# Patient Record
Sex: FEMALE | Race: WHITE | Employment: OTHER | URBAN - METROPOLITAN AREA
[De-identification: names, ages, dates, MRNs, and addresses within clinical notes are randomized per-mention and may not be internally consistent; named-entity substitution may affect disease eponyms.]

---

## 2018-03-14 ENCOUNTER — TRANSFERRED RECORDS (OUTPATIENT)
Dept: HEALTH INFORMATION MANAGEMENT | Facility: CLINIC | Age: 63
End: 2018-03-14

## 2018-04-03 ENCOUNTER — APPOINTMENT (OUTPATIENT)
Dept: GENERAL RADIOLOGY | Facility: CLINIC | Age: 63
DRG: 470 | End: 2018-04-03
Attending: PHYSICIAN ASSISTANT
Payer: COMMERCIAL

## 2018-04-03 ENCOUNTER — ANESTHESIA (OUTPATIENT)
Dept: SURGERY | Facility: CLINIC | Age: 63
DRG: 470 | End: 2018-04-03
Payer: COMMERCIAL

## 2018-04-03 ENCOUNTER — APPOINTMENT (OUTPATIENT)
Dept: PHYSICAL THERAPY | Facility: CLINIC | Age: 63
DRG: 470 | End: 2018-04-03
Attending: ORTHOPAEDIC SURGERY
Payer: COMMERCIAL

## 2018-04-03 ENCOUNTER — ANESTHESIA EVENT (OUTPATIENT)
Dept: SURGERY | Facility: CLINIC | Age: 63
DRG: 470 | End: 2018-04-03
Payer: COMMERCIAL

## 2018-04-03 ENCOUNTER — HOSPITAL ENCOUNTER (INPATIENT)
Facility: CLINIC | Age: 63
LOS: 3 days | Discharge: HOME HEALTH | DRG: 470 | End: 2018-04-06
Attending: ORTHOPAEDIC SURGERY | Admitting: ORTHOPAEDIC SURGERY
Payer: COMMERCIAL

## 2018-04-03 DIAGNOSIS — Z96.651 S/P TOTAL KNEE REPLACEMENT USING CEMENT, RIGHT: Primary | ICD-10-CM

## 2018-04-03 LAB
CREAT SERPL-MCNC: 0.57 MG/DL (ref 0.52–1.04)
GFR SERPL CREATININE-BSD FRML MDRD: >90 ML/MIN/1.7M2
PLATELET # BLD AUTO: 254 10E9/L (ref 150–450)
POTASSIUM SERPL-SCNC: 3.8 MMOL/L (ref 3.4–5.3)

## 2018-04-03 PROCEDURE — 25000128 H RX IP 250 OP 636: Performed by: PHYSICIAN ASSISTANT

## 2018-04-03 PROCEDURE — 25000128 H RX IP 250 OP 636

## 2018-04-03 PROCEDURE — 36000093 ZZH SURGERY LEVEL 4 1ST 30 MIN: Performed by: ORTHOPAEDIC SURGERY

## 2018-04-03 PROCEDURE — 25000128 H RX IP 250 OP 636: Performed by: ANESTHESIOLOGY

## 2018-04-03 PROCEDURE — 27810169 ZZH OR IMPLANT GENERAL: Performed by: ORTHOPAEDIC SURGERY

## 2018-04-03 PROCEDURE — 36000063 ZZH SURGERY LEVEL 4 EA 15 ADDTL MIN: Performed by: ORTHOPAEDIC SURGERY

## 2018-04-03 PROCEDURE — 85049 AUTOMATED PLATELET COUNT: CPT | Performed by: PHYSICIAN ASSISTANT

## 2018-04-03 PROCEDURE — 82565 ASSAY OF CREATININE: CPT | Performed by: PHYSICIAN ASSISTANT

## 2018-04-03 PROCEDURE — 25000128 H RX IP 250 OP 636: Performed by: ORTHOPAEDIC SURGERY

## 2018-04-03 PROCEDURE — 36415 COLL VENOUS BLD VENIPUNCTURE: CPT | Performed by: ANESTHESIOLOGY

## 2018-04-03 PROCEDURE — C1776 JOINT DEVICE (IMPLANTABLE): HCPCS | Performed by: ORTHOPAEDIC SURGERY

## 2018-04-03 PROCEDURE — 97530 THERAPEUTIC ACTIVITIES: CPT | Mod: GP

## 2018-04-03 PROCEDURE — 37000008 ZZH ANESTHESIA TECHNICAL FEE, 1ST 30 MIN: Performed by: ORTHOPAEDIC SURGERY

## 2018-04-03 PROCEDURE — 97116 GAIT TRAINING THERAPY: CPT | Mod: GP

## 2018-04-03 PROCEDURE — 84132 ASSAY OF SERUM POTASSIUM: CPT | Performed by: ANESTHESIOLOGY

## 2018-04-03 PROCEDURE — 27110028 ZZH OR GENERAL SUPPLY NON-STERILE: Performed by: ORTHOPAEDIC SURGERY

## 2018-04-03 PROCEDURE — 27210995 ZZH RX 272: Performed by: ORTHOPAEDIC SURGERY

## 2018-04-03 PROCEDURE — 25000132 ZZH RX MED GY IP 250 OP 250 PS 637: Performed by: PHYSICIAN ASSISTANT

## 2018-04-03 PROCEDURE — 71000013 ZZH RECOVERY PHASE 1 LEVEL 1 EA ADDTL HR: Performed by: ORTHOPAEDIC SURGERY

## 2018-04-03 PROCEDURE — 40000193 ZZH STATISTIC PT WARD VISIT

## 2018-04-03 PROCEDURE — 97161 PT EVAL LOW COMPLEX 20 MIN: CPT | Mod: GP

## 2018-04-03 PROCEDURE — 0SRC0J9 REPLACEMENT OF RIGHT KNEE JOINT WITH SYNTHETIC SUBSTITUTE, CEMENTED, OPEN APPROACH: ICD-10-PCS | Performed by: ORTHOPAEDIC SURGERY

## 2018-04-03 PROCEDURE — 71000012 ZZH RECOVERY PHASE 1 LEVEL 1 FIRST HR: Performed by: ORTHOPAEDIC SURGERY

## 2018-04-03 PROCEDURE — 40000171 ZZH STATISTIC PRE-PROCEDURE ASSESSMENT III: Performed by: ORTHOPAEDIC SURGERY

## 2018-04-03 PROCEDURE — 25000125 ZZHC RX 250

## 2018-04-03 PROCEDURE — 97110 THERAPEUTIC EXERCISES: CPT | Mod: GP

## 2018-04-03 PROCEDURE — 12000007 ZZH R&B INTERMEDIATE

## 2018-04-03 PROCEDURE — 25800025 ZZH RX 258: Performed by: ORTHOPAEDIC SURGERY

## 2018-04-03 PROCEDURE — 40000986 XR KNEE PORT RT 1/2 VW: Mod: RT

## 2018-04-03 PROCEDURE — 37000009 ZZH ANESTHESIA TECHNICAL FEE, EACH ADDTL 15 MIN: Performed by: ORTHOPAEDIC SURGERY

## 2018-04-03 PROCEDURE — 27210794 ZZH OR GENERAL SUPPLY STERILE: Performed by: ORTHOPAEDIC SURGERY

## 2018-04-03 PROCEDURE — 36416 COLLJ CAPILLARY BLOOD SPEC: CPT | Performed by: PHYSICIAN ASSISTANT

## 2018-04-03 DEVICE — IMPLANTABLE DEVICE: Type: IMPLANTABLE DEVICE | Site: KNEE | Status: FUNCTIONAL

## 2018-04-03 DEVICE — BONE CEMENT STRK SIMPLEX P SPEEDSET 6192-1-001: Type: IMPLANTABLE DEVICE | Site: KNEE | Status: FUNCTIONAL

## 2018-04-03 RX ORDER — ACETAMINOPHEN 325 MG/1
650 TABLET ORAL EVERY 4 HOURS PRN
Status: DISCONTINUED | OUTPATIENT
Start: 2018-04-06 | End: 2018-04-06 | Stop reason: HOSPADM

## 2018-04-03 RX ORDER — SODIUM CHLORIDE, SODIUM LACTATE, POTASSIUM CHLORIDE, CALCIUM CHLORIDE 600; 310; 30; 20 MG/100ML; MG/100ML; MG/100ML; MG/100ML
INJECTION, SOLUTION INTRAVENOUS CONTINUOUS
Status: DISCONTINUED | OUTPATIENT
Start: 2018-04-03 | End: 2018-04-03 | Stop reason: HOSPADM

## 2018-04-03 RX ORDER — EPHEDRINE SULFATE 50 MG/ML
INJECTION, SOLUTION INTRAMUSCULAR; INTRAVENOUS; SUBCUTANEOUS PRN
Status: DISCONTINUED | OUTPATIENT
Start: 2018-04-03 | End: 2018-04-03

## 2018-04-03 RX ORDER — GLYCOPYRROLATE 0.2 MG/ML
INJECTION, SOLUTION INTRAMUSCULAR; INTRAVENOUS PRN
Status: DISCONTINUED | OUTPATIENT
Start: 2018-04-03 | End: 2018-04-03

## 2018-04-03 RX ORDER — PROPOFOL 10 MG/ML
INJECTION, EMULSION INTRAVENOUS CONTINUOUS PRN
Status: DISCONTINUED | OUTPATIENT
Start: 2018-04-03 | End: 2018-04-03

## 2018-04-03 RX ORDER — MULTIPLE VITAMINS W/ MINERALS TAB 9MG-400MCG
1 TAB ORAL AT BEDTIME
Status: DISCONTINUED | OUTPATIENT
Start: 2018-04-03 | End: 2018-04-06 | Stop reason: HOSPADM

## 2018-04-03 RX ORDER — NALOXONE HYDROCHLORIDE 0.4 MG/ML
.1-.4 INJECTION, SOLUTION INTRAMUSCULAR; INTRAVENOUS; SUBCUTANEOUS
Status: DISCONTINUED | OUTPATIENT
Start: 2018-04-03 | End: 2018-04-06 | Stop reason: HOSPADM

## 2018-04-03 RX ORDER — FENTANYL CITRATE 50 UG/ML
INJECTION, SOLUTION INTRAMUSCULAR; INTRAVENOUS PRN
Status: DISCONTINUED | OUTPATIENT
Start: 2018-04-03 | End: 2018-04-03

## 2018-04-03 RX ORDER — DEXAMETHASONE SODIUM PHOSPHATE 4 MG/ML
INJECTION, SOLUTION INTRA-ARTICULAR; INTRALESIONAL; INTRAMUSCULAR; INTRAVENOUS; SOFT TISSUE PRN
Status: DISCONTINUED | OUTPATIENT
Start: 2018-04-03 | End: 2018-04-03

## 2018-04-03 RX ORDER — ACETAMINOPHEN 500 MG
1000 TABLET ORAL ONCE
Status: COMPLETED | OUTPATIENT
Start: 2018-04-03 | End: 2018-04-03

## 2018-04-03 RX ORDER — PROCHLORPERAZINE MALEATE 10 MG
10 TABLET ORAL EVERY 6 HOURS PRN
Status: DISCONTINUED | OUTPATIENT
Start: 2018-04-03 | End: 2018-04-06 | Stop reason: HOSPADM

## 2018-04-03 RX ORDER — PROPOFOL 10 MG/ML
INJECTION, EMULSION INTRAVENOUS PRN
Status: DISCONTINUED | OUTPATIENT
Start: 2018-04-03 | End: 2018-04-03

## 2018-04-03 RX ORDER — DIAZEPAM 5 MG
5 TABLET ORAL EVERY 6 HOURS PRN
Status: DISCONTINUED | OUTPATIENT
Start: 2018-04-03 | End: 2018-04-06 | Stop reason: HOSPADM

## 2018-04-03 RX ORDER — NALOXONE HYDROCHLORIDE 0.4 MG/ML
.1-.4 INJECTION, SOLUTION INTRAMUSCULAR; INTRAVENOUS; SUBCUTANEOUS
Status: DISCONTINUED | OUTPATIENT
Start: 2018-04-03 | End: 2018-04-04

## 2018-04-03 RX ORDER — MAGNESIUM HYDROXIDE 1200 MG/15ML
LIQUID ORAL PRN
Status: DISCONTINUED | OUTPATIENT
Start: 2018-04-03 | End: 2018-04-03

## 2018-04-03 RX ORDER — AMLODIPINE BESYLATE 5 MG/1
5 TABLET ORAL EVERY MORNING
Status: DISCONTINUED | OUTPATIENT
Start: 2018-04-04 | End: 2018-04-06 | Stop reason: HOSPADM

## 2018-04-03 RX ORDER — ONDANSETRON 4 MG/1
4 TABLET, ORALLY DISINTEGRATING ORAL EVERY 6 HOURS PRN
Status: DISCONTINUED | OUTPATIENT
Start: 2018-04-03 | End: 2018-04-06 | Stop reason: HOSPADM

## 2018-04-03 RX ORDER — AMOXICILLIN 250 MG
1 CAPSULE ORAL 2 TIMES DAILY
Status: DISCONTINUED | OUTPATIENT
Start: 2018-04-03 | End: 2018-04-06 | Stop reason: HOSPADM

## 2018-04-03 RX ORDER — MULTIVIT WITH MINERALS/LUTEIN
1 TABLET ORAL AT BEDTIME
COMMUNITY
End: 2021-01-01

## 2018-04-03 RX ORDER — ONDANSETRON 2 MG/ML
4 INJECTION INTRAMUSCULAR; INTRAVENOUS EVERY 30 MIN PRN
Status: DISCONTINUED | OUTPATIENT
Start: 2018-04-03 | End: 2018-04-03 | Stop reason: HOSPADM

## 2018-04-03 RX ORDER — ZOLPIDEM TARTRATE 5 MG/1
5 TABLET ORAL
Status: DISCONTINUED | OUTPATIENT
Start: 2018-04-04 | End: 2018-04-06 | Stop reason: HOSPADM

## 2018-04-03 RX ORDER — AMOXICILLIN 250 MG
2 CAPSULE ORAL 2 TIMES DAILY
Status: DISCONTINUED | OUTPATIENT
Start: 2018-04-03 | End: 2018-04-06 | Stop reason: HOSPADM

## 2018-04-03 RX ORDER — HYDROMORPHONE HYDROCHLORIDE 1 MG/ML
.3-.5 INJECTION, SOLUTION INTRAMUSCULAR; INTRAVENOUS; SUBCUTANEOUS EVERY 5 MIN PRN
Status: DISCONTINUED | OUTPATIENT
Start: 2018-04-03 | End: 2018-04-03 | Stop reason: HOSPADM

## 2018-04-03 RX ORDER — CEFAZOLIN SODIUM 2 G/100ML
2 INJECTION, SOLUTION INTRAVENOUS EVERY 8 HOURS
Status: COMPLETED | OUTPATIENT
Start: 2018-04-03 | End: 2018-04-04

## 2018-04-03 RX ORDER — CEFAZOLIN SODIUM 2 G/100ML
2 INJECTION, SOLUTION INTRAVENOUS
Status: DISCONTINUED | OUTPATIENT
Start: 2018-04-03 | End: 2018-04-03 | Stop reason: HOSPADM

## 2018-04-03 RX ORDER — ACETAMINOPHEN 325 MG/1
975 TABLET ORAL EVERY 8 HOURS
Status: DISCONTINUED | OUTPATIENT
Start: 2018-04-03 | End: 2018-04-06 | Stop reason: HOSPADM

## 2018-04-03 RX ORDER — FENTANYL CITRATE 50 UG/ML
25-50 INJECTION, SOLUTION INTRAMUSCULAR; INTRAVENOUS EVERY 5 MIN PRN
Status: DISCONTINUED | OUTPATIENT
Start: 2018-04-03 | End: 2018-04-03 | Stop reason: HOSPADM

## 2018-04-03 RX ORDER — HYDROMORPHONE HYDROCHLORIDE 2 MG/1
2-4 TABLET ORAL
Status: DISCONTINUED | OUTPATIENT
Start: 2018-04-03 | End: 2018-04-06 | Stop reason: HOSPADM

## 2018-04-03 RX ORDER — SODIUM CHLORIDE 9 MG/ML
INJECTION, SOLUTION INTRAVENOUS CONTINUOUS
Status: DISCONTINUED | OUTPATIENT
Start: 2018-04-03 | End: 2018-04-06 | Stop reason: HOSPADM

## 2018-04-03 RX ORDER — HYDROXYZINE HYDROCHLORIDE 25 MG/1
25 TABLET, FILM COATED ORAL EVERY 6 HOURS PRN
Status: DISCONTINUED | OUTPATIENT
Start: 2018-04-03 | End: 2018-04-06 | Stop reason: HOSPADM

## 2018-04-03 RX ORDER — OXYCODONE HCL 10 MG/1
10 TABLET, FILM COATED, EXTENDED RELEASE ORAL ONCE
Status: COMPLETED | OUTPATIENT
Start: 2018-04-03 | End: 2018-04-03

## 2018-04-03 RX ORDER — METOCLOPRAMIDE HYDROCHLORIDE 5 MG/ML
10 INJECTION INTRAMUSCULAR; INTRAVENOUS EVERY 6 HOURS PRN
Status: DISCONTINUED | OUTPATIENT
Start: 2018-04-03 | End: 2018-04-06 | Stop reason: HOSPADM

## 2018-04-03 RX ORDER — LIDOCAINE 40 MG/G
CREAM TOPICAL
Status: DISCONTINUED | OUTPATIENT
Start: 2018-04-03 | End: 2018-04-06 | Stop reason: HOSPADM

## 2018-04-03 RX ORDER — ONDANSETRON 4 MG/1
4 TABLET, ORALLY DISINTEGRATING ORAL EVERY 30 MIN PRN
Status: DISCONTINUED | OUTPATIENT
Start: 2018-04-03 | End: 2018-04-03 | Stop reason: HOSPADM

## 2018-04-03 RX ORDER — CEFAZOLIN SODIUM 1 G
1 VIAL (EA) INJECTION SEE ADMIN INSTRUCTIONS
Status: DISCONTINUED | OUTPATIENT
Start: 2018-04-03 | End: 2018-04-03 | Stop reason: HOSPADM

## 2018-04-03 RX ORDER — ONDANSETRON 2 MG/ML
4 INJECTION INTRAMUSCULAR; INTRAVENOUS EVERY 6 HOURS PRN
Status: DISCONTINUED | OUTPATIENT
Start: 2018-04-03 | End: 2018-04-06 | Stop reason: HOSPADM

## 2018-04-03 RX ORDER — ONDANSETRON 2 MG/ML
INJECTION INTRAMUSCULAR; INTRAVENOUS PRN
Status: DISCONTINUED | OUTPATIENT
Start: 2018-04-03 | End: 2018-04-03

## 2018-04-03 RX ORDER — METOCLOPRAMIDE 10 MG/1
10 TABLET ORAL EVERY 6 HOURS PRN
Status: DISCONTINUED | OUTPATIENT
Start: 2018-04-03 | End: 2018-04-06 | Stop reason: HOSPADM

## 2018-04-03 RX ORDER — CEFAZOLIN SODIUM 2 G/100ML
2 INJECTION, SOLUTION INTRAVENOUS
Status: COMPLETED | OUTPATIENT
Start: 2018-04-03 | End: 2018-04-03

## 2018-04-03 RX ORDER — HYDROMORPHONE HYDROCHLORIDE 1 MG/ML
.3-.5 INJECTION, SOLUTION INTRAMUSCULAR; INTRAVENOUS; SUBCUTANEOUS
Status: DISCONTINUED | OUTPATIENT
Start: 2018-04-03 | End: 2018-04-06 | Stop reason: HOSPADM

## 2018-04-03 RX ORDER — PANTOPRAZOLE SODIUM 40 MG/1
40 TABLET, DELAYED RELEASE ORAL ONCE
Status: COMPLETED | OUTPATIENT
Start: 2018-04-03 | End: 2018-04-03

## 2018-04-03 RX ORDER — NEOSTIGMINE METHYLSULFATE 1 MG/ML
VIAL (ML) INJECTION PRN
Status: DISCONTINUED | OUTPATIENT
Start: 2018-04-03 | End: 2018-04-03

## 2018-04-03 RX ORDER — METOPROLOL SUCCINATE 50 MG/1
50 TABLET, EXTENDED RELEASE ORAL EVERY MORNING
Status: DISCONTINUED | OUTPATIENT
Start: 2018-04-04 | End: 2018-04-06 | Stop reason: HOSPADM

## 2018-04-03 RX ORDER — OXYCODONE HCL 10 MG/1
10 TABLET, FILM COATED, EXTENDED RELEASE ORAL EVERY 12 HOURS
Status: DISPENSED | OUTPATIENT
Start: 2018-04-03 | End: 2018-04-05

## 2018-04-03 RX ORDER — LABETALOL HYDROCHLORIDE 5 MG/ML
10 INJECTION, SOLUTION INTRAVENOUS
Status: DISCONTINUED | OUTPATIENT
Start: 2018-04-03 | End: 2018-04-03 | Stop reason: HOSPADM

## 2018-04-03 RX ORDER — LIDOCAINE HYDROCHLORIDE 20 MG/ML
INJECTION, SOLUTION INFILTRATION; PERINEURAL PRN
Status: DISCONTINUED | OUTPATIENT
Start: 2018-04-03 | End: 2018-04-03

## 2018-04-03 RX ADMIN — PROPOFOL 150 MCG/KG/MIN: 10 INJECTION, EMULSION INTRAVENOUS at 10:04

## 2018-04-03 RX ADMIN — DEXAMETHASONE SODIUM PHOSPHATE 4 MG: 4 INJECTION, SOLUTION INTRA-ARTICULAR; INTRALESIONAL; INTRAMUSCULAR; INTRAVENOUS; SOFT TISSUE at 10:11

## 2018-04-03 RX ADMIN — MIDAZOLAM 2 MG: 1 INJECTION INTRAMUSCULAR; INTRAVENOUS at 09:59

## 2018-04-03 RX ADMIN — PHENYLEPHRINE HYDROCHLORIDE 100 MCG: 10 INJECTION, SOLUTION INTRAMUSCULAR; INTRAVENOUS; SUBCUTANEOUS at 10:20

## 2018-04-03 RX ADMIN — DEXMEDETOMIDINE HYDROCHLORIDE 0.3 MCG/KG/HR: 100 INJECTION, SOLUTION INTRAVENOUS at 10:03

## 2018-04-03 RX ADMIN — FENTANYL CITRATE 50 MCG: 50 INJECTION, SOLUTION INTRAMUSCULAR; INTRAVENOUS at 10:03

## 2018-04-03 RX ADMIN — HYDROMORPHONE HYDROCHLORIDE 0.5 MG: 1 INJECTION, SOLUTION INTRAMUSCULAR; INTRAVENOUS; SUBCUTANEOUS at 11:58

## 2018-04-03 RX ADMIN — Medication 10 MG: at 10:35

## 2018-04-03 RX ADMIN — ROCURONIUM BROMIDE 50 MG: 10 INJECTION INTRAVENOUS at 10:03

## 2018-04-03 RX ADMIN — NEOSTIGMINE METHYLSULFATE 5 MG: 1 INJECTION, SOLUTION INTRAVENOUS at 11:07

## 2018-04-03 RX ADMIN — SODIUM CHLORIDE: 9 INJECTION, SOLUTION INTRAVENOUS at 12:56

## 2018-04-03 RX ADMIN — Medication 10 MG: at 10:25

## 2018-04-03 RX ADMIN — SODIUM CHLORIDE, POTASSIUM CHLORIDE, SODIUM LACTATE AND CALCIUM CHLORIDE: 600; 310; 30; 20 INJECTION, SOLUTION INTRAVENOUS at 09:24

## 2018-04-03 RX ADMIN — CEFAZOLIN SODIUM 2 G: 2 INJECTION, SOLUTION INTRAVENOUS at 10:19

## 2018-04-03 RX ADMIN — ONDANSETRON 4 MG: 2 INJECTION INTRAMUSCULAR; INTRAVENOUS at 10:46

## 2018-04-03 RX ADMIN — HYDROXYZINE HYDROCHLORIDE 25 MG: 25 TABLET ORAL at 18:09

## 2018-04-03 RX ADMIN — ROPIVACAINE HYDROCHLORIDE 25 ML GIVEN: 5 INJECTION, SOLUTION EPIDURAL; INFILTRATION; PERINEURAL at 09:16

## 2018-04-03 RX ADMIN — PANTOPRAZOLE SODIUM 40 MG: 40 TABLET, DELAYED RELEASE ORAL at 08:36

## 2018-04-03 RX ADMIN — MIDAZOLAM HYDROCHLORIDE 2 MG: 1 INJECTION, SOLUTION INTRAMUSCULAR; INTRAVENOUS at 09:26

## 2018-04-03 RX ADMIN — GLYCOPYRROLATE 0.8 MG: 0.2 INJECTION, SOLUTION INTRAMUSCULAR; INTRAVENOUS at 11:07

## 2018-04-03 RX ADMIN — PROPOFOL 50 MG: 10 INJECTION, EMULSION INTRAVENOUS at 11:01

## 2018-04-03 RX ADMIN — FENTANYL CITRATE 50 MCG: 50 INJECTION, SOLUTION INTRAMUSCULAR; INTRAVENOUS at 10:22

## 2018-04-03 RX ADMIN — SENNOSIDES AND DOCUSATE SODIUM 1 TABLET: 8.6; 5 TABLET ORAL at 22:13

## 2018-04-03 RX ADMIN — ACETAMINOPHEN 1000 MG: 500 TABLET, FILM COATED ORAL at 08:35

## 2018-04-03 RX ADMIN — LIDOCAINE HYDROCHLORIDE 100 MG: 20 INJECTION, SOLUTION INFILTRATION; PERINEURAL at 10:03

## 2018-04-03 RX ADMIN — HYDROMORPHONE HYDROCHLORIDE 0.5 MG: 1 INJECTION, SOLUTION INTRAMUSCULAR; INTRAVENOUS; SUBCUTANEOUS at 13:07

## 2018-04-03 RX ADMIN — OXYCODONE HYDROCHLORIDE 10 MG: 10 TABLET, FILM COATED, EXTENDED RELEASE ORAL at 08:35

## 2018-04-03 RX ADMIN — ACETAMINOPHEN 975 MG: 325 TABLET, FILM COATED ORAL at 22:13

## 2018-04-03 RX ADMIN — MULTIPLE VITAMINS W/ MINERALS TAB 1 TABLET: TAB at 22:14

## 2018-04-03 RX ADMIN — PROPOFOL 200 MG: 10 INJECTION, EMULSION INTRAVENOUS at 10:03

## 2018-04-03 RX ADMIN — Medication 10 MG: at 10:50

## 2018-04-03 RX ADMIN — CEFAZOLIN SODIUM 2 G: 2 INJECTION, SOLUTION INTRAVENOUS at 18:09

## 2018-04-03 RX ADMIN — SODIUM CHLORIDE, POTASSIUM CHLORIDE, SODIUM LACTATE AND CALCIUM CHLORIDE: 600; 310; 30; 20 INJECTION, SOLUTION INTRAVENOUS at 10:30

## 2018-04-03 NOTE — BRIEF OP NOTE
[facility] Brief Operative Note    Pre-operative diagnosis: END STAGE OSTEOARTHRITIS RIGHT KNEE   Post-operative diagnosis: Same   Procedure: Procedure(s):  TOTAL ARTHROPLASTY RIGHT KNEE - ZAHIRA HIGHTOWER   Surgeon: Amy Landers MD   Assistant(s): Franny Hill PA-C   Estimated blood loss: Less than 20 ml   Drains:  Specimens:                             Medium Hemovac  None   Condition: Stable

## 2018-04-03 NOTE — IP AVS SNAPSHOT
[address] Special[facility]    [address] [place] [address]    Phone:  [phone]                                       After Visit Summary   4/3/2018    Sarahy Dunbar    MRN: [id number]           After Visit Summary Signature Page     I have received my discharge instructions, and my questions have been answered. I have discussed any challenges I see with this plan with the nurse or doctor.    ..........................................................................................................................................  Patient/Patient Representative Signature      ..........................................................................................................................................  Patient Representative Print Name and Relationship to Patient    ..................................................               ................................................  Date                                            Time    ..........................................................................................................................................  Reviewed by Signature/Title    ...................................................              ..............................................  Date                                                            Time

## 2018-04-03 NOTE — IP AVS SNAPSHOT
[place] [address] [facility]: [phone]                                              INTERAGENCY TRANSFER FORM - PHYSICIAN ORDERS   4/3/2018                    Hospital Admission Date: 4/3/2018  SYED VAZQUEZ   : 1955  Sex: Female        Attending Provider: Amy Landers MD     Allergies:  Lisinopril-hydrochlorothiazide    Infection:  None   Service:  SURGERY    Ht:  1.524 m (5')   Wt:  104.3 kg (230 lb)   Admission Wt:  104.3 kg (230 lb)    BMI:  44.92 kg/m 2   BSA:  2.1 m 2            Patient PCP Information     Provider PCP Type    Rama Pop General      ED Clinical Impression     Diagnosis Description Comment Added By Time Added    S/P total knee replacement using cement, right [Z96.651] S/P total knee replacement using cement, right [Z96.651]  Franny Hill PA-C 2018  6:50 AM      Hospital Problems as of 2018              Priority Class Noted POA    S/P total knee replacement using cement, right Medium  4/3/2018 Yes      Non-Hospital Problems as of 2018              Priority Class Noted    Other postprocedural status(V45.89) Medium  3/31/2006    Place of occurrence, home Medium  3/31/2006    Accidental fall on or from other stairs or steps Medium  3/31/2006    Left ankle pain Medium  2012    Papulovesicular rash B/l LE Medium  2012    S/P total knee replacement using cement, left Medium  2016      Code Status History     Date Active Date Inactive Code Status Order ID Comments User Context    2012  8:49 AM  Full Code [id number]  Lonnie Aaron MD Outpatient    2012  6:01 PM 2012  8:49 AM Full Code [id number]  Zaida Ho, RN Inpatient    2012  1:04 AM 2012  6:01 PM Full Code [id number]  Deepa Noland, RN Inpatient         Medication Review      CONTINUE these medications which have NOT CHANGED        Dose / Directions Comments    AMLODIPINE BESYLATE PO        Dose:  5 mg   Take 5 mg by  mouth every morning   Refills:  0        CENTRUM SILVER per tablet        Dose:  1 tablet   Take 1 tablet by mouth At Bedtime   Refills:  0        METOPROLOL SUCCINATE ER PO        Dose:  50 mg   Take 50 mg by mouth every morning   Refills:  0        TYLENOL PO        Dose:  650 mg   Take 650 mg by mouth every 4 hours as needed for mild pain or fever   Refills:  0                  Further instructions from your care team       Home care arranged by:  [facility]  Phone number #[phone]  Fax [phone]      Summary of Visit     Reason for your hospital stay       Total Knee Arthroplasty             After Care     Activity       Your activity upon discharge: activity as tolerated       Diet       Follow this diet upon discharge: Regular       Wound care and dressings       Instructions to care for your wound at home: daily dressing changes as needed.  May shower with incision uncovered. No soaking or bathing for 2 weeks.             Referrals     Home Care PT Referral for Hospital Discharge       PT to eval and treat    Your provider has ordered home care - physical therapy. If you have not been contacted within 2 days of your discharge please call the department phone number listed on the top of this document.              MD face to face encounter       Documentation of Face to Face and Certification for Home Health Services    I certify that patient: Sarahy Dunbar is under my care and that I, or a nurse practitioner or physician's assistant working with me, had a face-to-face encounter that meets the physician face-to-face encounter requirements with this patient on: 4/6/2018.    This encounter with the patient was in whole, or in part, for the following medical condition, which is the primary reason for home health care: total knee arthroplasty.    I certify that, based on my findings, the following services are medically necessary home health services: Physical Therapy.    My clinical findings  support the need for the above services because: Physical Therapy Services are needed to assess and treat the following functional impairments: limited mobility and ambulation following TKA.    Further, I certify that my clinical findings support that this patient is homebound (i.e. absences from home require considerable and taxing effort and are for medical reasons or [employer] services or infrequently or of short duration when for other reasons) because: Requires assistance of another person or specialized equipment to access medical services because patient: Has prohibitive pain during ambulation.    Based on the above findings. I certify that this patient is confined to the home and needs intermittent skilled nursing care, physical therapy and/or speech therapy.  The patient is under my care, and I have initiated the establishment of the plan of care.  This patient will be followed by a physician who will periodically review the plan of care.  Physician/Provider to provide follow up care: Rama Pop    Attending hospital physician (the Medicare certified PECOS provider): Franny Hill  Physician Signature: See electronic signature associated with these discharge orders.  Date: 4/6/2018                  Follow-Up Appointment Instructions     Future Labs/Procedures    Follow-up and recommended labs and tests     Comments:    Follow up with Dr. Landers at [facility] within 2 weeks to evaluate after surgery. No follow up labs or test are needed.      Follow-Up Appointment Instructions     Follow-up and recommended labs and tests       Follow up with Dr. Landers at [facility] within 2 weeks to evaluate after surgery. No follow up labs or test are needed.             Statement of Approval     Ordered          04/06/18 0650  I have reviewed and agree with all the recommendations and orders detailed in this document.  EFFECTIVE NOW     Approved and electronically  signed by:  Franny Hill PA-C

## 2018-04-03 NOTE — OP NOTE
Procedure Date: 04/03/2018      SURGEON:  Amy Landers MD      ASSISTANT:  MANUELITO Treadwell PA-C, was required for retraction of soft tissues, protection of neurovascular structures, visualization and positioning of the leg.      PREOPERATIVE DIAGNOSIS:  Right knee endstage osteoarthritis.      POSTOPERATIVE DIAGNOSIS:  Right knee endstage osteoarthritis.      PROCEDURE:  Total knee arthroplasty, right.      IMPLANTS:  DePuy Attune size 4 femur, size 4 tibia, size 10 mm polyethylene insert, size 35 mm patellar button.      INDICATIONS:  Sarahy Dunbar is a 62-year-old woman with progressive end-stage osteoarthritis of her right knee.  She has had extensive nonoperative treatments and these have failed to provide her with persistent or lasting relief.  Risks, benefits and alternatives of continued nonoperative and operative intervention, specifically knee replacement, were discussed with her and her questions were answered.  She elected to proceed with knee replacement.      DESCRIPTION OF PROCEDURE:  After informed consent was obtained and operative site was blocked, a femoral nerve block was administered in the preoperative holding suite.  She was then brought to the operating room where general anesthetic via ET tube was placed by Anesthesia.  Her right lower extremity was then prepped and draped in the usual sterile fashion.  A timeout was taken to confirm operative site, antibiotic administration and procedure.  Her leg was exsanguinated and the tourniquet insufflated to 300 mmHg.        A standard anterior incision was made on the front of the knee, sharp dissection carried out through skin and subcutaneous fat.  A medial parapatellar arthrotomy was created.  The patella was translated laterally and the knee was brought into flexion.  At this time, the anterior horns of the menisci and fat pad were resected.  Femoral intramedullary drill was used to find the  intramedullary canal.  A canal-based distal femoral articular surfacing cutting jig was secured in place and 9-mm was resected.  At this time, a sizing jig was placed on this freshly cut surface, measuring her femur to a size 4.  A 4 cutting jig was secured, subsequently checked and agreed upon as the correct size.  Therefore, anterior, posterior and chamfer cuts, followed by a PCL sacrificing box cut of the femur, were carried out.        Attention was then turned to the tibia where an extramedullary guide was fashioned, correcting for coronal and sagittal alignment.  Approximately 4 mm of proximal tibial articular surface referencing off the medial tibial plateau was planned to be resected.  The jig was secured in place and the cut was performed.  The remainder of the menisci, cruciate ligaments and articular surface proximally were removed.  On this freshly removed surface, sizing of the tibia was carried out and she was found to give a 4.  The 4 was then rotationally aligned, secured in place and a tibial pin punched.  At this time, a trial polyethylene insert was positioned until a 10 was found to give full extension and flexion and stability throughout the range of motion.  With the knee in extension, the articular surface of the patella was then subsequently removed with a saw, sized and punched.  All the trials were then in place and the knee was taken through a range of motion and cycled.  The patella was found to track normally free of any external input and the knee was stable with full range of motion.  All the trials were removed.  The bone ends were irrigated, dried and prepared for final cementation.  Final components were cemented into place and further irrigation was then performed.  A deep Hemovac drain was then positioned.  Arthrotomy was closed, followed by layered wound closure of skin and application of sterile dressing.  She was then extubated and taken to PACU in stable condition after the  tourniquet had been let down.        ESTIMATED BLOOD LOSS:  Minimal.      COMPLICATIONS:  None apparent.      POSTOPERATIVE PLAN:  Once the block wears off, she can bear weight as tolerated.  She has unrestricted range of motion, ice, elevation, intermittent use of oral narcotics for pain and daily aspirin.  Intravenous followed by oral narcotics for pain.  She will use Lovenox for DVT prophylaxis in house and discharge home on twice daily aspirin.  Anticipate discharge on postoperative day 2 or 3 to significant nursing home for rehabilitative services.         SHAYNA SMITH MD             D: 2018   T: 2018   MT: CEZAR      Name:     SYED VAZQUEZ   MRN:      [id number]        Account:        [id number]   :      1955           Procedure Date: 2018      Document: [id number]

## 2018-04-03 NOTE — PLAN OF CARE
Problem: Patient Care Overview  Goal: Plan of Care/Patient Progress Review  PT:  Discharge Planner PT   Patient plan for discharge: Return home with spouse assist for a few days and home PT if at all possible  Current status: Orders received, eval completed, treatment initiated. Pt is POD 0 R TKA. Prior to admit pt was living with spouse in a split level house, no AD use, independent with mobility. Currently requires min A for supine to/from sit, CGA sit to/from stand with FWW, CGA for gait of 8 ftx2 with FWW with KI donned and no buckling noted. Independent pericares in bathroom standing with SBA. Participated well in LE strengthening and ROM activities. Pt demonstrates pain, dec strength, balance, activity tolerance, ROM and difficulty ambulating and transferring and would benefit from skilled PT services in order to improve this. AAROM R knee 25-85 deg.  Barriers to return to prior living situation: Has not yet done stairs, short distance walking only done thus far  Recommendations for discharge: Anticipate pt will be able to return home with spouse assist and home PT  Rationale for recommendations: Pt would benefit from continued PT to improve strength, balance, mobility to increase independence, reduce falls risk and increase safety before returning home.       Entered by: Trinh Simpson 04/03/2018 4:44 PM

## 2018-04-03 NOTE — PROGRESS NOTES
Admission medication history interview status for the 4/3/2018  admission is complete. See EPIC admission navigator for prior to admission medications     Medication history source reliability:Good    Medication history interview source(s):Patient    Medication history resources (including written lists, pill bottles, clinic record):Patient emailed in a med list prior to day of procedure.    Primary pharmacy.[facility], [place], ([place])    Additional medication history information not noted on PTA med list :None    Time spent in this activity: 30 minutes    Prior to Admission medications    Medication Sig Last Dose Taking? Auth Provider   Acetaminophen (TYLENOL PO) Take 650 mg by mouth every 4 hours as needed for mild pain or fever 4/2/2018 at pm Yes Reported, Patient   Multiple Vitamins-Minerals (CENTRUM SILVER) per tablet Take 1 tablet by mouth At Bedtime 4/1/2018 at hs Yes Reported, Patient   METOPROLOL SUCCINATE ER PO Take 50 mg by mouth every morning  4/3/2018 at 0600 Yes Reported, Patient   AMLODIPINE BESYLATE PO Take 5 mg by mouth every morning  4/3/2018 at 0600 Yes Reported, Patient

## 2018-04-03 NOTE — ANESTHESIA CARE TRANSFER NOTE
Patient: Sarahy Dunbar    Procedure(s):  RIGHT TOTAL KNEE ARTHROPLASTY  - Wound Class: I-Clean    Diagnosis: END STAGE OSTEOARTHRITIS RIGHT KNEE  Diagnosis Additional Information: No value filed.    Anesthesia Type:   Periph. Nerve Block for postop pain, General, ETT     Note:  Airway :Face Mask  Patient transferred to:PACU  Comments: Spontaneously breathing with adequate vT, sats 98 - 100%, TOF 4/4 with sustained tetany. Purposeful movement, following commands with 100% O2 at 15 L/min, suctioned x2, Anesthesiologist notified.  Extubated.  To PACU with O2 via SFM at 10 L/minute, VSS. Monitors on, report to RN.Handoff Report: Identifed the Patient, Identified the Reponsible Provider, Reviewed the pertinent medical history, Discussed the surgical course, Reviewed Intra-OP anesthesia mangement and issues during anesthesia, Set expectations for post-procedure period and Allowed opportunity for questions and acknowledgement of understanding      Vitals: (Last set prior to Anesthesia Care Transfer)    CRNA VITALS  4/3/2018 1049 - 4/3/2018 1124      4/3/2018             NIBP: 120/56    Pulse: 81    NIBP Mean: 80    SpO2: 96 %    Resp Rate (set): 10                Electronically Signed By: DARRELL Foster CRNA  April 3, 2018  11:24 AM

## 2018-04-03 NOTE — PROGRESS NOTES
04/03/18 1553   Quick Adds   Type of Visit Initial PT Evaluation   Living Environment   Lives With spouse   Living Arrangements house  (3 level split)   Home Accessibility stairs to enter home;stairs within home   Number of Stairs to Enter Home 3   Number of Stairs Within Home 7   Stair Railings at Home inside, present on right side   Self-Care   Usual Activity Tolerance good   Current Activity Tolerance moderate   Regular Exercise no   Equipment Currently Used at Home none  (has 2 walkers and cane, did not use)   Functional Level Prior   Ambulation 0-->independent   Transferring 0-->independent   Fall history within last six months no   Which of the above functional risks had a recent onset or change? none   General Information   Onset of Illness/Injury or Date of Surgery - Date 04/03/18   Referring Physician Franny Boyle PA-C   Patient/Family Goals Statement return home with home PT and then OPPT. Spouse working and unable to assist past a few days at home.   Pertinent History of Current Problem (include personal factors and/or comorbidities that impact the POC) POD 0 R TKA. PMH: HTN, morbid obesity, venous insufficiency, L TKA.   Precautions/Limitations fall precautions   Weight-Bearing Status - LLE full weight-bearing   Weight-Bearing Status - RLE weight-bearing as tolerated   General Observations spouse present   Cognitive Status Examination   Orientation orientation to person, place and time   Level of Consciousness alert   Follows Commands and Answers Questions 100% of the time;able to follow multistep instructions   Personal Safety and Judgment intact   Memory intact   Pain Assessment   Patient Currently in Pain Yes, see Vital Sign flowsheet  (2/10)   Posture    Posture Forward head position;Protracted shoulders   Range of Motion (ROM)   ROM Comment R LE limited by pain and surgery, L LE WFL   Strength   Strength Comments R LE functionally weak, L LE WFL   Bed Mobility   Bed Mobility Comments  "Supine to sit with min A   Transfer Skills   Transfer Comments Sit to stand with FWW and CGA with KI donned   Gait   Gait Comments Pt amb 5 ft with FWW and CGA with KI donned, no buckling noted   Balance   Balance Comments Balance mildly dec with FWW   Sensory Examination   Sensory Perception Comments Denies numbness or tingling   General Therapy Interventions   Planned Therapy Interventions bed mobility training;gait training;ROM;strengthening;transfer training   Clinical Impression   Criteria for Skilled Therapeutic Intervention yes, treatment indicated   PT Diagnosis Difficulty ambulating   Influenced by the following impairments Pain, dec strength, balance, activity tolernace, ROM   Functional limitations due to impairments Difficulty ambulating and transferring   Clinical Presentation Stable/Uncomplicated   Clinical Presentation Rationale medically stable   Clinical Decision Making (Complexity) Low complexity   Therapy Frequency` 2 times/day   Predicted Duration of Therapy Intervention (days/wks) 3 days   Anticipated Discharge Disposition Home with Home Therapy   Risk & Benefits of therapy have been explained Yes   Patient, Family & other staff in agreement with plan of care Yes   [facility] [employer] System TM \"6 Clicks\"   2016, Trustees of [facility], under license to [employer].  All rights reserved.   6 Clicks Short Forms Basic Mobility Inpatient Short Form   [facility] AM-PAC  \"6 Clicks\" V.2 Basic Mobility Inpatient Short Form   1. Turning from your back to your side while in a flat bed without using bedrails? 3 - A Little   2. Moving from lying on your back to sitting on the side of a flat bed without using bedrails? 3 - A Little   3. Moving to and from a bed to a chair (including a wheelchair)? 3 - A Little   4. Standing up from a chair using your arms (e.g., wheelchair, or bedside chair)? 3 - A Little   5. To walk in hospital room? 3 - A Little   6. Climbing 3-5 steps with a railing? 2 - A " Lot   Basic Mobility Raw Score (Score out of 24.Lower scores equate to lower levels of function) 17   Total Evaluation Time   Total Evaluation Time (Minutes) 10

## 2018-04-03 NOTE — IP AVS SNAPSHOT
` ` Patient Information     Patient Name Sex     Sarahy Dunbar ([id number]) Female 1955       Room Bed    5532 5532-01      Patient Demographics     Address Phone    [address]  [place] [address] [phone] (Home)  [phone] (Mobile) *Preferred*      Patient Ethnicity & Race     Ethnic Group Patient Race    American White      PCP and Center    Primary Care Provider  Phone Center     Rama Pop [phone] [address] [facility] [address]        Emergency Contact(s)     Name Relation Home Work Mobile    BRENT DUNBAR Spouse [phone]        Documents on File        Status Date Received Description       Documents for the Patient    Privacy Notice - [place]  06     Consent Form  06     Waiver - Payment  06     External Medication Information Consent       Patient ID Received 16     Consent for Services - Hospital/Clinic  ()      Privacy Notice - [place] Received 12     Insurance Card Received 12     Consent for EHR Access  13 Copied from existing Consent for services - IP/ED collected on 2012    [facility] Specified Other       Consent for Services/Privacy Notice - Hospital/Clinic Received () 16     Insurance Card Received 16     Care Everywhere Prospective Auth Received 18     Consent for Services/Privacy Notice - Hospital/Clinic Received 18     Insurance Card Received 18     Patient ID Received 18     Consent for Services/Privacy Notice - Hospital/Clinic       Advance Directives and Living Will Not Received  Validation of AD 08/10/2016    Advance Directives and Living Will Received 18 Health Care Directive 08/10/2016    Insurance Card  (Deleted) 06        Documents for the Encounter    H/P - History and Physical  18 BEVERLEY JOHN, HISTORY AND PHY 2018    CMS IM for Patient Signature       EKG Cardiac - HIM Scan  (Deleted) 18  EKG, [facility] JOHN    EKG Cardiac - HIM Scan  04/02/18 EKG - [facility] 3/14/18    CE Point of Care Auth Received        Admission Information     Attending Provider Admitting Provider Admission Type Admission Date/Time    Amy Landers MD Heikes, Christie S, MD Elective 04/03/18  0753    Discharge Date Hospital Service Auth/Cert Status Service Area     Surgery Incomplete [facility]    Unit Room/Bed Admission Status       [facility] ORTHO SPEC UNIT 5532/5532-01 Admission (Confirmed)       Admission     Complaint    END STAGE OSTEOARTHRITIS RIGHT KNEE, S/P total knee replacement using cement, right      Hospital Account     Name Acct ID Class Status Primary Coverage    Sarahy Dunbar [id number] Inpatient Open [employer] [facility] [place] [employer]            Guarantor Account (for Hospital Account #[id number])     Name Relation to Pt Service Area Active? Acct Type    Sarahy Dunbar  FCS Yes Personal/Family    Address Phone          [address] [place] [address] [phone](H)  [phone](O)              Coverage Information (for Hospital Account #[id number])     F/O Payor/Plan Precert #    [employer] [employer]     Subscriber Subscriber #    Jd Dunbar [id number]    Address Phone    [address] [address]  [place] [address] [phone]

## 2018-04-03 NOTE — ANESTHESIA PREPROCEDURE EVALUATION
Anesthesia Evaluation     .             ROS/MED HX    ENT/Pulmonary:     (+)RAMOS risk factors hypertension, obese, , . .   (-) sleep apnea   Neurologic:       Cardiovascular:     (+) hypertension----. : . . . :. .       METS/Exercise Tolerance:     Hematologic:         Musculoskeletal:   (+) arthritis, , , -       GI/Hepatic:        (-) GERD   Renal/Genitourinary:         Endo:     (+) Obesity, .      Psychiatric:         Infectious Disease:         Malignancy:         Other:                     Physical Exam  Normal systems: cardiovascular, pulmonary and dental    Airway   Mallampati: III  TM distance: >3 FB  Neck ROM: full    Dental     Cardiovascular       Pulmonary                     Anesthesia Plan      History & Physical Review  History and physical reviewed and following examination; no interval change.    ASA Status:  2 .    NPO Status:  > 8 hours    Plan for Periph. Nerve Block for postop pain, General and ETT   PONV prophylaxis:  Ondansetron (or other 5HT-3) and Dexamethasone or Solumedrol  Additional equipment: Videolaryngoscope Declines SAB; glidescope due to previous report of difficult airway;      Postoperative Care  Postoperative pain management:  IV analgesics and Peripheral nerve block (Single Shot).      Consents  Anesthetic plan, risks, benefits and alternatives discussed with:  Patient..                          .

## 2018-04-03 NOTE — ANESTHESIA PROCEDURE NOTES
Peripheral nerve/Neuraxial procedure note : femoral and Adductor canal  Pre-Procedure  Performed by INGE WEBB  Location: pre-op      Pre-Anesthestic Checklist: patient identified, IV checked, site marked, risks and benefits discussed, informed consent, monitors and equipment checked, at physician/surgeon's request and post-op pain management    Timeout  Correct Patient: Yes   Correct Procedure: Yes   Correct Site: Yes   Correct Laterality: Yes   Correct Position: Yes   Site Marked: Yes   .   Procedure Documentation    .    Procedure:  right  Femoral and Adductor canal.  Local skin infiltrated with 3 mL of 1% lidocaine.     Ultrasound used to identify targeted nerve, plexus, or vascular marker and placed a needle adjacent to it., Ultrasound was used to visualize the spread of the anesthetic in close proximity to the above stated nerve. A permanent image is entered into the patient's record.  Patient Prep;mask, sterile gloves, chlorhexidine gluconate and isopropyl alcohol, patient draped.  .  Needle: insulated, short bevel Needle Gauge: 20.    Needle Length (Inches) 2  Insertion Method: Single Shot.       Assessment/Narrative  Paresthesias: No.  .  The placement was negative for: blood aspirated, painful injection and site bleeding.  Bolus given via needle..   Secured via.   Complications: none. Comments:  Single shot femoral nerve block via adductor canal;  25 ml 0.5% Ropivacaine with 1:400,000 Epinephrine

## 2018-04-03 NOTE — ANESTHESIA POSTPROCEDURE EVALUATION
Patient: Sarahy Dunbar    Procedure(s):  RIGHT TOTAL KNEE ARTHROPLASTY  - Wound Class: I-Clean    Diagnosis:END STAGE OSTEOARTHRITIS RIGHT KNEE  Diagnosis Additional Information: No value filed.    Anesthesia Type:  Periph. Nerve Block for postop pain, General, ETT    Note:  Anesthesia Post Evaluation    Patient location during evaluation: PACU  Patient participation: Able to fully participate in evaluation  Level of consciousness: awake  Pain management: adequate  Airway patency: patent  Cardiovascular status: acceptable  Respiratory status: acceptable  Hydration status: acceptable  PONV: none     Anesthetic complications: None          Last vitals:  Vitals:    04/03/18 1140 04/03/18 1150 04/03/18 1200   BP: 119/63 116/58 119/58   Resp: 20 13 16   Temp:      SpO2: 94% 94% 97%         Electronically Signed By: INGE WEBB MD  April 3, 2018  12:15 PM

## 2018-04-03 NOTE — IP AVS SNAPSHOT
MRN:[id number]                      After Visit Summary   4/3/2018    Sarahy Dunbar    MRN: [id number]           Thank you!     Thank you for choosing [facility] for your care. Our goal is always to provide you with excellent care. Hearing back from our patients is one way we can continue to improve our services. Please take a few minutes to complete the written survey that you may receive in the mail after you visit with us. Thank you!        Patient Information     Date Of Birth          1955        Designated Caregiver       Most Recent Value    Caregiver    Will someone help with your care after discharge? yes    Name of designated caregiver lulu    Phone number of caregiver     Caregiver address [place]      About your hospital stay     You were admitted on:  April 3, 2018 You last received care in the:  [facility] [facility] [address] [facility]    You were discharged on:  April 6, 2018        Reason for your hospital stay       Total Knee Arthroplasty                  Who to Call     For medical emergencies, please call 911.  For non-urgent questions about your medical care, please call your primary care provider or clinic, [phone]  For questions related to your surgery, please call your surgery clinic        Attending Provider     Provider Amy Jeff MD Orthopedics       Primary Care Provider Office Phone # Fax #    Rama GERARD Nellyflorinda [phone]      After Care Instructions     Activity       Your activity upon discharge: activity as tolerated            Diet       Follow this diet upon discharge: Regular            Wound care and dressings       Instructions to care for your wound at home: daily dressing changes as needed.  May shower with incision uncovered. No soaking or bathing for 2 weeks.                  Follow-up Appointments     Follow-up and recommended labs and tests       Follow up with Dr. Landers at [facility]  "[facility] [place] within 2 weeks to evaluate after surgery. No follow up labs or test are needed.                  Additional Services     Home Care PT Referral for Hospital Discharge       PT to eval and treat    Your provider has ordered home care - physical therapy. If you have not been contacted within 2 days of your discharge please call the department phone number listed on the top of this document.                  Further instructions from your care team       Home care arranged by:  [facility]  Phone number #[phone]  Fax [phone]    TOTAL KNEE REPLACEMENT TAKE HOME INSTRUCTIONS  Your surgeon will answer any questions about your progress. General guidelines for your care are listed below. Your surgeon may give additional instructions for your care at home. Please follow them carefully    Activity Level  1. Physical activity may be resumed gradually according to your comfort level and your surgeon s instructions. Follow your exercise program as instructed by your therapist. Do exercises at least twice a day. you may ice your knee after exercising.  2. Complete exercises two hours before bedtime to minimize the effect pain may have on sleep.  Refer to pages 19-22 of you \"Total Knee Replacement\" booklet for details  3. Do not wear your knee immobilizer unless your doctor has specifically told you to continue it.    Good Health Practices  1. Maintain an adequate fluid intake and eat a well balanced diet.  2. Be sure to include the basic food groups, such as dairy products, meat/fish, vegetables, and fruit. Each of these foods contribute to your wound healing and increasing your strength.  3. Surgery, decreased activity and pain medication all contribute to a decrease in bowel activity that can result in constipation. It is recommended that you increase your liquid intake, add fiber to your diet, increase activity, and decrease pain medication use. If you have any problems, notify your " "physician.  4. Wear your anti-embolism stockings day and night until seen by your surgeon if you were issued these at discharge.  (Not all patients will have anti-embolism stockings) Remove twice a day for one hour at a time. You may hand wash and air dry your stockings.  5. Notify your dentist of your total knee surgery and call your dentist one week before a dental appointment for antibiotics, if your dentist will not prescribe antibiotics then call your surgeon to ask for next steps.      Things to Watch For  1. Check incision daily for increased redness, tenderness, swelling, or drainage along the incision line. If these occur, please notify your doctor. Also, call if you develop a fever above 101 .  2. Please notify your doctor if you experience any calf pain and/or if you have surgical pain not relieved by the pain medication prescribed by your doctor.  3. [facility] main line: [phone]    Pending Results     No orders found from 4/1/2018 to 4/4/2018.            Statement of Approval     Ordered          04/06/18 0650  I have reviewed and agree with all the recommendations and orders detailed in this document.  EFFECTIVE NOW     Approved and electronically signed by:  Franny Hill PA-C             Admission Information     Date & Time Provider Department Dept. Phone    4/3/2018 Amy Landers MD [place] [address] [facility] [phone]      Your Vitals Were     Blood Pressure Pulse Temperature Respirations Height Weight    146/70 (BP Location: Left arm) 91 98.1  F (36.7  C) (Oral) 16 1.524 m (5') 104.3 kg (230 lb)    Pulse Oximetry BMI (Body Mass Index)                97% 44.92 kg/m2          MyChart Information     [employer] lets you send messages to your doctor, view your test results, renew your prescriptions, schedule appointments and more. To sign up, go to [email or web address] . Click on \"Log in\" on the left side of the screen, which will take you to " "the Welcome page. Then click on \"Sign up Now\" on the right side of the page.     You will be asked to enter the access code listed below, as well as some personal information. Please follow the directions to create your username and password.     Your access code is: [email or web address]  Expires: 2018  2:12 PM     Your access code will  in 90 days. If you need help or a new code, please call your [facility] clinic or [phone].        Care EveryWhere ID     This is your Care EveryWhere ID. This could be used by other organizations to access your [facility] medical records  [id number]        Equal Access to Services     SANJUANITA SARAVIA : Merissa Wynne, elaine molina, sierra bales, christel guadarrama. So [facility] [phone].    ATENCIÓN: Si habla español, tiene a cooper disposición servicios gratuitos de asistencia lingüística. Llame al [phone].    We comply with applicable federal civil rights laws and [place] laws. We do not discriminate on the basis of race, color, national origin, age, disability, sex, sexual orientation, or gender identity.               Review of your medicines      START taking        Dose / Directions    aspirin 325 MG tablet   Notes to Patient:  Take for 14 days        Dose:  325 mg   Take 1 tablet (325 mg) by mouth 2 times daily for 14 days   Quantity:  28 tablet   Refills:  0         CONTINUE these medicines which have NOT CHANGED        Dose / Directions    AMLODIPINE BESYLATE PO        Dose:  5 mg   Take 5 mg by mouth every morning   Refills:  0       CENTRUM SILVER per tablet        Dose:  1 tablet   Take 1 tablet by mouth At Bedtime   Refills:  0       METOPROLOL SUCCINATE ER PO        Dose:  50 mg   Take 50 mg by mouth every morning   Refills:  0       TYLENOL PO        Dose:  650 mg   Take 650 mg by mouth every 4 hours as needed for mild pain or fever   Refills:  0            Where to get your medicines      Some of these will " need a paper prescription and others can be bought over the counter. Ask your nurse if you have questions.     You don't need a prescription for these medications     aspirin 325 MG tablet                Protect others around you: Learn how to safely use, store and throw away your medicines at www.disposemymeds.org.             Medication List: This is a list of all your medications and when to take them. Check marks below indicate your daily home schedule. Keep this list as a reference.      Medications           Morning Afternoon Evening Bedtime As Needed    AMLODIPINE BESYLATE PO   Take 5 mg by mouth every morning   Last time this was given:  5 mg on 4/6/2018  9:21 AM   Next Dose Due:  4/7 AM                                   aspirin 325 MG tablet   Take 1 tablet (325 mg) by mouth 2 times daily for 14 days   Next Dose Due:  Start 4/7 AM   Notes to Patient:  Take for 14 days                                      CENTRUM SILVER per tablet   Take 1 tablet by mouth At Bedtime   Last time this was given:  1 tablet on 4/5/2018  9:04 PM   Next Dose Due:  4/6 PM                                   METOPROLOL SUCCINATE ER PO   Take 50 mg by mouth every morning   Last time this was given:  50 mg on 4/6/2018  9:21 AM   Next Dose Due:  4/7 AM                                   TYLENOL PO   Take 650 mg by mouth every 4 hours as needed for mild pain or fever   Last time this was given:  975 mg on 4/6/2018  9:20 AM

## 2018-04-03 NOTE — PLAN OF CARE
Problem: Patient Care Overview  Goal: Plan of Care/Patient Progress Review  Outcome: No Change  A&Ox4, VSS on 2L NC. CMS intact, dressing CDI. Hemovac patent. IV dilaudid for pain management. Pt DTV. Tolerating clear liquids. Will continue to monitor.

## 2018-04-04 ENCOUNTER — APPOINTMENT (OUTPATIENT)
Dept: PHYSICAL THERAPY | Facility: CLINIC | Age: 63
DRG: 470 | End: 2018-04-04
Attending: ORTHOPAEDIC SURGERY
Payer: COMMERCIAL

## 2018-04-04 LAB
GLUCOSE SERPL-MCNC: 106 MG/DL (ref 70–99)
HGB BLD-MCNC: 9.9 G/DL (ref 11.7–15.7)

## 2018-04-04 PROCEDURE — 36415 COLL VENOUS BLD VENIPUNCTURE: CPT | Performed by: ORTHOPAEDIC SURGERY

## 2018-04-04 PROCEDURE — 12000007 ZZH R&B INTERMEDIATE

## 2018-04-04 PROCEDURE — 97116 GAIT TRAINING THERAPY: CPT | Mod: GP | Performed by: PHYSICAL THERAPY ASSISTANT

## 2018-04-04 PROCEDURE — 97110 THERAPEUTIC EXERCISES: CPT | Mod: GP | Performed by: PHYSICAL THERAPY ASSISTANT

## 2018-04-04 PROCEDURE — 97110 THERAPEUTIC EXERCISES: CPT | Mod: GP

## 2018-04-04 PROCEDURE — 97530 THERAPEUTIC ACTIVITIES: CPT | Mod: GP

## 2018-04-04 PROCEDURE — 25000128 H RX IP 250 OP 636: Performed by: PHYSICIAN ASSISTANT

## 2018-04-04 PROCEDURE — 85018 HEMOGLOBIN: CPT | Performed by: PHYSICIAN ASSISTANT

## 2018-04-04 PROCEDURE — 40000193 ZZH STATISTIC PT WARD VISIT

## 2018-04-04 PROCEDURE — 25000132 ZZH RX MED GY IP 250 OP 250 PS 637: Performed by: PHYSICIAN ASSISTANT

## 2018-04-04 PROCEDURE — 40000193 ZZH STATISTIC PT WARD VISIT: Performed by: PHYSICAL THERAPY ASSISTANT

## 2018-04-04 PROCEDURE — 97116 GAIT TRAINING THERAPY: CPT | Mod: GP

## 2018-04-04 PROCEDURE — 40000894 ZZH STATISTIC OT IP EVAL DEFER

## 2018-04-04 PROCEDURE — 97530 THERAPEUTIC ACTIVITIES: CPT | Mod: GP | Performed by: PHYSICAL THERAPY ASSISTANT

## 2018-04-04 PROCEDURE — 82947 ASSAY GLUCOSE BLOOD QUANT: CPT | Performed by: ORTHOPAEDIC SURGERY

## 2018-04-04 RX ADMIN — HYDROMORPHONE HYDROCHLORIDE 2 MG: 2 TABLET ORAL at 08:11

## 2018-04-04 RX ADMIN — SENNOSIDES AND DOCUSATE SODIUM 2 TABLET: 8.6; 5 TABLET ORAL at 20:23

## 2018-04-04 RX ADMIN — METOPROLOL SUCCINATE 50 MG: 50 TABLET, EXTENDED RELEASE ORAL at 08:11

## 2018-04-04 RX ADMIN — AMLODIPINE BESYLATE 5 MG: 5 TABLET ORAL at 08:11

## 2018-04-04 RX ADMIN — SODIUM CHLORIDE: 9 INJECTION, SOLUTION INTRAVENOUS at 02:07

## 2018-04-04 RX ADMIN — HYDROMORPHONE HYDROCHLORIDE 2 MG: 2 TABLET ORAL at 16:42

## 2018-04-04 RX ADMIN — ACETAMINOPHEN 975 MG: 325 TABLET, FILM COATED ORAL at 07:06

## 2018-04-04 RX ADMIN — ENOXAPARIN SODIUM 40 MG: 40 INJECTION SUBCUTANEOUS at 11:23

## 2018-04-04 RX ADMIN — ACETAMINOPHEN 975 MG: 325 TABLET, FILM COATED ORAL at 16:33

## 2018-04-04 RX ADMIN — SENNOSIDES AND DOCUSATE SODIUM 1 TABLET: 8.6; 5 TABLET ORAL at 08:11

## 2018-04-04 RX ADMIN — OXYCODONE HYDROCHLORIDE 10 MG: 10 TABLET, FILM COATED, EXTENDED RELEASE ORAL at 08:11

## 2018-04-04 RX ADMIN — MULTIPLE VITAMINS W/ MINERALS TAB 1 TABLET: TAB at 20:23

## 2018-04-04 RX ADMIN — CEFAZOLIN SODIUM 2 G: 2 INJECTION, SOLUTION INTRAVENOUS at 02:09

## 2018-04-04 RX ADMIN — ENOXAPARIN SODIUM 40 MG: 40 INJECTION SUBCUTANEOUS at 23:03

## 2018-04-04 NOTE — PLAN OF CARE
Problem: Patient Care Overview  Goal: Plan of Care/Patient Progress Review  Discharge Planner OT   Patient plan for discharge: Home.  Current status: Order rec'd. Patient reports she has no concern for ADL. Has AE at home and assist if needed.   Barriers to return to prior living situation: Defer to PT.   Recommendations for discharge: Defer to PT.   Rationale for recommendations: Patient denies a need for OT eval. OT order completed.        Entered by: Juanis Harper 04/04/2018 2:00 PM

## 2018-04-04 NOTE — PLAN OF CARE
Problem: Patient Care Overview  Goal: Plan of Care/Patient Progress Review  Outcome: Improving  Nursing note  Pt is POD # 1 of RTKA doing well. Up with assist of 1/walker/gb to the BR voiding fine. Pt denies any dizziness or lightheadedness. Pt denies any n/v. Dressing d/c/i. Cms intact. Pain well controlled w/po dilaudid. hvc d/c'd. Saline locked iv. Will continue to monitor.

## 2018-04-04 NOTE — PLAN OF CARE
Problem: Patient Care Overview  Goal: Plan of Care/Patient Progress Review  Discharge Planner PT   Patient plan for discharge: home with , home PT  Current status: Pt requires Gildardo for bed mobility, transfers and gait 15' x 2 with FWW and KI donned. Toilet transfer with Gildardo, pt able to manage hygiene independently. R knee AAROM 22-75 degrees AAROM.  Barriers to return to prior living situation: level of assist required, limited mobility, pain, stairs  Recommendations for discharge: Home with  [facility]st for household tasks, home PT  Rationale for recommendations: Pt would benefit from home PT to progress AROM/strength and mobility upon hospital discharge.       Entered by: Marychuy Larose 04/04/2018 11:44 AM

## 2018-04-04 NOTE — PROGRESS NOTES
Orthopedic Surgery  4/4/2018  POD# 1    S: Patient voices no complaints today. No immediate post-operative complications.     O: Blood pressure 122/71, temperature 98.3  F (36.8  C), temperature source Oral, resp. rate 18, height 1.524 m (5'), weight 104.3 kg (230 lb), SpO2 97 %.  Lab Results   Component Value Date    HGB 9.9 04/04/2018     Lab Results   Component Value Date    INR 1.07 01/31/2012        Neurovascularly intact.  Calves are negative bilaterally, both soft and nontender.  The wound is C/D/I.  The wound looks good with minimal erythema of the surrounding skin.    A: Ms. Dunbar is doing well status post Procedure(s):  ARTHROPLASTY RIGHT KNEE.    P: Continue physical therapy.   Anticoagulation with Lovenox  Pain management  Hemovac removal today, dressing change tomorrow  Discharge planning - plan for homecare with [facility], likely POD#3 once she can safely navigate stairs.       Nichelle Hill PA-C  [phone]

## 2018-04-04 NOTE — PLAN OF CARE
Problem: Patient Care Overview  Goal: Plan of Care/Patient Progress Review  Outcome: Improving  A/o x4. VSS. RA. IVF. Tolerating clears and crackers. CMS intact. Hemovac intact. Dressing CDI. Bladder scanned +500, straight cath for 850. Bladder scan before bed 125. Has voided small amounts. Up A/1 walker to bathroom. Pain  Managed with schedule tylenol. PRN atarax x1. D/c pending progress.

## 2018-04-04 NOTE — PLAN OF CARE
Problem: Patient Care Overview  Goal: Plan of Care/Patient Progress Review  Outcome: Improving  Patient A&Ox4, VSS on 2L NC. CMS intact. Dressing CDI. Up with 1 & walker. Rated pain 2/10 refused any pain med. IV infusing. On Regular diet. Denies any nausea. cc & pt try void BSC with 50 cc.  Pt still DTV . Will continue monitoring.

## 2018-04-05 ENCOUNTER — APPOINTMENT (OUTPATIENT)
Dept: PHYSICAL THERAPY | Facility: CLINIC | Age: 63
DRG: 470 | End: 2018-04-05
Attending: ORTHOPAEDIC SURGERY
Payer: COMMERCIAL

## 2018-04-05 LAB
GLUCOSE SERPL-MCNC: 101 MG/DL (ref 70–99)
HGB BLD-MCNC: 9 G/DL (ref 11.7–15.7)

## 2018-04-05 PROCEDURE — 36415 COLL VENOUS BLD VENIPUNCTURE: CPT | Performed by: ORTHOPAEDIC SURGERY

## 2018-04-05 PROCEDURE — 25000132 ZZH RX MED GY IP 250 OP 250 PS 637: Performed by: PHYSICIAN ASSISTANT

## 2018-04-05 PROCEDURE — 40000193 ZZH STATISTIC PT WARD VISIT: Performed by: PHYSICAL THERAPY ASSISTANT

## 2018-04-05 PROCEDURE — 82947 ASSAY GLUCOSE BLOOD QUANT: CPT | Performed by: ORTHOPAEDIC SURGERY

## 2018-04-05 PROCEDURE — 12000007 ZZH R&B INTERMEDIATE

## 2018-04-05 PROCEDURE — 85018 HEMOGLOBIN: CPT | Performed by: PHYSICIAN ASSISTANT

## 2018-04-05 PROCEDURE — 97116 GAIT TRAINING THERAPY: CPT | Mod: GP | Performed by: PHYSICAL THERAPY ASSISTANT

## 2018-04-05 PROCEDURE — 97530 THERAPEUTIC ACTIVITIES: CPT | Mod: GP | Performed by: PHYSICAL THERAPY ASSISTANT

## 2018-04-05 PROCEDURE — 97110 THERAPEUTIC EXERCISES: CPT | Mod: GP | Performed by: PHYSICAL THERAPY ASSISTANT

## 2018-04-05 PROCEDURE — 25000128 H RX IP 250 OP 636: Performed by: PHYSICIAN ASSISTANT

## 2018-04-05 RX ADMIN — MULTIPLE VITAMINS W/ MINERALS TAB 1 TABLET: TAB at 21:04

## 2018-04-05 RX ADMIN — ENOXAPARIN SODIUM 40 MG: 40 INJECTION SUBCUTANEOUS at 12:31

## 2018-04-05 RX ADMIN — ACETAMINOPHEN 975 MG: 325 TABLET, FILM COATED ORAL at 08:35

## 2018-04-05 RX ADMIN — HYDROMORPHONE HYDROCHLORIDE 2 MG: 2 TABLET ORAL at 01:46

## 2018-04-05 RX ADMIN — OXYCODONE HYDROCHLORIDE 10 MG: 10 TABLET, FILM COATED, EXTENDED RELEASE ORAL at 08:35

## 2018-04-05 RX ADMIN — ACETAMINOPHEN 975 MG: 325 TABLET, FILM COATED ORAL at 23:10

## 2018-04-05 RX ADMIN — ACETAMINOPHEN 975 MG: 325 TABLET, FILM COATED ORAL at 00:42

## 2018-04-05 RX ADMIN — ENOXAPARIN SODIUM 40 MG: 40 INJECTION SUBCUTANEOUS at 23:10

## 2018-04-05 RX ADMIN — ACETAMINOPHEN 975 MG: 325 TABLET, FILM COATED ORAL at 16:15

## 2018-04-05 RX ADMIN — AMLODIPINE BESYLATE 5 MG: 5 TABLET ORAL at 08:35

## 2018-04-05 RX ADMIN — SENNOSIDES AND DOCUSATE SODIUM 2 TABLET: 8.6; 5 TABLET ORAL at 08:34

## 2018-04-05 RX ADMIN — SENNOSIDES AND DOCUSATE SODIUM 2 TABLET: 8.6; 5 TABLET ORAL at 21:04

## 2018-04-05 RX ADMIN — METOPROLOL SUCCINATE 50 MG: 50 TABLET, EXTENDED RELEASE ORAL at 08:35

## 2018-04-05 NOTE — PLAN OF CARE
Problem: Knee Arthroplasty (Total, Partial) (Adult)  Goal: Signs and Symptoms of Listed Potential Problems Will be Absent, Minimized or Managed (Knee Arthroplasty)  Signs and symptoms of listed potential problems will be absent, minimized or managed by discharge/transition of care (reference Knee Arthroplasty (Total, Partial) (Adult) CPG).   Outcome: Improving  Patient up with assist of 1 and walker.  Pain managed with PO Dilaudid and scheduled Tylenol.  VSS on RA.  A/Ox4.  Dressing CDI.  CMS intact.  Knee immobilizer on @HS.  Voiding adequately.  Good PO intake.  Plan to D/C home with [facility].

## 2018-04-05 NOTE — PROGRESS NOTES
Orthopedic Surgery  4/5/2018  POD# 2    S: Patient voices no complaints today.     O: Blood pressure 148/80, pulse 85, temperature 98.5  F (36.9  C), temperature source Oral, resp. rate 16, height 1.524 m (5'), weight 104.3 kg (230 lb), SpO2 95 %.  Lab Results   Component Value Date    HGB 9.0 04/05/2018     Lab Results   Component Value Date    INR 1.07 01/31/2012        Neurovascularly intact.  Calves are negative bilaterally, both soft and nontender.  The wound is C/D/I.  The wound looks good with minimal erythema of the surrounding skin.    A: Ms. Dunbar is doing well status post Procedure(s):  ARTHROPLASTY RIGHT KNEE.    P: Continue physical therapy.   Anticoagulation with Lovenox  Pain management  Discharge planning - plan for home with homecare ([facility]) tomorrow if progressing towards therapy goals.      Nichelle Hill PA-C  [phone]

## 2018-04-05 NOTE — PLAN OF CARE
Problem: Patient Care Overview  Goal: Plan of Care/Patient Progress Review  Outcome: Improving  Nursing note  Pt is POD # 1 of RTKA doing well. Up with assist of 1/walker/gb to the BR voiding fine. Pt denies any dizziness or lightheadedness. Pt denies any n/v. Dressing changed. Cms intact. Pain well controlled with tylenol. No iv access.Will continue to monitor.

## 2018-04-05 NOTE — PLAN OF CARE
Problem: Patient Care Overview  Goal: Plan of Care/Patient Progress Review  Discharge Planner PT   Patient plan for discharge: Home with , Home PT  Current status: Pt performed bed mobility with min assist and sit to/from stand transfers with CGA.  Pt performed gait training x 50 ft using wheeled walker and CGA.  Slow pace.  Step length improving.  Pt performed 3 stairs x 1 trial using bilateral rails and CGA.  Knee AAROM is 14-78 degrees.  Barriers to return to prior living situation: None  Recommendations for discharge: Home with  assisst for household tasks and Home PT per plan established by the PT.   Rationale for recommendations: Pt would benefit from Home PT to progress AROM/strength and mobility upon hospital discharge.       Entered by: Juanis Almanza 04/05/2018 11:05 AM

## 2018-04-06 ENCOUNTER — APPOINTMENT (OUTPATIENT)
Dept: PHYSICAL THERAPY | Facility: CLINIC | Age: 63
DRG: 470 | End: 2018-04-06
Attending: ORTHOPAEDIC SURGERY
Payer: COMMERCIAL

## 2018-04-06 VITALS
BODY MASS INDEX: 45.16 KG/M2 | DIASTOLIC BLOOD PRESSURE: 70 MMHG | RESPIRATION RATE: 16 BRPM | WEIGHT: 230 LBS | HEIGHT: 60 IN | HEART RATE: 91 BPM | SYSTOLIC BLOOD PRESSURE: 146 MMHG | OXYGEN SATURATION: 97 % | TEMPERATURE: 98.1 F

## 2018-04-06 LAB
CREAT SERPL-MCNC: 0.63 MG/DL (ref 0.52–1.04)
GFR SERPL CREATININE-BSD FRML MDRD: >90 ML/MIN/1.7M2
PLATELET # BLD AUTO: 231 10E9/L (ref 150–450)

## 2018-04-06 PROCEDURE — 36415 COLL VENOUS BLD VENIPUNCTURE: CPT | Performed by: PHYSICIAN ASSISTANT

## 2018-04-06 PROCEDURE — 97110 THERAPEUTIC EXERCISES: CPT | Mod: GP | Performed by: PHYSICAL THERAPIST

## 2018-04-06 PROCEDURE — 97530 THERAPEUTIC ACTIVITIES: CPT | Mod: GP | Performed by: PHYSICAL THERAPIST

## 2018-04-06 PROCEDURE — 25000128 H RX IP 250 OP 636: Performed by: PHYSICIAN ASSISTANT

## 2018-04-06 PROCEDURE — 82565 ASSAY OF CREATININE: CPT | Performed by: PHYSICIAN ASSISTANT

## 2018-04-06 PROCEDURE — 97116 GAIT TRAINING THERAPY: CPT | Mod: GP | Performed by: PHYSICAL THERAPIST

## 2018-04-06 PROCEDURE — 85049 AUTOMATED PLATELET COUNT: CPT | Performed by: PHYSICIAN ASSISTANT

## 2018-04-06 PROCEDURE — 25000132 ZZH RX MED GY IP 250 OP 250 PS 637: Performed by: PHYSICIAN ASSISTANT

## 2018-04-06 PROCEDURE — 40000193 ZZH STATISTIC PT WARD VISIT: Performed by: PHYSICAL THERAPIST

## 2018-04-06 RX ORDER — ASPIRIN 325 MG
325 TABLET ORAL 2 TIMES DAILY
Qty: 28 TABLET | Refills: 0
Start: 2018-04-06 | End: 2018-04-20

## 2018-04-06 RX ORDER — ASPIRIN 325 MG
325 TABLET ORAL 2 TIMES DAILY
Refills: 0
Start: 2018-04-06 | End: 2018-04-06

## 2018-04-06 RX ADMIN — METOPROLOL SUCCINATE 50 MG: 50 TABLET, EXTENDED RELEASE ORAL at 09:21

## 2018-04-06 RX ADMIN — ENOXAPARIN SODIUM 40 MG: 40 INJECTION SUBCUTANEOUS at 10:49

## 2018-04-06 RX ADMIN — ACETAMINOPHEN 975 MG: 325 TABLET, FILM COATED ORAL at 09:20

## 2018-04-06 RX ADMIN — AMLODIPINE BESYLATE 5 MG: 5 TABLET ORAL at 09:21

## 2018-04-06 NOTE — PLAN OF CARE
Problem: Patient Care Overview  Goal: Plan of Care/Patient Progress Review  Physical Therapy Discharge Summary    Reason for therapy discharge:    Discharged to home with home therapy.    Progress towards therapy goal(s). See goals on Care Plan [facility] electronic health record for goal details.  Goals partially met.  Barriers to achieving goals:   discharge from facility.    Therapy recommendation(s):    Continued therapy is recommended.  Rationale/Recommendations:  Home PT to maximize return to PLOF.

## 2018-04-06 NOTE — PLAN OF CARE
Problem: Patient Care Overview  Goal: Plan of Care/Patient Progress Review  Outcome: Improving  Pt is A&Ox4, CMS intact. Up w/ one assist/ Gb/walker to BR. Minimal pain, declined pain medication overnight. Discoloration of BLE due to venous stasis per pt. D/c today.

## 2018-04-06 NOTE — PLAN OF CARE
Problem: Patient Care Overview  Goal: Plan of Care/Patient Progress Review  Discharge Planner PT   Patient plan for discharge: return home today with assist of spouse and Home PT  Current status: Patient needing min/mod A for R LE into and OOB; CGA/SBA for sit<>stand transfers with use of a walker; Gait 75 feet x 2 with a walker and SBA; no LOB; cues for upright posture and forward gaze; Min/CGA for stairs; cues for sequencing and safety  Barriers to return to prior living situation: stairs; patient will have assist of spouse  Recommendations for discharge: home with assist of spouse  Until independent with all functional mobility; Home PT  Rationale for recommendations: decreased R knee ROM/strength; impaired independence with functional mobility       Entered by: Kerrie Marx 04/06/2018 10:05 AM

## 2018-04-06 NOTE — DISCHARGE SUMMARY
Went over avs with pt and answered all questions. Pt was sent with AVS packet, dressing supplies, and all personal belongings. Pt DC'd to home with help from family @ 1400

## 2018-04-06 NOTE — PLAN OF CARE
Problem: Knee Arthroplasty (Total, Partial) (Adult)  Goal: Signs and Symptoms of Listed Potential Problems Will be Absent, Minimized or Managed (Knee Arthroplasty)  Signs and symptoms of listed potential problems will be absent, minimized or managed by discharge/transition of care (reference Knee Arthroplasty (Total, Partial) (Adult) CPG).   Outcome: Adequate for Discharge Date Met: 04/06/18  A&O X4. CMS intact. Drg changed. Pain is being managed with po pain meds. A-1 with walker. Eating, drinking, and voiding adequately. VSS on RA. Pt to DC to home

## 2018-04-06 NOTE — PROGRESS NOTES
Orthopedic Surgery  4/6/2018  POD# 3    S: Patient voices no complaints today.     O: Blood pressure 143/58, pulse 91, temperature 98.6  F (37  C), temperature source Oral, resp. rate 16, height 1.524 m (5'), weight 104.3 kg (230 lb), SpO2 95 %.  Lab Results   Component Value Date    HGB 9.0 04/05/2018     Lab Results   Component Value Date    INR 1.07 01/31/2012        Neurovascularly intact.  Calves are negative bilaterally, both soft and nontender.  The wound is C/D/I.  The wound looks good with minimal erythema of the surrounding skin.    A: Ms. Dunbar is doing well status post Procedure(s):  ARTHROPLASTY RIGHT KNEE.    P: Continue physical therapy.   Anticoagulation with 325 mg ASA twice daily upon D/C - patient has at home and is aware of dosage and schedule  Pain management - she does not wish to have Dilaudid upon D/C and has Tylenol at home for pain management - no written Rx needed for D/C  Discharge planning - to home with [facility] today  Follow-up in 2 weeks with Dr. Landers  Call [facility] with questions [phone]      Nichelle Hill PA-C  [phone]

## 2018-04-06 NOTE — PLAN OF CARE
Problem: Patient Care Overview  Goal: Plan of Care/Patient Progress Review  Outcome: Improving  Alert and oriented x4 . Up with assist of 1 and walker . Denies pain . Vital signs stable . CMS intact . Lower ext. Discoloration from venous stasis .

## 2018-04-06 NOTE — DISCHARGE INSTRUCTIONS
"Home care arranged by:  [facility]  Phone number #[phone]  Fax [phone]    TOTAL KNEE REPLACEMENT TAKE HOME INSTRUCTIONS  Your surgeon will answer any questions about your progress. General guidelines for your care are listed below. Your surgeon may give additional instructions for your care at home. Please follow them carefully    Activity Level  1. Physical activity may be resumed gradually according to your comfort level and your surgeon s instructions. Follow your exercise program as instructed by your therapist. Do exercises at least twice a day. you may ice your knee after exercising.  2. Complete exercises two hours before bedtime to minimize the effect pain may have on sleep.  Refer to pages 19-22 of you \"Total Knee Replacement\" booklet for details  3. Do not wear your knee immobilizer unless your doctor has specifically told you to continue it.    Good Health Practices  1. Maintain an adequate fluid intake and eat a well balanced diet.  2. Be sure to include the basic food groups, such as dairy products, meat/fish, vegetables, and fruit. Each of these foods contribute to your wound healing and increasing your strength.  3. Surgery, decreased activity and pain medication all contribute to a decrease in bowel activity that can result in constipation. It is recommended that you increase your liquid intake, add fiber to your diet, increase activity, and decrease pain medication use. If you have any problems, notify your physician.  4. Wear your anti-embolism stockings day and night until seen by your surgeon if you were issued these at discharge.  (Not all patients will have anti-embolism stockings) Remove twice a day for one hour at a time. You may hand wash and air dry your stockings.  5. Notify your dentist of your total knee surgery and call your dentist one week before a dental appointment for antibiotics, if your dentist will not prescribe antibiotics then call your surgeon to ask for next " steps.      Things to Watch For  1. Check incision daily for increased redness, tenderness, swelling, or drainage along the incision line. If these occur, please notify your doctor. Also, call if you develop a fever above 101 .  2. Please notify your doctor if you experience any calf pain and/or if you have surgical pain not relieved by the pain medication prescribed by your doctor.  3. [facility] main line: [phone]

## 2018-04-13 NOTE — DISCHARGE SUMMARY
Discharge Summary    Sarahy Dunbar MRN# [id number]   YOB: 1955 Age: 62 year old     Date of Admission: 4/3/2018    Date of Discharge: 4/6/2018    Reason for Admission: Sarahy Dunbar is a 62 year old year old female who was admitted to the hospital following surgery with Dr. Amy Landers.    Preoperative Diagnosis: END STAGE OSTEOARTHRITIS RIGHT KNEE    Postoperative Diagnosis: END STAGE OSTEOARTHRITIS RIGHT KNEE    Procedure Completed: right total knee arthroplasty     Hospital Course:  Ms. Dunbar was admitted and underwent the above procedure. The patient tolerated the procedure well. There were no complications. Following surgery she was admitted to the floor.  During her stay she did not require any blood transfusions.  Her last hemoglobin was noted to be   Lab Results   Component Value Date    HGB 9.0 04/05/2018   .  Her pain was controlled with oral pain medication.  During her stay she progressed well in physical therapy and all the therapy goals were met.     Discharge Physical Exam:  /70 (BP Location: Left arm)  Pulse 91  Temp 98.1  F (36.7  C) (Oral)  Resp 16  Ht 1.524 m (5')  Wt 104.3 kg (230 lb)  SpO2 97%  BMI 44.92 kg/m2  Neurovascularly intact, distal pulses present bilaterally.  Calves are negative bilaterally, both soft and nontender.  The would looks good with minimal erythema of the surrounding skin.    Assessment: Ms. Dunbar is stable and doing well status post right total knee arthroplasty on POD #3.    Plan: We will discharge her home on analgesics and deep venous thrombosis prophylaxis.  She will follow-up with Dr. Amy Landers approximately 2 weeks from surgery.      Meds:   Sarahy Dunbar   Home Medication Instructions MAXI:[id number]    Printed on:04/13/18 0758   Medication Information                      Acetaminophen (TYLENOL PO)  Take 650 mg by mouth every 4 hours as needed for mild pain or fever             AMLODIPINE BESYLATE PO  Take 5 mg by  mouth every morning              aspirin 325 MG tablet  Take 1 tablet (325 mg) by mouth 2 times daily for 14 days             METOPROLOL SUCCINATE ER PO  Take 50 mg by mouth every morning              Multiple Vitamins-Minerals (CENTRUM SILVER) per tablet  Take 1 tablet by mouth At Bedtime

## 2021-01-01 ENCOUNTER — APPOINTMENT (OUTPATIENT)
Dept: ULTRASOUND IMAGING | Facility: CLINIC | Age: 66
DRG: 004 | End: 2021-01-01
Attending: INTERNAL MEDICINE
Payer: MEDICARE

## 2021-01-01 ENCOUNTER — ANESTHESIA (OUTPATIENT)
Dept: SURGERY | Facility: CLINIC | Age: 66
DRG: 004 | End: 2021-01-01
Payer: MEDICARE

## 2021-01-01 ENCOUNTER — APPOINTMENT (OUTPATIENT)
Dept: CARDIOLOGY | Facility: CLINIC | Age: 66
DRG: 004 | End: 2021-01-01
Attending: INTERNAL MEDICINE
Payer: MEDICARE

## 2021-01-01 ENCOUNTER — APPOINTMENT (OUTPATIENT)
Dept: GENERAL RADIOLOGY | Facility: CLINIC | Age: 66
DRG: 004 | End: 2021-01-01
Attending: INTERNAL MEDICINE
Payer: MEDICARE

## 2021-01-01 ENCOUNTER — OFFICE VISIT (OUTPATIENT)
Dept: SURGERY | Facility: PHYSICIAN GROUP | Age: 66
End: 2021-01-01
Payer: MEDICARE

## 2021-01-01 ENCOUNTER — ANESTHESIA (OUTPATIENT)
Dept: INTENSIVE CARE | Facility: CLINIC | Age: 66
DRG: 004 | End: 2021-01-01
Payer: MEDICARE

## 2021-01-01 ENCOUNTER — ANESTHESIA EVENT (OUTPATIENT)
Dept: INTENSIVE CARE | Facility: CLINIC | Age: 66
DRG: 004 | End: 2021-01-01
Payer: MEDICARE

## 2021-01-01 ENCOUNTER — APPOINTMENT (OUTPATIENT)
Dept: ULTRASOUND IMAGING | Facility: CLINIC | Age: 66
DRG: 004 | End: 2021-01-01
Attending: ANESTHESIOLOGY
Payer: MEDICARE

## 2021-01-01 ENCOUNTER — TRANSFERRED RECORDS (OUTPATIENT)
Dept: HEALTH INFORMATION MANAGEMENT | Facility: CLINIC | Age: 66
End: 2021-01-01

## 2021-01-01 ENCOUNTER — APPOINTMENT (OUTPATIENT)
Dept: CT IMAGING | Facility: CLINIC | Age: 66
DRG: 004 | End: 2021-01-01
Attending: INTERNAL MEDICINE
Payer: MEDICARE

## 2021-01-01 ENCOUNTER — APPOINTMENT (OUTPATIENT)
Dept: GENERAL RADIOLOGY | Facility: CLINIC | Age: 66
DRG: 004 | End: 2021-01-01
Attending: ANESTHESIOLOGY
Payer: MEDICARE

## 2021-01-01 ENCOUNTER — HOSPITAL ENCOUNTER (INPATIENT)
Facility: CLINIC | Age: 66
LOS: 45 days | DRG: 004 | End: 2021-05-28
Attending: ANESTHESIOLOGY | Admitting: ANESTHESIOLOGY
Payer: MEDICARE

## 2021-01-01 ENCOUNTER — ANESTHESIA EVENT (OUTPATIENT)
Dept: SURGERY | Facility: CLINIC | Age: 66
DRG: 004 | End: 2021-01-01
Payer: MEDICARE

## 2021-01-01 ENCOUNTER — APPOINTMENT (OUTPATIENT)
Dept: ULTRASOUND IMAGING | Facility: CLINIC | Age: 66
DRG: 004 | End: 2021-01-01
Attending: SURGERY
Payer: MEDICARE

## 2021-01-01 ENCOUNTER — APPOINTMENT (OUTPATIENT)
Dept: GENERAL RADIOLOGY | Facility: CLINIC | Age: 66
DRG: 004 | End: 2021-01-01
Attending: SURGERY
Payer: MEDICARE

## 2021-01-01 VITALS
SYSTOLIC BLOOD PRESSURE: 69 MMHG | WEIGHT: 239.2 LBS | OXYGEN SATURATION: 1 % | BODY MASS INDEX: 46.96 KG/M2 | TEMPERATURE: 100.6 F | HEIGHT: 60 IN | DIASTOLIC BLOOD PRESSURE: 47 MMHG

## 2021-01-01 DIAGNOSIS — Z87.09 HISTORY OF RESPIRATORY FAILURE: ICD-10-CM

## 2021-01-01 DIAGNOSIS — J96.01 ACUTE RESPIRATORY FAILURE WITH HYPOXEMIA (H): Primary | ICD-10-CM

## 2021-01-01 LAB
ABO + RH BLD: NORMAL
ALBUMIN SERPL-MCNC: 1.8 G/DL (ref 3.4–5)
ALBUMIN SERPL-MCNC: 1.9 G/DL (ref 3.4–5)
ALBUMIN SERPL-MCNC: 2.7 G/DL (ref 3.4–5)
ALBUMIN UR-MCNC: 10 MG/DL
ALBUMIN UR-MCNC: 10 MG/DL
ALBUMIN UR-MCNC: NEGATIVE MG/DL
ALP SERPL-CCNC: 269 U/L (ref 40–150)
ALP SERPL-CCNC: 75 U/L (ref 40–150)
ALP SERPL-CCNC: 98 U/L (ref 40–150)
ALT SERPL W P-5'-P-CCNC: 21 U/L (ref 0–50)
ALT SERPL W P-5'-P-CCNC: 46 U/L (ref 0–50)
ALT SERPL W P-5'-P-CCNC: 64 U/L (ref 0–50)
AMYLASE SERPL-CCNC: 28 U/L (ref 30–110)
ANION GAP SERPL CALCULATED.3IONS-SCNC: 1 MMOL/L (ref 3–14)
ANION GAP SERPL CALCULATED.3IONS-SCNC: 2 MMOL/L (ref 3–14)
ANION GAP SERPL CALCULATED.3IONS-SCNC: 3 MMOL/L (ref 3–14)
ANION GAP SERPL CALCULATED.3IONS-SCNC: 3 MMOL/L (ref 3–14)
ANION GAP SERPL CALCULATED.3IONS-SCNC: 4 MMOL/L (ref 3–14)
ANION GAP SERPL CALCULATED.3IONS-SCNC: 5 MMOL/L (ref 3–14)
ANION GAP SERPL CALCULATED.3IONS-SCNC: 5 MMOL/L (ref 3–14)
ANION GAP SERPL CALCULATED.3IONS-SCNC: 6 MMOL/L (ref 3–14)
ANION GAP SERPL CALCULATED.3IONS-SCNC: 8 MMOL/L (ref 3–14)
ANION GAP SERPL CALCULATED.3IONS-SCNC: <1 MMOL/L (ref 3–14)
ANION GAP SERPL CALCULATED.3IONS-SCNC: ABNORMAL MMOL/L (ref 3–14)
APPEARANCE UR: ABNORMAL
APPEARANCE UR: CLEAR
APPEARANCE UR: CLEAR
APTT PPP: 30 SEC (ref 22–37)
APTT PPP: 35 SEC (ref 22–37)
AST SERPL W P-5'-P-CCNC: 42 U/L (ref 0–45)
AST SERPL W P-5'-P-CCNC: 55 U/L (ref 0–45)
AST SERPL W P-5'-P-CCNC: 70 U/L (ref 0–45)
BACTERIA #/AREA URNS HPF: ABNORMAL /HPF
BACTERIA SPEC CULT: ABNORMAL
BACTERIA SPEC CULT: NO GROWTH
BASE DEFICIT BLDA-SCNC: 0.2 MMOL/L
BASE DEFICIT BLDA-SCNC: 0.6 MMOL/L
BASE DEFICIT BLDA-SCNC: 2.4 MMOL/L
BASE DEFICIT BLDV-SCNC: NORMAL MMOL/L
BASE EXCESS BLDA CALC-SCNC: 0.4 MMOL/L
BASE EXCESS BLDA CALC-SCNC: 1.2 MMOL/L
BASE EXCESS BLDA CALC-SCNC: 10.1 MMOL/L
BASE EXCESS BLDA CALC-SCNC: 10.4 MMOL/L
BASE EXCESS BLDA CALC-SCNC: 11.5 MMOL/L
BASE EXCESS BLDA CALC-SCNC: 12.1 MMOL/L
BASE EXCESS BLDA CALC-SCNC: 12.4 MMOL/L
BASE EXCESS BLDA CALC-SCNC: 12.5 MMOL/L
BASE EXCESS BLDA CALC-SCNC: 12.6 MMOL/L
BASE EXCESS BLDA CALC-SCNC: 13.5 MMOL/L
BASE EXCESS BLDA CALC-SCNC: 14.4 MMOL/L
BASE EXCESS BLDA CALC-SCNC: 15.5 MMOL/L
BASE EXCESS BLDA CALC-SCNC: 16.8 MMOL/L
BASE EXCESS BLDA CALC-SCNC: 17.1 MMOL/L
BASE EXCESS BLDA CALC-SCNC: 19.7 MMOL/L
BASE EXCESS BLDA CALC-SCNC: 4.7 MMOL/L
BASE EXCESS BLDA CALC-SCNC: 4.7 MMOL/L
BASE EXCESS BLDA CALC-SCNC: 5 MMOL/L
BASE EXCESS BLDA CALC-SCNC: 5.1 MMOL/L
BASE EXCESS BLDA CALC-SCNC: 5.1 MMOL/L
BASE EXCESS BLDA CALC-SCNC: 5.3 MMOL/L
BASE EXCESS BLDA CALC-SCNC: 5.4 MMOL/L
BASE EXCESS BLDA CALC-SCNC: 5.7 MMOL/L
BASE EXCESS BLDA CALC-SCNC: 5.8 MMOL/L
BASE EXCESS BLDA CALC-SCNC: 6.5 MMOL/L
BASE EXCESS BLDA CALC-SCNC: 6.7 MMOL/L
BASE EXCESS BLDA CALC-SCNC: 6.9 MMOL/L
BASE EXCESS BLDA CALC-SCNC: 9.1 MMOL/L
BASE EXCESS BLDA CALC-SCNC: 9.5 MMOL/L
BASE EXCESS BLDV CALC-SCNC: 12.7 MMOL/L
BASE EXCESS BLDV CALC-SCNC: 13.5 MMOL/L
BASE EXCESS BLDV CALC-SCNC: 14.7 MMOL/L
BASE EXCESS BLDV CALC-SCNC: 16.4 MMOL/L
BASE EXCESS BLDV CALC-SCNC: 17.3 MMOL/L
BASE EXCESS BLDV CALC-SCNC: 17.6 MMOL/L
BASE EXCESS BLDV CALC-SCNC: 17.9 MMOL/L
BASE EXCESS BLDV CALC-SCNC: NORMAL MMOL/L
BASOPHILS # BLD AUTO: 0 10E9/L (ref 0–0.2)
BASOPHILS # BLD AUTO: 0 10E9/L (ref 0–0.2)
BASOPHILS NFR BLD AUTO: 0.1 %
BASOPHILS NFR BLD AUTO: 0.1 %
BILIRUB DIRECT SERPL-MCNC: 0.1 MG/DL (ref 0–0.2)
BILIRUB DIRECT SERPL-MCNC: 0.2 MG/DL (ref 0–0.2)
BILIRUB SERPL-MCNC: 0.4 MG/DL (ref 0.2–1.3)
BILIRUB SERPL-MCNC: 0.6 MG/DL (ref 0.2–1.3)
BILIRUB SERPL-MCNC: 1 MG/DL (ref 0.2–1.3)
BILIRUB UR QL STRIP: NEGATIVE
BLD GP AB SCN SERPL QL: NORMAL
BLD PROD TYP BPU: NORMAL
BLD UNIT ID BPU: 0
BLOOD BANK CMNT PATIENT-IMP: NORMAL
BLOOD PRODUCT CODE: NORMAL
BPU ID: NORMAL
BUN SERPL-MCNC: 10 MG/DL (ref 7–30)
BUN SERPL-MCNC: 12 MG/DL (ref 7–30)
BUN SERPL-MCNC: 14 MG/DL (ref 7–30)
BUN SERPL-MCNC: 17 MG/DL (ref 7–30)
BUN SERPL-MCNC: 20 MG/DL (ref 7–30)
BUN SERPL-MCNC: 21 MG/DL (ref 7–30)
BUN SERPL-MCNC: 21 MG/DL (ref 7–30)
BUN SERPL-MCNC: 22 MG/DL (ref 7–30)
BUN SERPL-MCNC: 23 MG/DL (ref 7–30)
BUN SERPL-MCNC: 27 MG/DL (ref 7–30)
BUN SERPL-MCNC: 28 MG/DL (ref 7–30)
BUN SERPL-MCNC: 28 MG/DL (ref 7–30)
BUN SERPL-MCNC: 29 MG/DL (ref 7–30)
BUN SERPL-MCNC: 30 MG/DL (ref 7–30)
BUN SERPL-MCNC: 31 MG/DL (ref 7–30)
BUN SERPL-MCNC: 32 MG/DL (ref 7–30)
BUN SERPL-MCNC: 32 MG/DL (ref 7–30)
BUN SERPL-MCNC: 33 MG/DL (ref 7–30)
BUN SERPL-MCNC: 34 MG/DL (ref 7–30)
BUN SERPL-MCNC: 35 MG/DL (ref 7–30)
BUN SERPL-MCNC: 35 MG/DL (ref 7–30)
BUN SERPL-MCNC: 37 MG/DL (ref 7–30)
BUN SERPL-MCNC: 38 MG/DL (ref 7–30)
BUN SERPL-MCNC: 38 MG/DL (ref 7–30)
BUN SERPL-MCNC: 40 MG/DL (ref 7–30)
BUN SERPL-MCNC: 43 MG/DL (ref 7–30)
BUN SERPL-MCNC: 44 MG/DL (ref 7–30)
BUN SERPL-MCNC: 45 MG/DL (ref 7–30)
BUN SERPL-MCNC: 8 MG/DL (ref 7–30)
BUN SERPL-MCNC: 8 MG/DL (ref 7–30)
BUN SERPL-MCNC: 9 MG/DL (ref 7–30)
CALCIUM SERPL-MCNC: 7.1 MG/DL (ref 8.5–10.1)
CALCIUM SERPL-MCNC: 7.3 MG/DL (ref 8.5–10.1)
CALCIUM SERPL-MCNC: 7.3 MG/DL (ref 8.5–10.1)
CALCIUM SERPL-MCNC: 7.4 MG/DL (ref 8.5–10.1)
CALCIUM SERPL-MCNC: 7.4 MG/DL (ref 8.5–10.1)
CALCIUM SERPL-MCNC: 7.5 MG/DL (ref 8.5–10.1)
CALCIUM SERPL-MCNC: 7.6 MG/DL (ref 8.5–10.1)
CALCIUM SERPL-MCNC: 7.7 MG/DL (ref 8.5–10.1)
CALCIUM SERPL-MCNC: 7.8 MG/DL (ref 8.5–10.1)
CALCIUM SERPL-MCNC: 7.9 MG/DL (ref 8.5–10.1)
CALCIUM SERPL-MCNC: 8 MG/DL (ref 8.5–10.1)
CALCIUM SERPL-MCNC: 8.2 MG/DL (ref 8.5–10.1)
CALCIUM SERPL-MCNC: 8.2 MG/DL (ref 8.5–10.1)
CALCIUM SERPL-MCNC: 8.3 MG/DL (ref 8.5–10.1)
CALCIUM SERPL-MCNC: 8.3 MG/DL (ref 8.5–10.1)
CALCIUM SERPL-MCNC: 8.4 MG/DL (ref 8.5–10.1)
CALCIUM SERPL-MCNC: 8.4 MG/DL (ref 8.5–10.1)
CALCIUM SERPL-MCNC: 8.5 MG/DL (ref 8.5–10.1)
CALCIUM SERPL-MCNC: 8.5 MG/DL (ref 8.5–10.1)
CALCIUM SERPL-MCNC: 8.6 MG/DL (ref 8.5–10.1)
CALCIUM SERPL-MCNC: 8.7 MG/DL (ref 8.5–10.1)
CALCIUM SERPL-MCNC: 8.8 MG/DL (ref 8.5–10.1)
CALCIUM SERPL-MCNC: 8.9 MG/DL (ref 8.5–10.1)
CALCIUM SERPL-MCNC: 9 MG/DL (ref 8.5–10.1)
CALCIUM SERPL-MCNC: 9.1 MG/DL (ref 8.5–10.1)
CALCIUM SERPL-MCNC: 9.1 MG/DL (ref 8.5–10.1)
CHLORIDE SERPL-SCNC: 100 MMOL/L (ref 94–109)
CHLORIDE SERPL-SCNC: 101 MMOL/L (ref 94–109)
CHLORIDE SERPL-SCNC: 102 MMOL/L (ref 94–109)
CHLORIDE SERPL-SCNC: 103 MMOL/L (ref 94–109)
CHLORIDE SERPL-SCNC: 104 MMOL/L (ref 94–109)
CHLORIDE SERPL-SCNC: 105 MMOL/L (ref 94–109)
CHLORIDE SERPL-SCNC: 106 MMOL/L (ref 94–109)
CHLORIDE SERPL-SCNC: 106 MMOL/L (ref 94–109)
CHLORIDE SERPL-SCNC: 107 MMOL/L (ref 94–109)
CHLORIDE SERPL-SCNC: 108 MMOL/L (ref 94–109)
CHLORIDE SERPL-SCNC: 108 MMOL/L (ref 94–109)
CHLORIDE SERPL-SCNC: 110 MMOL/L (ref 94–109)
CHLORIDE SERPL-SCNC: 110 MMOL/L (ref 94–109)
CHLORIDE SERPL-SCNC: 112 MMOL/L (ref 94–109)
CHLORIDE SERPL-SCNC: 113 MMOL/L (ref 94–109)
CHLORIDE SERPL-SCNC: 113 MMOL/L (ref 94–109)
CHLORIDE SERPL-SCNC: 91 MMOL/L (ref 94–109)
CHLORIDE SERPL-SCNC: 93 MMOL/L (ref 94–109)
CHLORIDE SERPL-SCNC: 94 MMOL/L (ref 94–109)
CHLORIDE SERPL-SCNC: 96 MMOL/L (ref 94–109)
CHLORIDE SERPL-SCNC: 96 MMOL/L (ref 94–109)
CHLORIDE SERPL-SCNC: 97 MMOL/L (ref 94–109)
CHLORIDE SERPL-SCNC: 97 MMOL/L (ref 94–109)
CHLORIDE SERPL-SCNC: 98 MMOL/L (ref 94–109)
CHLORIDE SERPL-SCNC: 99 MMOL/L (ref 94–109)
CK SERPL-CCNC: 100 U/L (ref 30–225)
CO2 SERPL-SCNC: 25 MMOL/L (ref 20–32)
CO2 SERPL-SCNC: 26 MMOL/L (ref 20–32)
CO2 SERPL-SCNC: 26 MMOL/L (ref 20–32)
CO2 SERPL-SCNC: 28 MMOL/L (ref 20–32)
CO2 SERPL-SCNC: 29 MMOL/L (ref 20–32)
CO2 SERPL-SCNC: 31 MMOL/L (ref 20–32)
CO2 SERPL-SCNC: 31 MMOL/L (ref 20–32)
CO2 SERPL-SCNC: 32 MMOL/L (ref 20–32)
CO2 SERPL-SCNC: 32 MMOL/L (ref 20–32)
CO2 SERPL-SCNC: 35 MMOL/L (ref 20–32)
CO2 SERPL-SCNC: 36 MMOL/L (ref 20–32)
CO2 SERPL-SCNC: 37 MMOL/L (ref 20–32)
CO2 SERPL-SCNC: 38 MMOL/L (ref 20–32)
CO2 SERPL-SCNC: 39 MMOL/L (ref 20–32)
CO2 SERPL-SCNC: 40 MMOL/L (ref 20–32)
CO2 SERPL-SCNC: 41 MMOL/L (ref 20–32)
CO2 SERPL-SCNC: 42 MMOL/L (ref 20–32)
CO2 SERPL-SCNC: 43 MMOL/L (ref 20–32)
CO2 SERPL-SCNC: 44 MMOL/L (ref 20–32)
CO2 SERPL-SCNC: 45 MMOL/L (ref 20–32)
CO2 SERPL-SCNC: 45 MMOL/L (ref 20–32)
CO2 SERPL-SCNC: >45 MMOL/L (ref 20–32)
COLOR UR AUTO: ABNORMAL
COLOR UR AUTO: YELLOW
COLOR UR AUTO: YELLOW
CREAT SERPL-MCNC: 0.3 MG/DL (ref 0.52–1.04)
CREAT SERPL-MCNC: 0.38 MG/DL (ref 0.52–1.04)
CREAT SERPL-MCNC: 0.39 MG/DL (ref 0.52–1.04)
CREAT SERPL-MCNC: 0.4 MG/DL (ref 0.52–1.04)
CREAT SERPL-MCNC: 0.4 MG/DL (ref 0.52–1.04)
CREAT SERPL-MCNC: 0.41 MG/DL (ref 0.52–1.04)
CREAT SERPL-MCNC: 0.41 MG/DL (ref 0.52–1.04)
CREAT SERPL-MCNC: 0.42 MG/DL (ref 0.52–1.04)
CREAT SERPL-MCNC: 0.42 MG/DL (ref 0.52–1.04)
CREAT SERPL-MCNC: 0.43 MG/DL (ref 0.52–1.04)
CREAT SERPL-MCNC: 0.43 MG/DL (ref 0.52–1.04)
CREAT SERPL-MCNC: 0.44 MG/DL (ref 0.52–1.04)
CREAT SERPL-MCNC: 0.45 MG/DL (ref 0.52–1.04)
CREAT SERPL-MCNC: 0.46 MG/DL (ref 0.52–1.04)
CREAT SERPL-MCNC: 0.47 MG/DL (ref 0.52–1.04)
CREAT SERPL-MCNC: 0.48 MG/DL (ref 0.52–1.04)
CREAT SERPL-MCNC: 0.49 MG/DL (ref 0.52–1.04)
CREAT SERPL-MCNC: 0.5 MG/DL (ref 0.52–1.04)
CREAT SERPL-MCNC: 0.5 MG/DL (ref 0.52–1.04)
CREAT SERPL-MCNC: 0.51 MG/DL (ref 0.52–1.04)
CREAT SERPL-MCNC: 0.51 MG/DL (ref 0.52–1.04)
CREAT SERPL-MCNC: 0.52 MG/DL (ref 0.52–1.04)
CREAT SERPL-MCNC: 0.53 MG/DL (ref 0.52–1.04)
CREAT SERPL-MCNC: 0.55 MG/DL (ref 0.52–1.04)
CREAT SERPL-MCNC: 0.57 MG/DL (ref 0.52–1.04)
CREAT SERPL-MCNC: 0.58 MG/DL (ref 0.52–1.04)
CREAT SERPL-MCNC: 0.58 MG/DL (ref 0.52–1.04)
CREAT SERPL-MCNC: 0.62 MG/DL (ref 0.52–1.04)
CREAT SERPL-MCNC: 0.64 MG/DL (ref 0.52–1.04)
CREAT SERPL-MCNC: 0.7 MG/DL (ref 0.52–1.04)
CREAT SERPL-MCNC: 0.78 MG/DL (ref 0.52–1.04)
CREAT SERPL-MCNC: 0.85 MG/DL (ref 0.52–1.04)
CREAT SERPL-MCNC: 0.99 MG/DL (ref 0.52–1.04)
CREAT SERPL-MCNC: 1.02 MG/DL (ref 0.57–1.11)
CRP SERPL-MCNC: 155 MG/L (ref 0–8)
CRP SERPL-MCNC: 165 MG/L (ref 0–8)
CRP SERPL-MCNC: 204 MG/L (ref 0–8)
CRP SERPL-MCNC: 257 MG/L (ref 0–8)
CRP SERPL-MCNC: 286 MG/L (ref 0–8)
CRP SERPL-MCNC: 290 MG/L (ref 0–8)
D DIMER PPP FEU-MCNC: 10.2 UG/ML FEU (ref 0–0.5)
D DIMER PPP FEU-MCNC: 2.8 UG/ML FEU (ref 0–0.5)
D DIMER PPP FEU-MCNC: 4.3 UG/ML FEU (ref 0–0.5)
D DIMER PPP FEU-MCNC: >20 UG/ML FEU (ref 0–0.5)
D DIMER PPP FEU-MCNC: >20 UG/ML FEU (ref 0–0.5)
DIFFERENTIAL METHOD BLD: ABNORMAL
DIFFERENTIAL METHOD BLD: ABNORMAL
EOSINOPHIL # BLD AUTO: 0 10E9/L (ref 0–0.7)
EOSINOPHIL # BLD AUTO: 0.1 10E9/L (ref 0–0.7)
EOSINOPHIL NFR BLD AUTO: 0 %
EOSINOPHIL NFR BLD AUTO: 0.8 %
ERYTHROCYTE [DISTWIDTH] IN BLOOD BY AUTOMATED COUNT: 12.8 % (ref 10–15)
ERYTHROCYTE [DISTWIDTH] IN BLOOD BY AUTOMATED COUNT: 12.9 % (ref 10–15)
ERYTHROCYTE [DISTWIDTH] IN BLOOD BY AUTOMATED COUNT: 13.2 % (ref 10–15)
ERYTHROCYTE [DISTWIDTH] IN BLOOD BY AUTOMATED COUNT: 13.2 % (ref 10–15)
ERYTHROCYTE [DISTWIDTH] IN BLOOD BY AUTOMATED COUNT: 13.3 % (ref 10–15)
ERYTHROCYTE [DISTWIDTH] IN BLOOD BY AUTOMATED COUNT: 13.4 % (ref 10–15)
ERYTHROCYTE [DISTWIDTH] IN BLOOD BY AUTOMATED COUNT: 13.4 % (ref 10–15)
ERYTHROCYTE [DISTWIDTH] IN BLOOD BY AUTOMATED COUNT: 13.5 % (ref 10–15)
ERYTHROCYTE [DISTWIDTH] IN BLOOD BY AUTOMATED COUNT: 14.1 % (ref 10–15)
ERYTHROCYTE [DISTWIDTH] IN BLOOD BY AUTOMATED COUNT: 14.2 % (ref 10–15)
ERYTHROCYTE [DISTWIDTH] IN BLOOD BY AUTOMATED COUNT: 14.3 % (ref 10–15)
ERYTHROCYTE [DISTWIDTH] IN BLOOD BY AUTOMATED COUNT: 14.5 % (ref 10–15)
ERYTHROCYTE [DISTWIDTH] IN BLOOD BY AUTOMATED COUNT: 14.5 % (ref 10–15)
ERYTHROCYTE [DISTWIDTH] IN BLOOD BY AUTOMATED COUNT: 14.6 % (ref 10–15)
ERYTHROCYTE [DISTWIDTH] IN BLOOD BY AUTOMATED COUNT: 14.6 % (ref 10–15)
ERYTHROCYTE [DISTWIDTH] IN BLOOD BY AUTOMATED COUNT: 14.8 % (ref 10–15)
ERYTHROCYTE [DISTWIDTH] IN BLOOD BY AUTOMATED COUNT: 15.1 % (ref 10–15)
ERYTHROCYTE [DISTWIDTH] IN BLOOD BY AUTOMATED COUNT: 15.8 % (ref 10–15)
ERYTHROCYTE [DISTWIDTH] IN BLOOD BY AUTOMATED COUNT: 15.9 % (ref 10–15)
ERYTHROCYTE [DISTWIDTH] IN BLOOD BY AUTOMATED COUNT: 16 % (ref 10–15)
ERYTHROCYTE [DISTWIDTH] IN BLOOD BY AUTOMATED COUNT: 16.5 % (ref 10–15)
ERYTHROCYTE [DISTWIDTH] IN BLOOD BY AUTOMATED COUNT: 16.7 % (ref 10–15)
ERYTHROCYTE [DISTWIDTH] IN BLOOD BY AUTOMATED COUNT: 17.2 % (ref 10–15)
ERYTHROCYTE [DISTWIDTH] IN BLOOD BY AUTOMATED COUNT: 17.2 % (ref 10–15)
ERYTHROCYTE [DISTWIDTH] IN BLOOD BY AUTOMATED COUNT: 17.4 % (ref 10–15)
ERYTHROCYTE [DISTWIDTH] IN BLOOD BY AUTOMATED COUNT: 17.5 % (ref 10–15)
ERYTHROCYTE [DISTWIDTH] IN BLOOD BY AUTOMATED COUNT: 17.7 % (ref 10–15)
ERYTHROCYTE [DISTWIDTH] IN BLOOD BY AUTOMATED COUNT: 17.7 % (ref 10–15)
ERYTHROCYTE [DISTWIDTH] IN BLOOD BY AUTOMATED COUNT: 17.8 % (ref 10–15)
ERYTHROCYTE [DISTWIDTH] IN BLOOD BY AUTOMATED COUNT: 17.8 % (ref 10–15)
ERYTHROCYTE [DISTWIDTH] IN BLOOD BY AUTOMATED COUNT: 18.1 % (ref 10–15)
ERYTHROCYTE [DISTWIDTH] IN BLOOD BY AUTOMATED COUNT: 18.2 % (ref 10–15)
ERYTHROCYTE [DISTWIDTH] IN BLOOD BY AUTOMATED COUNT: 18.2 % (ref 10–15)
ERYTHROCYTE [DISTWIDTH] IN BLOOD BY AUTOMATED COUNT: 18.3 % (ref 10–15)
ERYTHROCYTE [DISTWIDTH] IN BLOOD BY AUTOMATED COUNT: 18.4 % (ref 10–15)
ERYTHROCYTE [DISTWIDTH] IN BLOOD BY AUTOMATED COUNT: 18.6 % (ref 10–15)
ERYTHROCYTE [DISTWIDTH] IN BLOOD BY AUTOMATED COUNT: 18.8 % (ref 10–15)
ERYTHROCYTE [DISTWIDTH] IN BLOOD BY AUTOMATED COUNT: 19 % (ref 10–15)
ERYTHROCYTE [DISTWIDTH] IN BLOOD BY AUTOMATED COUNT: 19.1 % (ref 10–15)
FIBRINOGEN PPP-MCNC: 365 MG/DL (ref 200–420)
FIBRINOGEN PPP-MCNC: 382 MG/DL (ref 200–420)
FIBRINOGEN PPP-MCNC: 515 MG/DL (ref 200–420)
FIBRINOGEN PPP-MCNC: 521 MG/DL (ref 200–420)
FIBRINOGEN PPP-MCNC: 532 MG/DL (ref 200–420)
FLUAV RNA RESP QL NAA+PROBE: NEGATIVE
FLUBV RNA RESP QL NAA+PROBE: NEGATIVE
GFR SERPL CREATININE-BSD FRML MDRD: 54 ML/MIN/1.73M2
GFR SERPL CREATININE-BSD FRML MDRD: 60 ML/MIN/{1.73_M2}
GFR SERPL CREATININE-BSD FRML MDRD: 72 ML/MIN/{1.73_M2}
GFR SERPL CREATININE-BSD FRML MDRD: 79 ML/MIN/{1.73_M2}
GFR SERPL CREATININE-BSD FRML MDRD: >90 ML/MIN/{1.73_M2}
GLUCOSE BLDC GLUCOMTR-MCNC: 100 MG/DL (ref 70–99)
GLUCOSE BLDC GLUCOMTR-MCNC: 101 MG/DL (ref 70–99)
GLUCOSE BLDC GLUCOMTR-MCNC: 102 MG/DL (ref 70–99)
GLUCOSE BLDC GLUCOMTR-MCNC: 103 MG/DL (ref 70–99)
GLUCOSE BLDC GLUCOMTR-MCNC: 104 MG/DL (ref 70–99)
GLUCOSE BLDC GLUCOMTR-MCNC: 105 MG/DL (ref 70–99)
GLUCOSE BLDC GLUCOMTR-MCNC: 106 MG/DL (ref 70–99)
GLUCOSE BLDC GLUCOMTR-MCNC: 106 MG/DL (ref 70–99)
GLUCOSE BLDC GLUCOMTR-MCNC: 107 MG/DL (ref 70–99)
GLUCOSE BLDC GLUCOMTR-MCNC: 108 MG/DL (ref 70–99)
GLUCOSE BLDC GLUCOMTR-MCNC: 109 MG/DL (ref 70–99)
GLUCOSE BLDC GLUCOMTR-MCNC: 110 MG/DL (ref 70–99)
GLUCOSE BLDC GLUCOMTR-MCNC: 110 MG/DL (ref 70–99)
GLUCOSE BLDC GLUCOMTR-MCNC: 111 MG/DL (ref 70–99)
GLUCOSE BLDC GLUCOMTR-MCNC: 112 MG/DL (ref 70–99)
GLUCOSE BLDC GLUCOMTR-MCNC: 113 MG/DL (ref 70–99)
GLUCOSE BLDC GLUCOMTR-MCNC: 114 MG/DL (ref 70–99)
GLUCOSE BLDC GLUCOMTR-MCNC: 115 MG/DL (ref 70–99)
GLUCOSE BLDC GLUCOMTR-MCNC: 116 MG/DL (ref 70–99)
GLUCOSE BLDC GLUCOMTR-MCNC: 117 MG/DL (ref 70–99)
GLUCOSE BLDC GLUCOMTR-MCNC: 118 MG/DL (ref 70–99)
GLUCOSE BLDC GLUCOMTR-MCNC: 118 MG/DL (ref 70–99)
GLUCOSE BLDC GLUCOMTR-MCNC: 119 MG/DL (ref 70–99)
GLUCOSE BLDC GLUCOMTR-MCNC: 120 MG/DL (ref 70–99)
GLUCOSE BLDC GLUCOMTR-MCNC: 120 MG/DL (ref 70–99)
GLUCOSE BLDC GLUCOMTR-MCNC: 121 MG/DL (ref 70–99)
GLUCOSE BLDC GLUCOMTR-MCNC: 121 MG/DL (ref 70–99)
GLUCOSE BLDC GLUCOMTR-MCNC: 122 MG/DL (ref 70–99)
GLUCOSE BLDC GLUCOMTR-MCNC: 123 MG/DL (ref 70–99)
GLUCOSE BLDC GLUCOMTR-MCNC: 124 MG/DL (ref 70–99)
GLUCOSE BLDC GLUCOMTR-MCNC: 125 MG/DL (ref 70–99)
GLUCOSE BLDC GLUCOMTR-MCNC: 126 MG/DL (ref 70–99)
GLUCOSE BLDC GLUCOMTR-MCNC: 127 MG/DL (ref 70–99)
GLUCOSE BLDC GLUCOMTR-MCNC: 128 MG/DL (ref 70–99)
GLUCOSE BLDC GLUCOMTR-MCNC: 129 MG/DL (ref 70–99)
GLUCOSE BLDC GLUCOMTR-MCNC: 129 MG/DL (ref 70–99)
GLUCOSE BLDC GLUCOMTR-MCNC: 130 MG/DL (ref 70–99)
GLUCOSE BLDC GLUCOMTR-MCNC: 130 MG/DL (ref 70–99)
GLUCOSE BLDC GLUCOMTR-MCNC: 131 MG/DL (ref 70–99)
GLUCOSE BLDC GLUCOMTR-MCNC: 131 MG/DL (ref 70–99)
GLUCOSE BLDC GLUCOMTR-MCNC: 132 MG/DL (ref 70–99)
GLUCOSE BLDC GLUCOMTR-MCNC: 133 MG/DL (ref 70–99)
GLUCOSE BLDC GLUCOMTR-MCNC: 134 MG/DL (ref 70–99)
GLUCOSE BLDC GLUCOMTR-MCNC: 135 MG/DL (ref 70–99)
GLUCOSE BLDC GLUCOMTR-MCNC: 136 MG/DL (ref 70–99)
GLUCOSE BLDC GLUCOMTR-MCNC: 136 MG/DL (ref 70–99)
GLUCOSE BLDC GLUCOMTR-MCNC: 137 MG/DL (ref 70–99)
GLUCOSE BLDC GLUCOMTR-MCNC: 138 MG/DL (ref 70–99)
GLUCOSE BLDC GLUCOMTR-MCNC: 139 MG/DL (ref 70–99)
GLUCOSE BLDC GLUCOMTR-MCNC: 140 MG/DL (ref 70–99)
GLUCOSE BLDC GLUCOMTR-MCNC: 141 MG/DL (ref 70–99)
GLUCOSE BLDC GLUCOMTR-MCNC: 141 MG/DL (ref 70–99)
GLUCOSE BLDC GLUCOMTR-MCNC: 142 MG/DL (ref 70–99)
GLUCOSE BLDC GLUCOMTR-MCNC: 143 MG/DL (ref 70–99)
GLUCOSE BLDC GLUCOMTR-MCNC: 144 MG/DL (ref 70–99)
GLUCOSE BLDC GLUCOMTR-MCNC: 146 MG/DL (ref 70–99)
GLUCOSE BLDC GLUCOMTR-MCNC: 147 MG/DL (ref 70–99)
GLUCOSE BLDC GLUCOMTR-MCNC: 148 MG/DL (ref 70–99)
GLUCOSE BLDC GLUCOMTR-MCNC: 150 MG/DL (ref 70–99)
GLUCOSE BLDC GLUCOMTR-MCNC: 151 MG/DL (ref 70–99)
GLUCOSE BLDC GLUCOMTR-MCNC: 152 MG/DL (ref 70–99)
GLUCOSE BLDC GLUCOMTR-MCNC: 152 MG/DL (ref 70–99)
GLUCOSE BLDC GLUCOMTR-MCNC: 153 MG/DL (ref 70–99)
GLUCOSE BLDC GLUCOMTR-MCNC: 154 MG/DL (ref 70–99)
GLUCOSE BLDC GLUCOMTR-MCNC: 154 MG/DL (ref 70–99)
GLUCOSE BLDC GLUCOMTR-MCNC: 155 MG/DL (ref 70–99)
GLUCOSE BLDC GLUCOMTR-MCNC: 157 MG/DL (ref 70–99)
GLUCOSE BLDC GLUCOMTR-MCNC: 158 MG/DL (ref 70–99)
GLUCOSE BLDC GLUCOMTR-MCNC: 158 MG/DL (ref 70–99)
GLUCOSE BLDC GLUCOMTR-MCNC: 159 MG/DL (ref 70–99)
GLUCOSE BLDC GLUCOMTR-MCNC: 160 MG/DL (ref 70–99)
GLUCOSE BLDC GLUCOMTR-MCNC: 161 MG/DL (ref 70–99)
GLUCOSE BLDC GLUCOMTR-MCNC: 162 MG/DL (ref 70–99)
GLUCOSE BLDC GLUCOMTR-MCNC: 163 MG/DL (ref 70–99)
GLUCOSE BLDC GLUCOMTR-MCNC: 166 MG/DL (ref 70–99)
GLUCOSE BLDC GLUCOMTR-MCNC: 171 MG/DL (ref 70–99)
GLUCOSE BLDC GLUCOMTR-MCNC: 172 MG/DL (ref 70–99)
GLUCOSE BLDC GLUCOMTR-MCNC: 174 MG/DL (ref 70–99)
GLUCOSE BLDC GLUCOMTR-MCNC: 175 MG/DL (ref 70–99)
GLUCOSE BLDC GLUCOMTR-MCNC: 176 MG/DL (ref 70–99)
GLUCOSE BLDC GLUCOMTR-MCNC: 181 MG/DL (ref 70–99)
GLUCOSE BLDC GLUCOMTR-MCNC: 183 MG/DL (ref 70–99)
GLUCOSE BLDC GLUCOMTR-MCNC: 183 MG/DL (ref 70–99)
GLUCOSE BLDC GLUCOMTR-MCNC: 186 MG/DL (ref 70–99)
GLUCOSE BLDC GLUCOMTR-MCNC: 191 MG/DL (ref 70–99)
GLUCOSE BLDC GLUCOMTR-MCNC: 194 MG/DL (ref 70–99)
GLUCOSE BLDC GLUCOMTR-MCNC: 200 MG/DL (ref 70–99)
GLUCOSE BLDC GLUCOMTR-MCNC: 83 MG/DL (ref 70–99)
GLUCOSE BLDC GLUCOMTR-MCNC: 83 MG/DL (ref 70–99)
GLUCOSE BLDC GLUCOMTR-MCNC: 84 MG/DL (ref 70–99)
GLUCOSE BLDC GLUCOMTR-MCNC: 85 MG/DL (ref 70–99)
GLUCOSE BLDC GLUCOMTR-MCNC: 85 MG/DL (ref 70–99)
GLUCOSE BLDC GLUCOMTR-MCNC: 86 MG/DL (ref 70–99)
GLUCOSE BLDC GLUCOMTR-MCNC: 87 MG/DL (ref 70–99)
GLUCOSE BLDC GLUCOMTR-MCNC: 88 MG/DL (ref 70–99)
GLUCOSE BLDC GLUCOMTR-MCNC: 89 MG/DL (ref 70–99)
GLUCOSE BLDC GLUCOMTR-MCNC: 90 MG/DL (ref 70–99)
GLUCOSE BLDC GLUCOMTR-MCNC: 91 MG/DL (ref 70–99)
GLUCOSE BLDC GLUCOMTR-MCNC: 93 MG/DL (ref 70–99)
GLUCOSE BLDC GLUCOMTR-MCNC: 94 MG/DL (ref 70–99)
GLUCOSE BLDC GLUCOMTR-MCNC: 95 MG/DL (ref 70–99)
GLUCOSE BLDC GLUCOMTR-MCNC: 98 MG/DL (ref 70–99)
GLUCOSE BLDC GLUCOMTR-MCNC: 98 MG/DL (ref 70–99)
GLUCOSE BLDC GLUCOMTR-MCNC: 99 MG/DL (ref 70–99)
GLUCOSE SERPL-MCNC: 101 MG/DL (ref 70–99)
GLUCOSE SERPL-MCNC: 105 MG/DL (ref 70–99)
GLUCOSE SERPL-MCNC: 108 MG/DL (ref 70–99)
GLUCOSE SERPL-MCNC: 110 MG/DL (ref 70–99)
GLUCOSE SERPL-MCNC: 110 MG/DL (ref 70–99)
GLUCOSE SERPL-MCNC: 111 MG/DL (ref 70–99)
GLUCOSE SERPL-MCNC: 114 MG/DL (ref 70–99)
GLUCOSE SERPL-MCNC: 114 MG/DL (ref 70–99)
GLUCOSE SERPL-MCNC: 115 MG/DL (ref 70–99)
GLUCOSE SERPL-MCNC: 117 MG/DL (ref 70–99)
GLUCOSE SERPL-MCNC: 118 MG/DL (ref 70–99)
GLUCOSE SERPL-MCNC: 119 MG/DL (ref 70–99)
GLUCOSE SERPL-MCNC: 119 MG/DL (ref 70–99)
GLUCOSE SERPL-MCNC: 120 MG/DL (ref 70–99)
GLUCOSE SERPL-MCNC: 121 MG/DL (ref 65–100)
GLUCOSE SERPL-MCNC: 121 MG/DL (ref 70–99)
GLUCOSE SERPL-MCNC: 122 MG/DL (ref 70–99)
GLUCOSE SERPL-MCNC: 123 MG/DL (ref 70–99)
GLUCOSE SERPL-MCNC: 124 MG/DL (ref 70–99)
GLUCOSE SERPL-MCNC: 125 MG/DL (ref 70–99)
GLUCOSE SERPL-MCNC: 126 MG/DL (ref 70–99)
GLUCOSE SERPL-MCNC: 127 MG/DL (ref 70–99)
GLUCOSE SERPL-MCNC: 128 MG/DL (ref 70–99)
GLUCOSE SERPL-MCNC: 132 MG/DL (ref 70–99)
GLUCOSE SERPL-MCNC: 132 MG/DL (ref 70–99)
GLUCOSE SERPL-MCNC: 133 MG/DL (ref 70–99)
GLUCOSE SERPL-MCNC: 134 MG/DL (ref 70–99)
GLUCOSE SERPL-MCNC: 134 MG/DL (ref 70–99)
GLUCOSE SERPL-MCNC: 136 MG/DL (ref 70–99)
GLUCOSE SERPL-MCNC: 137 MG/DL (ref 70–99)
GLUCOSE SERPL-MCNC: 144 MG/DL (ref 70–99)
GLUCOSE SERPL-MCNC: 146 MG/DL (ref 70–99)
GLUCOSE SERPL-MCNC: 146 MG/DL (ref 70–99)
GLUCOSE SERPL-MCNC: 164 MG/DL (ref 70–99)
GLUCOSE SERPL-MCNC: 193 MG/DL (ref 70–99)
GLUCOSE SERPL-MCNC: 194 MG/DL (ref 70–99)
GLUCOSE SERPL-MCNC: 215 MG/DL (ref 70–99)
GLUCOSE SERPL-MCNC: 82 MG/DL (ref 70–99)
GLUCOSE SERPL-MCNC: 88 MG/DL (ref 70–99)
GLUCOSE SERPL-MCNC: 89 MG/DL (ref 70–99)
GLUCOSE SERPL-MCNC: 90 MG/DL (ref 70–99)
GLUCOSE SERPL-MCNC: 91 MG/DL (ref 70–99)
GLUCOSE SERPL-MCNC: 91 MG/DL (ref 70–99)
GLUCOSE SERPL-MCNC: 96 MG/DL (ref 70–99)
GLUCOSE SERPL-MCNC: 96 MG/DL (ref 70–99)
GLUCOSE SERPL-MCNC: 97 MG/DL (ref 70–99)
GLUCOSE SERPL-MCNC: 98 MG/DL (ref 70–99)
GLUCOSE SERPL-MCNC: 99 MG/DL (ref 70–99)
GLUCOSE UR STRIP-MCNC: NEGATIVE MG/DL
GRAM STN SPEC: ABNORMAL
GRAM STN SPEC: NORMAL
GRAM STN SPEC: NORMAL
HCO3 BLD-SCNC: 23 MMOL/L (ref 21–28)
HCO3 BLD-SCNC: 24 MMOL/L (ref 21–28)
HCO3 BLD-SCNC: 25 MMOL/L (ref 21–28)
HCO3 BLD-SCNC: 25 MMOL/L (ref 21–28)
HCO3 BLD-SCNC: 26 MMOL/L (ref 21–28)
HCO3 BLD-SCNC: 29 MMOL/L (ref 21–28)
HCO3 BLD-SCNC: 29 MMOL/L (ref 21–28)
HCO3 BLD-SCNC: 30 MMOL/L (ref 21–28)
HCO3 BLD-SCNC: 31 MMOL/L (ref 21–28)
HCO3 BLD-SCNC: 32 MMOL/L (ref 21–28)
HCO3 BLD-SCNC: 32 MMOL/L (ref 21–28)
HCO3 BLD-SCNC: 33 MMOL/L (ref 21–28)
HCO3 BLD-SCNC: 34 MMOL/L (ref 21–28)
HCO3 BLD-SCNC: 35 MMOL/L (ref 21–28)
HCO3 BLD-SCNC: 36 MMOL/L (ref 21–28)
HCO3 BLD-SCNC: 37 MMOL/L (ref 21–28)
HCO3 BLD-SCNC: 38 MMOL/L (ref 21–28)
HCO3 BLD-SCNC: 39 MMOL/L (ref 21–28)
HCO3 BLD-SCNC: 40 MMOL/L (ref 21–28)
HCO3 BLD-SCNC: 41 MMOL/L (ref 21–28)
HCO3 BLD-SCNC: 42 MMOL/L (ref 21–28)
HCO3 BLD-SCNC: 43 MMOL/L (ref 21–28)
HCO3 BLD-SCNC: 44 MMOL/L (ref 21–28)
HCO3 BLD-SCNC: 45 MMOL/L (ref 21–28)
HCO3 BLD-SCNC: 46 MMOL/L (ref 21–28)
HCO3 BLDV-SCNC: 41 MMOL/L (ref 21–28)
HCO3 BLDV-SCNC: 41 MMOL/L (ref 21–28)
HCO3 BLDV-SCNC: 42 MMOL/L (ref 21–28)
HCO3 BLDV-SCNC: 43 MMOL/L (ref 21–28)
HCO3 BLDV-SCNC: 45 MMOL/L (ref 21–28)
HCO3 BLDV-SCNC: 45 MMOL/L (ref 21–28)
HCO3 BLDV-SCNC: 46 MMOL/L (ref 21–28)
HCO3 BLDV-SCNC: NORMAL MMOL/L (ref 21–28)
HCT VFR BLD AUTO: 19.9 % (ref 35–47)
HCT VFR BLD AUTO: 21.2 % (ref 35–47)
HCT VFR BLD AUTO: 21.6 % (ref 35–47)
HCT VFR BLD AUTO: 21.7 % (ref 35–47)
HCT VFR BLD AUTO: 21.9 % (ref 35–47)
HCT VFR BLD AUTO: 22.8 % (ref 35–47)
HCT VFR BLD AUTO: 23.1 % (ref 35–47)
HCT VFR BLD AUTO: 23.5 % (ref 35–47)
HCT VFR BLD AUTO: 23.7 % (ref 35–47)
HCT VFR BLD AUTO: 23.8 % (ref 35–47)
HCT VFR BLD AUTO: 24.4 % (ref 35–47)
HCT VFR BLD AUTO: 24.8 % (ref 35–47)
HCT VFR BLD AUTO: 25.2 % (ref 35–47)
HCT VFR BLD AUTO: 25.2 % (ref 35–47)
HCT VFR BLD AUTO: 25.3 % (ref 35–47)
HCT VFR BLD AUTO: 25.8 % (ref 35–47)
HCT VFR BLD AUTO: 26.1 % (ref 35–47)
HCT VFR BLD AUTO: 26.1 % (ref 35–47)
HCT VFR BLD AUTO: 26.5 % (ref 35–47)
HCT VFR BLD AUTO: 26.9 % (ref 35–47)
HCT VFR BLD AUTO: 27 % (ref 35–47)
HCT VFR BLD AUTO: 27.2 % (ref 35–47)
HCT VFR BLD AUTO: 27.6 % (ref 35–47)
HCT VFR BLD AUTO: 27.7 % (ref 35–47)
HCT VFR BLD AUTO: 27.7 % (ref 35–47)
HCT VFR BLD AUTO: 28.5 % (ref 35–47)
HCT VFR BLD AUTO: 28.7 % (ref 35–47)
HCT VFR BLD AUTO: 29.1 % (ref 35–47)
HCT VFR BLD AUTO: 30 % (ref 35–47)
HCT VFR BLD AUTO: 30.2 % (ref 35–47)
HCT VFR BLD AUTO: 30.4 % (ref 35–47)
HCT VFR BLD AUTO: 30.5 % (ref 35–47)
HCT VFR BLD AUTO: 31.1 % (ref 35–47)
HCT VFR BLD AUTO: 31.3 % (ref 35–47)
HCT VFR BLD AUTO: 31.6 % (ref 35–47)
HCT VFR BLD AUTO: 32.3 % (ref 35–47)
HCT VFR BLD AUTO: 32.5 % (ref 35–47)
HCT VFR BLD AUTO: 32.7 % (ref 35–47)
HCT VFR BLD AUTO: 32.9 % (ref 35–47)
HCT VFR BLD AUTO: 33 % (ref 35–47)
HCT VFR BLD AUTO: 33.4 % (ref 35–47)
HCT VFR BLD AUTO: 33.4 % (ref 35–47)
HCT VFR BLD AUTO: 34.7 % (ref 35–47)
HCT VFR BLD AUTO: 37.4 % (ref 35–47)
HCT VFR BLD AUTO: 39.4 % (ref 35–47)
HGB BLD-MCNC: 10.3 G/DL (ref 11.7–15.7)
HGB BLD-MCNC: 10.6 G/DL (ref 11.7–15.7)
HGB BLD-MCNC: 10.8 G/DL (ref 11.7–15.7)
HGB BLD-MCNC: 11.3 G/DL (ref 11.7–15.7)
HGB BLD-MCNC: 12.5 G/DL (ref 11.7–15.7)
HGB BLD-MCNC: 13.6 G/DL (ref 11.7–15.7)
HGB BLD-MCNC: 6.5 G/DL (ref 11.7–15.7)
HGB BLD-MCNC: 6.6 G/DL (ref 11.7–15.7)
HGB BLD-MCNC: 6.7 G/DL (ref 11.7–15.7)
HGB BLD-MCNC: 6.8 G/DL (ref 11.7–15.7)
HGB BLD-MCNC: 6.8 G/DL (ref 11.7–15.7)
HGB BLD-MCNC: 6.9 G/DL (ref 11.7–15.7)
HGB BLD-MCNC: 7 G/DL (ref 11.7–15.7)
HGB BLD-MCNC: 7.1 G/DL (ref 11.7–15.7)
HGB BLD-MCNC: 7.2 G/DL (ref 11.7–15.7)
HGB BLD-MCNC: 7.2 G/DL (ref 11.7–15.7)
HGB BLD-MCNC: 7.3 G/DL (ref 11.7–15.7)
HGB BLD-MCNC: 7.4 G/DL (ref 11.7–15.7)
HGB BLD-MCNC: 7.5 G/DL (ref 11.7–15.7)
HGB BLD-MCNC: 7.6 G/DL (ref 11.7–15.7)
HGB BLD-MCNC: 7.7 G/DL (ref 11.7–15.7)
HGB BLD-MCNC: 7.8 G/DL (ref 11.7–15.7)
HGB BLD-MCNC: 8 G/DL (ref 11.7–15.7)
HGB BLD-MCNC: 8.1 G/DL (ref 11.7–15.7)
HGB BLD-MCNC: 8.2 G/DL (ref 11.7–15.7)
HGB BLD-MCNC: 8.3 G/DL (ref 11.7–15.7)
HGB BLD-MCNC: 8.6 G/DL (ref 11.7–15.7)
HGB BLD-MCNC: 8.7 G/DL (ref 11.7–15.7)
HGB BLD-MCNC: 8.7 G/DL (ref 11.7–15.7)
HGB BLD-MCNC: 8.8 G/DL (ref 11.7–15.7)
HGB BLD-MCNC: 9 G/DL (ref 11.7–15.7)
HGB BLD-MCNC: 9.1 G/DL (ref 11.7–15.7)
HGB BLD-MCNC: 9.3 G/DL (ref 11.7–15.7)
HGB BLD-MCNC: 9.3 G/DL (ref 11.7–15.7)
HGB BLD-MCNC: 9.4 G/DL (ref 11.7–15.7)
HGB BLD-MCNC: 9.6 G/DL (ref 11.7–15.7)
HGB BLD-MCNC: 9.7 G/DL (ref 11.7–15.7)
HGB BLD-MCNC: 9.8 G/DL (ref 11.7–15.7)
HGB BLD-MCNC: 9.9 G/DL (ref 11.7–15.7)
HGB UR QL STRIP: ABNORMAL
HGB UR QL STRIP: NEGATIVE
HGB UR QL STRIP: NEGATIVE
HYALINE CASTS #/AREA URNS LPF: 7 /LPF (ref 0–2)
IMM GRANULOCYTES # BLD: 0.1 10E9/L (ref 0–0.4)
IMM GRANULOCYTES # BLD: 0.7 10E9/L (ref 0–0.4)
IMM GRANULOCYTES NFR BLD: 0.6 %
IMM GRANULOCYTES NFR BLD: 4.7 %
INR PPP: 1.08 (ref 0.86–1.14)
INR PPP: 1.2
INR PPP: 1.23 (ref 0.86–1.14)
INR PPP: 1.27 (ref 0.86–1.14)
INTERPRETATION ECG - MUSE: NORMAL
KETONES UR STRIP-MCNC: NEGATIVE MG/DL
LABORATORY COMMENT REPORT: ABNORMAL
LACTATE BLD-SCNC: 0.7 MMOL/L (ref 0.7–2)
LACTATE BLD-SCNC: 1 MMOL/L (ref 0.7–2)
LDH SERPL L TO P-CCNC: 814 U/L (ref 81–234)
LEUKOCYTE ESTERASE UR QL STRIP: ABNORMAL
LIPASE SERPL-CCNC: 60 U/L (ref 73–393)
LYMPHOCYTES # BLD AUTO: 0.3 10E9/L (ref 0.8–5.3)
LYMPHOCYTES # BLD AUTO: 0.8 10E9/L (ref 0.8–5.3)
LYMPHOCYTES NFR BLD AUTO: 2.7 %
LYMPHOCYTES NFR BLD AUTO: 5.2 %
Lab: ABNORMAL
Lab: NORMAL
MAGNESIUM SERPL-MCNC: 1.9 MG/DL (ref 1.6–2.3)
MAGNESIUM SERPL-MCNC: 1.9 MG/DL (ref 1.6–2.3)
MAGNESIUM SERPL-MCNC: 2 MG/DL (ref 1.6–2.3)
MAGNESIUM SERPL-MCNC: 2.1 MG/DL (ref 1.6–2.3)
MAGNESIUM SERPL-MCNC: 2.2 MG/DL (ref 1.6–2.3)
MAGNESIUM SERPL-MCNC: 2.3 MG/DL (ref 1.6–2.3)
MAGNESIUM SERPL-MCNC: 2.4 MG/DL (ref 1.6–2.3)
MAGNESIUM SERPL-MCNC: 2.5 MG/DL (ref 1.6–2.3)
MAGNESIUM SERPL-MCNC: 2.6 MG/DL (ref 1.6–2.3)
MAGNESIUM SERPL-MCNC: 2.7 MG/DL (ref 1.6–2.3)
MCH RBC QN AUTO: 28.8 PG (ref 26.5–33)
MCH RBC QN AUTO: 28.9 PG (ref 26.5–33)
MCH RBC QN AUTO: 28.9 PG (ref 26.5–33)
MCH RBC QN AUTO: 29.1 PG (ref 26.5–33)
MCH RBC QN AUTO: 29.1 PG (ref 26.5–33)
MCH RBC QN AUTO: 29.2 PG (ref 26.5–33)
MCH RBC QN AUTO: 29.3 PG (ref 26.5–33)
MCH RBC QN AUTO: 29.6 PG (ref 26.5–33)
MCH RBC QN AUTO: 29.7 PG (ref 26.5–33)
MCH RBC QN AUTO: 29.8 PG (ref 26.5–33)
MCH RBC QN AUTO: 29.9 PG (ref 26.5–33)
MCH RBC QN AUTO: 30 PG (ref 26.5–33)
MCH RBC QN AUTO: 30.1 PG (ref 26.5–33)
MCH RBC QN AUTO: 30.2 PG (ref 26.5–33)
MCH RBC QN AUTO: 30.3 PG (ref 26.5–33)
MCH RBC QN AUTO: 30.4 PG (ref 26.5–33)
MCH RBC QN AUTO: 30.5 PG (ref 26.5–33)
MCH RBC QN AUTO: 30.6 PG (ref 26.5–33)
MCH RBC QN AUTO: 30.8 PG (ref 26.5–33)
MCH RBC QN AUTO: 30.9 PG (ref 26.5–33)
MCH RBC QN AUTO: 30.9 PG (ref 26.5–33)
MCH RBC QN AUTO: 31.1 PG (ref 26.5–33)
MCH RBC QN AUTO: 31.5 PG (ref 26.5–33)
MCHC RBC AUTO-ENTMCNC: 28.7 G/DL (ref 31.5–36.5)
MCHC RBC AUTO-ENTMCNC: 29.1 G/DL (ref 31.5–36.5)
MCHC RBC AUTO-ENTMCNC: 29.3 G/DL (ref 31.5–36.5)
MCHC RBC AUTO-ENTMCNC: 29.4 G/DL (ref 31.5–36.5)
MCHC RBC AUTO-ENTMCNC: 29.6 G/DL (ref 31.5–36.5)
MCHC RBC AUTO-ENTMCNC: 29.7 G/DL (ref 31.5–36.5)
MCHC RBC AUTO-ENTMCNC: 29.7 G/DL (ref 31.5–36.5)
MCHC RBC AUTO-ENTMCNC: 29.8 G/DL (ref 31.5–36.5)
MCHC RBC AUTO-ENTMCNC: 29.9 G/DL (ref 31.5–36.5)
MCHC RBC AUTO-ENTMCNC: 30 G/DL (ref 31.5–36.5)
MCHC RBC AUTO-ENTMCNC: 30 G/DL (ref 31.5–36.5)
MCHC RBC AUTO-ENTMCNC: 30.2 G/DL (ref 31.5–36.5)
MCHC RBC AUTO-ENTMCNC: 30.2 G/DL (ref 31.5–36.5)
MCHC RBC AUTO-ENTMCNC: 30.4 G/DL (ref 31.5–36.5)
MCHC RBC AUTO-ENTMCNC: 30.4 G/DL (ref 31.5–36.5)
MCHC RBC AUTO-ENTMCNC: 30.5 G/DL (ref 31.5–36.5)
MCHC RBC AUTO-ENTMCNC: 30.5 G/DL (ref 31.5–36.5)
MCHC RBC AUTO-ENTMCNC: 30.7 G/DL (ref 31.5–36.5)
MCHC RBC AUTO-ENTMCNC: 30.7 G/DL (ref 31.5–36.5)
MCHC RBC AUTO-ENTMCNC: 31 G/DL (ref 31.5–36.5)
MCHC RBC AUTO-ENTMCNC: 31.1 G/DL (ref 31.5–36.5)
MCHC RBC AUTO-ENTMCNC: 31.7 G/DL (ref 31.5–36.5)
MCHC RBC AUTO-ENTMCNC: 31.9 G/DL (ref 31.5–36.5)
MCHC RBC AUTO-ENTMCNC: 32 G/DL (ref 31.5–36.5)
MCHC RBC AUTO-ENTMCNC: 32.1 G/DL (ref 31.5–36.5)
MCHC RBC AUTO-ENTMCNC: 32.3 G/DL (ref 31.5–36.5)
MCHC RBC AUTO-ENTMCNC: 32.6 G/DL (ref 31.5–36.5)
MCHC RBC AUTO-ENTMCNC: 32.7 G/DL (ref 31.5–36.5)
MCHC RBC AUTO-ENTMCNC: 32.9 G/DL (ref 31.5–36.5)
MCHC RBC AUTO-ENTMCNC: 32.9 G/DL (ref 31.5–36.5)
MCHC RBC AUTO-ENTMCNC: 33.4 G/DL (ref 31.5–36.5)
MCHC RBC AUTO-ENTMCNC: 34.5 G/DL (ref 31.5–36.5)
MCV RBC AUTO: 100 FL (ref 78–100)
MCV RBC AUTO: 101 FL (ref 78–100)
MCV RBC AUTO: 102 FL (ref 78–100)
MCV RBC AUTO: 103 FL (ref 78–100)
MCV RBC AUTO: 104 FL (ref 78–100)
MCV RBC AUTO: 104 FL (ref 78–100)
MCV RBC AUTO: 105 FL (ref 78–100)
MCV RBC AUTO: 86 FL (ref 78–100)
MCV RBC AUTO: 86 FL (ref 78–100)
MCV RBC AUTO: 89 FL (ref 78–100)
MCV RBC AUTO: 90 FL (ref 78–100)
MCV RBC AUTO: 91 FL (ref 78–100)
MCV RBC AUTO: 92 FL (ref 78–100)
MCV RBC AUTO: 94 FL (ref 78–100)
MCV RBC AUTO: 95 FL (ref 78–100)
MCV RBC AUTO: 95 FL (ref 78–100)
MCV RBC AUTO: 98 FL (ref 78–100)
MCV RBC AUTO: 98 FL (ref 78–100)
MCV RBC AUTO: 99 FL (ref 78–100)
MONOCYTES # BLD AUTO: 0.2 10E9/L (ref 0–1.3)
MONOCYTES # BLD AUTO: 0.5 10E9/L (ref 0–1.3)
MONOCYTES NFR BLD AUTO: 1.9 %
MONOCYTES NFR BLD AUTO: 3.7 %
MUCOUS THREADS #/AREA URNS LPF: PRESENT /LPF
NEUTROPHILS # BLD AUTO: 11.5 10E9/L (ref 1.6–8.3)
NEUTROPHILS # BLD AUTO: 12.5 10E9/L (ref 1.6–8.3)
NEUTROPHILS NFR BLD AUTO: 85.5 %
NEUTROPHILS NFR BLD AUTO: 94.7 %
NITRATE UR QL: NEGATIVE
NRBC # BLD AUTO: 0 10*3/UL
NRBC # BLD AUTO: 0 10*3/UL
NRBC BLD AUTO-RTO: 0 /100
NRBC BLD AUTO-RTO: 0 /100
NT-PROBNP SERPL-MCNC: 9375 PG/ML (ref 0–900)
NUM BPU REQUESTED: 1
NUM BPU REQUESTED: 3
O2/TOTAL GAS SETTING VFR VENT: 50 %
O2/TOTAL GAS SETTING VFR VENT: 60 %
O2/TOTAL GAS SETTING VFR VENT: 60 %
O2/TOTAL GAS SETTING VFR VENT: 70 %
O2/TOTAL GAS SETTING VFR VENT: ABNORMAL %
O2/TOTAL GAS SETTING VFR VENT: NORMAL %
OXYHGB MFR BLD: 73 % (ref 92–100)
OXYHGB MFR BLD: 82 % (ref 92–100)
OXYHGB MFR BLD: 88 % (ref 92–100)
OXYHGB MFR BLD: 90 % (ref 92–100)
OXYHGB MFR BLD: 92 % (ref 92–100)
OXYHGB MFR BLD: 93 % (ref 92–100)
OXYHGB MFR BLD: 93 % (ref 92–100)
OXYHGB MFR BLD: 94 % (ref 92–100)
OXYHGB MFR BLD: 95 % (ref 92–100)
OXYHGB MFR BLD: 96 % (ref 92–100)
OXYHGB MFR BLD: 97 % (ref 92–100)
OXYHGB MFR BLD: 98 % (ref 92–100)
OXYHGB MFR BLD: 99 % (ref 92–100)
OXYHGB MFR BLDV: 66 %
OXYHGB MFR BLDV: 68 %
OXYHGB MFR BLDV: 71 %
OXYHGB MFR BLDV: 73 %
OXYHGB MFR BLDV: 75 %
OXYHGB MFR BLDV: 77 %
OXYHGB MFR BLDV: 77 %
OXYHGB MFR BLDV: NORMAL %
PCO2 BLD: 35 MM HG (ref 35–45)
PCO2 BLD: 39 MM HG (ref 35–45)
PCO2 BLD: 40 MM HG (ref 35–45)
PCO2 BLD: 40 MM HG (ref 35–45)
PCO2 BLD: 41 MM HG (ref 35–45)
PCO2 BLD: 42 MM HG (ref 35–45)
PCO2 BLD: 44 MM HG (ref 35–45)
PCO2 BLD: 46 MM HG (ref 35–45)
PCO2 BLD: 47 MM HG (ref 35–45)
PCO2 BLD: 48 MM HG (ref 35–45)
PCO2 BLD: 48 MM HG (ref 35–45)
PCO2 BLD: 49 MM HG (ref 35–45)
PCO2 BLD: 50 MM HG (ref 35–45)
PCO2 BLD: 53 MM HG (ref 35–45)
PCO2 BLD: 55 MM HG (ref 35–45)
PCO2 BLD: 57 MM HG (ref 35–45)
PCO2 BLD: 59 MM HG (ref 35–45)
PCO2 BLD: 60 MM HG (ref 35–45)
PCO2 BLD: 63 MM HG (ref 35–45)
PCO2 BLD: 69 MM HG (ref 35–45)
PCO2 BLD: 70 MM HG (ref 35–45)
PCO2 BLD: 72 MM HG (ref 35–45)
PCO2 BLD: 72 MM HG (ref 35–45)
PCO2 BLD: 73 MM HG (ref 35–45)
PCO2 BLD: 82 MM HG (ref 35–45)
PCO2 BLDV: 64 MM HG (ref 40–50)
PCO2 BLDV: 69 MM HG (ref 40–50)
PCO2 BLDV: 70 MM HG (ref 40–50)
PCO2 BLDV: 74 MM HG (ref 40–50)
PCO2 BLDV: 78 MM HG (ref 40–50)
PCO2 BLDV: 80 MM HG (ref 40–50)
PCO2 BLDV: 89 MM HG (ref 40–50)
PCO2 BLDV: NORMAL MM HG (ref 40–50)
PH BLD: 7.32 PH (ref 7.35–7.45)
PH BLD: 7.35 PH (ref 7.35–7.45)
PH BLD: 7.36 PH (ref 7.35–7.45)
PH BLD: 7.37 PH (ref 7.35–7.45)
PH BLD: 7.37 PH (ref 7.35–7.45)
PH BLD: 7.39 PH (ref 7.35–7.45)
PH BLD: 7.4 PH (ref 7.35–7.45)
PH BLD: 7.41 PH (ref 7.35–7.45)
PH BLD: 7.42 PH (ref 7.35–7.45)
PH BLD: 7.43 PH (ref 7.35–7.45)
PH BLD: 7.44 PH (ref 7.35–7.45)
PH BLD: 7.44 PH (ref 7.35–7.45)
PH BLD: 7.45 PH (ref 7.35–7.45)
PH BLD: 7.45 PH (ref 7.35–7.45)
PH BLD: 7.46 PH (ref 7.35–7.45)
PH BLD: 7.47 PH (ref 7.35–7.45)
PH BLD: 7.49 PH (ref 7.35–7.45)
PH BLD: 7.5 PH (ref 7.35–7.45)
PH BLDV: 7.28 PH (ref 7.32–7.43)
PH BLDV: 7.35 PH (ref 7.32–7.43)
PH BLDV: 7.36 PH (ref 7.32–7.43)
PH BLDV: 7.39 PH (ref 7.32–7.43)
PH BLDV: 7.4 PH (ref 7.32–7.43)
PH BLDV: 7.41 PH (ref 7.32–7.43)
PH BLDV: 7.42 PH (ref 7.32–7.43)
PH BLDV: NORMAL PH (ref 7.32–7.43)
PH UR STRIP: 5.5 PH (ref 5–7)
PHOSPHATE SERPL-MCNC: 2.2 MG/DL (ref 2.5–4.5)
PHOSPHATE SERPL-MCNC: 2.2 MG/DL (ref 2.5–4.5)
PHOSPHATE SERPL-MCNC: 2.6 MG/DL (ref 2.5–4.5)
PHOSPHATE SERPL-MCNC: 2.6 MG/DL (ref 2.5–4.5)
PHOSPHATE SERPL-MCNC: 2.8 MG/DL (ref 2.5–4.5)
PHOSPHATE SERPL-MCNC: 2.8 MG/DL (ref 2.5–4.5)
PHOSPHATE SERPL-MCNC: 3 MG/DL (ref 2.5–4.5)
PHOSPHATE SERPL-MCNC: 3.2 MG/DL (ref 2.5–4.5)
PHOSPHATE SERPL-MCNC: 3.3 MG/DL (ref 2.5–4.5)
PHOSPHATE SERPL-MCNC: 3.4 MG/DL (ref 2.5–4.5)
PHOSPHATE SERPL-MCNC: 3.5 MG/DL (ref 2.5–4.5)
PHOSPHATE SERPL-MCNC: 3.5 MG/DL (ref 2.5–4.5)
PHOSPHATE SERPL-MCNC: 3.6 MG/DL (ref 2.5–4.5)
PHOSPHATE SERPL-MCNC: 3.7 MG/DL (ref 2.5–4.5)
PHOSPHATE SERPL-MCNC: 3.7 MG/DL (ref 2.5–4.5)
PHOSPHATE SERPL-MCNC: 3.9 MG/DL (ref 2.5–4.5)
PHOSPHATE SERPL-MCNC: 3.9 MG/DL (ref 2.5–4.5)
PHOSPHATE SERPL-MCNC: 4 MG/DL (ref 2.5–4.5)
PHOSPHATE SERPL-MCNC: 4.1 MG/DL (ref 2.5–4.5)
PHOSPHATE SERPL-MCNC: 4.2 MG/DL (ref 2.5–4.5)
PHOSPHATE SERPL-MCNC: 4.4 MG/DL (ref 2.5–4.5)
PHOSPHATE SERPL-MCNC: 4.5 MG/DL (ref 2.5–4.5)
PHOSPHATE SERPL-MCNC: 4.5 MG/DL (ref 2.5–4.5)
PHOSPHATE SERPL-MCNC: 4.8 MG/DL (ref 2.5–4.5)
PHOSPHATE SERPL-MCNC: 4.8 MG/DL (ref 2.5–4.5)
PLATELET # BLD AUTO: 162 10E9/L (ref 150–450)
PLATELET # BLD AUTO: 171 10E9/L (ref 150–450)
PLATELET # BLD AUTO: 178 10E9/L (ref 150–450)
PLATELET # BLD AUTO: 182 10E9/L (ref 150–450)
PLATELET # BLD AUTO: 190 10E9/L (ref 150–450)
PLATELET # BLD AUTO: 192 10E9/L (ref 150–450)
PLATELET # BLD AUTO: 203 10E9/L (ref 150–450)
PLATELET # BLD AUTO: 237 10E9/L (ref 150–450)
PLATELET # BLD AUTO: 238 10E9/L (ref 150–450)
PLATELET # BLD AUTO: 239 10E9/L (ref 150–450)
PLATELET # BLD AUTO: 240 10E9/L (ref 150–450)
PLATELET # BLD AUTO: 242 10E9/L (ref 150–450)
PLATELET # BLD AUTO: 244 10E9/L (ref 150–450)
PLATELET # BLD AUTO: 247 10E9/L (ref 150–450)
PLATELET # BLD AUTO: 252 10E9/L (ref 150–450)
PLATELET # BLD AUTO: 253 10E9/L (ref 150–450)
PLATELET # BLD AUTO: 264 10E9/L (ref 150–450)
PLATELET # BLD AUTO: 269 10E9/L (ref 150–450)
PLATELET # BLD AUTO: 269 10E9/L (ref 150–450)
PLATELET # BLD AUTO: 274 10E9/L (ref 150–450)
PLATELET # BLD AUTO: 278 10E9/L (ref 150–450)
PLATELET # BLD AUTO: 278 10E9/L (ref 150–450)
PLATELET # BLD AUTO: 279 10E9/L (ref 150–450)
PLATELET # BLD AUTO: 284 10E9/L (ref 150–450)
PLATELET # BLD AUTO: 285 10E9/L (ref 150–450)
PLATELET # BLD AUTO: 287 10E9/L (ref 150–450)
PLATELET # BLD AUTO: 287 10E9/L (ref 150–450)
PLATELET # BLD AUTO: 288 10E9/L (ref 150–450)
PLATELET # BLD AUTO: 291 10E9/L (ref 150–450)
PLATELET # BLD AUTO: 295 10E9/L (ref 150–450)
PLATELET # BLD AUTO: 298 10E9/L (ref 150–450)
PLATELET # BLD AUTO: 302 10E9/L (ref 150–450)
PLATELET # BLD AUTO: 306 10E9/L (ref 150–450)
PLATELET # BLD AUTO: 321 10E9/L (ref 150–450)
PLATELET # BLD AUTO: 328 10E9/L (ref 150–450)
PLATELET # BLD AUTO: 355 10E9/L (ref 150–450)
PLATELET # BLD AUTO: 356 10E9/L (ref 150–450)
PLATELET # BLD AUTO: 372 10E9/L (ref 150–450)
PLATELET # BLD AUTO: 374 10E9/L (ref 150–450)
PLATELET # BLD AUTO: 388 10E9/L (ref 150–450)
PLATELET # BLD AUTO: 403 10E9/L (ref 150–450)
PLATELET # BLD AUTO: 480 10E9/L (ref 150–450)
PLATELET # BLD AUTO: 481 10E9/L (ref 150–450)
PLATELET # BLD AUTO: 487 10E9/L (ref 150–450)
PLATELET # BLD AUTO: 514 10E9/L (ref 150–450)
PLATELET # BLD AUTO: 527 10E9/L (ref 150–450)
PLATELET # BLD AUTO: 549 10E9/L (ref 150–450)
PO2 BLD: 101 MM HG (ref 80–105)
PO2 BLD: 107 MM HG (ref 80–105)
PO2 BLD: 108 MM HG (ref 80–105)
PO2 BLD: 111 MM HG (ref 80–105)
PO2 BLD: 121 MM HG (ref 80–105)
PO2 BLD: 127 MM HG (ref 80–105)
PO2 BLD: 149 MM HG (ref 80–105)
PO2 BLD: 158 MM HG (ref 80–105)
PO2 BLD: 159 MM HG (ref 80–105)
PO2 BLD: 170 MM HG (ref 80–105)
PO2 BLD: 198 MM HG (ref 80–105)
PO2 BLD: 44 MM HG (ref 80–105)
PO2 BLD: 49 MM HG (ref 80–105)
PO2 BLD: 59 MM HG (ref 80–105)
PO2 BLD: 61 MM HG (ref 80–105)
PO2 BLD: 64 MM HG (ref 80–105)
PO2 BLD: 65 MM HG (ref 80–105)
PO2 BLD: 69 MM HG (ref 80–105)
PO2 BLD: 69 MM HG (ref 80–105)
PO2 BLD: 70 MM HG (ref 80–105)
PO2 BLD: 71 MM HG (ref 80–105)
PO2 BLD: 73 MM HG (ref 80–105)
PO2 BLD: 74 MM HG (ref 80–105)
PO2 BLD: 75 MM HG (ref 80–105)
PO2 BLD: 78 MM HG (ref 80–105)
PO2 BLD: 81 MM HG (ref 80–105)
PO2 BLD: 88 MM HG (ref 80–105)
PO2 BLD: 88 MM HG (ref 80–105)
PO2 BLD: 91 MM HG (ref 80–105)
PO2 BLD: 92 MM HG (ref 80–105)
PO2 BLD: 92 MM HG (ref 80–105)
PO2 BLD: 98 MM HG (ref 80–105)
PO2 BLDV: 38 MM HG (ref 25–47)
PO2 BLDV: 39 MM HG (ref 25–47)
PO2 BLDV: 39 MM HG (ref 25–47)
PO2 BLDV: 42 MM HG (ref 25–47)
PO2 BLDV: 43 MM HG (ref 25–47)
PO2 BLDV: 44 MM HG (ref 25–47)
PO2 BLDV: 47 MM HG (ref 25–47)
PO2 BLDV: NORMAL MM HG (ref 25–47)
POTASSIUM SERPL-SCNC: 2.9 MMOL/L (ref 3.4–5.3)
POTASSIUM SERPL-SCNC: 3 MMOL/L (ref 3.4–5.3)
POTASSIUM SERPL-SCNC: 3.1 MMOL/L (ref 3.4–5.3)
POTASSIUM SERPL-SCNC: 3.2 MMOL/L (ref 3.4–5.3)
POTASSIUM SERPL-SCNC: 3.3 MMOL/L (ref 3.4–5.3)
POTASSIUM SERPL-SCNC: 3.4 MMOL/L (ref 3.4–5.3)
POTASSIUM SERPL-SCNC: 3.4 MMOL/L (ref 3.5–5)
POTASSIUM SERPL-SCNC: 3.5 MMOL/L (ref 3.4–5.3)
POTASSIUM SERPL-SCNC: 3.6 MMOL/L (ref 3.4–5.3)
POTASSIUM SERPL-SCNC: 3.7 MMOL/L (ref 3.4–5.3)
POTASSIUM SERPL-SCNC: 3.8 MMOL/L (ref 3.4–5.3)
POTASSIUM SERPL-SCNC: 3.9 MMOL/L (ref 3.4–5.3)
POTASSIUM SERPL-SCNC: 4 MMOL/L (ref 3.4–5.3)
POTASSIUM SERPL-SCNC: 4.1 MMOL/L (ref 3.4–5.3)
POTASSIUM SERPL-SCNC: 4.2 MMOL/L (ref 3.4–5.3)
POTASSIUM SERPL-SCNC: 4.5 MMOL/L (ref 3.4–5.3)
POTASSIUM SERPL-SCNC: 4.8 MMOL/L (ref 3.4–5.3)
POTASSIUM SERPL-SCNC: NORMAL MMOL/L (ref 3.4–5.3)
PROCALCITONIN SERPL-MCNC: 0.14 NG/ML
PROCALCITONIN SERPL-MCNC: 0.23 NG/ML
PROCALCITONIN SERPL-MCNC: 0.41 NG/ML
PROCALCITONIN SERPL-MCNC: 0.41 NG/ML
PROCALCITONIN SERPL-MCNC: 0.48 NG/ML
PROT SERPL-MCNC: 5.2 G/DL (ref 6.8–8.8)
PROT SERPL-MCNC: 6.5 G/DL (ref 6.8–8.8)
PROT SERPL-MCNC: 6.9 G/DL (ref 6.8–8.8)
RBC # BLD AUTO: 2.18 10E12/L (ref 3.8–5.2)
RBC # BLD AUTO: 2.25 10E12/L (ref 3.8–5.2)
RBC # BLD AUTO: 2.28 10E12/L (ref 3.8–5.2)
RBC # BLD AUTO: 2.29 10E12/L (ref 3.8–5.2)
RBC # BLD AUTO: 2.39 10E12/L (ref 3.8–5.2)
RBC # BLD AUTO: 2.4 10E12/L (ref 3.8–5.2)
RBC # BLD AUTO: 2.42 10E12/L (ref 3.8–5.2)
RBC # BLD AUTO: 2.43 10E12/L (ref 3.8–5.2)
RBC # BLD AUTO: 2.47 10E12/L (ref 3.8–5.2)
RBC # BLD AUTO: 2.5 10E12/L (ref 3.8–5.2)
RBC # BLD AUTO: 2.54 10E12/L (ref 3.8–5.2)
RBC # BLD AUTO: 2.56 10E12/L (ref 3.8–5.2)
RBC # BLD AUTO: 2.58 10E12/L (ref 3.8–5.2)
RBC # BLD AUTO: 2.58 10E12/L (ref 3.8–5.2)
RBC # BLD AUTO: 2.59 10E12/L (ref 3.8–5.2)
RBC # BLD AUTO: 2.6 10E12/L (ref 3.8–5.2)
RBC # BLD AUTO: 2.64 10E12/L (ref 3.8–5.2)
RBC # BLD AUTO: 2.64 10E12/L (ref 3.8–5.2)
RBC # BLD AUTO: 2.65 10E12/L (ref 3.8–5.2)
RBC # BLD AUTO: 2.67 10E12/L (ref 3.8–5.2)
RBC # BLD AUTO: 2.7 10E12/L (ref 3.8–5.2)
RBC # BLD AUTO: 2.7 10E12/L (ref 3.8–5.2)
RBC # BLD AUTO: 2.72 10E12/L (ref 3.8–5.2)
RBC # BLD AUTO: 2.74 10E12/L (ref 3.8–5.2)
RBC # BLD AUTO: 2.77 10E12/L (ref 3.8–5.2)
RBC # BLD AUTO: 2.8 10E12/L (ref 3.8–5.2)
RBC # BLD AUTO: 2.85 10E12/L (ref 3.8–5.2)
RBC # BLD AUTO: 2.88 10E12/L (ref 3.8–5.2)
RBC # BLD AUTO: 2.9 10E12/L (ref 3.8–5.2)
RBC # BLD AUTO: 3.02 10E12/L (ref 3.8–5.2)
RBC # BLD AUTO: 3.03 10E12/L (ref 3.8–5.2)
RBC # BLD AUTO: 3.04 10E12/L (ref 3.8–5.2)
RBC # BLD AUTO: 3.09 10E12/L (ref 3.8–5.2)
RBC # BLD AUTO: 3.11 10E12/L (ref 3.8–5.2)
RBC # BLD AUTO: 3.16 10E12/L (ref 3.8–5.2)
RBC # BLD AUTO: 3.2 10E12/L (ref 3.8–5.2)
RBC # BLD AUTO: 3.25 10E12/L (ref 3.8–5.2)
RBC # BLD AUTO: 3.3 10E12/L (ref 3.8–5.2)
RBC # BLD AUTO: 3.33 10E12/L (ref 3.8–5.2)
RBC # BLD AUTO: 3.57 10E12/L (ref 3.8–5.2)
RBC # BLD AUTO: 3.57 10E12/L (ref 3.8–5.2)
RBC # BLD AUTO: 3.69 10E12/L (ref 3.8–5.2)
RBC # BLD AUTO: 3.88 10E12/L (ref 3.8–5.2)
RBC # BLD AUTO: 4.34 10E12/L (ref 3.8–5.2)
RBC # BLD AUTO: 4.57 10E12/L (ref 3.8–5.2)
RBC #/AREA URNS AUTO: 2 /HPF (ref 0–2)
RBC #/AREA URNS AUTO: 21 /HPF (ref 0–2)
RBC #/AREA URNS AUTO: 24 /HPF (ref 0–2)
RSV RNA SPEC QL NAA+PROBE: ABNORMAL
SARS-COV-2 RNA RESP QL NAA+PROBE: POSITIVE
SODIUM SERPL-SCNC: 135 MMOL/L (ref 133–144)
SODIUM SERPL-SCNC: 135 MMOL/L (ref 133–144)
SODIUM SERPL-SCNC: 136 MMOL/L (ref 133–144)
SODIUM SERPL-SCNC: 137 MMOL/L (ref 133–144)
SODIUM SERPL-SCNC: 138 MMOL/L (ref 133–144)
SODIUM SERPL-SCNC: 139 MMOL/L (ref 133–144)
SODIUM SERPL-SCNC: 140 MMOL/L (ref 133–144)
SODIUM SERPL-SCNC: 141 MMOL/L (ref 133–144)
SODIUM SERPL-SCNC: 142 MMOL/L (ref 133–144)
SODIUM SERPL-SCNC: 143 MMOL/L (ref 133–144)
SODIUM SERPL-SCNC: 144 MMOL/L (ref 133–144)
SODIUM SERPL-SCNC: 145 MMOL/L (ref 133–144)
SODIUM SERPL-SCNC: 146 MMOL/L (ref 133–144)
SODIUM SERPL-SCNC: 146 MMOL/L (ref 133–144)
SODIUM SERPL-SCNC: 147 MMOL/L (ref 133–144)
SODIUM SERPL-SCNC: 148 MMOL/L (ref 133–144)
SODIUM SERPL-SCNC: 148 MMOL/L (ref 133–144)
SOURCE: ABNORMAL
SP GR UR STRIP: 1.01 (ref 1–1.03)
SP GR UR STRIP: 1.02 (ref 1–1.03)
SP GR UR STRIP: 1.02 (ref 1–1.03)
SPECIMEN EXP DATE BLD: NORMAL
SPECIMEN SOURCE: ABNORMAL
SPECIMEN SOURCE: NORMAL
SQUAMOUS #/AREA URNS AUTO: 0 /HPF (ref 0–1)
SQUAMOUS #/AREA URNS AUTO: <1 /HPF (ref 0–1)
SQUAMOUS #/AREA URNS AUTO: <1 /HPF (ref 0–1)
TRANS CELLS #/AREA URNS HPF: <1 /HPF (ref 0–1)
TRANSF REACT PLASRBC-IMP: NORMAL
TRANSFUSION RXN BLOOD BANK NOTIFICATION: NORMAL
TRANSFUSION STATUS PATIENT QL: NORMAL
TRIGL SERPL-MCNC: 134 MG/DL
TRIGL SERPL-MCNC: 226 MG/DL
TROPONIN I SERPL-MCNC: 0.02 UG/L (ref 0–0.04)
TROPONIN I SERPL-MCNC: 0.43 UG/L (ref 0–0.04)
UFH PPP CHRO-ACNC: 0.49 IU/ML
UROBILINOGEN UR STRIP-MCNC: 0 MG/DL (ref 0–2)
WBC # BLD AUTO: 10 10E9/L (ref 4–11)
WBC # BLD AUTO: 10.2 10E9/L (ref 4–11)
WBC # BLD AUTO: 10.2 10E9/L (ref 4–11)
WBC # BLD AUTO: 10.5 10E9/L (ref 4–11)
WBC # BLD AUTO: 10.7 10E9/L (ref 4–11)
WBC # BLD AUTO: 10.8 10E9/L (ref 4–11)
WBC # BLD AUTO: 11.2 10E9/L (ref 4–11)
WBC # BLD AUTO: 11.3 10E9/L (ref 4–11)
WBC # BLD AUTO: 11.3 10E9/L (ref 4–11)
WBC # BLD AUTO: 11.5 10E9/L (ref 4–11)
WBC # BLD AUTO: 11.5 10E9/L (ref 4–11)
WBC # BLD AUTO: 11.7 10E9/L (ref 4–11)
WBC # BLD AUTO: 11.7 10E9/L (ref 4–11)
WBC # BLD AUTO: 11.8 10E9/L (ref 4–11)
WBC # BLD AUTO: 12 10E9/L (ref 4–11)
WBC # BLD AUTO: 12.1 10E9/L (ref 4–11)
WBC # BLD AUTO: 12.1 10E9/L (ref 4–11)
WBC # BLD AUTO: 12.4 10E9/L (ref 4–11)
WBC # BLD AUTO: 12.6 10E9/L (ref 4–11)
WBC # BLD AUTO: 12.7 10E9/L (ref 4–11)
WBC # BLD AUTO: 12.9 10E9/L (ref 4–11)
WBC # BLD AUTO: 12.9 10E9/L (ref 4–11)
WBC # BLD AUTO: 13 10E9/L (ref 4–11)
WBC # BLD AUTO: 13.2 10E9/L (ref 4–11)
WBC # BLD AUTO: 13.6 10E9/L (ref 4–11)
WBC # BLD AUTO: 14.4 10E9/L (ref 4–11)
WBC # BLD AUTO: 14.5 10E9/L (ref 4–11)
WBC # BLD AUTO: 14.6 10E9/L (ref 4–11)
WBC # BLD AUTO: 14.8 10E9/L (ref 4–11)
WBC # BLD AUTO: 14.8 10E9/L (ref 4–11)
WBC # BLD AUTO: 15 10E9/L (ref 4–11)
WBC # BLD AUTO: 15.2 10E9/L (ref 4–11)
WBC # BLD AUTO: 15.9 10E9/L (ref 4–11)
WBC # BLD AUTO: 16.2 10E9/L (ref 4–11)
WBC # BLD AUTO: 16.4 10E9/L (ref 4–11)
WBC # BLD AUTO: 16.5 10E9/L (ref 4–11)
WBC # BLD AUTO: 16.6 10E9/L (ref 4–11)
WBC # BLD AUTO: 16.7 10E9/L (ref 4–11)
WBC # BLD AUTO: 19.3 10E9/L (ref 4–11)
WBC # BLD AUTO: 7.6 10E9/L (ref 4–11)
WBC # BLD AUTO: 8.1 10E9/L (ref 4–11)
WBC # BLD AUTO: 8.6 10E9/L (ref 4–11)
WBC # BLD AUTO: 9.3 10E9/L (ref 4–11)
WBC # BLD AUTO: 9.4 10E9/L (ref 4–11)
WBC # BLD AUTO: 9.5 10E9/L (ref 4–11)
WBC #/AREA URNS AUTO: 13 /HPF (ref 0–5)
WBC #/AREA URNS AUTO: 22 /HPF (ref 0–5)
WBC #/AREA URNS AUTO: 31 /HPF (ref 0–5)
WBC CLUMPS #/AREA URNS HPF: PRESENT /HPF
YEAST #/AREA URNS HPF: ABNORMAL /HPF
YEAST #/AREA URNS HPF: ABNORMAL /HPF

## 2021-01-01 PROCEDURE — 250N000011 HC RX IP 250 OP 636: Performed by: INTERNAL MEDICINE

## 2021-01-01 PROCEDURE — 86850 RBC ANTIBODY SCREEN: CPT | Performed by: ANESTHESIOLOGY

## 2021-01-01 PROCEDURE — 94640 AIRWAY INHALATION TREATMENT: CPT | Mod: 76

## 2021-01-01 PROCEDURE — 84100 ASSAY OF PHOSPHORUS: CPT | Performed by: ANESTHESIOLOGY

## 2021-01-01 PROCEDURE — 99291 CRITICAL CARE FIRST HOUR: CPT | Performed by: INTERNAL MEDICINE

## 2021-01-01 PROCEDURE — 83735 ASSAY OF MAGNESIUM: CPT | Performed by: ANESTHESIOLOGY

## 2021-01-01 PROCEDURE — 999N000157 HC STATISTIC RCP TIME EA 10 MIN

## 2021-01-01 PROCEDURE — 200N000001 HC R&B ICU

## 2021-01-01 PROCEDURE — 250N000013 HC RX MED GY IP 250 OP 250 PS 637: Performed by: ANESTHESIOLOGY

## 2021-01-01 PROCEDURE — 258N000003 HC RX IP 258 OP 636: Performed by: NURSE ANESTHETIST, CERTIFIED REGISTERED

## 2021-01-01 PROCEDURE — 250N000013 HC RX MED GY IP 250 OP 250 PS 637: Performed by: PHYSICIAN ASSISTANT

## 2021-01-01 PROCEDURE — 250N000009 HC RX 250: Performed by: INTERNAL MEDICINE

## 2021-01-01 PROCEDURE — 85027 COMPLETE CBC AUTOMATED: CPT | Performed by: ANESTHESIOLOGY

## 2021-01-01 PROCEDURE — 999N000198 HC STATISTIC WOC PT EDUCATION, 16-30 MIN

## 2021-01-01 PROCEDURE — 84132 ASSAY OF SERUM POTASSIUM: CPT | Performed by: ANESTHESIOLOGY

## 2021-01-01 PROCEDURE — 84132 ASSAY OF SERUM POTASSIUM: CPT | Performed by: INTERNAL MEDICINE

## 2021-01-01 PROCEDURE — 87070 CULTURE OTHR SPECIMN AEROBIC: CPT | Performed by: INTERNAL MEDICINE

## 2021-01-01 PROCEDURE — 250N000011 HC RX IP 250 OP 636

## 2021-01-01 PROCEDURE — 86078 PHYS BLOOD BANK SERV REACTJ: CPT | Performed by: PATHOLOGY

## 2021-01-01 PROCEDURE — 85027 COMPLETE CBC AUTOMATED: CPT | Performed by: PHYSICIAN ASSISTANT

## 2021-01-01 PROCEDURE — 250N000013 HC RX MED GY IP 250 OP 250 PS 637: Performed by: INTERNAL MEDICINE

## 2021-01-01 PROCEDURE — 250N000012 HC RX MED GY IP 250 OP 636 PS 637: Performed by: INTERNAL MEDICINE

## 2021-01-01 PROCEDURE — 94003 VENT MGMT INPAT SUBQ DAY: CPT

## 2021-01-01 PROCEDURE — 999N000009 HC STATISTIC AIRWAY CARE

## 2021-01-01 PROCEDURE — 86140 C-REACTIVE PROTEIN: CPT | Performed by: ANESTHESIOLOGY

## 2021-01-01 PROCEDURE — 83615 LACTATE (LD) (LDH) ENZYME: CPT | Performed by: ANESTHESIOLOGY

## 2021-01-01 PROCEDURE — 85730 THROMBOPLASTIN TIME PARTIAL: CPT | Performed by: INTERNAL MEDICINE

## 2021-01-01 PROCEDURE — 80048 BASIC METABOLIC PNL TOTAL CA: CPT | Performed by: ANESTHESIOLOGY

## 2021-01-01 PROCEDURE — 85018 HEMOGLOBIN: CPT | Performed by: INTERNAL MEDICINE

## 2021-01-01 PROCEDURE — 82805 BLOOD GASES W/O2 SATURATION: CPT | Performed by: INTERNAL MEDICINE

## 2021-01-01 PROCEDURE — 85610 PROTHROMBIN TIME: CPT | Performed by: ANESTHESIOLOGY

## 2021-01-01 PROCEDURE — 87077 CULTURE AEROBIC IDENTIFY: CPT | Performed by: INTERNAL MEDICINE

## 2021-01-01 PROCEDURE — 250N000009 HC RX 250: Performed by: ANESTHESIOLOGY

## 2021-01-01 PROCEDURE — 83735 ASSAY OF MAGNESIUM: CPT | Performed by: PHYSICIAN ASSISTANT

## 2021-01-01 PROCEDURE — 250N000011 HC RX IP 250 OP 636: Performed by: PHYSICIAN ASSISTANT

## 2021-01-01 PROCEDURE — 93005 ELECTROCARDIOGRAM TRACING: CPT

## 2021-01-01 PROCEDURE — 258N000003 HC RX IP 258 OP 636: Performed by: ANESTHESIOLOGY

## 2021-01-01 PROCEDURE — 86900 BLOOD TYPING SEROLOGIC ABO: CPT | Performed by: SURGERY

## 2021-01-01 PROCEDURE — 87040 BLOOD CULTURE FOR BACTERIA: CPT | Performed by: INTERNAL MEDICINE

## 2021-01-01 PROCEDURE — P9016 RBC LEUKOCYTES REDUCED: HCPCS | Performed by: INTERNAL MEDICINE

## 2021-01-01 PROCEDURE — 86850 RBC ANTIBODY SCREEN: CPT | Performed by: INTERNAL MEDICINE

## 2021-01-01 PROCEDURE — 87635 SARS-COV-2 COVID-19 AMP PRB: CPT | Performed by: INTERNAL MEDICINE

## 2021-01-01 PROCEDURE — 85018 HEMOGLOBIN: CPT | Performed by: SURGERY

## 2021-01-01 PROCEDURE — 250N000011 HC RX IP 250 OP 636: Performed by: SURGERY

## 2021-01-01 PROCEDURE — 94640 AIRWAY INHALATION TREATMENT: CPT

## 2021-01-01 PROCEDURE — 999N001017 HC STATISTIC GLUCOSE BY METER IP

## 2021-01-01 PROCEDURE — 80048 BASIC METABOLIC PNL TOTAL CA: CPT | Performed by: INTERNAL MEDICINE

## 2021-01-01 PROCEDURE — 84100 ASSAY OF PHOSPHORUS: CPT | Performed by: PHYSICIAN ASSISTANT

## 2021-01-01 PROCEDURE — 999N000208 ECHOCARDIOGRAM COMPLETE

## 2021-01-01 PROCEDURE — 87186 SC STD MICRODIL/AGAR DIL: CPT | Performed by: INTERNAL MEDICINE

## 2021-01-01 PROCEDURE — 87088 URINE BACTERIA CULTURE: CPT | Performed by: ANESTHESIOLOGY

## 2021-01-01 PROCEDURE — 250N000009 HC RX 250: Performed by: PHYSICIAN ASSISTANT

## 2021-01-01 PROCEDURE — 85520 HEPARIN ASSAY: CPT | Performed by: INTERNAL MEDICINE

## 2021-01-01 PROCEDURE — 84132 ASSAY OF SERUM POTASSIUM: CPT | Performed by: FAMILY MEDICINE

## 2021-01-01 PROCEDURE — 87186 SC STD MICRODIL/AGAR DIL: CPT | Performed by: ANESTHESIOLOGY

## 2021-01-01 PROCEDURE — 99291 CRITICAL CARE FIRST HOUR: CPT | Performed by: ANESTHESIOLOGY

## 2021-01-01 PROCEDURE — 250N000011 HC RX IP 250 OP 636: Performed by: ANESTHESIOLOGY

## 2021-01-01 PROCEDURE — 97602 WOUND(S) CARE NON-SELECTIVE: CPT

## 2021-01-01 PROCEDURE — 84478 ASSAY OF TRIGLYCERIDES: CPT | Performed by: ANESTHESIOLOGY

## 2021-01-01 PROCEDURE — 84484 ASSAY OF TROPONIN QUANT: CPT | Performed by: ANESTHESIOLOGY

## 2021-01-01 PROCEDURE — C9113 INJ PANTOPRAZOLE SODIUM, VIA: HCPCS | Performed by: ANESTHESIOLOGY

## 2021-01-01 PROCEDURE — 86923 COMPATIBILITY TEST ELECTRIC: CPT | Performed by: ANESTHESIOLOGY

## 2021-01-01 PROCEDURE — 85379 FIBRIN DEGRADATION QUANT: CPT | Performed by: ANESTHESIOLOGY

## 2021-01-01 PROCEDURE — 999N000185 HC STATISTIC TRANSPORT TIME EA 15 MIN

## 2021-01-01 PROCEDURE — 999N000065 XR CHEST PORT 1 VIEW

## 2021-01-01 PROCEDURE — 80048 BASIC METABOLIC PNL TOTAL CA: CPT | Performed by: PHYSICIAN ASSISTANT

## 2021-01-01 PROCEDURE — 82248 BILIRUBIN DIRECT: CPT | Performed by: ANESTHESIOLOGY

## 2021-01-01 PROCEDURE — 86900 BLOOD TYPING SEROLOGIC ABO: CPT | Performed by: ANESTHESIOLOGY

## 2021-01-01 PROCEDURE — 71045 X-RAY EXAM CHEST 1 VIEW: CPT

## 2021-01-01 PROCEDURE — 999N000078 HC STATISTIC INTRAPULMONARY PERCUSSIVE VENT

## 2021-01-01 PROCEDURE — 999N000197 HC STATISTIC WOC PT EDUCATION, 0-15 MIN

## 2021-01-01 PROCEDURE — 87205 SMEAR GRAM STAIN: CPT | Performed by: INTERNAL MEDICINE

## 2021-01-01 PROCEDURE — 84145 PROCALCITONIN (PCT): CPT | Performed by: INTERNAL MEDICINE

## 2021-01-01 PROCEDURE — 81001 URINALYSIS AUTO W/SCOPE: CPT | Performed by: ANESTHESIOLOGY

## 2021-01-01 PROCEDURE — 76942 ECHO GUIDE FOR BIOPSY: CPT

## 2021-01-01 PROCEDURE — 99291 CRITICAL CARE FIRST HOUR: CPT | Mod: 24 | Performed by: INTERNAL MEDICINE

## 2021-01-01 PROCEDURE — G0463 HOSPITAL OUTPT CLINIC VISIT: HCPCS

## 2021-01-01 PROCEDURE — 84100 ASSAY OF PHOSPHORUS: CPT | Performed by: INTERNAL MEDICINE

## 2021-01-01 PROCEDURE — 999N000065 XR ABDOMEN PORT 1 VW

## 2021-01-01 PROCEDURE — 272N000083 HC NUTRITION PRODUCT SEMIELEM INTERMED LITER

## 2021-01-01 PROCEDURE — 31500 INSERT EMERGENCY AIRWAY: CPT

## 2021-01-01 PROCEDURE — 86900 BLOOD TYPING SEROLOGIC ABO: CPT | Performed by: INTERNAL MEDICINE

## 2021-01-01 PROCEDURE — 3E043XZ INTRODUCTION OF VASOPRESSOR INTO CENTRAL VEIN, PERCUTANEOUS APPROACH: ICD-10-PCS | Performed by: ANESTHESIOLOGY

## 2021-01-01 PROCEDURE — 81001 URINALYSIS AUTO W/SCOPE: CPT | Performed by: INTERNAL MEDICINE

## 2021-01-01 PROCEDURE — 83735 ASSAY OF MAGNESIUM: CPT | Performed by: INTERNAL MEDICINE

## 2021-01-01 PROCEDURE — 36620 INSERTION CATHETER ARTERY: CPT | Performed by: ANESTHESIOLOGY

## 2021-01-01 PROCEDURE — 93306 TTE W/DOPPLER COMPLETE: CPT | Mod: 26 | Performed by: INTERNAL MEDICINE

## 2021-01-01 PROCEDURE — 82150 ASSAY OF AMYLASE: CPT | Performed by: INTERNAL MEDICINE

## 2021-01-01 PROCEDURE — 5A1955Z RESPIRATORY VENTILATION, GREATER THAN 96 CONSECUTIVE HOURS: ICD-10-PCS | Performed by: ANESTHESIOLOGY

## 2021-01-01 PROCEDURE — 82805 BLOOD GASES W/O2 SATURATION: CPT | Performed by: SURGERY

## 2021-01-01 PROCEDURE — 0B110F4 BYPASS TRACHEA TO CUTANEOUS WITH TRACHEOSTOMY DEVICE, OPEN APPROACH: ICD-10-PCS | Performed by: THORACIC SURGERY (CARDIOTHORACIC VASCULAR SURGERY)

## 2021-01-01 PROCEDURE — 85027 COMPLETE CBC AUTOMATED: CPT | Performed by: INTERNAL MEDICINE

## 2021-01-01 PROCEDURE — 258N000003 HC RX IP 258 OP 636: Performed by: INTERNAL MEDICINE

## 2021-01-01 PROCEDURE — 94645 CONT INHLJ TX EACH ADDL HOUR: CPT

## 2021-01-01 PROCEDURE — 99233 SBSQ HOSP IP/OBS HIGH 50: CPT | Performed by: NURSE PRACTITIONER

## 2021-01-01 PROCEDURE — 94644 CONT INHLJ TX 1ST HOUR: CPT

## 2021-01-01 PROCEDURE — 370N000017 HC ANESTHESIA TECHNICAL FEE, PER MIN: Performed by: THORACIC SURGERY (CARDIOTHORACIC VASCULAR SURGERY)

## 2021-01-01 PROCEDURE — 250N000011 HC RX IP 250 OP 636: Performed by: OBSTETRICS & GYNECOLOGY

## 2021-01-01 PROCEDURE — 999N000065 XR CHEST PORT 1 VW

## 2021-01-01 PROCEDURE — 99231 SBSQ HOSP IP/OBS SF/LOW 25: CPT | Performed by: SURGERY

## 2021-01-01 PROCEDURE — 93970 EXTREMITY STUDY: CPT

## 2021-01-01 PROCEDURE — P9016 RBC LEUKOCYTES REDUCED: HCPCS | Performed by: SURGERY

## 2021-01-01 PROCEDURE — 86901 BLOOD TYPING SEROLOGIC RH(D): CPT | Performed by: SURGERY

## 2021-01-01 PROCEDURE — 94660 CPAP INITIATION&MGMT: CPT

## 2021-01-01 PROCEDURE — 999N000190 HC STATISTIC VAT ROUNDS

## 2021-01-01 PROCEDURE — 258N000003 HC RX IP 258 OP 636: Performed by: PHYSICIAN ASSISTANT

## 2021-01-01 PROCEDURE — 250N000012 HC RX MED GY IP 250 OP 636 PS 637: Performed by: ANESTHESIOLOGY

## 2021-01-01 PROCEDURE — XW043E5 INTRODUCTION OF REMDESIVIR ANTI-INFECTIVE INTO CENTRAL VEIN, PERCUTANEOUS APPROACH, NEW TECHNOLOGY GROUP 5: ICD-10-PCS | Performed by: ANESTHESIOLOGY

## 2021-01-01 PROCEDURE — 84145 PROCALCITONIN (PCT): CPT | Performed by: ANESTHESIOLOGY

## 2021-01-01 PROCEDURE — 99207 PR NO BILLABLE SERVICE THIS VISIT: CPT | Mod: 24 | Performed by: INTERNAL MEDICINE

## 2021-01-01 PROCEDURE — 87086 URINE CULTURE/COLONY COUNT: CPT | Performed by: ANESTHESIOLOGY

## 2021-01-01 PROCEDURE — 86901 BLOOD TYPING SEROLOGIC RH(D): CPT | Performed by: ANESTHESIOLOGY

## 2021-01-01 PROCEDURE — 36600 WITHDRAWAL OF ARTERIAL BLOOD: CPT

## 2021-01-01 PROCEDURE — G0463 HOSPITAL OUTPT CLINIC VISIT: HCPCS | Mod: 25

## 2021-01-01 PROCEDURE — 250N000009 HC RX 250: Performed by: NURSE ANESTHETIST, CERTIFIED REGISTERED

## 2021-01-01 PROCEDURE — 82550 ASSAY OF CK (CPK): CPT | Performed by: ANESTHESIOLOGY

## 2021-01-01 PROCEDURE — 36415 COLL VENOUS BLD VENIPUNCTURE: CPT | Performed by: INTERNAL MEDICINE

## 2021-01-01 PROCEDURE — 999N000011 HC STATISTIC ANESTHESIA CASE

## 2021-01-01 PROCEDURE — 82805 BLOOD GASES W/O2 SATURATION: CPT | Performed by: ANESTHESIOLOGY

## 2021-01-01 PROCEDURE — 86923 COMPATIBILITY TEST ELECTRIC: CPT | Performed by: SURGERY

## 2021-01-01 PROCEDURE — 99291 CRITICAL CARE FIRST HOUR: CPT | Mod: 25 | Performed by: INTERNAL MEDICINE

## 2021-01-01 PROCEDURE — 80076 HEPATIC FUNCTION PANEL: CPT | Performed by: ANESTHESIOLOGY

## 2021-01-01 PROCEDURE — 5A09457 ASSISTANCE WITH RESPIRATORY VENTILATION, 24-96 CONSECUTIVE HOURS, CONTINUOUS POSITIVE AIRWAY PRESSURE: ICD-10-PCS | Performed by: ANESTHESIOLOGY

## 2021-01-01 PROCEDURE — 87106 FUNGI IDENTIFICATION YEAST: CPT | Performed by: ANESTHESIOLOGY

## 2021-01-01 PROCEDURE — 87636 SARSCOV2 & INF A&B AMP PRB: CPT | Performed by: ANESTHESIOLOGY

## 2021-01-01 PROCEDURE — 250N000013 HC RX MED GY IP 250 OP 250 PS 637: Performed by: STUDENT IN AN ORGANIZED HEALTH CARE EDUCATION/TRAINING PROGRAM

## 2021-01-01 PROCEDURE — 36415 COLL VENOUS BLD VENIPUNCTURE: CPT | Performed by: ANESTHESIOLOGY

## 2021-01-01 PROCEDURE — 87106 FUNGI IDENTIFICATION YEAST: CPT | Performed by: INTERNAL MEDICINE

## 2021-01-01 PROCEDURE — 250N000013 HC RX MED GY IP 250 OP 250 PS 637: Performed by: SURGERY

## 2021-01-01 PROCEDURE — 250N000011 HC RX IP 250 OP 636: Performed by: NURSE ANESTHETIST, CERTIFIED REGISTERED

## 2021-01-01 PROCEDURE — 85730 THROMBOPLASTIN TIME PARTIAL: CPT | Performed by: ANESTHESIOLOGY

## 2021-01-01 PROCEDURE — 360N000075 HC SURGERY LEVEL 2, PER MIN: Performed by: THORACIC SURGERY (CARDIOTHORACIC VASCULAR SURGERY)

## 2021-01-01 PROCEDURE — 99358 PROLONG SERVICE W/O CONTACT: CPT | Performed by: NURSE PRACTITIONER

## 2021-01-01 PROCEDURE — 99291 CRITICAL CARE FIRST HOUR: CPT | Mod: 25 | Performed by: ANESTHESIOLOGY

## 2021-01-01 PROCEDURE — 999N000040 HC STATISTIC CONSULT NO CHARGE VASC ACCESS

## 2021-01-01 PROCEDURE — 99207 PR CONSULT E&M CHANGED TO INITIAL LEVEL: CPT | Performed by: NURSE PRACTITIONER

## 2021-01-01 PROCEDURE — P9016 RBC LEUKOCYTES REDUCED: HCPCS | Performed by: ANESTHESIOLOGY

## 2021-01-01 PROCEDURE — 80053 COMPREHEN METABOLIC PANEL: CPT | Performed by: ANESTHESIOLOGY

## 2021-01-01 PROCEDURE — 85610 PROTHROMBIN TIME: CPT | Performed by: INTERNAL MEDICINE

## 2021-01-01 PROCEDURE — 80048 BASIC METABOLIC PNL TOTAL CA: CPT

## 2021-01-01 PROCEDURE — 36569 INSJ PICC 5 YR+ W/O IMAGING: CPT

## 2021-01-01 PROCEDURE — 86901 BLOOD TYPING SEROLOGIC RH(D): CPT | Performed by: INTERNAL MEDICINE

## 2021-01-01 PROCEDURE — 36556 INSERT NON-TUNNEL CV CATH: CPT | Performed by: ANESTHESIOLOGY

## 2021-01-01 PROCEDURE — 83605 ASSAY OF LACTIC ACID: CPT | Performed by: INTERNAL MEDICINE

## 2021-01-01 PROCEDURE — 272N000452 HC KIT SHRLOCK 5FR POWER PICC TRIPLE LUMEN

## 2021-01-01 PROCEDURE — 86923 COMPATIBILITY TEST ELECTRIC: CPT | Performed by: INTERNAL MEDICINE

## 2021-01-01 PROCEDURE — 99292 CRITICAL CARE ADDL 30 MIN: CPT | Mod: 25 | Performed by: INTERNAL MEDICINE

## 2021-01-01 PROCEDURE — 80053 COMPREHEN METABOLIC PANEL: CPT | Performed by: INTERNAL MEDICINE

## 2021-01-01 PROCEDURE — 0DH63UZ INSERTION OF FEEDING DEVICE INTO STOMACH, PERCUTANEOUS APPROACH: ICD-10-PCS | Performed by: SURGERY

## 2021-01-01 PROCEDURE — 83605 ASSAY OF LACTIC ACID: CPT | Performed by: ANESTHESIOLOGY

## 2021-01-01 PROCEDURE — 85025 COMPLETE CBC W/AUTO DIFF WBC: CPT | Performed by: ANESTHESIOLOGY

## 2021-01-01 PROCEDURE — 86140 C-REACTIVE PROTEIN: CPT | Performed by: INTERNAL MEDICINE

## 2021-01-01 PROCEDURE — 93926 LOWER EXTREMITY STUDY: CPT

## 2021-01-01 PROCEDURE — 93971 EXTREMITY STUDY: CPT | Mod: RT

## 2021-01-01 PROCEDURE — 85025 COMPLETE CBC W/AUTO DIFF WBC: CPT | Performed by: INTERNAL MEDICINE

## 2021-01-01 PROCEDURE — 83880 ASSAY OF NATRIURETIC PEPTIDE: CPT | Performed by: ANESTHESIOLOGY

## 2021-01-01 PROCEDURE — 85384 FIBRINOGEN ACTIVITY: CPT | Performed by: ANESTHESIOLOGY

## 2021-01-01 PROCEDURE — 87040 BLOOD CULTURE FOR BACTERIA: CPT | Performed by: ANESTHESIOLOGY

## 2021-01-01 PROCEDURE — 250N000025 HC SEVOFLURANE, PER MIN: Performed by: THORACIC SURGERY (CARDIOTHORACIC VASCULAR SURGERY)

## 2021-01-01 PROCEDURE — 86850 RBC ANTIBODY SCREEN: CPT | Performed by: SURGERY

## 2021-01-01 PROCEDURE — 999N001060 HC STATISTIC BB TRANSF RXN INVEST: Performed by: PATHOLOGY

## 2021-01-01 PROCEDURE — 0Y953ZZ DRAINAGE OF RIGHT INGUINAL REGION, PERCUTANEOUS APPROACH: ICD-10-PCS | Performed by: RADIOLOGY

## 2021-01-01 PROCEDURE — 255N000002 HC RX 255 OP 636: Performed by: ANESTHESIOLOGY

## 2021-01-01 PROCEDURE — 36620 INSERTION CATHETER ARTERY: CPT | Performed by: SURGERY

## 2021-01-01 PROCEDURE — 71250 CT THORAX DX C-: CPT | Mod: MG

## 2021-01-01 PROCEDURE — 94002 VENT MGMT INPAT INIT DAY: CPT

## 2021-01-01 PROCEDURE — 272N000431 US DRAIN OF SEROMA/HEMATOMA/ABSCESS/CYST

## 2021-01-01 PROCEDURE — 272N000001 HC OR GENERAL SUPPLY STERILE: Performed by: THORACIC SURGERY (CARDIOTHORACIC VASCULAR SURGERY)

## 2021-01-01 PROCEDURE — 02HV33Z INSERTION OF INFUSION DEVICE INTO SUPERIOR VENA CAVA, PERCUTANEOUS APPROACH: ICD-10-PCS | Performed by: ANESTHESIOLOGY

## 2021-01-01 PROCEDURE — 83690 ASSAY OF LIPASE: CPT | Performed by: INTERNAL MEDICINE

## 2021-01-01 PROCEDURE — 99221 1ST HOSP IP/OBS SF/LOW 40: CPT | Performed by: NURSE PRACTITIONER

## 2021-01-01 PROCEDURE — 84478 ASSAY OF TRIGLYCERIDES: CPT | Performed by: PHYSICIAN ASSISTANT

## 2021-01-01 RX ORDER — ACETYLCYSTEINE 200 MG/ML
2 SOLUTION ORAL; RESPIRATORY (INHALATION) 4 TIMES DAILY
Status: DISCONTINUED | OUTPATIENT
Start: 2021-01-01 | End: 2021-01-01

## 2021-01-01 RX ORDER — ATROPINE SULFATE 10 MG/ML
1-2 SOLUTION/ DROPS OPHTHALMIC
Status: DISCONTINUED | OUTPATIENT
Start: 2021-01-01 | End: 2021-01-01 | Stop reason: HOSPADM

## 2021-01-01 RX ORDER — POTASSIUM CHLORIDE 20MEQ/15ML
40 LIQUID (ML) ORAL ONCE
Status: COMPLETED | OUTPATIENT
Start: 2021-01-01 | End: 2021-01-01

## 2021-01-01 RX ORDER — POTASSIUM CHLORIDE 20MEQ/15ML
20 LIQUID (ML) ORAL ONCE
Status: COMPLETED | OUTPATIENT
Start: 2021-01-01 | End: 2021-01-01

## 2021-01-01 RX ORDER — MULTIVITAMIN,THERAPEUTIC
1 TABLET ORAL DAILY
COMMUNITY

## 2021-01-01 RX ORDER — NALOXONE HYDROCHLORIDE 0.4 MG/ML
0.2 INJECTION, SOLUTION INTRAMUSCULAR; INTRAVENOUS; SUBCUTANEOUS
Status: DISCONTINUED | OUTPATIENT
Start: 2021-01-01 | End: 2021-01-01

## 2021-01-01 RX ORDER — POTASSIUM CHLORIDE 1.5 G/1.58G
20 POWDER, FOR SOLUTION ORAL ONCE
Status: COMPLETED | OUTPATIENT
Start: 2021-01-01 | End: 2021-01-01

## 2021-01-01 RX ORDER — ACETAMINOPHEN 325 MG/1
650 TABLET ORAL EVERY 4 HOURS PRN
Status: DISCONTINUED | OUTPATIENT
Start: 2021-01-01 | End: 2021-01-01

## 2021-01-01 RX ORDER — POTASSIUM CHLORIDE 29.8 MG/ML
20 INJECTION INTRAVENOUS
Status: COMPLETED | OUTPATIENT
Start: 2021-01-01 | End: 2021-01-01

## 2021-01-01 RX ORDER — MEROPENEM 500 MG/1
500 INJECTION, POWDER, FOR SOLUTION INTRAVENOUS EVERY 6 HOURS
Status: DISCONTINUED | OUTPATIENT
Start: 2021-01-01 | End: 2021-01-01

## 2021-01-01 RX ORDER — CEFAZOLIN SODIUM 2 G/100ML
2 INJECTION, SOLUTION INTRAVENOUS
Status: CANCELLED | OUTPATIENT
Start: 2021-01-01

## 2021-01-01 RX ORDER — NOREPINEPHRINE BITARTRATE 0.06 MG/ML
0.03-0.4 INJECTION, SOLUTION INTRAVENOUS CONTINUOUS
Status: DISCONTINUED | OUTPATIENT
Start: 2021-01-01 | End: 2021-01-01

## 2021-01-01 RX ORDER — DEXTROSE MONOHYDRATE 25 G/50ML
25-50 INJECTION, SOLUTION INTRAVENOUS
Status: DISCONTINUED | OUTPATIENT
Start: 2021-01-01 | End: 2021-01-01

## 2021-01-01 RX ORDER — POTASSIUM CHLORIDE 1.5 G/1.58G
40 POWDER, FOR SOLUTION ORAL ONCE
Status: COMPLETED | OUTPATIENT
Start: 2021-01-01 | End: 2021-01-01

## 2021-01-01 RX ORDER — DEXMEDETOMIDINE HYDROCHLORIDE 4 UG/ML
0.2-0.7 INJECTION, SOLUTION INTRAVENOUS CONTINUOUS
Status: DISCONTINUED | OUTPATIENT
Start: 2021-01-01 | End: 2021-01-01

## 2021-01-01 RX ORDER — PROPOFOL 10 MG/ML
5-75 INJECTION, EMULSION INTRAVENOUS CONTINUOUS
Status: DISCONTINUED | OUTPATIENT
Start: 2021-01-01 | End: 2021-01-01

## 2021-01-01 RX ORDER — VECURONIUM BROMIDE 1 MG/ML
INJECTION, POWDER, LYOPHILIZED, FOR SOLUTION INTRAVENOUS PRN
Status: DISCONTINUED | OUTPATIENT
Start: 2021-01-01 | End: 2021-01-01

## 2021-01-01 RX ORDER — NALOXONE HYDROCHLORIDE 0.4 MG/ML
0.4 INJECTION, SOLUTION INTRAMUSCULAR; INTRAVENOUS; SUBCUTANEOUS
Status: DISCONTINUED | OUTPATIENT
Start: 2021-01-01 | End: 2021-01-01

## 2021-01-01 RX ORDER — DIPHENHYDRAMINE HYDROCHLORIDE 50 MG/ML
25 INJECTION INTRAMUSCULAR; INTRAVENOUS ONCE
Status: COMPLETED | OUTPATIENT
Start: 2021-01-01 | End: 2021-01-01

## 2021-01-01 RX ORDER — FUROSEMIDE 10 MG/ML
60 INJECTION INTRAMUSCULAR; INTRAVENOUS EVERY 12 HOURS
Status: DISCONTINUED | OUTPATIENT
Start: 2021-01-01 | End: 2021-01-01

## 2021-01-01 RX ORDER — ACETAMINOPHEN 160 MG
TABLET,DISINTEGRATING ORAL EVERY 8 HOURS PRN
Status: DISCONTINUED | OUTPATIENT
Start: 2021-01-01 | End: 2021-01-01

## 2021-01-01 RX ORDER — MIDAZOLAM HCL IN 0.9 % NACL/PF 1 MG/ML
1-10 PLASTIC BAG, INJECTION (ML) INTRAVENOUS CONTINUOUS
Status: DISCONTINUED | OUTPATIENT
Start: 2021-01-01 | End: 2021-01-01

## 2021-01-01 RX ORDER — MIDAZOLAM HCL IN 0.9 % NACL/PF 1 MG/ML
1-6 PLASTIC BAG, INJECTION (ML) INTRAVENOUS CONTINUOUS
Status: DISCONTINUED | OUTPATIENT
Start: 2021-01-01 | End: 2021-01-01

## 2021-01-01 RX ORDER — POTASSIUM CHLORIDE 1.5 G/1.58G
40 POWDER, FOR SOLUTION ORAL 2 TIMES DAILY
Status: DISCONTINUED | OUTPATIENT
Start: 2021-01-01 | End: 2021-01-01

## 2021-01-01 RX ORDER — DEXAMETHASONE 4 MG/1
4 TABLET ORAL DAILY
Status: DISCONTINUED | OUTPATIENT
Start: 2021-01-01 | End: 2021-01-01

## 2021-01-01 RX ORDER — PROPOFOL 10 MG/ML
INJECTION, EMULSION INTRAVENOUS
Status: DISCONTINUED
Start: 2021-01-01 | End: 2021-01-01 | Stop reason: WASHOUT

## 2021-01-01 RX ORDER — METHYLPREDNISOLONE SODIUM SUCCINATE 125 MG/2ML
62.5 INJECTION, POWDER, LYOPHILIZED, FOR SOLUTION INTRAMUSCULAR; INTRAVENOUS EVERY 24 HOURS
Status: DISCONTINUED | OUTPATIENT
Start: 2021-01-01 | End: 2021-01-01

## 2021-01-01 RX ORDER — FUROSEMIDE 10 MG/ML
60 INJECTION INTRAMUSCULAR; INTRAVENOUS ONCE
Status: COMPLETED | OUTPATIENT
Start: 2021-01-01 | End: 2021-01-01

## 2021-01-01 RX ORDER — FENTANYL CITRATE 50 UG/ML
25-50 INJECTION, SOLUTION INTRAMUSCULAR; INTRAVENOUS
Status: DISCONTINUED | OUTPATIENT
Start: 2021-01-01 | End: 2021-01-01

## 2021-01-01 RX ORDER — FUROSEMIDE 10 MG/ML
80 INJECTION INTRAMUSCULAR; INTRAVENOUS ONCE
Status: COMPLETED | OUTPATIENT
Start: 2021-01-01 | End: 2021-01-01

## 2021-01-01 RX ORDER — AMINO AC/PROTEIN HYDR/WHEY PRO 10G-100/30
2 LIQUID (ML) ORAL 3 TIMES DAILY
Status: DISCONTINUED | OUTPATIENT
Start: 2021-01-01 | End: 2021-01-01 | Stop reason: CLARIF

## 2021-01-01 RX ORDER — NOREPINEPHRINE BITARTRATE 0.06 MG/ML
.03-.4 INJECTION, SOLUTION INTRAVENOUS CONTINUOUS
Status: DISCONTINUED | OUTPATIENT
Start: 2021-01-01 | End: 2021-01-01

## 2021-01-01 RX ORDER — POTASSIUM CHLORIDE 7.45 MG/ML
10 INJECTION INTRAVENOUS
Status: COMPLETED | OUTPATIENT
Start: 2021-01-01 | End: 2021-01-01

## 2021-01-01 RX ORDER — METOCLOPRAMIDE HYDROCHLORIDE 5 MG/ML
5 INJECTION INTRAMUSCULAR; INTRAVENOUS EVERY 6 HOURS PRN
Status: DISCONTINUED | OUTPATIENT
Start: 2021-01-01 | End: 2021-01-01

## 2021-01-01 RX ORDER — FUROSEMIDE 10 MG/ML
40 INJECTION INTRAMUSCULAR; INTRAVENOUS ONCE
Status: COMPLETED | OUTPATIENT
Start: 2021-01-01 | End: 2021-01-01

## 2021-01-01 RX ORDER — PIPERACILLIN SODIUM, TAZOBACTAM SODIUM 4; .5 G/20ML; G/20ML
4.5 INJECTION, POWDER, LYOPHILIZED, FOR SOLUTION INTRAVENOUS EVERY 6 HOURS
Status: COMPLETED | OUTPATIENT
Start: 2021-01-01 | End: 2021-01-01

## 2021-01-01 RX ORDER — MIDAZOLAM HCL IN 0.9 % NACL/PF 1 MG/ML
1-15 PLASTIC BAG, INJECTION (ML) INTRAVENOUS CONTINUOUS
Status: DISCONTINUED | OUTPATIENT
Start: 2021-01-01 | End: 2021-01-01

## 2021-01-01 RX ORDER — QUETIAPINE FUMARATE 25 MG/1
50 TABLET, FILM COATED ORAL 2 TIMES DAILY
Status: DISCONTINUED | OUTPATIENT
Start: 2021-01-01 | End: 2021-01-01

## 2021-01-01 RX ORDER — BISACODYL 5 MG
10 TABLET, DELAYED RELEASE (ENTERIC COATED) ORAL DAILY PRN
Status: DISCONTINUED | OUTPATIENT
Start: 2021-01-01 | End: 2021-01-01

## 2021-01-01 RX ORDER — MAGNESIUM SULFATE HEPTAHYDRATE 40 MG/ML
2 INJECTION, SOLUTION INTRAVENOUS ONCE
Status: COMPLETED | OUTPATIENT
Start: 2021-01-01 | End: 2021-01-01

## 2021-01-01 RX ORDER — ACETAZOLAMIDE 500 MG/5ML
500 INJECTION, POWDER, LYOPHILIZED, FOR SOLUTION INTRAVENOUS ONCE
Status: COMPLETED | OUTPATIENT
Start: 2021-01-01 | End: 2021-01-01

## 2021-01-01 RX ORDER — MIDAZOLAM HCL IN 0.9 % NACL/PF 1 MG/ML
1-8 PLASTIC BAG, INJECTION (ML) INTRAVENOUS CONTINUOUS
Status: DISCONTINUED | OUTPATIENT
Start: 2021-01-01 | End: 2021-01-01

## 2021-01-01 RX ORDER — HYDROMORPHONE HYDROCHLORIDE 1 MG/ML
0.3 INJECTION, SOLUTION INTRAMUSCULAR; INTRAVENOUS; SUBCUTANEOUS ONCE
Status: DISCONTINUED | OUTPATIENT
Start: 2021-01-01 | End: 2021-01-01 | Stop reason: CLARIF

## 2021-01-01 RX ORDER — MEROPENEM 1 G/1
1 INJECTION, POWDER, FOR SOLUTION INTRAVENOUS EVERY 8 HOURS
Status: COMPLETED | OUTPATIENT
Start: 2021-01-01 | End: 2021-01-01

## 2021-01-01 RX ORDER — PROPOFOL 10 MG/ML
INJECTION, EMULSION INTRAVENOUS PRN
Status: DISCONTINUED | OUTPATIENT
Start: 2021-01-01 | End: 2021-01-01

## 2021-01-01 RX ORDER — METHYLPREDNISOLONE SODIUM SUCCINATE 125 MG/2ML
125 INJECTION, POWDER, LYOPHILIZED, FOR SOLUTION INTRAMUSCULAR; INTRAVENOUS EVERY 24 HOURS
Status: DISCONTINUED | OUTPATIENT
Start: 2021-01-01 | End: 2021-01-01

## 2021-01-01 RX ORDER — AMINO AC/PROTEIN HYDR/WHEY PRO 10G-100/30
3 LIQUID (ML) ORAL DAILY
Status: DISCONTINUED | OUTPATIENT
Start: 2021-01-01 | End: 2021-01-01 | Stop reason: CLARIF

## 2021-01-01 RX ORDER — METHYLPREDNISOLONE SODIUM SUCCINATE 40 MG/ML
40 INJECTION, POWDER, LYOPHILIZED, FOR SOLUTION INTRAMUSCULAR; INTRAVENOUS EVERY 24 HOURS
Status: COMPLETED | OUTPATIENT
Start: 2021-01-01 | End: 2021-01-01

## 2021-01-01 RX ORDER — SODIUM CHLORIDE, SODIUM LACTATE, POTASSIUM CHLORIDE, CALCIUM CHLORIDE 600; 310; 30; 20 MG/100ML; MG/100ML; MG/100ML; MG/100ML
INJECTION, SOLUTION INTRAVENOUS CONTINUOUS
Status: DISCONTINUED | OUTPATIENT
Start: 2021-01-01 | End: 2021-01-01

## 2021-01-01 RX ORDER — LIDOCAINE 40 MG/G
CREAM TOPICAL
Status: DISCONTINUED | OUTPATIENT
Start: 2021-01-01 | End: 2021-01-01

## 2021-01-01 RX ORDER — CEFAZOLIN SODIUM 2 G/100ML
2 INJECTION, SOLUTION INTRAVENOUS SEE ADMIN INSTRUCTIONS
Status: CANCELLED | OUTPATIENT
Start: 2021-01-01

## 2021-01-01 RX ORDER — SODIUM CHLORIDE, SODIUM LACTATE, POTASSIUM CHLORIDE, CALCIUM CHLORIDE 600; 310; 30; 20 MG/100ML; MG/100ML; MG/100ML; MG/100ML
INJECTION, SOLUTION INTRAVENOUS CONTINUOUS PRN
Status: DISCONTINUED | OUTPATIENT
Start: 2021-01-01 | End: 2021-01-01

## 2021-01-01 RX ORDER — ONDANSETRON 4 MG/1
4 TABLET, ORALLY DISINTEGRATING ORAL EVERY 6 HOURS PRN
Status: DISCONTINUED | OUTPATIENT
Start: 2021-01-01 | End: 2021-01-01

## 2021-01-01 RX ORDER — ACETAMINOPHEN 325 MG/1
650 TABLET ORAL EVERY 4 HOURS PRN
Status: ON HOLD | COMMUNITY
End: 2021-01-01

## 2021-01-01 RX ORDER — SODIUM CHLORIDE 9 MG/ML
INJECTION, SOLUTION INTRAVENOUS CONTINUOUS
Status: DISCONTINUED | OUTPATIENT
Start: 2021-01-01 | End: 2021-01-01

## 2021-01-01 RX ORDER — NAPROXEN SODIUM 220 MG
220 TABLET ORAL 2 TIMES DAILY PRN
COMMUNITY

## 2021-01-01 RX ORDER — HYDROMORPHONE HYDROCHLORIDE 1 MG/ML
0.5 INJECTION, SOLUTION INTRAMUSCULAR; INTRAVENOUS; SUBCUTANEOUS ONCE
Status: COMPLETED | OUTPATIENT
Start: 2021-01-01 | End: 2021-01-01

## 2021-01-01 RX ORDER — DEXAMETHASONE SODIUM PHOSPHATE 4 MG/ML
4 INJECTION, SOLUTION INTRA-ARTICULAR; INTRALESIONAL; INTRAMUSCULAR; INTRAVENOUS; SOFT TISSUE EVERY 24 HOURS
Status: DISCONTINUED | OUTPATIENT
Start: 2021-01-01 | End: 2021-01-01

## 2021-01-01 RX ORDER — FENTANYL CITRATE 50 UG/ML
INJECTION, SOLUTION INTRAMUSCULAR; INTRAVENOUS PRN
Status: DISCONTINUED | OUTPATIENT
Start: 2021-01-01 | End: 2021-01-01

## 2021-01-01 RX ORDER — MIDAZOLAM HCL IN 0.9 % NACL/PF 1 MG/ML
1-12 PLASTIC BAG, INJECTION (ML) INTRAVENOUS CONTINUOUS
Status: DISCONTINUED | OUTPATIENT
Start: 2021-01-01 | End: 2021-01-01

## 2021-01-01 RX ORDER — IPRATROPIUM BROMIDE AND ALBUTEROL SULFATE 2.5; .5 MG/3ML; MG/3ML
3 SOLUTION RESPIRATORY (INHALATION) EVERY 4 HOURS PRN
Status: DISCONTINUED | OUTPATIENT
Start: 2021-01-01 | End: 2021-01-01

## 2021-01-01 RX ORDER — FUROSEMIDE 10 MG/ML
20 INJECTION INTRAMUSCULAR; INTRAVENOUS ONCE
Status: DISCONTINUED | OUTPATIENT
Start: 2021-01-01 | End: 2021-01-01 | Stop reason: CLARIF

## 2021-01-01 RX ORDER — QUETIAPINE FUMARATE 25 MG/1
25 TABLET, FILM COATED ORAL EVERY 8 HOURS PRN
Status: DISCONTINUED | OUTPATIENT
Start: 2021-01-01 | End: 2021-01-01

## 2021-01-01 RX ORDER — POTASSIUM CHLORIDE 1.5 G/1.58G
40 POWDER, FOR SOLUTION ORAL ONCE
Status: DISCONTINUED | OUTPATIENT
Start: 2021-01-01 | End: 2021-01-01 | Stop reason: ALTCHOICE

## 2021-01-01 RX ORDER — DEXTROSE MONOHYDRATE 100 MG/ML
INJECTION, SOLUTION INTRAVENOUS CONTINUOUS PRN
Status: DISCONTINUED | OUTPATIENT
Start: 2021-01-01 | End: 2021-01-01

## 2021-01-01 RX ORDER — DEXAMETHASONE SODIUM PHOSPHATE 4 MG/ML
6 INJECTION, SOLUTION INTRA-ARTICULAR; INTRALESIONAL; INTRAMUSCULAR; INTRAVENOUS; SOFT TISSUE EVERY 24 HOURS
Status: COMPLETED | OUTPATIENT
Start: 2021-01-01 | End: 2021-01-01

## 2021-01-01 RX ORDER — CEFTRIAXONE 2 G/1
2 INJECTION, POWDER, FOR SOLUTION INTRAMUSCULAR; INTRAVENOUS EVERY 24 HOURS
Status: COMPLETED | OUTPATIENT
Start: 2021-01-01 | End: 2021-01-01

## 2021-01-01 RX ORDER — AMINO AC/PROTEIN HYDR/WHEY PRO 10G-100/30
2 LIQUID (ML) ORAL EVERY 6 HOURS
Status: DISCONTINUED | OUTPATIENT
Start: 2021-01-01 | End: 2021-01-01

## 2021-01-01 RX ORDER — METOPROLOL SUCCINATE 50 MG/1
75 TABLET, EXTENDED RELEASE ORAL DAILY
COMMUNITY

## 2021-01-01 RX ORDER — HEPARIN SODIUM,PORCINE 10 UNIT/ML
2-5 VIAL (ML) INTRAVENOUS
Status: DISCONTINUED | OUTPATIENT
Start: 2021-01-01 | End: 2021-01-01

## 2021-01-01 RX ORDER — CEFTRIAXONE 1 G/1
1 INJECTION, POWDER, FOR SOLUTION INTRAMUSCULAR; INTRAVENOUS ONCE
Status: COMPLETED | OUTPATIENT
Start: 2021-01-01 | End: 2021-01-01

## 2021-01-01 RX ORDER — FUROSEMIDE 10 MG/ML
60 INJECTION INTRAMUSCULAR; INTRAVENOUS EVERY 8 HOURS
Status: DISCONTINUED | OUTPATIENT
Start: 2021-01-01 | End: 2021-01-01

## 2021-01-01 RX ORDER — AMINO AC/PROTEIN HYDR/WHEY PRO 10G-100/30
2 LIQUID (ML) ORAL EVERY 8 HOURS
Status: DISCONTINUED | OUTPATIENT
Start: 2021-01-01 | End: 2021-01-01 | Stop reason: CLARIF

## 2021-01-01 RX ORDER — POTASSIUM CHLORIDE 29.8 MG/ML
20 INJECTION INTRAVENOUS
Status: ACTIVE | OUTPATIENT
Start: 2021-01-01 | End: 2021-01-01

## 2021-01-01 RX ORDER — MIDODRINE HYDROCHLORIDE 5 MG/1
10 TABLET ORAL EVERY 8 HOURS
Status: DISCONTINUED | OUTPATIENT
Start: 2021-01-01 | End: 2021-01-01

## 2021-01-01 RX ORDER — ONDANSETRON 2 MG/ML
4 INJECTION INTRAMUSCULAR; INTRAVENOUS EVERY 6 HOURS PRN
Status: DISCONTINUED | OUTPATIENT
Start: 2021-01-01 | End: 2021-01-01

## 2021-01-01 RX ORDER — IPRATROPIUM BROMIDE AND ALBUTEROL SULFATE 2.5; .5 MG/3ML; MG/3ML
3 SOLUTION RESPIRATORY (INHALATION)
Status: DISCONTINUED | OUTPATIENT
Start: 2021-01-01 | End: 2021-01-01

## 2021-01-01 RX ORDER — LORAZEPAM 2 MG/ML
1-2 INJECTION INTRAMUSCULAR EVERY 10 MIN PRN
Status: DISCONTINUED | OUTPATIENT
Start: 2021-01-01 | End: 2021-01-01 | Stop reason: HOSPADM

## 2021-01-01 RX ORDER — POTASSIUM CHLORIDE 29.8 MG/ML
20 INJECTION INTRAVENOUS ONCE
Status: COMPLETED | OUTPATIENT
Start: 2021-01-01 | End: 2021-01-01

## 2021-01-01 RX ORDER — BISACODYL 5 MG
15 TABLET, DELAYED RELEASE (ENTERIC COATED) ORAL DAILY PRN
Status: DISCONTINUED | OUTPATIENT
Start: 2021-01-01 | End: 2021-01-01

## 2021-01-01 RX ORDER — CHLORHEXIDINE GLUCONATE ORAL RINSE 1.2 MG/ML
15 SOLUTION DENTAL EVERY 12 HOURS
Status: DISCONTINUED | OUTPATIENT
Start: 2021-01-01 | End: 2021-01-01

## 2021-01-01 RX ORDER — BISACODYL 5 MG
5 TABLET, DELAYED RELEASE (ENTERIC COATED) ORAL DAILY PRN
Status: DISCONTINUED | OUTPATIENT
Start: 2021-01-01 | End: 2021-01-01

## 2021-01-01 RX ORDER — AZITHROMYCIN 500 MG/1
500 INJECTION, POWDER, LYOPHILIZED, FOR SOLUTION INTRAVENOUS EVERY 24 HOURS
Status: COMPLETED | OUTPATIENT
Start: 2021-01-01 | End: 2021-01-01

## 2021-01-01 RX ORDER — PROPOFOL 10 MG/ML
INJECTION, EMULSION INTRAVENOUS
Status: COMPLETED
Start: 2021-01-01 | End: 2021-01-01

## 2021-01-01 RX ORDER — MIDODRINE HYDROCHLORIDE 5 MG/1
10 TABLET ORAL ONCE
Status: COMPLETED | OUTPATIENT
Start: 2021-01-01 | End: 2021-01-01

## 2021-01-01 RX ORDER — NICOTINE POLACRILEX 4 MG
15-30 LOZENGE BUCCAL
Status: DISCONTINUED | OUTPATIENT
Start: 2021-01-01 | End: 2021-01-01

## 2021-01-01 RX ORDER — MORPHINE SULFATE 4 MG/ML
5-10 INJECTION, SOLUTION INTRAMUSCULAR; INTRAVENOUS EVERY 10 MIN PRN
Status: DISCONTINUED | OUTPATIENT
Start: 2021-01-01 | End: 2021-01-01 | Stop reason: HOSPADM

## 2021-01-01 RX ORDER — AMLODIPINE BESYLATE 5 MG/1
5 TABLET ORAL DAILY
COMMUNITY

## 2021-01-01 RX ORDER — MORPHINE SULFATE 4 MG/ML
5-10 INJECTION, SOLUTION INTRAMUSCULAR; INTRAVENOUS EVERY 30 MIN PRN
Status: DISCONTINUED | OUTPATIENT
Start: 2021-01-01 | End: 2021-01-01 | Stop reason: HOSPADM

## 2021-01-01 RX ORDER — FUROSEMIDE 10 MG/ML
20 INJECTION INTRAMUSCULAR; INTRAVENOUS EVERY 12 HOURS
Status: DISCONTINUED | OUTPATIENT
Start: 2021-01-01 | End: 2021-01-01

## 2021-01-01 RX ORDER — FUROSEMIDE 10 MG/ML
20 INJECTION INTRAMUSCULAR; INTRAVENOUS ONCE
Status: COMPLETED | OUTPATIENT
Start: 2021-01-01 | End: 2021-01-01

## 2021-01-01 RX ADMIN — INSULIN ASPART 1 UNITS: 100 INJECTION, SOLUTION INTRAVENOUS; SUBCUTANEOUS at 05:12

## 2021-01-01 RX ADMIN — PROPOFOL 75 MCG/KG/MIN: 10 INJECTION, EMULSION INTRAVENOUS at 05:49

## 2021-01-01 RX ADMIN — CHLORHEXIDINE GLUCONATE 0.12% ORAL RINSE 15 ML: 1.2 LIQUID ORAL at 19:21

## 2021-01-01 RX ADMIN — IPRATROPIUM BROMIDE AND ALBUTEROL SULFATE 3 ML: .5; 3 SOLUTION RESPIRATORY (INHALATION) at 07:22

## 2021-01-01 RX ADMIN — Medication 15 MG/HR: at 13:09

## 2021-01-01 RX ADMIN — MIDAZOLAM (PF) 1 MG/ML IN 0.9 % SODIUM CHLORIDE INTRAVENOUS SOLUTION 12 MG/HR: at 11:09

## 2021-01-01 RX ADMIN — SODIUM CHLORIDE 50 ML: 9 INJECTION, SOLUTION INTRAVENOUS at 23:45

## 2021-01-01 RX ADMIN — IPRATROPIUM BROMIDE AND ALBUTEROL SULFATE 3 ML: .5; 3 SOLUTION RESPIRATORY (INHALATION) at 10:49

## 2021-01-01 RX ADMIN — ACETAMINOPHEN 650 MG: 325 TABLET, FILM COATED ORAL at 06:16

## 2021-01-01 RX ADMIN — INSULIN ASPART 1 UNITS: 100 INJECTION, SOLUTION INTRAVENOUS; SUBCUTANEOUS at 08:02

## 2021-01-01 RX ADMIN — IPRATROPIUM BROMIDE AND ALBUTEROL SULFATE 3 ML: .5; 3 SOLUTION RESPIRATORY (INHALATION) at 08:00

## 2021-01-01 RX ADMIN — PROPOFOL 50 MCG/KG/MIN: 10 INJECTION, EMULSION INTRAVENOUS at 00:33

## 2021-01-01 RX ADMIN — Medication 10 MG/HR: at 22:47

## 2021-01-01 RX ADMIN — PROPOFOL 50 MCG/KG/MIN: 10 INJECTION, EMULSION INTRAVENOUS at 07:35

## 2021-01-01 RX ADMIN — CHLORHEXIDINE GLUCONATE 0.12% ORAL RINSE 15 ML: 1.2 LIQUID ORAL at 09:32

## 2021-01-01 RX ADMIN — FUROSEMIDE 20 MG: 10 INJECTION, SOLUTION INTRAVENOUS at 12:40

## 2021-01-01 RX ADMIN — Medication 12 MG/HR: at 09:42

## 2021-01-01 RX ADMIN — ENOXAPARIN SODIUM 60 MG: 60 INJECTION SUBCUTANEOUS at 20:58

## 2021-01-01 RX ADMIN — POTASSIUM CHLORIDE 20 MEQ: 29.8 INJECTION, SOLUTION INTRAVENOUS at 05:18

## 2021-01-01 RX ADMIN — MIDAZOLAM (PF) 1 MG/ML IN 0.9 % SODIUM CHLORIDE INTRAVENOUS SOLUTION 7 MG/HR: at 14:28

## 2021-01-01 RX ADMIN — PROPOFOL 50 MCG/KG/MIN: 10 INJECTION, EMULSION INTRAVENOUS at 12:04

## 2021-01-01 RX ADMIN — PANTOPRAZOLE SODIUM 40 MG: 40 TABLET, DELAYED RELEASE ORAL at 08:10

## 2021-01-01 RX ADMIN — ENOXAPARIN SODIUM 50 MG: 60 INJECTION SUBCUTANEOUS at 07:51

## 2021-01-01 RX ADMIN — Medication 15 ML: at 08:00

## 2021-01-01 RX ADMIN — Medication 2 PACKET: at 16:06

## 2021-01-01 RX ADMIN — AZITHROMYCIN MONOHYDRATE 500 MG: 500 INJECTION, POWDER, LYOPHILIZED, FOR SOLUTION INTRAVENOUS at 14:33

## 2021-01-01 RX ADMIN — ACETYLCYSTEINE 2 ML: 200 SOLUTION ORAL; RESPIRATORY (INHALATION) at 20:27

## 2021-01-01 RX ADMIN — POTASSIUM CHLORIDE 20 MEQ: 1.5 POWDER, FOR SOLUTION ORAL at 06:40

## 2021-01-01 RX ADMIN — VECURONIUM BROMIDE 5 MG: 1 INJECTION, POWDER, LYOPHILIZED, FOR SOLUTION INTRAVENOUS at 15:52

## 2021-01-01 RX ADMIN — Medication 2 PACKET: at 00:05

## 2021-01-01 RX ADMIN — FUROSEMIDE 60 MG: 10 INJECTION, SOLUTION INTRAVENOUS at 04:06

## 2021-01-01 RX ADMIN — POTASSIUM CHLORIDE 20 MEQ: 29.8 INJECTION, SOLUTION INTRAVENOUS at 08:03

## 2021-01-01 RX ADMIN — FUROSEMIDE 60 MG: 10 INJECTION, SOLUTION INTRAVENOUS at 20:07

## 2021-01-01 RX ADMIN — FUROSEMIDE 60 MG: 10 INJECTION, SOLUTION INTRAVENOUS at 22:31

## 2021-01-01 RX ADMIN — PIPERACILLIN SODIUM AND TAZOBACTAM SODIUM 4.5 G: 4; .5 INJECTION, POWDER, LYOPHILIZED, FOR SOLUTION INTRAVENOUS at 13:49

## 2021-01-01 RX ADMIN — PROPOFOL 50 MCG/KG/MIN: 10 INJECTION, EMULSION INTRAVENOUS at 04:11

## 2021-01-01 RX ADMIN — PROPOFOL 40 MCG/KG/MIN: 10 INJECTION, EMULSION INTRAVENOUS at 13:27

## 2021-01-01 RX ADMIN — CHLORHEXIDINE GLUCONATE 0.12% ORAL RINSE 15 ML: 1.2 LIQUID ORAL at 08:12

## 2021-01-01 RX ADMIN — INSULIN ASPART 1 UNITS: 100 INJECTION, SOLUTION INTRAVENOUS; SUBCUTANEOUS at 16:14

## 2021-01-01 RX ADMIN — Medication 15 MG/HR: at 05:48

## 2021-01-01 RX ADMIN — PIPERACILLIN SODIUM AND TAZOBACTAM SODIUM 4.5 G: 4; .5 INJECTION, POWDER, LYOPHILIZED, FOR SOLUTION INTRAVENOUS at 19:06

## 2021-01-01 RX ADMIN — QUETIAPINE FUMARATE 75 MG: 50 TABLET ORAL at 19:15

## 2021-01-01 RX ADMIN — Medication 15 ML: at 07:53

## 2021-01-01 RX ADMIN — Medication 15 ML: at 07:50

## 2021-01-01 RX ADMIN — PROPOFOL 75 MCG/KG/MIN: 10 INJECTION, EMULSION INTRAVENOUS at 21:48

## 2021-01-01 RX ADMIN — ENOXAPARIN SODIUM 105 MG: 120 INJECTION SUBCUTANEOUS at 07:48

## 2021-01-01 RX ADMIN — ENOXAPARIN SODIUM 60 MG: 60 INJECTION SUBCUTANEOUS at 07:45

## 2021-01-01 RX ADMIN — EPOPROSTENOL 20 NG/KG/MIN: 1.5 INJECTION, POWDER, LYOPHILIZED, FOR SOLUTION INTRAVENOUS at 22:15

## 2021-01-01 RX ADMIN — ENOXAPARIN SODIUM 50 MG: 60 INJECTION SUBCUTANEOUS at 08:16

## 2021-01-01 RX ADMIN — Medication 15 ML: at 08:04

## 2021-01-01 RX ADMIN — PROPOFOL 50 MCG/KG/MIN: 10 INJECTION, EMULSION INTRAVENOUS at 21:20

## 2021-01-01 RX ADMIN — SODIUM CHLORIDE, PRESERVATIVE FREE 20 ML: 5 INJECTION INTRAVENOUS at 23:11

## 2021-01-01 RX ADMIN — PROPOFOL 60 MCG/KG/MIN: 10 INJECTION, EMULSION INTRAVENOUS at 05:25

## 2021-01-01 RX ADMIN — CHLORHEXIDINE GLUCONATE 0.12% ORAL RINSE 15 ML: 1.2 LIQUID ORAL at 19:59

## 2021-01-01 RX ADMIN — CEFTRIAXONE SODIUM 2 G: 2 INJECTION, POWDER, FOR SOLUTION INTRAMUSCULAR; INTRAVENOUS at 11:34

## 2021-01-01 RX ADMIN — QUETIAPINE FUMARATE 50 MG: 25 TABLET ORAL at 07:58

## 2021-01-01 RX ADMIN — ACETYLCYSTEINE 2 ML: 200 SOLUTION ORAL; RESPIRATORY (INHALATION) at 10:49

## 2021-01-01 RX ADMIN — PROPOFOL 50 MCG/KG/MIN: 10 INJECTION, EMULSION INTRAVENOUS at 03:15

## 2021-01-01 RX ADMIN — Medication 4 MG/HR: at 09:23

## 2021-01-01 RX ADMIN — PROPOFOL 70 MCG/KG/MIN: 10 INJECTION, EMULSION INTRAVENOUS at 11:09

## 2021-01-01 RX ADMIN — CHLORHEXIDINE GLUCONATE 0.12% ORAL RINSE 15 ML: 1.2 LIQUID ORAL at 20:56

## 2021-01-01 RX ADMIN — MIDODRINE HYDROCHLORIDE 10 MG: 5 TABLET ORAL at 23:12

## 2021-01-01 RX ADMIN — Medication 15 MG/HR: at 18:10

## 2021-01-01 RX ADMIN — SODIUM CHLORIDE, PRESERVATIVE FREE: 5 INJECTION INTRAVENOUS at 20:13

## 2021-01-01 RX ADMIN — PIPERACILLIN SODIUM AND TAZOBACTAM SODIUM 4.5 G: 4; .5 INJECTION, POWDER, LYOPHILIZED, FOR SOLUTION INTRAVENOUS at 14:19

## 2021-01-01 RX ADMIN — MIDAZOLAM (PF) 1 MG/ML IN 0.9 % SODIUM CHLORIDE INTRAVENOUS SOLUTION 7 MG/HR: at 22:30

## 2021-01-01 RX ADMIN — SENNOSIDES 5 ML: 8.8 LIQUID ORAL at 19:36

## 2021-01-01 RX ADMIN — MIDAZOLAM (PF) 1 MG/ML IN 0.9 % SODIUM CHLORIDE INTRAVENOUS SOLUTION 6 MG/HR: at 16:08

## 2021-01-01 RX ADMIN — AZITHROMYCIN MONOHYDRATE 500 MG: 500 INJECTION, POWDER, LYOPHILIZED, FOR SOLUTION INTRAVENOUS at 12:41

## 2021-01-01 RX ADMIN — PROPOFOL 75 MCG/KG/MIN: 10 INJECTION, EMULSION INTRAVENOUS at 23:55

## 2021-01-01 RX ADMIN — QUETIAPINE FUMARATE 50 MG: 25 TABLET ORAL at 10:08

## 2021-01-01 RX ADMIN — MEROPENEM 1 G: 1 INJECTION, POWDER, FOR SOLUTION INTRAVENOUS at 04:19

## 2021-01-01 RX ADMIN — PROPOFOL 20 MCG/KG/MIN: 10 INJECTION, EMULSION INTRAVENOUS at 16:23

## 2021-01-01 RX ADMIN — FUROSEMIDE 60 MG: 10 INJECTION, SOLUTION INTRAVENOUS at 18:47

## 2021-01-01 RX ADMIN — MIDAZOLAM (PF) 1 MG/ML IN 0.9 % SODIUM CHLORIDE INTRAVENOUS SOLUTION 10 MG/HR: at 09:16

## 2021-01-01 RX ADMIN — Medication 0.12 MCG/KG/MIN: at 08:46

## 2021-01-01 RX ADMIN — Medication 2 PACKET: at 12:23

## 2021-01-01 RX ADMIN — CHLORHEXIDINE GLUCONATE 0.12% ORAL RINSE 15 ML: 1.2 LIQUID ORAL at 20:13

## 2021-01-01 RX ADMIN — Medication 2 PACKET: at 23:48

## 2021-01-01 RX ADMIN — Medication 1 MG/HR: at 03:26

## 2021-01-01 RX ADMIN — ENOXAPARIN SODIUM 105 MG: 120 INJECTION SUBCUTANEOUS at 22:44

## 2021-01-01 RX ADMIN — Medication 15 MG/HR: at 11:31

## 2021-01-01 RX ADMIN — FENTANYL CITRATE 25 MCG: 50 INJECTION, SOLUTION INTRAMUSCULAR; INTRAVENOUS at 08:37

## 2021-01-01 RX ADMIN — Medication 12 MG/HR: at 05:18

## 2021-01-01 RX ADMIN — PROPOFOL 65 MCG/KG/MIN: 10 INJECTION, EMULSION INTRAVENOUS at 05:37

## 2021-01-01 RX ADMIN — PIPERACILLIN SODIUM AND TAZOBACTAM SODIUM 4.5 G: 4; .5 INJECTION, POWDER, LYOPHILIZED, FOR SOLUTION INTRAVENOUS at 14:38

## 2021-01-01 RX ADMIN — QUETIAPINE FUMARATE 75 MG: 50 TABLET ORAL at 19:59

## 2021-01-01 RX ADMIN — PROPOFOL 50 MCG/KG/MIN: 10 INJECTION, EMULSION INTRAVENOUS at 11:38

## 2021-01-01 RX ADMIN — Medication 9 MG/HR: at 11:29

## 2021-01-01 RX ADMIN — PROPOFOL 50 MCG/KG/MIN: 10 INJECTION, EMULSION INTRAVENOUS at 12:03

## 2021-01-01 RX ADMIN — Medication 15 ML: at 09:31

## 2021-01-01 RX ADMIN — FUROSEMIDE 60 MG: 10 INJECTION, SOLUTION INTRAVENOUS at 21:28

## 2021-01-01 RX ADMIN — MINERAL OIL AND PETROLATUM: 150; 830 OINTMENT OPHTHALMIC at 18:57

## 2021-01-01 RX ADMIN — PANTOPRAZOLE SODIUM 40 MG: 40 TABLET, DELAYED RELEASE ORAL at 09:32

## 2021-01-01 RX ADMIN — POTASSIUM CHLORIDE 40 MEQ: 1.5 POWDER, FOR SOLUTION ORAL at 05:27

## 2021-01-01 RX ADMIN — PROPOFOL 60 MCG/KG/MIN: 10 INJECTION, EMULSION INTRAVENOUS at 10:10

## 2021-01-01 RX ADMIN — ENOXAPARIN SODIUM 50 MG: 60 INJECTION SUBCUTANEOUS at 08:23

## 2021-01-01 RX ADMIN — MEROPENEM 500 MG: 500 INJECTION, POWDER, FOR SOLUTION INTRAVENOUS at 09:55

## 2021-01-01 RX ADMIN — PROPOFOL 60 MCG/KG/MIN: 10 INJECTION, EMULSION INTRAVENOUS at 06:55

## 2021-01-01 RX ADMIN — CHLORHEXIDINE GLUCONATE 0.12% ORAL RINSE 15 ML: 1.2 LIQUID ORAL at 19:46

## 2021-01-01 RX ADMIN — PIPERACILLIN SODIUM AND TAZOBACTAM SODIUM 4.5 G: 4; .5 INJECTION, POWDER, LYOPHILIZED, FOR SOLUTION INTRAVENOUS at 13:33

## 2021-01-01 RX ADMIN — FUROSEMIDE 60 MG: 10 INJECTION, SOLUTION INTRAVENOUS at 10:05

## 2021-01-01 RX ADMIN — PROPOFOL 60 MCG/KG/MIN: 10 INJECTION, EMULSION INTRAVENOUS at 12:41

## 2021-01-01 RX ADMIN — VECURONIUM BROMIDE 0.8 MCG/KG/MIN: 1 INJECTION, POWDER, LYOPHILIZED, FOR SOLUTION INTRAVENOUS at 04:38

## 2021-01-01 RX ADMIN — ENOXAPARIN SODIUM 60 MG: 60 INJECTION SUBCUTANEOUS at 20:47

## 2021-01-01 RX ADMIN — POTASSIUM CHLORIDE 20 MEQ: 1.5 POWDER, FOR SOLUTION ORAL at 05:58

## 2021-01-01 RX ADMIN — PROPOFOL 20 MCG/KG/MIN: 10 INJECTION, EMULSION INTRAVENOUS at 04:13

## 2021-01-01 RX ADMIN — POTASSIUM CHLORIDE 20 MEQ: 29.8 INJECTION, SOLUTION INTRAVENOUS at 17:21

## 2021-01-01 RX ADMIN — PROPOFOL 25 MCG/KG/MIN: 10 INJECTION, EMULSION INTRAVENOUS at 21:03

## 2021-01-01 RX ADMIN — FUROSEMIDE 60 MG: 10 INJECTION, SOLUTION INTRAVENOUS at 19:59

## 2021-01-01 RX ADMIN — Medication 3 MG/HR: at 23:46

## 2021-01-01 RX ADMIN — PROPOFOL 50 MCG/KG/MIN: 10 INJECTION, EMULSION INTRAVENOUS at 19:15

## 2021-01-01 RX ADMIN — Medication 2 PACKET: at 00:36

## 2021-01-01 RX ADMIN — DEXAMETHASONE SODIUM PHOSPHATE 6 MG: 4 INJECTION, SOLUTION INTRAMUSCULAR; INTRAVENOUS at 15:48

## 2021-01-01 RX ADMIN — Medication 0.08 MCG/KG/MIN: at 06:21

## 2021-01-01 RX ADMIN — EPOPROSTENOL 20 NG/KG/MIN: 1.5 INJECTION, POWDER, LYOPHILIZED, FOR SOLUTION INTRAVENOUS at 11:35

## 2021-01-01 RX ADMIN — CHLORHEXIDINE GLUCONATE 0.12% ORAL RINSE 15 ML: 1.2 LIQUID ORAL at 19:54

## 2021-01-01 RX ADMIN — METHYLPREDNISOLONE SODIUM SUCCINATE 40 MG: 40 INJECTION, POWDER, FOR SOLUTION INTRAMUSCULAR; INTRAVENOUS at 12:20

## 2021-01-01 RX ADMIN — IPRATROPIUM BROMIDE AND ALBUTEROL SULFATE 3 ML: .5; 3 SOLUTION RESPIRATORY (INHALATION) at 20:36

## 2021-01-01 RX ADMIN — PANTOPRAZOLE SODIUM 40 MG: 40 TABLET, DELAYED RELEASE ORAL at 07:51

## 2021-01-01 RX ADMIN — MIDAZOLAM (PF) 1 MG/ML IN 0.9 % SODIUM CHLORIDE INTRAVENOUS SOLUTION 10 MG/HR: at 12:55

## 2021-01-01 RX ADMIN — POTASSIUM CHLORIDE 40 MEQ: 1.5 POWDER, FOR SOLUTION ORAL at 09:32

## 2021-01-01 RX ADMIN — POTASSIUM CHLORIDE 40 MEQ: 1.5 POWDER, FOR SOLUTION ORAL at 08:00

## 2021-01-01 RX ADMIN — Medication 15 ML: at 07:46

## 2021-01-01 RX ADMIN — PROPOFOL 55 MCG/KG/MIN: 10 INJECTION, EMULSION INTRAVENOUS at 04:34

## 2021-01-01 RX ADMIN — FUROSEMIDE 60 MG: 10 INJECTION, SOLUTION INTRAVENOUS at 11:25

## 2021-01-01 RX ADMIN — ENOXAPARIN SODIUM 50 MG: 60 INJECTION SUBCUTANEOUS at 20:14

## 2021-01-01 RX ADMIN — SENNOSIDES 5 ML: 8.8 LIQUID ORAL at 20:00

## 2021-01-01 RX ADMIN — INSULIN ASPART 1 UNITS: 100 INJECTION, SOLUTION INTRAVENOUS; SUBCUTANEOUS at 08:28

## 2021-01-01 RX ADMIN — POTASSIUM CHLORIDE 40 MEQ: 20 SOLUTION ORAL at 13:52

## 2021-01-01 RX ADMIN — CEFTRIAXONE SODIUM 2 G: 2 INJECTION, POWDER, FOR SOLUTION INTRAMUSCULAR; INTRAVENOUS at 12:30

## 2021-01-01 RX ADMIN — Medication 15 ML: at 08:54

## 2021-01-01 RX ADMIN — ACETYLCYSTEINE 2 ML: 200 SOLUTION ORAL; RESPIRATORY (INHALATION) at 06:05

## 2021-01-01 RX ADMIN — MICONAZOLE NITRATE: 20 POWDER TOPICAL at 18:33

## 2021-01-01 RX ADMIN — PIPERACILLIN SODIUM AND TAZOBACTAM SODIUM 4.5 G: 4; .5 INJECTION, POWDER, LYOPHILIZED, FOR SOLUTION INTRAVENOUS at 18:56

## 2021-01-01 RX ADMIN — PROPOFOL 75 MCG/KG/MIN: 10 INJECTION, EMULSION INTRAVENOUS at 20:32

## 2021-01-01 RX ADMIN — Medication 0.06 MCG/KG/MIN: at 12:37

## 2021-01-01 RX ADMIN — PIPERACILLIN SODIUM AND TAZOBACTAM SODIUM 4.5 G: 4; .5 INJECTION, POWDER, LYOPHILIZED, FOR SOLUTION INTRAVENOUS at 02:15

## 2021-01-01 RX ADMIN — QUETIAPINE FUMARATE 75 MG: 50 TABLET ORAL at 08:01

## 2021-01-01 RX ADMIN — PROPOFOL 30 MCG/KG/MIN: 10 INJECTION, EMULSION INTRAVENOUS at 09:40

## 2021-01-01 RX ADMIN — Medication 0.05 MCG/KG/MIN: at 20:28

## 2021-01-01 RX ADMIN — IPRATROPIUM BROMIDE AND ALBUTEROL SULFATE 3 ML: .5; 3 SOLUTION RESPIRATORY (INHALATION) at 20:30

## 2021-01-01 RX ADMIN — MEROPENEM 500 MG: 500 INJECTION, POWDER, FOR SOLUTION INTRAVENOUS at 21:16

## 2021-01-01 RX ADMIN — FUROSEMIDE 60 MG: 10 INJECTION, SOLUTION INTRAVENOUS at 19:15

## 2021-01-01 RX ADMIN — FUROSEMIDE 60 MG: 10 INJECTION, SOLUTION INTRAVENOUS at 10:12

## 2021-01-01 RX ADMIN — PROPOFOL 70 MCG/KG/MIN: 10 INJECTION, EMULSION INTRAVENOUS at 04:17

## 2021-01-01 RX ADMIN — MEROPENEM 1 G: 1 INJECTION, POWDER, FOR SOLUTION INTRAVENOUS at 12:04

## 2021-01-01 RX ADMIN — PIPERACILLIN SODIUM AND TAZOBACTAM SODIUM 4.5 G: 4; .5 INJECTION, POWDER, LYOPHILIZED, FOR SOLUTION INTRAVENOUS at 19:41

## 2021-01-01 RX ADMIN — Medication 0.12 MCG/KG/MIN: at 14:57

## 2021-01-01 RX ADMIN — MEROPENEM 500 MG: 500 INJECTION, POWDER, FOR SOLUTION INTRAVENOUS at 22:05

## 2021-01-01 RX ADMIN — PROPOFOL 65 MCG/KG/MIN: 10 INJECTION, EMULSION INTRAVENOUS at 09:56

## 2021-01-01 RX ADMIN — PROPOFOL 20 MCG/KG/MIN: 10 INJECTION, EMULSION INTRAVENOUS at 22:43

## 2021-01-01 RX ADMIN — Medication 15 ML: at 08:59

## 2021-01-01 RX ADMIN — FUROSEMIDE 60 MG: 10 INJECTION, SOLUTION INTRAVENOUS at 04:19

## 2021-01-01 RX ADMIN — PROPOFOL 60 MCG/KG/MIN: 10 INJECTION, EMULSION INTRAVENOUS at 01:15

## 2021-01-01 RX ADMIN — SENNOSIDES 5 ML: 8.8 LIQUID ORAL at 20:25

## 2021-01-01 RX ADMIN — PIPERACILLIN SODIUM AND TAZOBACTAM SODIUM 4.5 G: 4; .5 INJECTION, POWDER, LYOPHILIZED, FOR SOLUTION INTRAVENOUS at 07:50

## 2021-01-01 RX ADMIN — CHLORHEXIDINE GLUCONATE 0.12% ORAL RINSE 15 ML: 1.2 LIQUID ORAL at 08:33

## 2021-01-01 RX ADMIN — PROPOFOL 50 MCG/KG/MIN: 10 INJECTION, EMULSION INTRAVENOUS at 20:17

## 2021-01-01 RX ADMIN — POTASSIUM CHLORIDE 20 MEQ: 29.8 INJECTION, SOLUTION INTRAVENOUS at 19:46

## 2021-01-01 RX ADMIN — CHLORHEXIDINE GLUCONATE 0.12% ORAL RINSE 15 ML: 1.2 LIQUID ORAL at 08:06

## 2021-01-01 RX ADMIN — ACETYLCYSTEINE 2 ML: 200 SOLUTION ORAL; RESPIRATORY (INHALATION) at 20:12

## 2021-01-01 RX ADMIN — INSULIN GLARGINE 12 UNITS: 100 INJECTION, SOLUTION SUBCUTANEOUS at 07:42

## 2021-01-01 RX ADMIN — POTASSIUM CHLORIDE 40 MEQ: 1.5 POWDER, FOR SOLUTION ORAL at 21:10

## 2021-01-01 RX ADMIN — ENOXAPARIN SODIUM 60 MG: 60 INJECTION SUBCUTANEOUS at 22:01

## 2021-01-01 RX ADMIN — Medication 9 MG/HR: at 11:07

## 2021-01-01 RX ADMIN — EPOPROSTENOL 20 NG/KG/MIN: 1.5 INJECTION, POWDER, LYOPHILIZED, FOR SOLUTION INTRAVENOUS at 09:24

## 2021-01-01 RX ADMIN — POTASSIUM CHLORIDE 40 MEQ: 20 SOLUTION ORAL at 16:50

## 2021-01-01 RX ADMIN — CHLORHEXIDINE GLUCONATE 0.12% ORAL RINSE 15 ML: 1.2 LIQUID ORAL at 21:04

## 2021-01-01 RX ADMIN — METHYLPREDNISOLONE SODIUM SUCCINATE 125 MG: 125 INJECTION, POWDER, FOR SOLUTION INTRAMUSCULAR; INTRAVENOUS at 11:50

## 2021-01-01 RX ADMIN — PROPOFOL 40 MCG/KG/MIN: 10 INJECTION, EMULSION INTRAVENOUS at 16:38

## 2021-01-01 RX ADMIN — PROPOFOL 20 MCG/KG/MIN: 10 INJECTION, EMULSION INTRAVENOUS at 13:45

## 2021-01-01 RX ADMIN — PROPOFOL 50 MCG/KG/MIN: 10 INJECTION, EMULSION INTRAVENOUS at 16:30

## 2021-01-01 RX ADMIN — PIPERACILLIN SODIUM AND TAZOBACTAM SODIUM 4.5 G: 4; .5 INJECTION, POWDER, LYOPHILIZED, FOR SOLUTION INTRAVENOUS at 20:06

## 2021-01-01 RX ADMIN — MIDAZOLAM (PF) 1 MG/ML IN 0.9 % SODIUM CHLORIDE INTRAVENOUS SOLUTION 7 MG/HR: at 02:03

## 2021-01-01 RX ADMIN — ACETAMINOPHEN 650 MG: 325 TABLET, FILM COATED ORAL at 04:54

## 2021-01-01 RX ADMIN — ENOXAPARIN SODIUM 50 MG: 60 INJECTION SUBCUTANEOUS at 20:01

## 2021-01-01 RX ADMIN — MIDAZOLAM (PF) 1 MG/ML IN 0.9 % SODIUM CHLORIDE INTRAVENOUS SOLUTION 8 MG/HR: at 04:47

## 2021-01-01 RX ADMIN — PROPOFOL 40 MCG/KG/MIN: 10 INJECTION, EMULSION INTRAVENOUS at 00:53

## 2021-01-01 RX ADMIN — Medication 1 MG/HR: at 06:21

## 2021-01-01 RX ADMIN — PROPOFOL 30 MCG/KG/MIN: 10 INJECTION, EMULSION INTRAVENOUS at 06:25

## 2021-01-01 RX ADMIN — PROPOFOL 60 MCG/KG/MIN: 10 INJECTION, EMULSION INTRAVENOUS at 06:32

## 2021-01-01 RX ADMIN — ENOXAPARIN SODIUM 50 MG: 60 INJECTION SUBCUTANEOUS at 20:06

## 2021-01-01 RX ADMIN — SENNOSIDES 5 ML: 8.8 LIQUID ORAL at 21:17

## 2021-01-01 RX ADMIN — DEXAMETHASONE 6 MG: 2 TABLET ORAL at 14:29

## 2021-01-01 RX ADMIN — MIDAZOLAM (PF) 1 MG/ML IN 0.9 % SODIUM CHLORIDE INTRAVENOUS SOLUTION 6 MG/HR: at 14:17

## 2021-01-01 RX ADMIN — PANTOPRAZOLE SODIUM 40 MG: 40 TABLET, DELAYED RELEASE ORAL at 07:50

## 2021-01-01 RX ADMIN — MEROPENEM 500 MG: 500 INJECTION, POWDER, FOR SOLUTION INTRAVENOUS at 14:43

## 2021-01-01 RX ADMIN — QUETIAPINE FUMARATE 50 MG: 25 TABLET ORAL at 20:56

## 2021-01-01 RX ADMIN — QUETIAPINE FUMARATE 50 MG: 25 TABLET ORAL at 07:45

## 2021-01-01 RX ADMIN — QUETIAPINE FUMARATE 50 MG: 25 TABLET ORAL at 19:38

## 2021-01-01 RX ADMIN — Medication 11 MG/HR: at 01:33

## 2021-01-01 RX ADMIN — Medication 15 MG/HR: at 05:37

## 2021-01-01 RX ADMIN — PIPERACILLIN SODIUM AND TAZOBACTAM SODIUM 4.5 G: 4; .5 INJECTION, POWDER, LYOPHILIZED, FOR SOLUTION INTRAVENOUS at 08:47

## 2021-01-01 RX ADMIN — FUROSEMIDE 20 MG: 10 INJECTION, SOLUTION INTRAVENOUS at 11:47

## 2021-01-01 RX ADMIN — Medication 15 MG/HR: at 23:55

## 2021-01-01 RX ADMIN — Medication 2 PACKET: at 09:10

## 2021-01-01 RX ADMIN — ENOXAPARIN SODIUM 105 MG: 120 INJECTION SUBCUTANEOUS at 08:35

## 2021-01-01 RX ADMIN — PROPOFOL 60 MCG/KG/MIN: 10 INJECTION, EMULSION INTRAVENOUS at 21:25

## 2021-01-01 RX ADMIN — PANTOPRAZOLE SODIUM 40 MG: 40 TABLET, DELAYED RELEASE ORAL at 08:33

## 2021-01-01 RX ADMIN — PROPOFOL 50 MCG/KG/MIN: 10 INJECTION, EMULSION INTRAVENOUS at 05:57

## 2021-01-01 RX ADMIN — DEXAMETHASONE 6 MG: 2 TABLET ORAL at 15:13

## 2021-01-01 RX ADMIN — Medication 2 MG/HR: at 11:58

## 2021-01-01 RX ADMIN — CHLORHEXIDINE GLUCONATE 0.12% ORAL RINSE 15 ML: 1.2 LIQUID ORAL at 07:49

## 2021-01-01 RX ADMIN — Medication 2 PACKET: at 08:01

## 2021-01-01 RX ADMIN — Medication 15 ML: at 08:15

## 2021-01-01 RX ADMIN — FUROSEMIDE 60 MG: 10 INJECTION, SOLUTION INTRAVENOUS at 11:49

## 2021-01-01 RX ADMIN — PROPOFOL 75 MCG/KG/MIN: 10 INJECTION, EMULSION INTRAVENOUS at 04:12

## 2021-01-01 RX ADMIN — Medication 4 MG/HR: at 05:49

## 2021-01-01 RX ADMIN — QUETIAPINE FUMARATE 75 MG: 50 TABLET ORAL at 20:18

## 2021-01-01 RX ADMIN — VECURONIUM BROMIDE 10 MG: 1 INJECTION, POWDER, LYOPHILIZED, FOR SOLUTION INTRAVENOUS at 15:35

## 2021-01-01 RX ADMIN — PROPOFOL 75 MCG/KG/MIN: 10 INJECTION, EMULSION INTRAVENOUS at 21:16

## 2021-01-01 RX ADMIN — INSULIN ASPART 2 UNITS: 100 INJECTION, SOLUTION INTRAVENOUS; SUBCUTANEOUS at 09:05

## 2021-01-01 RX ADMIN — ENOXAPARIN SODIUM 60 MG: 60 INJECTION SUBCUTANEOUS at 22:33

## 2021-01-01 RX ADMIN — PANTOPRAZOLE SODIUM 40 MG: 40 TABLET, DELAYED RELEASE ORAL at 08:00

## 2021-01-01 RX ADMIN — IPRATROPIUM BROMIDE AND ALBUTEROL SULFATE 3 ML: .5; 3 SOLUTION RESPIRATORY (INHALATION) at 10:43

## 2021-01-01 RX ADMIN — ENOXAPARIN SODIUM 50 MG: 60 INJECTION SUBCUTANEOUS at 19:59

## 2021-01-01 RX ADMIN — Medication 3 MG/HR: at 22:15

## 2021-01-01 RX ADMIN — ACETAMINOPHEN 650 MG: 325 TABLET, FILM COATED ORAL at 13:33

## 2021-01-01 RX ADMIN — POTASSIUM CHLORIDE 10 MEQ: 7.46 INJECTION, SOLUTION INTRAVENOUS at 01:16

## 2021-01-01 RX ADMIN — PROPOFOL 30 MCG/KG/MIN: 10 INJECTION, EMULSION INTRAVENOUS at 19:41

## 2021-01-01 RX ADMIN — SODIUM CHLORIDE, POTASSIUM CHLORIDE, SODIUM LACTATE AND CALCIUM CHLORIDE: 600; 310; 30; 20 INJECTION, SOLUTION INTRAVENOUS at 22:17

## 2021-01-01 RX ADMIN — CEFTRIAXONE SODIUM 2 G: 2 INJECTION, POWDER, FOR SOLUTION INTRAMUSCULAR; INTRAVENOUS at 12:26

## 2021-01-01 RX ADMIN — CHLORHEXIDINE GLUCONATE 0.12% ORAL RINSE 15 ML: 1.2 LIQUID ORAL at 08:41

## 2021-01-01 RX ADMIN — MIDODRINE HYDROCHLORIDE 10 MG: 5 TABLET ORAL at 15:20

## 2021-01-01 RX ADMIN — ACETAMINOPHEN 650 MG: 325 TABLET, FILM COATED ORAL at 08:02

## 2021-01-01 RX ADMIN — PIPERACILLIN SODIUM AND TAZOBACTAM SODIUM 4.5 G: 4; .5 INJECTION, POWDER, LYOPHILIZED, FOR SOLUTION INTRAVENOUS at 14:57

## 2021-01-01 RX ADMIN — ENOXAPARIN SODIUM 50 MG: 60 INJECTION SUBCUTANEOUS at 08:00

## 2021-01-01 RX ADMIN — Medication 2 PACKET: at 00:16

## 2021-01-01 RX ADMIN — PANTOPRAZOLE SODIUM 40 MG: 40 TABLET, DELAYED RELEASE ORAL at 08:02

## 2021-01-01 RX ADMIN — FUROSEMIDE 60 MG: 10 INJECTION, SOLUTION INTRAVENOUS at 21:16

## 2021-01-01 RX ADMIN — POTASSIUM CHLORIDE 40 MEQ: 1.5 POWDER, FOR SOLUTION ORAL at 05:06

## 2021-01-01 RX ADMIN — VANCOMYCIN HYDROCHLORIDE 1500 MG: 5 INJECTION, POWDER, LYOPHILIZED, FOR SOLUTION INTRAVENOUS at 23:53

## 2021-01-01 RX ADMIN — PROPOFOL 30 MCG/KG/MIN: 10 INJECTION, EMULSION INTRAVENOUS at 00:32

## 2021-01-01 RX ADMIN — ACETYLCYSTEINE 2 ML: 200 SOLUTION ORAL; RESPIRATORY (INHALATION) at 20:03

## 2021-01-01 RX ADMIN — CHLORHEXIDINE GLUCONATE 0.12% ORAL RINSE 15 ML: 1.2 LIQUID ORAL at 08:02

## 2021-01-01 RX ADMIN — FUROSEMIDE 40 MG: 10 INJECTION, SOLUTION INTRAVENOUS at 20:07

## 2021-01-01 RX ADMIN — FUROSEMIDE 60 MG: 10 INJECTION, SOLUTION INTRAVENOUS at 03:30

## 2021-01-01 RX ADMIN — SODIUM CHLORIDE, PRESERVATIVE FREE: 5 INJECTION INTRAVENOUS at 16:26

## 2021-01-01 RX ADMIN — PROPOFOL 50 MCG/KG/MIN: 10 INJECTION, EMULSION INTRAVENOUS at 10:55

## 2021-01-01 RX ADMIN — PIPERACILLIN SODIUM AND TAZOBACTAM SODIUM 4.5 G: 4; .5 INJECTION, POWDER, LYOPHILIZED, FOR SOLUTION INTRAVENOUS at 01:25

## 2021-01-01 RX ADMIN — POTASSIUM CHLORIDE 40 MEQ: 20 SOLUTION ORAL at 10:08

## 2021-01-01 RX ADMIN — PROPOFOL 40 MCG/KG/MIN: 10 INJECTION, EMULSION INTRAVENOUS at 03:19

## 2021-01-01 RX ADMIN — Medication 0.6 MG/HR: at 19:40

## 2021-01-01 RX ADMIN — ENOXAPARIN SODIUM 50 MG: 60 INJECTION SUBCUTANEOUS at 07:36

## 2021-01-01 RX ADMIN — POTASSIUM CHLORIDE 40 MEQ: 1.5 POWDER, FOR SOLUTION ORAL at 08:30

## 2021-01-01 RX ADMIN — PANTOPRAZOLE SODIUM 40 MG: 40 TABLET, DELAYED RELEASE ORAL at 07:48

## 2021-01-01 RX ADMIN — Medication 4 MG/HR: at 14:58

## 2021-01-01 RX ADMIN — POTASSIUM CHLORIDE 20 MEQ: 1.5 POWDER, FOR SOLUTION ORAL at 06:39

## 2021-01-01 RX ADMIN — PROPOFOL 30 MCG/KG/MIN: 10 INJECTION, EMULSION INTRAVENOUS at 09:09

## 2021-01-01 RX ADMIN — FUROSEMIDE 60 MG: 10 INJECTION, SOLUTION INTRAVENOUS at 11:17

## 2021-01-01 RX ADMIN — ENOXAPARIN SODIUM 60 MG: 60 INJECTION SUBCUTANEOUS at 20:20

## 2021-01-01 RX ADMIN — PROPOFOL 50 MCG/KG/MIN: 10 INJECTION, EMULSION INTRAVENOUS at 20:56

## 2021-01-01 RX ADMIN — FUROSEMIDE 80 MG: 10 INJECTION, SOLUTION INTRAVENOUS at 13:10

## 2021-01-01 RX ADMIN — FUROSEMIDE 60 MG: 10 INJECTION, SOLUTION INTRAVENOUS at 04:16

## 2021-01-01 RX ADMIN — SODIUM CHLORIDE, POTASSIUM CHLORIDE, SODIUM LACTATE AND CALCIUM CHLORIDE: 600; 310; 30; 20 INJECTION, SOLUTION INTRAVENOUS at 00:02

## 2021-01-01 RX ADMIN — EPOPROSTENOL 20 NG/KG/MIN: 1.5 INJECTION, POWDER, LYOPHILIZED, FOR SOLUTION INTRAVENOUS at 00:38

## 2021-01-01 RX ADMIN — PANTOPRAZOLE SODIUM 40 MG: 40 TABLET, DELAYED RELEASE ORAL at 07:45

## 2021-01-01 RX ADMIN — PROPOFOL 20 MCG/KG/MIN: 10 INJECTION, EMULSION INTRAVENOUS at 01:51

## 2021-01-01 RX ADMIN — ACETAMINOPHEN 650 MG: 325 TABLET, FILM COATED ORAL at 08:22

## 2021-01-01 RX ADMIN — PROPOFOL 55 MCG/KG/MIN: 10 INJECTION, EMULSION INTRAVENOUS at 06:40

## 2021-01-01 RX ADMIN — CHLORHEXIDINE GLUCONATE 0.12% ORAL RINSE 15 ML: 1.2 LIQUID ORAL at 22:16

## 2021-01-01 RX ADMIN — METHYLPREDNISOLONE SODIUM SUCCINATE 125 MG: 125 INJECTION, POWDER, FOR SOLUTION INTRAMUSCULAR; INTRAVENOUS at 12:04

## 2021-01-01 RX ADMIN — EPOPROSTENOL 20 NG/KG/MIN: 1.5 INJECTION, POWDER, LYOPHILIZED, FOR SOLUTION INTRAVENOUS at 22:30

## 2021-01-01 RX ADMIN — ACETAMINOPHEN 650 MG: 325 TABLET, FILM COATED ORAL at 13:56

## 2021-01-01 RX ADMIN — CHLORHEXIDINE GLUCONATE 0.12% ORAL RINSE 15 ML: 1.2 LIQUID ORAL at 19:38

## 2021-01-01 RX ADMIN — MIDODRINE HYDROCHLORIDE 10 MG: 5 TABLET ORAL at 16:33

## 2021-01-01 RX ADMIN — PROPOFOL 20 MCG/KG/MIN: 10 INJECTION, EMULSION INTRAVENOUS at 01:36

## 2021-01-01 RX ADMIN — CHLORHEXIDINE GLUCONATE 0.12% ORAL RINSE 15 ML: 1.2 LIQUID ORAL at 07:46

## 2021-01-01 RX ADMIN — CHLORHEXIDINE GLUCONATE 0.12% ORAL RINSE 15 ML: 1.2 LIQUID ORAL at 19:39

## 2021-01-01 RX ADMIN — MIDAZOLAM (PF) 1 MG/ML IN 0.9 % SODIUM CHLORIDE INTRAVENOUS SOLUTION 10 MG/HR: at 18:21

## 2021-01-01 RX ADMIN — PROPOFOL 50 MCG/KG/MIN: 10 INJECTION, EMULSION INTRAVENOUS at 02:56

## 2021-01-01 RX ADMIN — Medication 2 PACKET: at 15:37

## 2021-01-01 RX ADMIN — MORPHINE SULFATE 8 MG: 4 INJECTION, SOLUTION INTRAMUSCULAR; INTRAVENOUS at 16:51

## 2021-01-01 RX ADMIN — MEROPENEM 500 MG: 500 INJECTION, POWDER, FOR SOLUTION INTRAVENOUS at 15:58

## 2021-01-01 RX ADMIN — Medication 15 ML: at 08:16

## 2021-01-01 RX ADMIN — POTASSIUM CHLORIDE 20 MEQ: 1.5 POWDER, FOR SOLUTION ORAL at 07:43

## 2021-01-01 RX ADMIN — ENOXAPARIN SODIUM 105 MG: 120 INJECTION SUBCUTANEOUS at 20:00

## 2021-01-01 RX ADMIN — QUETIAPINE FUMARATE 50 MG: 25 TABLET ORAL at 07:46

## 2021-01-01 RX ADMIN — METHYLPREDNISOLONE SODIUM SUCCINATE 40 MG: 40 INJECTION, POWDER, FOR SOLUTION INTRAMUSCULAR; INTRAVENOUS at 11:16

## 2021-01-01 RX ADMIN — PROPOFOL 50 MCG/KG/MIN: 10 INJECTION, EMULSION INTRAVENOUS at 12:17

## 2021-01-01 RX ADMIN — MIDAZOLAM (PF) 1 MG/ML IN 0.9 % SODIUM CHLORIDE INTRAVENOUS SOLUTION 7 MG/HR: at 00:43

## 2021-01-01 RX ADMIN — PROPOFOL 25 MCG/KG/MIN: 10 INJECTION, EMULSION INTRAVENOUS at 15:51

## 2021-01-01 RX ADMIN — PROPOFOL 65 MCG/KG/MIN: 10 INJECTION, EMULSION INTRAVENOUS at 07:55

## 2021-01-01 RX ADMIN — Medication 0.4 MG/HR: at 21:24

## 2021-01-01 RX ADMIN — FUROSEMIDE 40 MG: 10 INJECTION, SOLUTION INTRAVENOUS at 14:30

## 2021-01-01 RX ADMIN — CHLORHEXIDINE GLUCONATE 0.12% ORAL RINSE 15 ML: 1.2 LIQUID ORAL at 07:48

## 2021-01-01 RX ADMIN — IPRATROPIUM BROMIDE AND ALBUTEROL SULFATE 3 ML: .5; 3 SOLUTION RESPIRATORY (INHALATION) at 10:54

## 2021-01-01 RX ADMIN — Medication 0.1 MCG/KG/MIN: at 15:43

## 2021-01-01 RX ADMIN — PROPOFOL 50 MCG/KG/MIN: 10 INJECTION, EMULSION INTRAVENOUS at 02:42

## 2021-01-01 RX ADMIN — MEROPENEM 500 MG: 500 INJECTION, POWDER, FOR SOLUTION INTRAVENOUS at 15:45

## 2021-01-01 RX ADMIN — CHLORHEXIDINE GLUCONATE 0.12% ORAL RINSE 15 ML: 1.2 LIQUID ORAL at 19:15

## 2021-01-01 RX ADMIN — Medication 0.6 MG/HR: at 11:26

## 2021-01-01 RX ADMIN — Medication 2 PACKET: at 15:35

## 2021-01-01 RX ADMIN — DEXAMETHASONE SODIUM PHOSPHATE 6 MG: 4 INJECTION, SOLUTION INTRAMUSCULAR; INTRAVENOUS at 13:30

## 2021-01-01 RX ADMIN — PROPOFOL 60 MCG/KG/MIN: 10 INJECTION, EMULSION INTRAVENOUS at 23:29

## 2021-01-01 RX ADMIN — ACETYLCYSTEINE 2 ML: 200 SOLUTION ORAL; RESPIRATORY (INHALATION) at 10:54

## 2021-01-01 RX ADMIN — SODIUM CHLORIDE, POTASSIUM CHLORIDE, SODIUM LACTATE AND CALCIUM CHLORIDE: 600; 310; 30; 20 INJECTION, SOLUTION INTRAVENOUS at 18:58

## 2021-01-01 RX ADMIN — Medication 3 MG/HR: at 05:48

## 2021-01-01 RX ADMIN — ENOXAPARIN SODIUM 50 MG: 60 INJECTION SUBCUTANEOUS at 07:40

## 2021-01-01 RX ADMIN — Medication 3 MG/HR: at 06:28

## 2021-01-01 RX ADMIN — ENOXAPARIN SODIUM 105 MG: 120 INJECTION SUBCUTANEOUS at 21:46

## 2021-01-01 RX ADMIN — DEXAMETHASONE SODIUM PHOSPHATE 4 MG: 4 INJECTION, SOLUTION INTRAMUSCULAR; INTRAVENOUS at 14:41

## 2021-01-01 RX ADMIN — POTASSIUM CHLORIDE 20 MEQ: 29.8 INJECTION, SOLUTION INTRAVENOUS at 06:35

## 2021-01-01 RX ADMIN — Medication 10 MG/HR: at 03:19

## 2021-01-01 RX ADMIN — IPRATROPIUM BROMIDE AND ALBUTEROL SULFATE 3 ML: .5; 3 SOLUTION RESPIRATORY (INHALATION) at 15:08

## 2021-01-01 RX ADMIN — HUMAN ALBUMIN MICROSPHERES AND PERFLUTREN 9 ML: 10; .22 INJECTION, SOLUTION INTRAVENOUS at 10:56

## 2021-01-01 RX ADMIN — MEROPENEM 500 MG: 500 INJECTION, POWDER, FOR SOLUTION INTRAVENOUS at 10:44

## 2021-01-01 RX ADMIN — MIDAZOLAM (PF) 1 MG/ML IN 0.9 % SODIUM CHLORIDE INTRAVENOUS SOLUTION 7 MG/HR: at 16:11

## 2021-01-01 RX ADMIN — CHLORHEXIDINE GLUCONATE 0.12% ORAL RINSE 15 ML: 1.2 LIQUID ORAL at 20:19

## 2021-01-01 RX ADMIN — INSULIN ASPART 1 UNITS: 100 INJECTION, SOLUTION INTRAVENOUS; SUBCUTANEOUS at 20:00

## 2021-01-01 RX ADMIN — MIDODRINE HYDROCHLORIDE 10 MG: 5 TABLET ORAL at 07:48

## 2021-01-01 RX ADMIN — Medication 0.1 MCG/KG/MIN: at 14:00

## 2021-01-01 RX ADMIN — POTASSIUM CHLORIDE 20 MEQ: 20 SOLUTION ORAL at 20:55

## 2021-01-01 RX ADMIN — POTASSIUM CHLORIDE 20 MEQ: 29.8 INJECTION, SOLUTION INTRAVENOUS at 18:44

## 2021-01-01 RX ADMIN — MIDAZOLAM (PF) 1 MG/ML IN 0.9 % SODIUM CHLORIDE INTRAVENOUS SOLUTION 6 MG/HR: at 06:47

## 2021-01-01 RX ADMIN — PROPOFOL 50 MCG/KG/MIN: 10 INJECTION, EMULSION INTRAVENOUS at 11:55

## 2021-01-01 RX ADMIN — POTASSIUM CHLORIDE 10 MEQ: 7.46 INJECTION, SOLUTION INTRAVENOUS at 20:17

## 2021-01-01 RX ADMIN — Medication 3 MG/HR: at 19:41

## 2021-01-01 RX ADMIN — POTASSIUM CHLORIDE 40 MEQ: 1.5 POWDER, FOR SOLUTION ORAL at 08:51

## 2021-01-01 RX ADMIN — ENOXAPARIN SODIUM 60 MG: 60 INJECTION SUBCUTANEOUS at 08:02

## 2021-01-01 RX ADMIN — SODIUM CHLORIDE, POTASSIUM CHLORIDE, SODIUM LACTATE AND CALCIUM CHLORIDE: 600; 310; 30; 20 INJECTION, SOLUTION INTRAVENOUS at 02:36

## 2021-01-01 RX ADMIN — POTASSIUM CHLORIDE 40 MEQ: 1.5 POWDER, FOR SOLUTION ORAL at 20:01

## 2021-01-01 RX ADMIN — CHLORHEXIDINE GLUCONATE 0.12% ORAL RINSE 15 ML: 1.2 LIQUID ORAL at 20:07

## 2021-01-01 RX ADMIN — QUETIAPINE FUMARATE 75 MG: 50 TABLET ORAL at 19:41

## 2021-01-01 RX ADMIN — INSULIN ASPART 3 UNITS: 100 INJECTION, SOLUTION INTRAVENOUS; SUBCUTANEOUS at 23:52

## 2021-01-01 RX ADMIN — Medication 2 PACKET: at 17:38

## 2021-01-01 RX ADMIN — FUROSEMIDE 60 MG: 10 INJECTION, SOLUTION INTRAVENOUS at 10:38

## 2021-01-01 RX ADMIN — Medication 15 ML: at 08:35

## 2021-01-01 RX ADMIN — INSULIN ASPART 1 UNITS: 100 INJECTION, SOLUTION INTRAVENOUS; SUBCUTANEOUS at 17:10

## 2021-01-01 RX ADMIN — PROPOFOL 50 MCG/KG/MIN: 10 INJECTION, EMULSION INTRAVENOUS at 00:25

## 2021-01-01 RX ADMIN — ACETYLCYSTEINE 2 ML: 200 SOLUTION ORAL; RESPIRATORY (INHALATION) at 07:07

## 2021-01-01 RX ADMIN — PROPOFOL 50 MCG/KG/MIN: 10 INJECTION, EMULSION INTRAVENOUS at 09:03

## 2021-01-01 RX ADMIN — IPRATROPIUM BROMIDE AND ALBUTEROL SULFATE 3 ML: .5; 3 SOLUTION RESPIRATORY (INHALATION) at 10:36

## 2021-01-01 RX ADMIN — PROPOFOL 40 MCG/KG/MIN: 10 INJECTION, EMULSION INTRAVENOUS at 04:57

## 2021-01-01 RX ADMIN — ENOXAPARIN SODIUM 60 MG: 60 INJECTION SUBCUTANEOUS at 09:03

## 2021-01-01 RX ADMIN — MIDAZOLAM 2 MG: 1 INJECTION INTRAMUSCULAR; INTRAVENOUS at 20:00

## 2021-01-01 RX ADMIN — QUETIAPINE FUMARATE 75 MG: 50 TABLET ORAL at 09:32

## 2021-01-01 RX ADMIN — SODIUM CHLORIDE, PRESERVATIVE FREE: 5 INJECTION INTRAVENOUS at 09:43

## 2021-01-01 RX ADMIN — METHYLPREDNISOLONE SODIUM SUCCINATE 62.5 MG: 125 INJECTION, POWDER, FOR SOLUTION INTRAMUSCULAR; INTRAVENOUS at 12:15

## 2021-01-01 RX ADMIN — PROPOFOL 50 MCG/KG/MIN: 10 INJECTION, EMULSION INTRAVENOUS at 15:38

## 2021-01-01 RX ADMIN — PROPOFOL 30 MCG/KG/MIN: 10 INJECTION, EMULSION INTRAVENOUS at 22:10

## 2021-01-01 RX ADMIN — PROPOFOL 65 MCG/KG/MIN: 10 INJECTION, EMULSION INTRAVENOUS at 12:22

## 2021-01-01 RX ADMIN — MEROPENEM 500 MG: 500 INJECTION, POWDER, FOR SOLUTION INTRAVENOUS at 16:04

## 2021-01-01 RX ADMIN — Medication 15 MG/HR: at 02:58

## 2021-01-01 RX ADMIN — Medication 2 PACKET: at 00:29

## 2021-01-01 RX ADMIN — POTASSIUM CHLORIDE 20 MEQ: 1.5 POWDER, FOR SOLUTION ORAL at 07:58

## 2021-01-01 RX ADMIN — INSULIN ASPART 2 UNITS: 100 INJECTION, SOLUTION INTRAVENOUS; SUBCUTANEOUS at 20:24

## 2021-01-01 RX ADMIN — MIDAZOLAM (PF) 1 MG/ML IN 0.9 % SODIUM CHLORIDE INTRAVENOUS SOLUTION 10 MG/HR: at 08:45

## 2021-01-01 RX ADMIN — Medication 15 MG/HR: at 15:23

## 2021-01-01 RX ADMIN — CHLORHEXIDINE GLUCONATE 0.12% ORAL RINSE 15 ML: 1.2 LIQUID ORAL at 07:51

## 2021-01-01 RX ADMIN — ENOXAPARIN SODIUM 60 MG: 60 INJECTION SUBCUTANEOUS at 19:36

## 2021-01-01 RX ADMIN — POTASSIUM CHLORIDE 40 MEQ: 1.5 POWDER, FOR SOLUTION ORAL at 07:48

## 2021-01-01 RX ADMIN — MIDAZOLAM (PF) 1 MG/ML IN 0.9 % SODIUM CHLORIDE INTRAVENOUS SOLUTION 12 MG/HR: at 02:35

## 2021-01-01 RX ADMIN — Medication 2 PACKET: at 16:03

## 2021-01-01 RX ADMIN — MIDODRINE HYDROCHLORIDE 10 MG: 5 TABLET ORAL at 23:23

## 2021-01-01 RX ADMIN — FUROSEMIDE 60 MG: 10 INJECTION, SOLUTION INTRAVENOUS at 12:15

## 2021-01-01 RX ADMIN — POTASSIUM CHLORIDE 40 MEQ: 1.5 POWDER, FOR SOLUTION ORAL at 06:23

## 2021-01-01 RX ADMIN — ACETAZOLAMIDE SODIUM 500 MG: 500 INJECTION, POWDER, LYOPHILIZED, FOR SOLUTION INTRAVENOUS at 12:20

## 2021-01-01 RX ADMIN — REMDESIVIR 100 MG: 100 INJECTION, POWDER, LYOPHILIZED, FOR SOLUTION INTRAVENOUS at 21:04

## 2021-01-01 RX ADMIN — CHLORHEXIDINE GLUCONATE 0.12% ORAL RINSE 15 ML: 1.2 LIQUID ORAL at 20:04

## 2021-01-01 RX ADMIN — SENNOSIDES 5 ML: 8.8 LIQUID ORAL at 08:26

## 2021-01-01 RX ADMIN — CHLORHEXIDINE GLUCONATE 0.12% ORAL RINSE 15 ML: 1.2 LIQUID ORAL at 07:43

## 2021-01-01 RX ADMIN — Medication 2 PACKET: at 16:12

## 2021-01-01 RX ADMIN — PROPOFOL 75 MCG/KG/MIN: 10 INJECTION, EMULSION INTRAVENOUS at 19:38

## 2021-01-01 RX ADMIN — FUROSEMIDE 60 MG: 10 INJECTION, SOLUTION INTRAVENOUS at 04:50

## 2021-01-01 RX ADMIN — Medication 0.5 MG/HR: at 11:58

## 2021-01-01 RX ADMIN — Medication 2 PACKET: at 07:49

## 2021-01-01 RX ADMIN — Medication 15 ML: at 08:12

## 2021-01-01 RX ADMIN — Medication 15 ML: at 08:06

## 2021-01-01 RX ADMIN — SODIUM CHLORIDE, PRESERVATIVE FREE: 5 INJECTION INTRAVENOUS at 04:43

## 2021-01-01 RX ADMIN — DEXAMETHASONE SODIUM PHOSPHATE 6 MG: 4 INJECTION, SOLUTION INTRAMUSCULAR; INTRAVENOUS at 13:48

## 2021-01-01 RX ADMIN — SENNOSIDES 5 ML: 8.8 LIQUID ORAL at 20:37

## 2021-01-01 RX ADMIN — INSULIN ASPART 2 UNITS: 100 INJECTION, SOLUTION INTRAVENOUS; SUBCUTANEOUS at 20:03

## 2021-01-01 RX ADMIN — PROPOFOL 20 MCG/KG/MIN: 10 INJECTION, EMULSION INTRAVENOUS at 08:17

## 2021-01-01 RX ADMIN — Medication 2 PACKET: at 00:31

## 2021-01-01 RX ADMIN — POTASSIUM CHLORIDE 10 MEQ: 7.46 INJECTION, SOLUTION INTRAVENOUS at 02:09

## 2021-01-01 RX ADMIN — IPRATROPIUM BROMIDE AND ALBUTEROL SULFATE 3 ML: .5; 3 SOLUTION RESPIRATORY (INHALATION) at 20:03

## 2021-01-01 RX ADMIN — PROPOFOL 50 MCG/KG/MIN: 10 INJECTION, EMULSION INTRAVENOUS at 00:06

## 2021-01-01 RX ADMIN — POTASSIUM CHLORIDE 40 MEQ: 1.5 POWDER, FOR SOLUTION ORAL at 05:48

## 2021-01-01 RX ADMIN — PROPOFOL 50 MCG/KG/MIN: 10 INJECTION, EMULSION INTRAVENOUS at 14:57

## 2021-01-01 RX ADMIN — FUROSEMIDE 20 MG: 10 INJECTION, SOLUTION INTRAVENOUS at 00:19

## 2021-01-01 RX ADMIN — QUETIAPINE FUMARATE 75 MG: 50 TABLET ORAL at 07:50

## 2021-01-01 RX ADMIN — MEROPENEM 500 MG: 500 INJECTION, POWDER, FOR SOLUTION INTRAVENOUS at 04:10

## 2021-01-01 RX ADMIN — DEXAMETHASONE SODIUM PHOSPHATE 6 MG: 4 INJECTION, SOLUTION INTRAMUSCULAR; INTRAVENOUS at 14:24

## 2021-01-01 RX ADMIN — QUETIAPINE FUMARATE 75 MG: 50 TABLET ORAL at 08:33

## 2021-01-01 RX ADMIN — FUROSEMIDE 60 MG: 10 INJECTION, SOLUTION INTRAVENOUS at 12:01

## 2021-01-01 RX ADMIN — METHYLPREDNISOLONE SODIUM SUCCINATE 62.5 MG: 125 INJECTION, POWDER, FOR SOLUTION INTRAMUSCULAR; INTRAVENOUS at 11:53

## 2021-01-01 RX ADMIN — PROPOFOL 50 MCG/KG/MIN: 10 INJECTION, EMULSION INTRAVENOUS at 16:04

## 2021-01-01 RX ADMIN — MIDAZOLAM 1 MG: 1 INJECTION INTRAMUSCULAR; INTRAVENOUS at 07:48

## 2021-01-01 RX ADMIN — Medication 9 MG/HR: at 23:38

## 2021-01-01 RX ADMIN — QUETIAPINE FUMARATE 75 MG: 50 TABLET ORAL at 08:02

## 2021-01-01 RX ADMIN — CHLORHEXIDINE GLUCONATE 0.12% ORAL RINSE 15 ML: 1.2 LIQUID ORAL at 08:08

## 2021-01-01 RX ADMIN — FUROSEMIDE 20 MG: 10 INJECTION, SOLUTION INTRAVENOUS at 14:52

## 2021-01-01 RX ADMIN — ENOXAPARIN SODIUM 60 MG: 60 INJECTION SUBCUTANEOUS at 20:04

## 2021-01-01 RX ADMIN — INSULIN ASPART 1 UNITS: 100 INJECTION, SOLUTION INTRAVENOUS; SUBCUTANEOUS at 04:18

## 2021-01-01 RX ADMIN — ROCURONIUM BROMIDE 30 MG: 10 INJECTION INTRAVENOUS at 11:59

## 2021-01-01 RX ADMIN — FUROSEMIDE 60 MG: 10 INJECTION, SOLUTION INTRAVENOUS at 20:47

## 2021-01-01 RX ADMIN — FUROSEMIDE 60 MG: 10 INJECTION, SOLUTION INTRAVENOUS at 03:27

## 2021-01-01 RX ADMIN — PROPOFOL 60 MCG/KG/MIN: 10 INJECTION, EMULSION INTRAVENOUS at 10:19

## 2021-01-01 RX ADMIN — IPRATROPIUM BROMIDE AND ALBUTEROL SULFATE 3 ML: .5; 3 SOLUTION RESPIRATORY (INHALATION) at 14:35

## 2021-01-01 RX ADMIN — ACETAMINOPHEN 650 MG: 325 TABLET, FILM COATED ORAL at 15:24

## 2021-01-01 RX ADMIN — VANCOMYCIN HYDROCHLORIDE 1500 MG: 5 INJECTION, POWDER, LYOPHILIZED, FOR SOLUTION INTRAVENOUS at 16:42

## 2021-01-01 RX ADMIN — Medication 0.06 MCG/KG/MIN: at 08:46

## 2021-01-01 RX ADMIN — POTASSIUM CHLORIDE 20 MEQ: 29.8 INJECTION, SOLUTION INTRAVENOUS at 04:16

## 2021-01-01 RX ADMIN — HYDROMORPHONE HYDROCHLORIDE 0.5 MG: 1 INJECTION, SOLUTION INTRAMUSCULAR; INTRAVENOUS; SUBCUTANEOUS at 11:43

## 2021-01-01 RX ADMIN — SODIUM CHLORIDE, POTASSIUM CHLORIDE, SODIUM LACTATE AND CALCIUM CHLORIDE: 600; 310; 30; 20 INJECTION, SOLUTION INTRAVENOUS at 12:26

## 2021-01-01 RX ADMIN — PROPOFOL 50 MCG/KG/MIN: 10 INJECTION, EMULSION INTRAVENOUS at 06:17

## 2021-01-01 RX ADMIN — Medication 10 MG/HR: at 17:15

## 2021-01-01 RX ADMIN — PROPOFOL 60 MCG/KG/MIN: 10 INJECTION, EMULSION INTRAVENOUS at 08:00

## 2021-01-01 RX ADMIN — CHLORHEXIDINE GLUCONATE 0.12% ORAL RINSE 15 ML: 1.2 LIQUID ORAL at 20:17

## 2021-01-01 RX ADMIN — POTASSIUM CHLORIDE 40 MEQ: 20 SOLUTION ORAL at 17:02

## 2021-01-01 RX ADMIN — AZITHROMYCIN MONOHYDRATE 500 MG: 500 INJECTION, POWDER, LYOPHILIZED, FOR SOLUTION INTRAVENOUS at 13:30

## 2021-01-01 RX ADMIN — INSULIN ASPART 1 UNITS: 100 INJECTION, SOLUTION INTRAVENOUS; SUBCUTANEOUS at 20:07

## 2021-01-01 RX ADMIN — PROPOFOL 40 MCG/KG/MIN: 10 INJECTION, EMULSION INTRAVENOUS at 10:41

## 2021-01-01 RX ADMIN — AZITHROMYCIN MONOHYDRATE 500 MG: 500 INJECTION, POWDER, LYOPHILIZED, FOR SOLUTION INTRAVENOUS at 13:13

## 2021-01-01 RX ADMIN — Medication 2 PACKET: at 16:31

## 2021-01-01 RX ADMIN — FUROSEMIDE 20 MG: 10 INJECTION, SOLUTION INTRAVENOUS at 12:15

## 2021-01-01 RX ADMIN — PROPOFOL 65 MCG/KG/MIN: 10 INJECTION, EMULSION INTRAVENOUS at 06:37

## 2021-01-01 RX ADMIN — Medication 15 ML: at 08:45

## 2021-01-01 RX ADMIN — MEROPENEM 500 MG: 500 INJECTION, POWDER, FOR SOLUTION INTRAVENOUS at 16:35

## 2021-01-01 RX ADMIN — FUROSEMIDE 20 MG: 10 INJECTION, SOLUTION INTRAVENOUS at 01:55

## 2021-01-01 RX ADMIN — POTASSIUM CHLORIDE 20 MEQ: 20 SOLUTION ORAL at 18:26

## 2021-01-01 RX ADMIN — PIPERACILLIN SODIUM AND TAZOBACTAM SODIUM 4.5 G: 4; .5 INJECTION, POWDER, LYOPHILIZED, FOR SOLUTION INTRAVENOUS at 01:03

## 2021-01-01 RX ADMIN — IPRATROPIUM BROMIDE AND ALBUTEROL SULFATE 3 ML: .5; 3 SOLUTION RESPIRATORY (INHALATION) at 07:07

## 2021-01-01 RX ADMIN — CEFTRIAXONE SODIUM 2 G: 2 INJECTION, POWDER, FOR SOLUTION INTRAMUSCULAR; INTRAVENOUS at 11:44

## 2021-01-01 RX ADMIN — FUROSEMIDE 60 MG: 10 INJECTION, SOLUTION INTRAVENOUS at 20:23

## 2021-01-01 RX ADMIN — MIDAZOLAM (PF) 1 MG/ML IN 0.9 % SODIUM CHLORIDE INTRAVENOUS SOLUTION 10 MG/HR: at 07:48

## 2021-01-01 RX ADMIN — SODIUM CHLORIDE, PRESERVATIVE FREE: 5 INJECTION INTRAVENOUS at 03:43

## 2021-01-01 RX ADMIN — VECURONIUM BROMIDE 0.8 MCG/KG/MIN: 1 INJECTION, POWDER, LYOPHILIZED, FOR SOLUTION INTRAVENOUS at 22:20

## 2021-01-01 RX ADMIN — LORAZEPAM 2 MG: 2 INJECTION INTRAMUSCULAR; INTRAVENOUS at 16:51

## 2021-01-01 RX ADMIN — PIPERACILLIN SODIUM AND TAZOBACTAM SODIUM 4.5 G: 4; .5 INJECTION, POWDER, LYOPHILIZED, FOR SOLUTION INTRAVENOUS at 19:16

## 2021-01-01 RX ADMIN — Medication 3 MG/HR: at 18:10

## 2021-01-01 RX ADMIN — ENOXAPARIN SODIUM 60 MG: 60 INJECTION SUBCUTANEOUS at 20:06

## 2021-01-01 RX ADMIN — INSULIN ASPART 1 UNITS: 100 INJECTION, SOLUTION INTRAVENOUS; SUBCUTANEOUS at 23:12

## 2021-01-01 RX ADMIN — PROPOFOL 65 MCG/KG/MIN: 10 INJECTION, EMULSION INTRAVENOUS at 00:51

## 2021-01-01 RX ADMIN — PIPERACILLIN SODIUM AND TAZOBACTAM SODIUM 4.5 G: 4; .5 INJECTION, POWDER, LYOPHILIZED, FOR SOLUTION INTRAVENOUS at 08:02

## 2021-01-01 RX ADMIN — POTASSIUM CHLORIDE 20 MEQ: 29.8 INJECTION, SOLUTION INTRAVENOUS at 04:47

## 2021-01-01 RX ADMIN — Medication 15 ML: at 08:25

## 2021-01-01 RX ADMIN — FUROSEMIDE 20 MG: 10 INJECTION, SOLUTION INTRAVENOUS at 14:25

## 2021-01-01 RX ADMIN — PROPOFOL 50 MCG/KG/MIN: 10 INJECTION, EMULSION INTRAVENOUS at 01:29

## 2021-01-01 RX ADMIN — SODIUM CHLORIDE, POTASSIUM CHLORIDE, SODIUM LACTATE AND CALCIUM CHLORIDE: 600; 310; 30; 20 INJECTION, SOLUTION INTRAVENOUS at 10:13

## 2021-01-01 RX ADMIN — PANTOPRAZOLE SODIUM 40 MG: 40 TABLET, DELAYED RELEASE ORAL at 07:37

## 2021-01-01 RX ADMIN — ENOXAPARIN SODIUM 60 MG: 60 INJECTION SUBCUTANEOUS at 08:01

## 2021-01-01 RX ADMIN — PROPOFOL 50 MCG/KG/MIN: 10 INJECTION, EMULSION INTRAVENOUS at 12:06

## 2021-01-01 RX ADMIN — PROPOFOL 50 MCG/KG/MIN: 10 INJECTION, EMULSION INTRAVENOUS at 18:21

## 2021-01-01 RX ADMIN — IPRATROPIUM BROMIDE AND ALBUTEROL SULFATE 3 ML: .5; 3 SOLUTION RESPIRATORY (INHALATION) at 19:07

## 2021-01-01 RX ADMIN — ENOXAPARIN SODIUM 50 MG: 60 INJECTION SUBCUTANEOUS at 08:34

## 2021-01-01 RX ADMIN — POTASSIUM CHLORIDE 20 MEQ: 29.8 INJECTION, SOLUTION INTRAVENOUS at 08:08

## 2021-01-01 RX ADMIN — PIPERACILLIN SODIUM AND TAZOBACTAM SODIUM 4.5 G: 4; .5 INJECTION, POWDER, LYOPHILIZED, FOR SOLUTION INTRAVENOUS at 20:00

## 2021-01-01 RX ADMIN — PROPOFOL 30 MCG/KG/MIN: 10 INJECTION, EMULSION INTRAVENOUS at 03:31

## 2021-01-01 RX ADMIN — PROPOFOL 50 MCG/KG/MIN: 10 INJECTION, EMULSION INTRAVENOUS at 12:14

## 2021-01-01 RX ADMIN — PROPOFOL 50 MCG/KG/MIN: 10 INJECTION, EMULSION INTRAVENOUS at 17:53

## 2021-01-01 RX ADMIN — FUROSEMIDE 60 MG: 10 INJECTION, SOLUTION INTRAVENOUS at 11:52

## 2021-01-01 RX ADMIN — PIPERACILLIN SODIUM AND TAZOBACTAM SODIUM 4.5 G: 4; .5 INJECTION, POWDER, LYOPHILIZED, FOR SOLUTION INTRAVENOUS at 02:23

## 2021-01-01 RX ADMIN — CHLORHEXIDINE GLUCONATE 0.12% ORAL RINSE 15 ML: 1.2 LIQUID ORAL at 20:37

## 2021-01-01 RX ADMIN — CHLORHEXIDINE GLUCONATE 0.12% ORAL RINSE 15 ML: 1.2 LIQUID ORAL at 07:39

## 2021-01-01 RX ADMIN — DEXAMETHASONE SODIUM PHOSPHATE 6 MG: 4 INJECTION, SOLUTION INTRAMUSCULAR; INTRAVENOUS at 15:25

## 2021-01-01 RX ADMIN — MIDAZOLAM (PF) 1 MG/ML IN 0.9 % SODIUM CHLORIDE INTRAVENOUS SOLUTION 10 MG/HR: at 14:54

## 2021-01-01 RX ADMIN — CHLORHEXIDINE GLUCONATE 0.12% ORAL RINSE 15 ML: 1.2 LIQUID ORAL at 20:09

## 2021-01-01 RX ADMIN — DEXAMETHASONE 6 MG: 2 TABLET ORAL at 14:44

## 2021-01-01 RX ADMIN — Medication 4 MG/HR: at 00:57

## 2021-01-01 RX ADMIN — PROPOFOL 75 MCG/KG/MIN: 10 INJECTION, EMULSION INTRAVENOUS at 23:19

## 2021-01-01 RX ADMIN — FUROSEMIDE 60 MG: 10 INJECTION, SOLUTION INTRAVENOUS at 19:37

## 2021-01-01 RX ADMIN — QUETIAPINE FUMARATE 75 MG: 50 TABLET ORAL at 08:39

## 2021-01-01 RX ADMIN — FUROSEMIDE 60 MG: 10 INJECTION, SOLUTION INTRAVENOUS at 12:19

## 2021-01-01 RX ADMIN — Medication 0.09 MCG/KG/MIN: at 23:14

## 2021-01-01 RX ADMIN — MIDAZOLAM 2 MG: 1 INJECTION INTRAMUSCULAR; INTRAVENOUS at 21:58

## 2021-01-01 RX ADMIN — MINERAL OIL AND PETROLATUM: 150; 830 OINTMENT OPHTHALMIC at 01:52

## 2021-01-01 RX ADMIN — MIDAZOLAM (PF) 1 MG/ML IN 0.9 % SODIUM CHLORIDE INTRAVENOUS SOLUTION 10 MG/HR: at 18:16

## 2021-01-01 RX ADMIN — INSULIN ASPART 1 UNITS: 100 INJECTION, SOLUTION INTRAVENOUS; SUBCUTANEOUS at 08:08

## 2021-01-01 RX ADMIN — CHLORHEXIDINE GLUCONATE 0.12% ORAL RINSE 15 ML: 1.2 LIQUID ORAL at 20:11

## 2021-01-01 RX ADMIN — PROPOFOL 70 MCG/KG/MIN: 10 INJECTION, EMULSION INTRAVENOUS at 12:50

## 2021-01-01 RX ADMIN — MINERAL OIL AND PETROLATUM: 150; 830 OINTMENT OPHTHALMIC at 03:23

## 2021-01-01 RX ADMIN — IPRATROPIUM BROMIDE AND ALBUTEROL SULFATE 3 ML: .5; 3 SOLUTION RESPIRATORY (INHALATION) at 07:45

## 2021-01-01 RX ADMIN — EPOPROSTENOL 20 NG/KG/MIN: 1.5 INJECTION, POWDER, LYOPHILIZED, FOR SOLUTION INTRAVENOUS at 04:47

## 2021-01-01 RX ADMIN — ENOXAPARIN SODIUM 105 MG: 120 INJECTION SUBCUTANEOUS at 20:17

## 2021-01-01 RX ADMIN — QUETIAPINE FUMARATE 75 MG: 50 TABLET ORAL at 19:38

## 2021-01-01 RX ADMIN — Medication 3 MG/HR: at 07:56

## 2021-01-01 RX ADMIN — MIDODRINE HYDROCHLORIDE 10 MG: 5 TABLET ORAL at 15:57

## 2021-01-01 RX ADMIN — PROPOFOL 50 MCG/KG/MIN: 10 INJECTION, EMULSION INTRAVENOUS at 14:32

## 2021-01-01 RX ADMIN — REMDESIVIR 100 MG: 100 INJECTION, POWDER, LYOPHILIZED, FOR SOLUTION INTRAVENOUS at 22:36

## 2021-01-01 RX ADMIN — IPRATROPIUM BROMIDE AND ALBUTEROL SULFATE 3 ML: .5; 3 SOLUTION RESPIRATORY (INHALATION) at 14:00

## 2021-01-01 RX ADMIN — Medication 15 MG/HR: at 00:41

## 2021-01-01 RX ADMIN — ENOXAPARIN SODIUM 50 MG: 60 INJECTION SUBCUTANEOUS at 22:09

## 2021-01-01 RX ADMIN — SENNOSIDES 5 ML: 8.8 LIQUID ORAL at 11:17

## 2021-01-01 RX ADMIN — FUROSEMIDE 60 MG: 10 INJECTION, SOLUTION INTRAVENOUS at 20:56

## 2021-01-01 RX ADMIN — DEXAMETHASONE 6 MG: 2 TABLET ORAL at 18:47

## 2021-01-01 RX ADMIN — BISACODYL 15 MG: 5 TABLET, COATED ORAL at 19:59

## 2021-01-01 RX ADMIN — POTASSIUM CHLORIDE 10 MEQ: 7.46 INJECTION, SOLUTION INTRAVENOUS at 10:13

## 2021-01-01 RX ADMIN — INSULIN ASPART 1 UNITS: 100 INJECTION, SOLUTION INTRAVENOUS; SUBCUTANEOUS at 12:06

## 2021-01-01 RX ADMIN — QUETIAPINE FUMARATE 75 MG: 50 TABLET ORAL at 20:25

## 2021-01-01 RX ADMIN — Medication 4 MG/HR: at 17:20

## 2021-01-01 RX ADMIN — Medication 12 MG/HR: at 07:53

## 2021-01-01 RX ADMIN — POTASSIUM CHLORIDE 20 MEQ: 1.5 POWDER, FOR SOLUTION ORAL at 13:56

## 2021-01-01 RX ADMIN — INSULIN ASPART 1 UNITS: 100 INJECTION, SOLUTION INTRAVENOUS; SUBCUTANEOUS at 12:12

## 2021-01-01 RX ADMIN — MIDAZOLAM (PF) 1 MG/ML IN 0.9 % SODIUM CHLORIDE INTRAVENOUS SOLUTION 5 MG/HR: at 12:43

## 2021-01-01 RX ADMIN — INSULIN ASPART 1 UNITS: 100 INJECTION, SOLUTION INTRAVENOUS; SUBCUTANEOUS at 03:45

## 2021-01-01 RX ADMIN — MIDAZOLAM (PF) 1 MG/ML IN 0.9 % SODIUM CHLORIDE INTRAVENOUS SOLUTION 10 MG/HR: at 01:43

## 2021-01-01 RX ADMIN — MIDODRINE HYDROCHLORIDE 10 MG: 5 TABLET ORAL at 23:59

## 2021-01-01 RX ADMIN — Medication 2 PACKET: at 05:35

## 2021-01-01 RX ADMIN — Medication 2 PACKET: at 06:10

## 2021-01-01 RX ADMIN — FUROSEMIDE 20 MG: 10 INJECTION, SOLUTION INTRAVENOUS at 14:19

## 2021-01-01 RX ADMIN — PANTOPRAZOLE SODIUM 40 MG: 40 TABLET, DELAYED RELEASE ORAL at 08:01

## 2021-01-01 RX ADMIN — AZITHROMYCIN MONOHYDRATE 500 MG: 500 INJECTION, POWDER, LYOPHILIZED, FOR SOLUTION INTRAVENOUS at 13:19

## 2021-01-01 RX ADMIN — PROPOFOL 70 MCG/KG/MIN: 10 INJECTION, EMULSION INTRAVENOUS at 19:35

## 2021-01-01 RX ADMIN — FUROSEMIDE 60 MG: 10 INJECTION, SOLUTION INTRAVENOUS at 11:29

## 2021-01-01 RX ADMIN — INSULIN GLARGINE 12 UNITS: 100 INJECTION, SOLUTION SUBCUTANEOUS at 07:51

## 2021-01-01 RX ADMIN — MINERAL OIL AND PETROLATUM: 150; 830 OINTMENT OPHTHALMIC at 01:53

## 2021-01-01 RX ADMIN — Medication 15 ML: at 08:01

## 2021-01-01 RX ADMIN — MIDAZOLAM (PF) 1 MG/ML IN 0.9 % SODIUM CHLORIDE INTRAVENOUS SOLUTION 10 MG/HR: at 05:03

## 2021-01-01 RX ADMIN — FUROSEMIDE 20 MG: 10 INJECTION, SOLUTION INTRAVENOUS at 00:56

## 2021-01-01 RX ADMIN — Medication 0.6 MG/HR: at 22:48

## 2021-01-01 RX ADMIN — POTASSIUM CHLORIDE 20 MEQ: 1.5 POWDER, FOR SOLUTION ORAL at 07:45

## 2021-01-01 RX ADMIN — Medication 2 PACKET: at 20:05

## 2021-01-01 RX ADMIN — IPRATROPIUM BROMIDE AND ALBUTEROL SULFATE 3 ML: .5; 3 SOLUTION RESPIRATORY (INHALATION) at 11:58

## 2021-01-01 RX ADMIN — Medication 1 MG/HR: at 00:10

## 2021-01-01 RX ADMIN — PANTOPRAZOLE SODIUM 40 MG: 40 TABLET, DELAYED RELEASE ORAL at 08:06

## 2021-01-01 RX ADMIN — PROPOFOL 25 MCG/KG/MIN: 10 INJECTION, EMULSION INTRAVENOUS at 08:57

## 2021-01-01 RX ADMIN — PANTOPRAZOLE SODIUM 40 MG: 40 TABLET, DELAYED RELEASE ORAL at 08:08

## 2021-01-01 RX ADMIN — CHLORHEXIDINE GLUCONATE 0.12% ORAL RINSE 15 ML: 1.2 LIQUID ORAL at 19:49

## 2021-01-01 RX ADMIN — PANTOPRAZOLE SODIUM 40 MG: 40 TABLET, DELAYED RELEASE ORAL at 08:20

## 2021-01-01 RX ADMIN — POTASSIUM CHLORIDE 20 MEQ: 29.8 INJECTION, SOLUTION INTRAVENOUS at 12:59

## 2021-01-01 RX ADMIN — PIPERACILLIN SODIUM AND TAZOBACTAM SODIUM 4.5 G: 4; .5 INJECTION, POWDER, LYOPHILIZED, FOR SOLUTION INTRAVENOUS at 12:48

## 2021-01-01 RX ADMIN — PROPOFOL 20 MCG/KG/MIN: 10 INJECTION, EMULSION INTRAVENOUS at 16:46

## 2021-01-01 RX ADMIN — PROPOFOL 20 MCG/KG/MIN: 10 INJECTION, EMULSION INTRAVENOUS at 06:23

## 2021-01-01 RX ADMIN — Medication 0.08 MCG/KG/MIN: at 15:20

## 2021-01-01 RX ADMIN — SENNOSIDES 5 ML: 8.8 LIQUID ORAL at 22:42

## 2021-01-01 RX ADMIN — FUROSEMIDE 60 MG: 10 INJECTION, SOLUTION INTRAVENOUS at 19:36

## 2021-01-01 RX ADMIN — Medication 0.08 MCG/KG/MIN: at 15:28

## 2021-01-01 RX ADMIN — PIPERACILLIN SODIUM AND TAZOBACTAM SODIUM 4.5 G: 4; .5 INJECTION, POWDER, LYOPHILIZED, FOR SOLUTION INTRAVENOUS at 07:46

## 2021-01-01 RX ADMIN — Medication 1 MG/HR: at 03:53

## 2021-01-01 RX ADMIN — MEROPENEM 500 MG: 500 INJECTION, POWDER, FOR SOLUTION INTRAVENOUS at 21:51

## 2021-01-01 RX ADMIN — Medication 2 PACKET: at 23:51

## 2021-01-01 RX ADMIN — Medication 0.1 MCG/KG/MIN: at 23:08

## 2021-01-01 RX ADMIN — SODIUM CHLORIDE 50 ML: 9 INJECTION, SOLUTION INTRAVENOUS at 23:33

## 2021-01-01 RX ADMIN — Medication 1 MG/HR: at 00:08

## 2021-01-01 RX ADMIN — SODIUM CHLORIDE, POTASSIUM CHLORIDE, SODIUM LACTATE AND CALCIUM CHLORIDE: 600; 310; 30; 20 INJECTION, SOLUTION INTRAVENOUS at 08:59

## 2021-01-01 RX ADMIN — PROPOFOL 50 MCG/KG/MIN: 10 INJECTION, EMULSION INTRAVENOUS at 14:28

## 2021-01-01 RX ADMIN — POTASSIUM CHLORIDE 20 MEQ: 29.8 INJECTION, SOLUTION INTRAVENOUS at 06:28

## 2021-01-01 RX ADMIN — CHLORHEXIDINE GLUCONATE 0.12% ORAL RINSE 15 ML: 1.2 LIQUID ORAL at 19:36

## 2021-01-01 RX ADMIN — MIDAZOLAM (PF) 1 MG/ML IN 0.9 % SODIUM CHLORIDE INTRAVENOUS SOLUTION 8 MG/HR: at 16:43

## 2021-01-01 RX ADMIN — PROPOFOL 20 MCG/KG/MIN: 10 INJECTION, EMULSION INTRAVENOUS at 10:00

## 2021-01-01 RX ADMIN — SENNOSIDES 5 ML: 8.8 LIQUID ORAL at 19:41

## 2021-01-01 RX ADMIN — MEROPENEM 1 G: 1 INJECTION, POWDER, FOR SOLUTION INTRAVENOUS at 05:26

## 2021-01-01 RX ADMIN — Medication 2 PACKET: at 23:59

## 2021-01-01 RX ADMIN — Medication 3 PACKET: at 11:26

## 2021-01-01 RX ADMIN — ACETYLCYSTEINE 2 ML: 200 SOLUTION ORAL; RESPIRATORY (INHALATION) at 08:00

## 2021-01-01 RX ADMIN — METHYLPREDNISOLONE SODIUM SUCCINATE 125 MG: 125 INJECTION, POWDER, FOR SOLUTION INTRAMUSCULAR; INTRAVENOUS at 12:52

## 2021-01-01 RX ADMIN — POTASSIUM CHLORIDE 40 MEQ: 20 SOLUTION ORAL at 06:50

## 2021-01-01 RX ADMIN — VECURONIUM BROMIDE 0.8 MCG/KG/MIN: 1 INJECTION, POWDER, LYOPHILIZED, FOR SOLUTION INTRAVENOUS at 03:42

## 2021-01-01 RX ADMIN — PROPOFOL 60 MCG/KG/MIN: 10 INJECTION, EMULSION INTRAVENOUS at 02:05

## 2021-01-01 RX ADMIN — Medication 2 MG/HR: at 18:12

## 2021-01-01 RX ADMIN — POTASSIUM CHLORIDE 40 MEQ: 1.5 POWDER, FOR SOLUTION ORAL at 05:22

## 2021-01-01 RX ADMIN — FUROSEMIDE 60 MG: 10 INJECTION, SOLUTION INTRAVENOUS at 05:03

## 2021-01-01 RX ADMIN — Medication 0.15 MCG/KG/MIN: at 08:16

## 2021-01-01 RX ADMIN — INSULIN ASPART 1 UNITS: 100 INJECTION, SOLUTION INTRAVENOUS; SUBCUTANEOUS at 12:01

## 2021-01-01 RX ADMIN — HYDROGEN PEROXIDE: 30 LIQUID TOPICAL at 11:45

## 2021-01-01 RX ADMIN — QUETIAPINE FUMARATE 75 MG: 50 TABLET ORAL at 07:49

## 2021-01-01 RX ADMIN — MEROPENEM 500 MG: 500 INJECTION, POWDER, FOR SOLUTION INTRAVENOUS at 10:37

## 2021-01-01 RX ADMIN — POTASSIUM CHLORIDE 20 MEQ: 1.5 POWDER, FOR SOLUTION ORAL at 18:16

## 2021-01-01 RX ADMIN — ACETAMINOPHEN 650 MG: 325 TABLET, FILM COATED ORAL at 03:26

## 2021-01-01 RX ADMIN — Medication 4 MG/HR: at 20:08

## 2021-01-01 RX ADMIN — PROPOFOL 60 MCG/KG/MIN: 10 INJECTION, EMULSION INTRAVENOUS at 13:50

## 2021-01-01 RX ADMIN — FUROSEMIDE 60 MG: 10 INJECTION, SOLUTION INTRAVENOUS at 11:28

## 2021-01-01 RX ADMIN — INSULIN ASPART 2 UNITS: 100 INJECTION, SOLUTION INTRAVENOUS; SUBCUTANEOUS at 08:02

## 2021-01-01 RX ADMIN — Medication 2 MG/HR: at 13:28

## 2021-01-01 RX ADMIN — PROPOFOL 60 MCG/KG/MIN: 10 INJECTION, EMULSION INTRAVENOUS at 17:56

## 2021-01-01 RX ADMIN — Medication 1 MG/HR: at 20:41

## 2021-01-01 RX ADMIN — METHYLPREDNISOLONE SODIUM SUCCINATE 40 MG: 40 INJECTION, POWDER, FOR SOLUTION INTRAMUSCULAR; INTRAVENOUS at 12:04

## 2021-01-01 RX ADMIN — POTASSIUM CHLORIDE 40 MEQ: 1.5 POWDER, FOR SOLUTION ORAL at 20:25

## 2021-01-01 RX ADMIN — PROPOFOL 60 MCG/KG/MIN: 10 INJECTION, EMULSION INTRAVENOUS at 15:42

## 2021-01-01 RX ADMIN — FUROSEMIDE 60 MG: 10 INJECTION, SOLUTION INTRAVENOUS at 12:47

## 2021-01-01 RX ADMIN — POTASSIUM CHLORIDE 10 MEQ: 7.46 INJECTION, SOLUTION INTRAVENOUS at 18:01

## 2021-01-01 RX ADMIN — CHLORHEXIDINE GLUCONATE 0.12% ORAL RINSE 15 ML: 1.2 LIQUID ORAL at 22:17

## 2021-01-01 RX ADMIN — ENOXAPARIN SODIUM 60 MG: 60 INJECTION SUBCUTANEOUS at 20:55

## 2021-01-01 RX ADMIN — CHLORHEXIDINE GLUCONATE 0.12% ORAL RINSE 15 ML: 1.2 LIQUID ORAL at 20:47

## 2021-01-01 RX ADMIN — MIDODRINE HYDROCHLORIDE 10 MG: 5 TABLET ORAL at 17:40

## 2021-01-01 RX ADMIN — MEROPENEM 500 MG: 500 INJECTION, POWDER, FOR SOLUTION INTRAVENOUS at 03:43

## 2021-01-01 RX ADMIN — FUROSEMIDE 60 MG: 10 INJECTION, SOLUTION INTRAVENOUS at 10:55

## 2021-01-01 RX ADMIN — Medication 15 ML: at 08:05

## 2021-01-01 RX ADMIN — PROPOFOL 40 MCG/KG/MIN: 10 INJECTION, EMULSION INTRAVENOUS at 20:19

## 2021-01-01 RX ADMIN — PROPOFOL 50 MCG/KG/MIN: 10 INJECTION, EMULSION INTRAVENOUS at 09:38

## 2021-01-01 RX ADMIN — MIDAZOLAM (PF) 1 MG/ML IN 0.9 % SODIUM CHLORIDE INTRAVENOUS SOLUTION 7 MG/HR: at 09:06

## 2021-01-01 RX ADMIN — ACETYLCYSTEINE 2 ML: 200 SOLUTION ORAL; RESPIRATORY (INHALATION) at 14:35

## 2021-01-01 RX ADMIN — PROPOFOL 70 MCG/KG/MIN: 10 INJECTION, EMULSION INTRAVENOUS at 14:52

## 2021-01-01 RX ADMIN — CHLORHEXIDINE GLUCONATE 0.12% ORAL RINSE 15 ML: 1.2 LIQUID ORAL at 19:56

## 2021-01-01 RX ADMIN — MEROPENEM 500 MG: 500 INJECTION, POWDER, FOR SOLUTION INTRAVENOUS at 16:23

## 2021-01-01 RX ADMIN — CHLORHEXIDINE GLUCONATE 0.12% ORAL RINSE 15 ML: 1.2 LIQUID ORAL at 20:00

## 2021-01-01 RX ADMIN — Medication 0.06 MCG/KG/MIN: at 15:41

## 2021-01-01 RX ADMIN — SODIUM CHLORIDE, PRESERVATIVE FREE: 5 INJECTION INTRAVENOUS at 18:05

## 2021-01-01 RX ADMIN — Medication 15 MG/HR: at 20:06

## 2021-01-01 RX ADMIN — Medication 7 MG/HR: at 14:01

## 2021-01-01 RX ADMIN — Medication 0.4 MG/HR: at 06:05

## 2021-01-01 RX ADMIN — Medication 2 PACKET: at 12:42

## 2021-01-01 RX ADMIN — Medication 0.02 MCG/KG/MIN: at 08:08

## 2021-01-01 RX ADMIN — METHYLPREDNISOLONE SODIUM SUCCINATE 40 MG: 40 INJECTION, POWDER, FOR SOLUTION INTRAMUSCULAR; INTRAVENOUS at 12:41

## 2021-01-01 RX ADMIN — PROPOFOL 50 MCG/KG/MIN: 10 INJECTION, EMULSION INTRAVENOUS at 09:04

## 2021-01-01 RX ADMIN — PROPOFOL 5 MCG/KG/MIN: 10 INJECTION, EMULSION INTRAVENOUS at 22:10

## 2021-01-01 RX ADMIN — MICONAZOLE NITRATE: 20 POWDER TOPICAL at 22:17

## 2021-01-01 RX ADMIN — Medication 3 MG/HR: at 03:13

## 2021-01-01 RX ADMIN — PIPERACILLIN SODIUM AND TAZOBACTAM SODIUM 4.5 G: 4; .5 INJECTION, POWDER, LYOPHILIZED, FOR SOLUTION INTRAVENOUS at 06:21

## 2021-01-01 RX ADMIN — ENOXAPARIN SODIUM 60 MG: 60 INJECTION SUBCUTANEOUS at 08:08

## 2021-01-01 RX ADMIN — FUROSEMIDE 20 MG: 10 INJECTION, SOLUTION INTRAVENOUS at 13:14

## 2021-01-01 RX ADMIN — PANTOPRAZOLE SODIUM 40 MG: 40 TABLET, DELAYED RELEASE ORAL at 08:16

## 2021-01-01 RX ADMIN — ENOXAPARIN SODIUM 60 MG: 60 INJECTION SUBCUTANEOUS at 07:58

## 2021-01-01 RX ADMIN — PROPOFOL 25 MCG/KG/MIN: 10 INJECTION, EMULSION INTRAVENOUS at 04:10

## 2021-01-01 RX ADMIN — ENOXAPARIN SODIUM 50 MG: 60 INJECTION SUBCUTANEOUS at 20:11

## 2021-01-01 RX ADMIN — ACETAMINOPHEN 650 MG: 325 TABLET, FILM COATED ORAL at 18:36

## 2021-01-01 RX ADMIN — PROPOFOL 75 MCG/KG/MIN: 10 INJECTION, EMULSION INTRAVENOUS at 02:01

## 2021-01-01 RX ADMIN — AZITHROMYCIN MONOHYDRATE 500 MG: 500 INJECTION, POWDER, LYOPHILIZED, FOR SOLUTION INTRAVENOUS at 13:46

## 2021-01-01 RX ADMIN — Medication 0.04 MCG/KG/MIN: at 16:30

## 2021-01-01 RX ADMIN — MEROPENEM 500 MG: 500 INJECTION, POWDER, FOR SOLUTION INTRAVENOUS at 02:13

## 2021-01-01 RX ADMIN — TOBRAMYCIN SULFATE 480 MG: 40 INJECTION, SOLUTION INTRAMUSCULAR; INTRAVENOUS at 16:30

## 2021-01-01 RX ADMIN — CHLORHEXIDINE GLUCONATE 0.12% ORAL RINSE 15 ML: 1.2 LIQUID ORAL at 08:16

## 2021-01-01 RX ADMIN — Medication 2 PACKET: at 19:39

## 2021-01-01 RX ADMIN — PROPOFOL 20 MCG/KG/MIN: 10 INJECTION, EMULSION INTRAVENOUS at 23:48

## 2021-01-01 RX ADMIN — PROPOFOL 50 MCG/KG/MIN: 10 INJECTION, EMULSION INTRAVENOUS at 18:17

## 2021-01-01 RX ADMIN — EPOPROSTENOL 20 NG/KG/MIN: 1.5 INJECTION, POWDER, LYOPHILIZED, FOR SOLUTION INTRAVENOUS at 15:05

## 2021-01-01 RX ADMIN — FUROSEMIDE 60 MG: 10 INJECTION, SOLUTION INTRAVENOUS at 02:33

## 2021-01-01 RX ADMIN — PROPOFOL 75 MCG/KG/MIN: 10 INJECTION, EMULSION INTRAVENOUS at 00:01

## 2021-01-01 RX ADMIN — INSULIN ASPART 2 UNITS: 100 INJECTION, SOLUTION INTRAVENOUS; SUBCUTANEOUS at 04:18

## 2021-01-01 RX ADMIN — ACETYLCYSTEINE 2 ML: 200 SOLUTION ORAL; RESPIRATORY (INHALATION) at 07:17

## 2021-01-01 RX ADMIN — INSULIN ASPART 1 UNITS: 100 INJECTION, SOLUTION INTRAVENOUS; SUBCUTANEOUS at 17:00

## 2021-01-01 RX ADMIN — ENOXAPARIN SODIUM 105 MG: 120 INJECTION SUBCUTANEOUS at 08:22

## 2021-01-01 RX ADMIN — MIDAZOLAM (PF) 1 MG/ML IN 0.9 % SODIUM CHLORIDE INTRAVENOUS SOLUTION 1 MG/HR: at 02:05

## 2021-01-01 RX ADMIN — QUETIAPINE FUMARATE 50 MG: 25 TABLET ORAL at 08:09

## 2021-01-01 RX ADMIN — Medication 0.07 MCG/KG/MIN: at 06:22

## 2021-01-01 RX ADMIN — PROPOFOL 80 MG: 10 INJECTION, EMULSION INTRAVENOUS at 11:57

## 2021-01-01 RX ADMIN — FUROSEMIDE 20 MG: 10 INJECTION, SOLUTION INTRAVENOUS at 08:29

## 2021-01-01 RX ADMIN — MIDAZOLAM (PF) 1 MG/ML IN 0.9 % SODIUM CHLORIDE INTRAVENOUS SOLUTION 8 MG/HR: at 20:59

## 2021-01-01 RX ADMIN — MAGNESIUM SULFATE HEPTAHYDRATE 2 G: 40 INJECTION, SOLUTION INTRAVENOUS at 04:18

## 2021-01-01 RX ADMIN — Medication 3 MG/HR: at 18:21

## 2021-01-01 RX ADMIN — Medication 2 MG/HR: at 12:47

## 2021-01-01 RX ADMIN — CHLORHEXIDINE GLUCONATE 0.12% ORAL RINSE 15 ML: 1.2 LIQUID ORAL at 19:41

## 2021-01-01 RX ADMIN — ENOXAPARIN SODIUM 50 MG: 60 INJECTION SUBCUTANEOUS at 21:10

## 2021-01-01 RX ADMIN — MIDAZOLAM (PF) 1 MG/ML IN 0.9 % SODIUM CHLORIDE INTRAVENOUS SOLUTION 10 MG/HR: at 06:57

## 2021-01-01 RX ADMIN — CHLORHEXIDINE GLUCONATE 0.12% ORAL RINSE 15 ML: 1.2 LIQUID ORAL at 08:20

## 2021-01-01 RX ADMIN — IPRATROPIUM BROMIDE AND ALBUTEROL SULFATE 3 ML: .5; 3 SOLUTION RESPIRATORY (INHALATION) at 07:02

## 2021-01-01 RX ADMIN — SODIUM CHLORIDE, POTASSIUM CHLORIDE, SODIUM LACTATE AND CALCIUM CHLORIDE: 600; 310; 30; 20 INJECTION, SOLUTION INTRAVENOUS at 04:00

## 2021-01-01 RX ADMIN — PROPOFOL 35 MCG/KG/MIN: 10 INJECTION, EMULSION INTRAVENOUS at 17:45

## 2021-01-01 RX ADMIN — QUETIAPINE FUMARATE 50 MG: 25 TABLET ORAL at 20:47

## 2021-01-01 RX ADMIN — POTASSIUM CHLORIDE 40 MEQ: 20 SOLUTION ORAL at 12:22

## 2021-01-01 RX ADMIN — PROPOFOL 40 MCG/KG/MIN: 10 INJECTION, EMULSION INTRAVENOUS at 23:58

## 2021-01-01 RX ADMIN — PROPOFOL 75 MCG/KG/MIN: 10 INJECTION, EMULSION INTRAVENOUS at 08:02

## 2021-01-01 RX ADMIN — Medication 2 MG/HR: at 08:58

## 2021-01-01 RX ADMIN — Medication 15 MG/HR: at 05:08

## 2021-01-01 RX ADMIN — FUROSEMIDE 60 MG: 10 INJECTION, SOLUTION INTRAVENOUS at 12:42

## 2021-01-01 RX ADMIN — MIDAZOLAM (PF) 1 MG/ML IN 0.9 % SODIUM CHLORIDE INTRAVENOUS SOLUTION 6 MG/HR: at 08:54

## 2021-01-01 RX ADMIN — Medication 0.06 MCG/KG/MIN: at 00:26

## 2021-01-01 RX ADMIN — ENOXAPARIN SODIUM 60 MG: 60 INJECTION SUBCUTANEOUS at 09:30

## 2021-01-01 RX ADMIN — INSULIN ASPART 2 UNITS: 100 INJECTION, SOLUTION INTRAVENOUS; SUBCUTANEOUS at 19:54

## 2021-01-01 RX ADMIN — Medication 0.1 MCG/KG/MIN: at 06:51

## 2021-01-01 RX ADMIN — PROPOFOL 35 MCG/KG/MIN: 10 INJECTION, EMULSION INTRAVENOUS at 14:16

## 2021-01-01 RX ADMIN — PIPERACILLIN SODIUM AND TAZOBACTAM SODIUM 4.5 G: 4; .5 INJECTION, POWDER, LYOPHILIZED, FOR SOLUTION INTRAVENOUS at 18:04

## 2021-01-01 RX ADMIN — EPOPROSTENOL 20 NG/KG/MIN: 1.5 INJECTION, POWDER, LYOPHILIZED, FOR SOLUTION INTRAVENOUS at 05:34

## 2021-01-01 RX ADMIN — CHLORHEXIDINE GLUCONATE 0.12% ORAL RINSE 15 ML: 1.2 LIQUID ORAL at 08:01

## 2021-01-01 RX ADMIN — QUETIAPINE FUMARATE 75 MG: 50 TABLET ORAL at 08:15

## 2021-01-01 RX ADMIN — Medication 15 MG/HR: at 16:39

## 2021-01-01 RX ADMIN — PROPOFOL 20 MCG/KG/MIN: 10 INJECTION, EMULSION INTRAVENOUS at 05:13

## 2021-01-01 RX ADMIN — PROPOFOL 75 MCG/KG/MIN: 10 INJECTION, EMULSION INTRAVENOUS at 22:15

## 2021-01-01 RX ADMIN — PROPOFOL 65 MCG/KG/MIN: 10 INJECTION, EMULSION INTRAVENOUS at 13:36

## 2021-01-01 RX ADMIN — Medication 9 MG/HR: at 13:10

## 2021-01-01 RX ADMIN — INSULIN ASPART 1 UNITS: 100 INJECTION, SOLUTION INTRAVENOUS; SUBCUTANEOUS at 15:59

## 2021-01-01 RX ADMIN — Medication 2 PACKET: at 07:56

## 2021-01-01 RX ADMIN — PROPOFOL 20 MCG/KG/MIN: 10 INJECTION, EMULSION INTRAVENOUS at 06:29

## 2021-01-01 RX ADMIN — ACETYLCYSTEINE 2 ML: 200 SOLUTION ORAL; RESPIRATORY (INHALATION) at 11:59

## 2021-01-01 RX ADMIN — Medication 15 ML: at 07:49

## 2021-01-01 RX ADMIN — PROPOFOL 60 MCG/KG/MIN: 10 INJECTION, EMULSION INTRAVENOUS at 04:08

## 2021-01-01 RX ADMIN — Medication 15 ML: at 08:33

## 2021-01-01 RX ADMIN — Medication 3 MG/HR: at 22:28

## 2021-01-01 RX ADMIN — QUETIAPINE FUMARATE 75 MG: 50 TABLET ORAL at 20:24

## 2021-01-01 RX ADMIN — FUROSEMIDE 60 MG: 10 INJECTION, SOLUTION INTRAVENOUS at 20:19

## 2021-01-01 RX ADMIN — Medication 0.09 MCG/KG/MIN: at 15:36

## 2021-01-01 RX ADMIN — PROPOFOL 20 MCG/KG/MIN: 10 INJECTION, EMULSION INTRAVENOUS at 02:52

## 2021-01-01 RX ADMIN — ENOXAPARIN SODIUM 60 MG: 60 INJECTION SUBCUTANEOUS at 08:26

## 2021-01-01 RX ADMIN — ENOXAPARIN SODIUM 60 MG: 60 INJECTION SUBCUTANEOUS at 20:37

## 2021-01-01 RX ADMIN — PANTOPRAZOLE SODIUM 40 MG: 40 TABLET, DELAYED RELEASE ORAL at 08:26

## 2021-01-01 RX ADMIN — CEFTRIAXONE SODIUM 1 G: 1 INJECTION, POWDER, FOR SOLUTION INTRAMUSCULAR; INTRAVENOUS at 18:46

## 2021-01-01 RX ADMIN — POTASSIUM CHLORIDE 20 MEQ: 20 SOLUTION ORAL at 13:58

## 2021-01-01 RX ADMIN — MIDODRINE HYDROCHLORIDE 10 MG: 5 TABLET ORAL at 23:15

## 2021-01-01 RX ADMIN — Medication 3 MG/HR: at 22:46

## 2021-01-01 RX ADMIN — EPOPROSTENOL 20 NG/KG/MIN: 1.5 INJECTION, POWDER, LYOPHILIZED, FOR SOLUTION INTRAVENOUS at 06:39

## 2021-01-01 RX ADMIN — POTASSIUM CHLORIDE 40 MEQ: 1.5 POWDER, FOR SOLUTION ORAL at 12:21

## 2021-01-01 RX ADMIN — ENOXAPARIN SODIUM 60 MG: 60 INJECTION SUBCUTANEOUS at 20:00

## 2021-01-01 RX ADMIN — INSULIN GLARGINE 12 UNITS: 100 INJECTION, SOLUTION SUBCUTANEOUS at 06:32

## 2021-01-01 RX ADMIN — PANTOPRAZOLE SODIUM 40 MG: 40 TABLET, DELAYED RELEASE ORAL at 08:40

## 2021-01-01 RX ADMIN — ENOXAPARIN SODIUM 60 MG: 60 INJECTION SUBCUTANEOUS at 21:18

## 2021-01-01 RX ADMIN — Medication 1 MG/HR: at 11:15

## 2021-01-01 RX ADMIN — MEROPENEM 500 MG: 500 INJECTION, POWDER, FOR SOLUTION INTRAVENOUS at 04:06

## 2021-01-01 RX ADMIN — Medication 12 MG/HR: at 11:28

## 2021-01-01 RX ADMIN — PANTOPRAZOLE SODIUM 40 MG: 40 TABLET, DELAYED RELEASE ORAL at 08:05

## 2021-01-01 RX ADMIN — Medication 3 MG/HR: at 14:39

## 2021-01-01 RX ADMIN — POTASSIUM CHLORIDE 20 MEQ: 20 SOLUTION ORAL at 20:11

## 2021-01-01 RX ADMIN — PROPOFOL 65 MCG/KG/MIN: 10 INJECTION, EMULSION INTRAVENOUS at 20:19

## 2021-01-01 RX ADMIN — INSULIN ASPART 1 UNITS: 100 INJECTION, SOLUTION INTRAVENOUS; SUBCUTANEOUS at 16:10

## 2021-01-01 RX ADMIN — PROPOFOL 60 MCG/KG/MIN: 10 INJECTION, EMULSION INTRAVENOUS at 02:35

## 2021-01-01 RX ADMIN — MIDAZOLAM (PF) 1 MG/ML IN 0.9 % SODIUM CHLORIDE INTRAVENOUS SOLUTION 8 MG/HR: at 13:58

## 2021-01-01 RX ADMIN — PROPOFOL 25 MCG/KG/MIN: 10 INJECTION, EMULSION INTRAVENOUS at 08:25

## 2021-01-01 RX ADMIN — EPOPROSTENOL 20 NG/KG/MIN: 1.5 INJECTION, POWDER, LYOPHILIZED, FOR SOLUTION INTRAVENOUS at 05:04

## 2021-01-01 RX ADMIN — POTASSIUM CHLORIDE 20 MEQ: 29.8 INJECTION, SOLUTION INTRAVENOUS at 03:26

## 2021-01-01 RX ADMIN — PANTOPRAZOLE SODIUM 40 MG: 40 TABLET, DELAYED RELEASE ORAL at 09:03

## 2021-01-01 RX ADMIN — FUROSEMIDE 60 MG: 10 INJECTION, SOLUTION INTRAVENOUS at 04:26

## 2021-01-01 RX ADMIN — PIPERACILLIN SODIUM AND TAZOBACTAM SODIUM 4.5 G: 4; .5 INJECTION, POWDER, LYOPHILIZED, FOR SOLUTION INTRAVENOUS at 12:22

## 2021-01-01 RX ADMIN — Medication 3 MG/HR: at 21:33

## 2021-01-01 RX ADMIN — Medication 15 ML: at 07:58

## 2021-01-01 RX ADMIN — QUETIAPINE FUMARATE 75 MG: 50 TABLET ORAL at 20:06

## 2021-01-01 RX ADMIN — FUROSEMIDE 60 MG: 10 INJECTION, SOLUTION INTRAVENOUS at 04:42

## 2021-01-01 RX ADMIN — Medication 2 MG/HR: at 02:29

## 2021-01-01 RX ADMIN — INSULIN GLARGINE 10 UNITS: 100 INJECTION, SOLUTION SUBCUTANEOUS at 08:59

## 2021-01-01 RX ADMIN — QUETIAPINE FUMARATE 50 MG: 25 TABLET ORAL at 20:41

## 2021-01-01 RX ADMIN — Medication 0.05 MCG/KG/MIN: at 21:21

## 2021-01-01 RX ADMIN — INSULIN ASPART 1 UNITS: 100 INJECTION, SOLUTION INTRAVENOUS; SUBCUTANEOUS at 16:24

## 2021-01-01 RX ADMIN — PROPOFOL 40 MCG/KG/MIN: 10 INJECTION, EMULSION INTRAVENOUS at 07:49

## 2021-01-01 RX ADMIN — Medication 3 MG/HR: at 04:12

## 2021-01-01 RX ADMIN — PROPOFOL 50 MCG/KG/MIN: 10 INJECTION, EMULSION INTRAVENOUS at 06:00

## 2021-01-01 RX ADMIN — PROPOFOL 50 MCG/KG/MIN: 10 INJECTION, EMULSION INTRAVENOUS at 17:12

## 2021-01-01 RX ADMIN — POTASSIUM CHLORIDE 40 MEQ: 1.5 POWDER, FOR SOLUTION ORAL at 00:44

## 2021-01-01 RX ADMIN — IPRATROPIUM BROMIDE AND ALBUTEROL SULFATE 3 ML: .5; 3 SOLUTION RESPIRATORY (INHALATION) at 15:05

## 2021-01-01 RX ADMIN — Medication 15 ML: at 08:26

## 2021-01-01 RX ADMIN — Medication 15 ML: at 18:13

## 2021-01-01 RX ADMIN — INSULIN ASPART 1 UNITS: 100 INJECTION, SOLUTION INTRAVENOUS; SUBCUTANEOUS at 23:55

## 2021-01-01 RX ADMIN — MEROPENEM 1 G: 1 INJECTION, POWDER, FOR SOLUTION INTRAVENOUS at 05:48

## 2021-01-01 RX ADMIN — Medication 15 ML: at 08:40

## 2021-01-01 RX ADMIN — MIDAZOLAM (PF) 1 MG/ML IN 0.9 % SODIUM CHLORIDE INTRAVENOUS SOLUTION 10 MG/HR: at 22:11

## 2021-01-01 RX ADMIN — ENOXAPARIN SODIUM 50 MG: 60 INJECTION SUBCUTANEOUS at 08:51

## 2021-01-01 RX ADMIN — CHLORHEXIDINE GLUCONATE 0.12% ORAL RINSE 15 ML: 1.2 LIQUID ORAL at 07:41

## 2021-01-01 RX ADMIN — MINERAL OIL AND PETROLATUM: 150; 830 OINTMENT OPHTHALMIC at 18:01

## 2021-01-01 RX ADMIN — MINERAL OIL AND PETROLATUM: 150; 830 OINTMENT OPHTHALMIC at 10:12

## 2021-01-01 RX ADMIN — Medication 2 PACKET: at 08:08

## 2021-01-01 RX ADMIN — SODIUM CHLORIDE, POTASSIUM CHLORIDE, SODIUM LACTATE AND CALCIUM CHLORIDE: 600; 310; 30; 20 INJECTION, SOLUTION INTRAVENOUS at 15:18

## 2021-01-01 RX ADMIN — MEROPENEM 500 MG: 500 INJECTION, POWDER, FOR SOLUTION INTRAVENOUS at 04:11

## 2021-01-01 RX ADMIN — PROPOFOL 40 MCG/KG/MIN: 10 INJECTION, EMULSION INTRAVENOUS at 21:55

## 2021-01-01 RX ADMIN — PROPOFOL 60 MCG/KG/MIN: 10 INJECTION, EMULSION INTRAVENOUS at 03:38

## 2021-01-01 RX ADMIN — PROPOFOL 50 MCG/KG/MIN: 10 INJECTION, EMULSION INTRAVENOUS at 18:16

## 2021-01-01 RX ADMIN — PROPOFOL 50 MCG/KG/MIN: 10 INJECTION, EMULSION INTRAVENOUS at 22:22

## 2021-01-01 RX ADMIN — PIPERACILLIN SODIUM AND TAZOBACTAM SODIUM 4.5 G: 4; .5 INJECTION, POWDER, LYOPHILIZED, FOR SOLUTION INTRAVENOUS at 08:50

## 2021-01-01 RX ADMIN — MIDAZOLAM 2 MG: 1 INJECTION INTRAMUSCULAR; INTRAVENOUS at 19:30

## 2021-01-01 RX ADMIN — PROPOFOL 20 MCG/KG/MIN: 10 INJECTION, EMULSION INTRAVENOUS at 11:34

## 2021-01-01 RX ADMIN — EPOPROSTENOL 20 NG/KG/MIN: 1.5 INJECTION, POWDER, LYOPHILIZED, FOR SOLUTION INTRAVENOUS at 20:27

## 2021-01-01 RX ADMIN — PROPOFOL 25 MCG/KG/MIN: 10 INJECTION, EMULSION INTRAVENOUS at 13:57

## 2021-01-01 RX ADMIN — POTASSIUM CHLORIDE 20 MEQ: 1.5 POWDER, FOR SOLUTION ORAL at 20:08

## 2021-01-01 RX ADMIN — FUROSEMIDE 60 MG: 10 INJECTION, SOLUTION INTRAVENOUS at 14:44

## 2021-01-01 RX ADMIN — MIDODRINE HYDROCHLORIDE 10 MG: 5 TABLET ORAL at 00:31

## 2021-01-01 RX ADMIN — PROPOFOL 50 MCG/KG/MIN: 10 INJECTION, EMULSION INTRAVENOUS at 20:59

## 2021-01-01 RX ADMIN — PANTOPRAZOLE SODIUM 40 MG: 40 INJECTION, POWDER, FOR SOLUTION INTRAVENOUS at 09:20

## 2021-01-01 RX ADMIN — MIDAZOLAM 2 MG: 1 INJECTION INTRAMUSCULAR; INTRAVENOUS at 21:00

## 2021-01-01 RX ADMIN — Medication 3 MG/HR: at 05:06

## 2021-01-01 RX ADMIN — CEFTRIAXONE SODIUM 2 G: 2 INJECTION, POWDER, FOR SOLUTION INTRAMUSCULAR; INTRAVENOUS at 11:59

## 2021-01-01 RX ADMIN — POTASSIUM CHLORIDE 40 MEQ: 1.5 POWDER, FOR SOLUTION ORAL at 05:57

## 2021-01-01 RX ADMIN — PROPOFOL 40 MCG/KG/MIN: 10 INJECTION, EMULSION INTRAVENOUS at 10:22

## 2021-01-01 RX ADMIN — ENOXAPARIN SODIUM 105 MG: 120 INJECTION SUBCUTANEOUS at 21:10

## 2021-01-01 RX ADMIN — PROPOFOL 50 MCG/KG/MIN: 10 INJECTION, EMULSION INTRAVENOUS at 20:29

## 2021-01-01 RX ADMIN — PROPOFOL 50 MCG/KG/MIN: 10 INJECTION, EMULSION INTRAVENOUS at 07:48

## 2021-01-01 RX ADMIN — DEXAMETHASONE SODIUM PHOSPHATE 6 MG: 4 INJECTION, SOLUTION INTRAMUSCULAR; INTRAVENOUS at 14:38

## 2021-01-01 RX ADMIN — IPRATROPIUM BROMIDE AND ALBUTEROL SULFATE 3 ML: .5; 3 SOLUTION RESPIRATORY (INHALATION) at 07:04

## 2021-01-01 RX ADMIN — PROPOFOL 50 MCG/KG/MIN: 10 INJECTION, EMULSION INTRAVENOUS at 12:21

## 2021-01-01 RX ADMIN — IPRATROPIUM BROMIDE AND ALBUTEROL SULFATE 3 ML: .5; 3 SOLUTION RESPIRATORY (INHALATION) at 15:36

## 2021-01-01 RX ADMIN — Medication 3 MG/HR: at 14:47

## 2021-01-01 RX ADMIN — DEXAMETHASONE SODIUM PHOSPHATE 6 MG: 4 INJECTION, SOLUTION INTRAMUSCULAR; INTRAVENOUS at 14:23

## 2021-01-01 RX ADMIN — INSULIN ASPART 1 UNITS: 100 INJECTION, SOLUTION INTRAVENOUS; SUBCUTANEOUS at 12:04

## 2021-01-01 RX ADMIN — Medication 1 MG/HR: at 22:06

## 2021-01-01 RX ADMIN — QUETIAPINE FUMARATE 75 MG: 50 TABLET ORAL at 19:39

## 2021-01-01 RX ADMIN — Medication 1 MG/HR: at 19:32

## 2021-01-01 RX ADMIN — MIDAZOLAM (PF) 1 MG/ML IN 0.9 % SODIUM CHLORIDE INTRAVENOUS SOLUTION 4 MG/HR: at 17:41

## 2021-01-01 RX ADMIN — FUROSEMIDE 60 MG: 10 INJECTION, SOLUTION INTRAVENOUS at 11:55

## 2021-01-01 RX ADMIN — SODIUM CHLORIDE, PRESERVATIVE FREE: 5 INJECTION INTRAVENOUS at 08:07

## 2021-01-01 RX ADMIN — PROPOFOL 50 MCG/KG/MIN: 10 INJECTION, EMULSION INTRAVENOUS at 07:09

## 2021-01-01 RX ADMIN — ENOXAPARIN SODIUM 105 MG: 120 INJECTION SUBCUTANEOUS at 08:15

## 2021-01-01 RX ADMIN — POTASSIUM CHLORIDE 20 MEQ: 1.5 POWDER, FOR SOLUTION ORAL at 06:37

## 2021-01-01 RX ADMIN — Medication 2 PACKET: at 00:51

## 2021-01-01 RX ADMIN — SODIUM CHLORIDE, POTASSIUM CHLORIDE, SODIUM LACTATE AND CALCIUM CHLORIDE 500 ML: 600; 310; 30; 20 INJECTION, SOLUTION INTRAVENOUS at 14:00

## 2021-01-01 RX ADMIN — ENOXAPARIN SODIUM 50 MG: 60 INJECTION SUBCUTANEOUS at 20:18

## 2021-01-01 RX ADMIN — Medication 0.14 MCG/KG/MIN: at 03:57

## 2021-01-01 RX ADMIN — PROPOFOL 60 MCG/KG/MIN: 10 INJECTION, EMULSION INTRAVENOUS at 04:34

## 2021-01-01 RX ADMIN — INSULIN GLARGINE 10 UNITS: 100 INJECTION, SOLUTION SUBCUTANEOUS at 08:41

## 2021-01-01 RX ADMIN — POTASSIUM CHLORIDE 10 MEQ: 7.46 INJECTION, SOLUTION INTRAVENOUS at 08:11

## 2021-01-01 RX ADMIN — QUETIAPINE FUMARATE 50 MG: 25 TABLET ORAL at 08:01

## 2021-01-01 RX ADMIN — ENOXAPARIN SODIUM 50 MG: 60 INJECTION SUBCUTANEOUS at 20:19

## 2021-01-01 RX ADMIN — Medication 0.1 MCG/KG/MIN: at 15:53

## 2021-01-01 RX ADMIN — Medication 150 MCG/HR: at 01:54

## 2021-01-01 RX ADMIN — ACETYLCYSTEINE 2 ML: 200 SOLUTION ORAL; RESPIRATORY (INHALATION) at 07:04

## 2021-01-01 RX ADMIN — INSULIN ASPART 1 UNITS: 100 INJECTION, SOLUTION INTRAVENOUS; SUBCUTANEOUS at 16:25

## 2021-01-01 RX ADMIN — POTASSIUM CHLORIDE 40 MEQ: 1.5 POWDER, FOR SOLUTION ORAL at 19:58

## 2021-01-01 RX ADMIN — VASOPRESSIN 2.4 UNITS/HR: 20 INJECTION INTRAVENOUS at 12:28

## 2021-01-01 RX ADMIN — CHLORHEXIDINE GLUCONATE 0.12% ORAL RINSE 15 ML: 1.2 LIQUID ORAL at 07:45

## 2021-01-01 RX ADMIN — MIDODRINE HYDROCHLORIDE 10 MG: 5 TABLET ORAL at 00:51

## 2021-01-01 RX ADMIN — POTASSIUM CHLORIDE 40 MEQ: 1.5 POWDER, FOR SOLUTION ORAL at 20:06

## 2021-01-01 RX ADMIN — FUROSEMIDE 60 MG: 10 INJECTION, SOLUTION INTRAVENOUS at 12:30

## 2021-01-01 RX ADMIN — QUETIAPINE FUMARATE 75 MG: 50 TABLET ORAL at 07:48

## 2021-01-01 RX ADMIN — CHLORHEXIDINE GLUCONATE 0.12% ORAL RINSE 15 ML: 1.2 LIQUID ORAL at 07:36

## 2021-01-01 RX ADMIN — ENOXAPARIN SODIUM 50 MG: 60 INJECTION SUBCUTANEOUS at 08:08

## 2021-01-01 RX ADMIN — POTASSIUM CHLORIDE 40 MEQ: 1.5 POWDER, FOR SOLUTION ORAL at 08:15

## 2021-01-01 RX ADMIN — Medication 15 ML: at 08:50

## 2021-01-01 RX ADMIN — Medication 0.5 MG/HR: at 19:15

## 2021-01-01 RX ADMIN — PROPOFOL 50 MCG/KG/MIN: 10 INJECTION, EMULSION INTRAVENOUS at 18:07

## 2021-01-01 RX ADMIN — POTASSIUM CHLORIDE 20 MEQ: 1.5 POWDER, FOR SOLUTION ORAL at 20:18

## 2021-01-01 RX ADMIN — CHLORHEXIDINE GLUCONATE 0.12% ORAL RINSE 15 ML: 1.2 LIQUID ORAL at 07:59

## 2021-01-01 RX ADMIN — CHLORHEXIDINE GLUCONATE 0.12% ORAL RINSE 15 ML: 1.2 LIQUID ORAL at 07:58

## 2021-01-01 RX ADMIN — PROPOFOL 75 MCG/KG/MIN: 10 INJECTION, EMULSION INTRAVENOUS at 02:13

## 2021-01-01 RX ADMIN — Medication 2 PACKET: at 05:18

## 2021-01-01 RX ADMIN — MIDAZOLAM 2 MG: 1 INJECTION INTRAMUSCULAR; INTRAVENOUS at 21:30

## 2021-01-01 RX ADMIN — FUROSEMIDE 60 MG: 10 INJECTION, SOLUTION INTRAVENOUS at 12:04

## 2021-01-01 RX ADMIN — Medication 2 PACKET: at 18:14

## 2021-01-01 RX ADMIN — Medication 2 PACKET: at 21:04

## 2021-01-01 RX ADMIN — ENOXAPARIN SODIUM 60 MG: 60 INJECTION SUBCUTANEOUS at 08:25

## 2021-01-01 RX ADMIN — MIDODRINE HYDROCHLORIDE 10 MG: 5 TABLET ORAL at 08:02

## 2021-01-01 RX ADMIN — FUROSEMIDE 60 MG: 10 INJECTION, SOLUTION INTRAVENOUS at 20:03

## 2021-01-01 RX ADMIN — PANTOPRAZOLE SODIUM 40 MG: 40 TABLET, DELAYED RELEASE ORAL at 07:58

## 2021-01-01 RX ADMIN — MICONAZOLE NITRATE: 20 POWDER TOPICAL at 08:47

## 2021-01-01 RX ADMIN — PROPOFOL 20 MCG/KG/MIN: 10 INJECTION, EMULSION INTRAVENOUS at 21:46

## 2021-01-01 RX ADMIN — QUETIAPINE FUMARATE 75 MG: 50 TABLET ORAL at 08:07

## 2021-01-01 RX ADMIN — Medication 0.16 MCG/KG/MIN: at 23:53

## 2021-01-01 RX ADMIN — Medication 11 MG/HR: at 16:31

## 2021-01-01 RX ADMIN — SODIUM CHLORIDE, PRESERVATIVE FREE: 5 INJECTION INTRAVENOUS at 07:47

## 2021-01-01 RX ADMIN — Medication 3 MG/HR: at 09:24

## 2021-01-01 RX ADMIN — Medication 0.01 MCG/KG/MIN: at 18:36

## 2021-01-01 RX ADMIN — MEROPENEM 500 MG: 500 INJECTION, POWDER, FOR SOLUTION INTRAVENOUS at 21:27

## 2021-01-01 RX ADMIN — IPRATROPIUM BROMIDE AND ALBUTEROL SULFATE 3 ML: .5; 3 SOLUTION RESPIRATORY (INHALATION) at 16:03

## 2021-01-01 RX ADMIN — POTASSIUM CHLORIDE 20 MEQ: 29.8 INJECTION, SOLUTION INTRAVENOUS at 07:41

## 2021-01-01 RX ADMIN — PROPOFOL 50 MCG/KG/MIN: 10 INJECTION, EMULSION INTRAVENOUS at 15:27

## 2021-01-01 RX ADMIN — Medication 2 PACKET: at 07:45

## 2021-01-01 RX ADMIN — PIPERACILLIN SODIUM AND TAZOBACTAM SODIUM 4.5 G: 4; .5 INJECTION, POWDER, LYOPHILIZED, FOR SOLUTION INTRAVENOUS at 00:23

## 2021-01-01 RX ADMIN — ENOXAPARIN SODIUM 60 MG: 60 INJECTION SUBCUTANEOUS at 08:05

## 2021-01-01 RX ADMIN — Medication 2 PACKET: at 23:23

## 2021-01-01 RX ADMIN — PIPERACILLIN SODIUM AND TAZOBACTAM SODIUM 4.5 G: 4; .5 INJECTION, POWDER, LYOPHILIZED, FOR SOLUTION INTRAVENOUS at 08:51

## 2021-01-01 RX ADMIN — Medication 1 MG/HR: at 16:29

## 2021-01-01 RX ADMIN — INSULIN ASPART 1 UNITS: 100 INJECTION, SOLUTION INTRAVENOUS; SUBCUTANEOUS at 23:42

## 2021-01-01 RX ADMIN — MIDAZOLAM (PF) 1 MG/ML IN 0.9 % SODIUM CHLORIDE INTRAVENOUS SOLUTION 11 MG/HR: at 02:48

## 2021-01-01 RX ADMIN — ENOXAPARIN SODIUM 50 MG: 60 INJECTION SUBCUTANEOUS at 08:14

## 2021-01-01 RX ADMIN — ACETAMINOPHEN 650 MG: 325 TABLET, FILM COATED ORAL at 23:56

## 2021-01-01 RX ADMIN — Medication 2 MG/HR: at 13:17

## 2021-01-01 RX ADMIN — CHLORHEXIDINE GLUCONATE 0.12% ORAL RINSE 15 ML: 1.2 LIQUID ORAL at 07:33

## 2021-01-01 RX ADMIN — QUETIAPINE FUMARATE 75 MG: 50 TABLET ORAL at 20:51

## 2021-01-01 RX ADMIN — INSULIN GLARGINE 12 UNITS: 100 INJECTION, SOLUTION SUBCUTANEOUS at 08:14

## 2021-01-01 RX ADMIN — CHLORHEXIDINE GLUCONATE 0.12% ORAL RINSE 15 ML: 1.2 LIQUID ORAL at 20:23

## 2021-01-01 RX ADMIN — Medication 3 MG/HR: at 11:39

## 2021-01-01 RX ADMIN — POTASSIUM CHLORIDE 40 MEQ: 1.5 POWDER, FOR SOLUTION ORAL at 10:01

## 2021-01-01 RX ADMIN — PIPERACILLIN SODIUM AND TAZOBACTAM SODIUM 4.5 G: 4; .5 INJECTION, POWDER, LYOPHILIZED, FOR SOLUTION INTRAVENOUS at 12:35

## 2021-01-01 RX ADMIN — PROPOFOL 50 MCG/KG/MIN: 10 INJECTION, EMULSION INTRAVENOUS at 15:20

## 2021-01-01 RX ADMIN — PIPERACILLIN SODIUM AND TAZOBACTAM SODIUM 4.5 G: 4; .5 INJECTION, POWDER, LYOPHILIZED, FOR SOLUTION INTRAVENOUS at 02:58

## 2021-01-01 RX ADMIN — Medication 3 MG/HR: at 12:38

## 2021-01-01 RX ADMIN — POTASSIUM CHLORIDE 20 MEQ: 1.5 POWDER, FOR SOLUTION ORAL at 07:59

## 2021-01-01 RX ADMIN — MIDAZOLAM (PF) 1 MG/ML IN 0.9 % SODIUM CHLORIDE INTRAVENOUS SOLUTION 10 MG/HR: at 19:35

## 2021-01-01 RX ADMIN — ACETYLCYSTEINE 2 ML: 200 SOLUTION ORAL; RESPIRATORY (INHALATION) at 15:36

## 2021-01-01 RX ADMIN — Medication 2 PACKET: at 12:52

## 2021-01-01 RX ADMIN — ENOXAPARIN SODIUM 105 MG: 120 INJECTION SUBCUTANEOUS at 09:19

## 2021-01-01 RX ADMIN — ACETYLCYSTEINE 2 ML: 200 SOLUTION ORAL; RESPIRATORY (INHALATION) at 19:10

## 2021-01-01 RX ADMIN — POTASSIUM CHLORIDE 20 MEQ: 29.8 INJECTION, SOLUTION INTRAVENOUS at 09:22

## 2021-01-01 RX ADMIN — CEFTRIAXONE SODIUM 2 G: 2 INJECTION, POWDER, FOR SOLUTION INTRAMUSCULAR; INTRAVENOUS at 17:04

## 2021-01-01 RX ADMIN — QUETIAPINE FUMARATE 75 MG: 50 TABLET ORAL at 08:51

## 2021-01-01 RX ADMIN — PROPOFOL 55 MCG/KG/MIN: 10 INJECTION, EMULSION INTRAVENOUS at 11:57

## 2021-01-01 RX ADMIN — Medication 15 ML: at 10:11

## 2021-01-01 RX ADMIN — PROPOFOL 60 MCG/KG/MIN: 10 INJECTION, EMULSION INTRAVENOUS at 08:33

## 2021-01-01 RX ADMIN — Medication 7 MG/HR: at 02:54

## 2021-01-01 RX ADMIN — MIDAZOLAM (PF) 1 MG/ML IN 0.9 % SODIUM CHLORIDE INTRAVENOUS SOLUTION 10 MG/HR: at 11:34

## 2021-01-01 RX ADMIN — PROPOFOL 60 MCG/KG/MIN: 10 INJECTION, EMULSION INTRAVENOUS at 20:06

## 2021-01-01 RX ADMIN — CHLORHEXIDINE GLUCONATE 0.12% ORAL RINSE 15 ML: 1.2 LIQUID ORAL at 20:46

## 2021-01-01 RX ADMIN — MIDAZOLAM (PF) 1 MG/ML IN 0.9 % SODIUM CHLORIDE INTRAVENOUS SOLUTION 7 MG/HR: at 13:57

## 2021-01-01 RX ADMIN — Medication 12 MG/HR: at 03:28

## 2021-01-01 RX ADMIN — INSULIN ASPART 1 UNITS: 100 INJECTION, SOLUTION INTRAVENOUS; SUBCUTANEOUS at 08:10

## 2021-01-01 RX ADMIN — Medication 0.09 MCG/KG/MIN: at 20:06

## 2021-01-01 RX ADMIN — EPOPROSTENOL 20 NG/KG/MIN: 1.5 INJECTION, POWDER, LYOPHILIZED, FOR SOLUTION INTRAVENOUS at 19:59

## 2021-01-01 RX ADMIN — PROPOFOL 65 MCG/KG/MIN: 10 INJECTION, EMULSION INTRAVENOUS at 22:35

## 2021-01-01 RX ADMIN — POTASSIUM CHLORIDE 40 MEQ: 1.5 POWDER, FOR SOLUTION ORAL at 21:28

## 2021-01-01 RX ADMIN — Medication 9 MG/HR: at 22:46

## 2021-01-01 RX ADMIN — MIDAZOLAM (PF) 1 MG/ML IN 0.9 % SODIUM CHLORIDE INTRAVENOUS SOLUTION 6 MG/HR: at 16:13

## 2021-01-01 RX ADMIN — SODIUM CHLORIDE, POTASSIUM CHLORIDE, SODIUM LACTATE AND CALCIUM CHLORIDE: 600; 310; 30; 20 INJECTION, SOLUTION INTRAVENOUS at 10:25

## 2021-01-01 RX ADMIN — PANTOPRAZOLE SODIUM 40 MG: 40 INJECTION, POWDER, FOR SOLUTION INTRAVENOUS at 16:13

## 2021-01-01 RX ADMIN — INSULIN ASPART 1 UNITS: 100 INJECTION, SOLUTION INTRAVENOUS; SUBCUTANEOUS at 16:21

## 2021-01-01 RX ADMIN — MIDAZOLAM (PF) 1 MG/ML IN 0.9 % SODIUM CHLORIDE INTRAVENOUS SOLUTION 12 MG/HR: at 11:05

## 2021-01-01 RX ADMIN — MEROPENEM 500 MG: 500 INJECTION, POWDER, FOR SOLUTION INTRAVENOUS at 20:20

## 2021-01-01 RX ADMIN — CHLORHEXIDINE GLUCONATE 0.12% ORAL RINSE 15 ML: 1.2 LIQUID ORAL at 08:26

## 2021-01-01 RX ADMIN — FUROSEMIDE 60 MG: 10 INJECTION, SOLUTION INTRAVENOUS at 20:11

## 2021-01-01 RX ADMIN — QUETIAPINE FUMARATE 50 MG: 25 TABLET ORAL at 19:36

## 2021-01-01 RX ADMIN — PANTOPRAZOLE SODIUM 40 MG: 40 TABLET, DELAYED RELEASE ORAL at 08:25

## 2021-01-01 RX ADMIN — POTASSIUM CHLORIDE 20 MEQ: 29.8 INJECTION, SOLUTION INTRAVENOUS at 11:57

## 2021-01-01 RX ADMIN — MIDAZOLAM (PF) 1 MG/ML IN 0.9 % SODIUM CHLORIDE INTRAVENOUS SOLUTION 10 MG/HR: at 23:19

## 2021-01-01 RX ADMIN — MIDAZOLAM (PF) 1 MG/ML IN 0.9 % SODIUM CHLORIDE INTRAVENOUS SOLUTION 6 MG/HR: at 10:51

## 2021-01-01 RX ADMIN — PROPOFOL 50 MCG/KG/MIN: 10 INJECTION, EMULSION INTRAVENOUS at 15:39

## 2021-01-01 RX ADMIN — POTASSIUM CHLORIDE 40 MEQ: 1.5 POWDER, FOR SOLUTION ORAL at 12:58

## 2021-01-01 RX ADMIN — Medication 2 PACKET: at 16:35

## 2021-01-01 RX ADMIN — Medication 2 MG/HR: at 22:08

## 2021-01-01 RX ADMIN — PIPERACILLIN SODIUM AND TAZOBACTAM SODIUM 4.5 G: 4; .5 INJECTION, POWDER, LYOPHILIZED, FOR SOLUTION INTRAVENOUS at 00:38

## 2021-01-01 RX ADMIN — Medication 2 MG/HR: at 16:23

## 2021-01-01 RX ADMIN — FUROSEMIDE 60 MG: 10 INJECTION, SOLUTION INTRAVENOUS at 20:06

## 2021-01-01 RX ADMIN — DEXAMETHASONE SODIUM PHOSPHATE 6 MG: 4 INJECTION, SOLUTION INTRAMUSCULAR; INTRAVENOUS at 15:35

## 2021-01-01 RX ADMIN — MEROPENEM 500 MG: 500 INJECTION, POWDER, FOR SOLUTION INTRAVENOUS at 04:22

## 2021-01-01 RX ADMIN — PROPOFOL 70 MCG/KG/MIN: 10 INJECTION, EMULSION INTRAVENOUS at 17:16

## 2021-01-01 RX ADMIN — PROPOFOL 55 MCG/KG/MIN: 10 INJECTION, EMULSION INTRAVENOUS at 05:36

## 2021-01-01 RX ADMIN — FUROSEMIDE 60 MG: 10 INJECTION, SOLUTION INTRAVENOUS at 04:17

## 2021-01-01 RX ADMIN — POTASSIUM CHLORIDE 20 MEQ: 29.8 INJECTION, SOLUTION INTRAVENOUS at 02:53

## 2021-01-01 RX ADMIN — CHLORHEXIDINE GLUCONATE 0.12% ORAL RINSE 15 ML: 1.2 LIQUID ORAL at 20:57

## 2021-01-01 RX ADMIN — MIDODRINE HYDROCHLORIDE 10 MG: 5 TABLET ORAL at 16:42

## 2021-01-01 RX ADMIN — FUROSEMIDE 20 MG: 10 INJECTION, SOLUTION INTRAVENOUS at 12:51

## 2021-01-01 RX ADMIN — Medication 150 MCG/HR: at 16:13

## 2021-01-01 RX ADMIN — PROPOFOL 50 MCG/KG/MIN: 10 INJECTION, EMULSION INTRAVENOUS at 23:48

## 2021-01-01 RX ADMIN — SODIUM CHLORIDE, PRESERVATIVE FREE 20 ML: 5 INJECTION INTRAVENOUS at 12:15

## 2021-01-01 RX ADMIN — POTASSIUM CHLORIDE 40 MEQ: 20 SOLUTION ORAL at 18:16

## 2021-01-01 RX ADMIN — INSULIN ASPART 1 UNITS: 100 INJECTION, SOLUTION INTRAVENOUS; SUBCUTANEOUS at 00:42

## 2021-01-01 RX ADMIN — ENOXAPARIN SODIUM 50 MG: 60 INJECTION SUBCUTANEOUS at 20:56

## 2021-01-01 RX ADMIN — POTASSIUM CHLORIDE 20 MEQ: 1.5 POWDER, FOR SOLUTION ORAL at 06:36

## 2021-01-01 RX ADMIN — Medication 12 MG/HR: at 20:00

## 2021-01-01 RX ADMIN — Medication 2 PACKET: at 09:39

## 2021-01-01 RX ADMIN — PROPOFOL 40 MCG/KG/MIN: 10 INJECTION, EMULSION INTRAVENOUS at 14:41

## 2021-01-01 RX ADMIN — PROPOFOL 40 MCG/KG/MIN: 10 INJECTION, EMULSION INTRAVENOUS at 20:53

## 2021-01-01 RX ADMIN — FUROSEMIDE 60 MG: 10 INJECTION, SOLUTION INTRAVENOUS at 20:13

## 2021-01-01 RX ADMIN — PIPERACILLIN SODIUM AND TAZOBACTAM SODIUM 4.5 G: 4; .5 INJECTION, POWDER, LYOPHILIZED, FOR SOLUTION INTRAVENOUS at 01:11

## 2021-01-01 RX ADMIN — PROPOFOL 40 MCG/KG/MIN: 10 INJECTION, EMULSION INTRAVENOUS at 14:02

## 2021-01-01 RX ADMIN — PROPOFOL 20 MCG/KG/MIN: 10 INJECTION, EMULSION INTRAVENOUS at 14:25

## 2021-01-01 RX ADMIN — POTASSIUM CHLORIDE 20 MEQ: 20 SOLUTION ORAL at 02:09

## 2021-01-01 RX ADMIN — POTASSIUM CHLORIDE 40 MEQ: 1.5 POWDER, FOR SOLUTION ORAL at 09:30

## 2021-01-01 RX ADMIN — IPRATROPIUM BROMIDE AND ALBUTEROL SULFATE 3 ML: .5; 3 SOLUTION RESPIRATORY (INHALATION) at 20:27

## 2021-01-01 RX ADMIN — Medication 15 ML: at 08:48

## 2021-01-01 RX ADMIN — CHLORHEXIDINE GLUCONATE 0.12% ORAL RINSE 15 ML: 1.2 LIQUID ORAL at 09:03

## 2021-01-01 RX ADMIN — CHLORHEXIDINE GLUCONATE 0.12% ORAL RINSE 15 ML: 1.2 LIQUID ORAL at 07:50

## 2021-01-01 RX ADMIN — Medication 1 MG/HR: at 15:38

## 2021-01-01 RX ADMIN — PIPERACILLIN SODIUM AND TAZOBACTAM SODIUM 4.5 G: 4; .5 INJECTION, POWDER, LYOPHILIZED, FOR SOLUTION INTRAVENOUS at 20:24

## 2021-01-01 RX ADMIN — ENOXAPARIN SODIUM 50 MG: 60 INJECTION SUBCUTANEOUS at 11:32

## 2021-01-01 RX ADMIN — Medication 2 MG/HR: at 22:06

## 2021-01-01 RX ADMIN — ENOXAPARIN SODIUM 50 MG: 60 INJECTION SUBCUTANEOUS at 20:23

## 2021-01-01 RX ADMIN — PROPOFOL 55 MCG/KG/MIN: 10 INJECTION, EMULSION INTRAVENOUS at 09:40

## 2021-01-01 RX ADMIN — Medication 2 MG/HR: at 01:33

## 2021-01-01 RX ADMIN — ENOXAPARIN SODIUM 60 MG: 60 INJECTION SUBCUTANEOUS at 20:19

## 2021-01-01 RX ADMIN — Medication 0.6 MG/HR: at 23:42

## 2021-01-01 RX ADMIN — PROPOFOL 40 MCG/KG/MIN: 10 INJECTION, EMULSION INTRAVENOUS at 16:12

## 2021-01-01 RX ADMIN — POTASSIUM CHLORIDE 40 MEQ: 1.5 POWDER, FOR SOLUTION ORAL at 11:59

## 2021-01-01 RX ADMIN — Medication 2 PACKET: at 00:12

## 2021-01-01 RX ADMIN — FUROSEMIDE 60 MG: 10 INJECTION, SOLUTION INTRAVENOUS at 10:44

## 2021-01-01 RX ADMIN — PROPOFOL 20 MCG/KG/MIN: 10 INJECTION, EMULSION INTRAVENOUS at 18:01

## 2021-01-01 RX ADMIN — VECURONIUM BROMIDE 0.8 MCG/KG/MIN: 1 INJECTION, POWDER, LYOPHILIZED, FOR SOLUTION INTRAVENOUS at 16:08

## 2021-01-01 RX ADMIN — Medication 0.05 MCG/KG/MIN: at 03:11

## 2021-01-01 RX ADMIN — SODIUM CHLORIDE, POTASSIUM CHLORIDE, SODIUM LACTATE AND CALCIUM CHLORIDE: 600; 310; 30; 20 INJECTION, SOLUTION INTRAVENOUS at 18:46

## 2021-01-01 RX ADMIN — Medication 3 MG/HR: at 20:08

## 2021-01-01 RX ADMIN — PROPOFOL 50 MCG/KG/MIN: 10 INJECTION, EMULSION INTRAVENOUS at 05:27

## 2021-01-01 RX ADMIN — Medication 2 MG/HR: at 18:09

## 2021-01-01 RX ADMIN — Medication 2 PACKET: at 13:15

## 2021-01-01 RX ADMIN — PROPOFOL 75 MCG/KG/MIN: 10 INJECTION, EMULSION INTRAVENOUS at 01:58

## 2021-01-01 RX ADMIN — IPRATROPIUM BROMIDE AND ALBUTEROL SULFATE 3 ML: .5; 3 SOLUTION RESPIRATORY (INHALATION) at 10:58

## 2021-01-01 RX ADMIN — PROPOFOL 40 MCG/KG/MIN: 10 INJECTION, EMULSION INTRAVENOUS at 20:47

## 2021-01-01 RX ADMIN — POTASSIUM CHLORIDE 40 MEQ: 1.5 POWDER, FOR SOLUTION ORAL at 17:56

## 2021-01-01 RX ADMIN — Medication 2 MG/HR: at 08:27

## 2021-01-01 RX ADMIN — SENNOSIDES 5 ML: 8.8 LIQUID ORAL at 08:02

## 2021-01-01 RX ADMIN — MIDAZOLAM (PF) 1 MG/ML IN 0.9 % SODIUM CHLORIDE INTRAVENOUS SOLUTION 4 MG/HR: at 08:12

## 2021-01-01 RX ADMIN — DEXAMETHASONE SODIUM PHOSPHATE 6 MG: 4 INJECTION, SOLUTION INTRAMUSCULAR; INTRAVENOUS at 13:28

## 2021-01-01 RX ADMIN — INSULIN ASPART 1 UNITS: 100 INJECTION, SOLUTION INTRAVENOUS; SUBCUTANEOUS at 20:26

## 2021-01-01 RX ADMIN — PROPOFOL 40 MCG/KG/MIN: 10 INJECTION, EMULSION INTRAVENOUS at 16:46

## 2021-01-01 RX ADMIN — SODIUM CHLORIDE, POTASSIUM CHLORIDE, SODIUM LACTATE AND CALCIUM CHLORIDE: 600; 310; 30; 20 INJECTION, SOLUTION INTRAVENOUS at 00:25

## 2021-01-01 RX ADMIN — MIDODRINE HYDROCHLORIDE 10 MG: 5 TABLET ORAL at 08:01

## 2021-01-01 RX ADMIN — POTASSIUM CHLORIDE 40 MEQ: 20 SOLUTION ORAL at 05:37

## 2021-01-01 RX ADMIN — EPOPROSTENOL 20 NG/KG/MIN: 1.5 INJECTION, POWDER, LYOPHILIZED, FOR SOLUTION INTRAVENOUS at 11:33

## 2021-01-01 RX ADMIN — POTASSIUM CHLORIDE 40 MEQ: 1.5 POWDER, FOR SOLUTION ORAL at 08:39

## 2021-01-01 RX ADMIN — MEROPENEM 1 G: 1 INJECTION, POWDER, FOR SOLUTION INTRAVENOUS at 12:30

## 2021-01-01 RX ADMIN — ACETAZOLAMIDE SODIUM 500 MG: 500 INJECTION, POWDER, LYOPHILIZED, FOR SOLUTION INTRAVENOUS at 18:26

## 2021-01-01 RX ADMIN — IPRATROPIUM BROMIDE AND ALBUTEROL SULFATE 3 ML: .5; 3 SOLUTION RESPIRATORY (INHALATION) at 20:12

## 2021-01-01 RX ADMIN — EPOPROSTENOL 20 NG/KG/MIN: 1.5 INJECTION, POWDER, LYOPHILIZED, FOR SOLUTION INTRAVENOUS at 15:51

## 2021-01-01 RX ADMIN — PROPOFOL 55 MCG/KG/MIN: 10 INJECTION, EMULSION INTRAVENOUS at 02:00

## 2021-01-01 RX ADMIN — EPOPROSTENOL 20 NG/KG/MIN: 1.5 INJECTION, POWDER, LYOPHILIZED, FOR SOLUTION INTRAVENOUS at 06:24

## 2021-01-01 RX ADMIN — INSULIN ASPART 1 UNITS: 100 INJECTION, SOLUTION INTRAVENOUS; SUBCUTANEOUS at 00:23

## 2021-01-01 RX ADMIN — EPOPROSTENOL 20 NG/KG/MIN: 1.5 INJECTION, POWDER, LYOPHILIZED, FOR SOLUTION INTRAVENOUS at 18:49

## 2021-01-01 RX ADMIN — ACETAZOLAMIDE SODIUM 500 MG: 500 INJECTION, POWDER, LYOPHILIZED, FOR SOLUTION INTRAVENOUS at 15:51

## 2021-01-01 RX ADMIN — FENTANYL CITRATE 100 MCG: 50 INJECTION, SOLUTION INTRAMUSCULAR; INTRAVENOUS at 16:05

## 2021-01-01 RX ADMIN — POTASSIUM CHLORIDE 20 MEQ: 1.5 POWDER, FOR SOLUTION ORAL at 10:44

## 2021-01-01 RX ADMIN — POTASSIUM CHLORIDE 40 MEQ: 20 SOLUTION ORAL at 15:53

## 2021-01-01 RX ADMIN — CHLORHEXIDINE GLUCONATE 0.12% ORAL RINSE 15 ML: 1.2 LIQUID ORAL at 15:27

## 2021-01-01 RX ADMIN — POTASSIUM CHLORIDE 40 MEQ: 1.5 POWDER, FOR SOLUTION ORAL at 08:16

## 2021-01-01 RX ADMIN — Medication 3 MG/HR: at 04:56

## 2021-01-01 RX ADMIN — IPRATROPIUM BROMIDE AND ALBUTEROL SULFATE 3 ML: .5; 3 SOLUTION RESPIRATORY (INHALATION) at 07:17

## 2021-01-01 RX ADMIN — PROPOFOL 40 MCG/KG/MIN: 10 INJECTION, EMULSION INTRAVENOUS at 22:47

## 2021-01-01 RX ADMIN — SENNOSIDES 5 ML: 8.8 LIQUID ORAL at 19:38

## 2021-01-01 RX ADMIN — Medication 0.12 MCG/KG/MIN: at 18:41

## 2021-01-01 RX ADMIN — MIDAZOLAM (PF) 1 MG/ML IN 0.9 % SODIUM CHLORIDE INTRAVENOUS SOLUTION 7 MG/HR: at 11:46

## 2021-01-01 RX ADMIN — QUETIAPINE FUMARATE 50 MG: 25 TABLET ORAL at 20:13

## 2021-01-01 RX ADMIN — PROPOFOL 40 MCG/KG/MIN: 10 INJECTION, EMULSION INTRAVENOUS at 17:17

## 2021-01-01 RX ADMIN — Medication 0.1 MCG/KG/MIN: at 17:14

## 2021-01-01 RX ADMIN — PROPOFOL 50 MCG/KG/MIN: 10 INJECTION, EMULSION INTRAVENOUS at 03:29

## 2021-01-01 RX ADMIN — INSULIN ASPART 1 UNITS: 100 INJECTION, SOLUTION INTRAVENOUS; SUBCUTANEOUS at 19:41

## 2021-01-01 RX ADMIN — ACETYLCYSTEINE 2 ML: 200 SOLUTION ORAL; RESPIRATORY (INHALATION) at 20:30

## 2021-01-01 RX ADMIN — PIPERACILLIN SODIUM AND TAZOBACTAM SODIUM 4.5 G: 4; .5 INJECTION, POWDER, LYOPHILIZED, FOR SOLUTION INTRAVENOUS at 02:17

## 2021-01-01 RX ADMIN — DEXAMETHASONE 6 MG: 2 TABLET ORAL at 14:18

## 2021-01-01 RX ADMIN — POTASSIUM CHLORIDE 40 MEQ: 1.5 POWDER, FOR SOLUTION ORAL at 17:33

## 2021-01-01 RX ADMIN — QUETIAPINE FUMARATE 75 MG: 50 TABLET ORAL at 08:16

## 2021-01-01 RX ADMIN — FUROSEMIDE 80 MG: 10 INJECTION, SOLUTION INTRAVENOUS at 11:09

## 2021-01-01 RX ADMIN — ENOXAPARIN SODIUM 50 MG: 60 INJECTION SUBCUTANEOUS at 08:02

## 2021-01-01 RX ADMIN — PANTOPRAZOLE SODIUM 40 MG: 40 INJECTION, POWDER, FOR SOLUTION INTRAVENOUS at 08:04

## 2021-01-01 RX ADMIN — SENNOSIDES 5 ML: 8.8 LIQUID ORAL at 20:50

## 2021-01-01 RX ADMIN — FUROSEMIDE 60 MG: 10 INJECTION, SOLUTION INTRAVENOUS at 11:09

## 2021-01-01 RX ADMIN — MEROPENEM 1 G: 1 INJECTION, POWDER, FOR SOLUTION INTRAVENOUS at 20:57

## 2021-01-01 RX ADMIN — INSULIN ASPART 1 UNITS: 100 INJECTION, SOLUTION INTRAVENOUS; SUBCUTANEOUS at 16:01

## 2021-01-01 RX ADMIN — Medication 0.4 MG/HR: at 06:43

## 2021-01-01 RX ADMIN — INSULIN ASPART 1 UNITS: 100 INJECTION, SOLUTION INTRAVENOUS; SUBCUTANEOUS at 16:17

## 2021-01-01 RX ADMIN — Medication 2 MG/HR: at 07:50

## 2021-01-01 RX ADMIN — POTASSIUM CHLORIDE 20 MEQ: 1.5 POWDER, FOR SOLUTION ORAL at 07:50

## 2021-01-01 RX ADMIN — PANTOPRAZOLE SODIUM 40 MG: 40 TABLET, DELAYED RELEASE ORAL at 07:46

## 2021-01-01 RX ADMIN — Medication 2 MG/HR: at 03:21

## 2021-01-01 RX ADMIN — PROPOFOL 5 MCG/KG/MIN: 10 INJECTION, EMULSION INTRAVENOUS at 16:20

## 2021-01-01 RX ADMIN — IPRATROPIUM BROMIDE AND ALBUTEROL SULFATE 3 ML: .5; 3 SOLUTION RESPIRATORY (INHALATION) at 06:05

## 2021-01-01 RX ADMIN — Medication 12 MG/HR: at 20:32

## 2021-01-01 RX ADMIN — POTASSIUM CHLORIDE 40 MEQ: 1.5 POWDER, FOR SOLUTION ORAL at 20:56

## 2021-01-01 RX ADMIN — PROPOFOL 55 MCG/KG/MIN: 10 INJECTION, EMULSION INTRAVENOUS at 08:57

## 2021-01-01 RX ADMIN — FENTANYL CITRATE 25 MCG: 50 INJECTION, SOLUTION INTRAMUSCULAR; INTRAVENOUS at 02:40

## 2021-01-01 RX ADMIN — CHLORHEXIDINE GLUCONATE 0.12% ORAL RINSE 15 ML: 1.2 LIQUID ORAL at 20:06

## 2021-01-01 RX ADMIN — INSULIN ASPART 2 UNITS: 100 INJECTION, SOLUTION INTRAVENOUS; SUBCUTANEOUS at 23:36

## 2021-01-01 RX ADMIN — QUETIAPINE FUMARATE 75 MG: 50 TABLET ORAL at 20:17

## 2021-01-01 RX ADMIN — PROPOFOL 50 MCG/KG/MIN: 10 INJECTION, EMULSION INTRAVENOUS at 14:29

## 2021-01-01 RX ADMIN — SENNOSIDES 5 ML: 8.8 LIQUID ORAL at 19:58

## 2021-01-01 RX ADMIN — SODIUM CHLORIDE 50 ML: 9 INJECTION, SOLUTION INTRAVENOUS at 21:06

## 2021-01-01 RX ADMIN — PROPOFOL 50 MCG/KG/MIN: 10 INJECTION, EMULSION INTRAVENOUS at 01:05

## 2021-01-01 RX ADMIN — MIDAZOLAM (PF) 1 MG/ML IN 0.9 % SODIUM CHLORIDE INTRAVENOUS SOLUTION 10 MG/HR: at 02:18

## 2021-01-01 RX ADMIN — PROPOFOL 60 MCG/KG/MIN: 10 INJECTION, EMULSION INTRAVENOUS at 05:57

## 2021-01-01 RX ADMIN — PROPOFOL 30 MCG/KG/MIN: 10 INJECTION, EMULSION INTRAVENOUS at 13:25

## 2021-01-01 RX ADMIN — REMDESIVIR 100 MG: 100 INJECTION, POWDER, LYOPHILIZED, FOR SOLUTION INTRAVENOUS at 16:17

## 2021-01-01 RX ADMIN — PIPERACILLIN SODIUM AND TAZOBACTAM SODIUM 4.5 G: 4; .5 INJECTION, POWDER, LYOPHILIZED, FOR SOLUTION INTRAVENOUS at 19:59

## 2021-01-01 RX ADMIN — PANTOPRAZOLE SODIUM 40 MG: 40 TABLET, DELAYED RELEASE ORAL at 08:15

## 2021-01-01 RX ADMIN — Medication 2 PACKET: at 00:43

## 2021-01-01 RX ADMIN — PROPOFOL 65 MCG/KG/MIN: 10 INJECTION, EMULSION INTRAVENOUS at 11:26

## 2021-01-01 RX ADMIN — PROPOFOL 50 MCG/KG/MIN: 10 INJECTION, EMULSION INTRAVENOUS at 23:50

## 2021-01-01 RX ADMIN — SUCCINYLCHOLINE CHLORIDE 80 MG: 20 INJECTION, SOLUTION INTRAMUSCULAR; INTRAVENOUS; PARENTERAL at 11:57

## 2021-01-01 RX ADMIN — FUROSEMIDE 60 MG: 10 INJECTION, SOLUTION INTRAVENOUS at 03:23

## 2021-01-01 RX ADMIN — POTASSIUM CHLORIDE 40 MEQ: 1.5 POWDER, FOR SOLUTION ORAL at 08:34

## 2021-01-01 RX ADMIN — MEROPENEM 500 MG: 500 INJECTION, POWDER, FOR SOLUTION INTRAVENOUS at 22:33

## 2021-01-01 RX ADMIN — SODIUM CHLORIDE 50 ML: 9 INJECTION, SOLUTION INTRAVENOUS at 23:56

## 2021-01-01 RX ADMIN — Medication 50 MCG/HR: at 12:09

## 2021-01-01 RX ADMIN — CHLORHEXIDINE GLUCONATE 0.12% ORAL RINSE 15 ML: 1.2 LIQUID ORAL at 08:51

## 2021-01-01 RX ADMIN — PIPERACILLIN SODIUM AND TAZOBACTAM SODIUM 4.5 G: 4; .5 INJECTION, POWDER, LYOPHILIZED, FOR SOLUTION INTRAVENOUS at 08:07

## 2021-01-01 RX ADMIN — PROPOFOL 50 MCG/KG/MIN: 10 INJECTION, EMULSION INTRAVENOUS at 08:25

## 2021-01-01 RX ADMIN — INSULIN ASPART 1 UNITS: 100 INJECTION, SOLUTION INTRAVENOUS; SUBCUTANEOUS at 16:36

## 2021-01-01 RX ADMIN — ACETAMINOPHEN 650 MG: 325 TABLET, FILM COATED ORAL at 07:50

## 2021-01-01 RX ADMIN — PROPOFOL 65 MCG/KG/MIN: 10 INJECTION, EMULSION INTRAVENOUS at 02:49

## 2021-01-01 RX ADMIN — IPRATROPIUM BROMIDE AND ALBUTEROL SULFATE 3 ML: .5; 3 SOLUTION RESPIRATORY (INHALATION) at 19:34

## 2021-01-01 RX ADMIN — CHLORHEXIDINE GLUCONATE 0.12% ORAL RINSE 15 ML: 1.2 LIQUID ORAL at 07:54

## 2021-01-01 RX ADMIN — CHLORHEXIDINE GLUCONATE 0.12% ORAL RINSE 15 ML: 1.2 LIQUID ORAL at 20:20

## 2021-01-01 RX ADMIN — MIDAZOLAM (PF) 1 MG/ML IN 0.9 % SODIUM CHLORIDE INTRAVENOUS SOLUTION 10 MG/HR: at 17:32

## 2021-01-01 RX ADMIN — INSULIN ASPART 1 UNITS: 100 INJECTION, SOLUTION INTRAVENOUS; SUBCUTANEOUS at 19:37

## 2021-01-01 RX ADMIN — Medication 0.5 MG/HR: at 11:08

## 2021-01-01 RX ADMIN — Medication 11 MG/HR: at 23:08

## 2021-01-01 RX ADMIN — SODIUM CHLORIDE, PRESERVATIVE FREE: 5 INJECTION INTRAVENOUS at 19:43

## 2021-01-01 RX ADMIN — SODIUM CHLORIDE, PRESERVATIVE FREE: 5 INJECTION INTRAVENOUS at 19:47

## 2021-01-01 RX ADMIN — POTASSIUM CHLORIDE 20 MEQ: 29.8 INJECTION, SOLUTION INTRAVENOUS at 06:16

## 2021-01-01 RX ADMIN — INSULIN ASPART 1 UNITS: 100 INJECTION, SOLUTION INTRAVENOUS; SUBCUTANEOUS at 08:36

## 2021-01-01 RX ADMIN — MIDAZOLAM (PF) 1 MG/ML IN 0.9 % SODIUM CHLORIDE INTRAVENOUS SOLUTION 11 MG/HR: at 18:25

## 2021-01-01 RX ADMIN — ENOXAPARIN SODIUM 50 MG: 60 INJECTION SUBCUTANEOUS at 07:50

## 2021-01-01 RX ADMIN — POTASSIUM CHLORIDE 20 MEQ: 20 SOLUTION ORAL at 08:01

## 2021-01-01 RX ADMIN — ENOXAPARIN SODIUM 50 MG: 60 INJECTION SUBCUTANEOUS at 08:28

## 2021-01-01 RX ADMIN — QUETIAPINE FUMARATE 50 MG: 25 TABLET ORAL at 08:00

## 2021-01-01 RX ADMIN — PROPOFOL 75 MCG/KG/MIN: 10 INJECTION, EMULSION INTRAVENOUS at 03:57

## 2021-01-01 RX ADMIN — Medication 15 ML: at 08:30

## 2021-01-01 RX ADMIN — CHLORHEXIDINE GLUCONATE 0.12% ORAL RINSE 15 ML: 1.2 LIQUID ORAL at 08:25

## 2021-01-01 RX ADMIN — Medication 2 PACKET: at 08:20

## 2021-01-01 RX ADMIN — MEROPENEM 1 G: 1 INJECTION, POWDER, FOR SOLUTION INTRAVENOUS at 20:55

## 2021-01-01 RX ADMIN — PROPOFOL 30 MCG/KG/MIN: 10 INJECTION, EMULSION INTRAVENOUS at 03:13

## 2021-01-01 RX ADMIN — Medication 2 PACKET: at 05:04

## 2021-01-01 RX ADMIN — MEROPENEM 1 G: 1 INJECTION, POWDER, FOR SOLUTION INTRAVENOUS at 12:38

## 2021-01-01 RX ADMIN — Medication 3 MG/HR: at 16:39

## 2021-01-01 RX ADMIN — PANTOPRAZOLE SODIUM 40 MG: 40 INJECTION, POWDER, FOR SOLUTION INTRAVENOUS at 08:11

## 2021-01-01 RX ADMIN — MIDAZOLAM (PF) 1 MG/ML IN 0.9 % SODIUM CHLORIDE INTRAVENOUS SOLUTION 3 MG/HR: at 01:55

## 2021-01-01 RX ADMIN — PROPOFOL 20 MCG/KG/MIN: 10 INJECTION, EMULSION INTRAVENOUS at 06:08

## 2021-01-01 RX ADMIN — PANTOPRAZOLE SODIUM 40 MG: 40 TABLET, DELAYED RELEASE ORAL at 08:03

## 2021-01-01 RX ADMIN — POTASSIUM CHLORIDE 40 MEQ: 1.5 POWDER, FOR SOLUTION ORAL at 05:11

## 2021-01-01 RX ADMIN — INSULIN ASPART 2 UNITS: 100 INJECTION, SOLUTION INTRAVENOUS; SUBCUTANEOUS at 03:50

## 2021-01-01 RX ADMIN — PROPOFOL 60 MCG/KG/MIN: 10 INJECTION, EMULSION INTRAVENOUS at 18:15

## 2021-01-01 RX ADMIN — FUROSEMIDE 20 MG: 10 INJECTION, SOLUTION INTRAVENOUS at 00:51

## 2021-01-01 RX ADMIN — ENOXAPARIN SODIUM 60 MG: 60 INJECTION SUBCUTANEOUS at 08:33

## 2021-01-01 RX ADMIN — ENOXAPARIN SODIUM 50 MG: 60 INJECTION SUBCUTANEOUS at 20:26

## 2021-01-01 RX ADMIN — FUROSEMIDE 60 MG: 10 INJECTION, SOLUTION INTRAVENOUS at 03:42

## 2021-01-01 RX ADMIN — MIDODRINE HYDROCHLORIDE 10 MG: 5 TABLET ORAL at 08:33

## 2021-01-01 RX ADMIN — POTASSIUM CHLORIDE 20 MEQ: 29.8 INJECTION, SOLUTION INTRAVENOUS at 05:38

## 2021-01-01 RX ADMIN — Medication 3 MG/HR: at 01:07

## 2021-01-01 RX ADMIN — Medication 2 PACKET: at 08:25

## 2021-01-01 RX ADMIN — CHLORHEXIDINE GLUCONATE 0.12% ORAL RINSE 15 ML: 1.2 LIQUID ORAL at 20:18

## 2021-01-01 RX ADMIN — PIPERACILLIN SODIUM AND TAZOBACTAM SODIUM 4.5 G: 4; .5 INJECTION, POWDER, LYOPHILIZED, FOR SOLUTION INTRAVENOUS at 00:27

## 2021-01-01 RX ADMIN — HYDROGEN PEROXIDE: 30 LIQUID TOPICAL at 18:19

## 2021-01-01 RX ADMIN — INSULIN ASPART 1 UNITS: 100 INJECTION, SOLUTION INTRAVENOUS; SUBCUTANEOUS at 15:43

## 2021-01-01 RX ADMIN — MIDAZOLAM 2 MG: 1 INJECTION INTRAMUSCULAR; INTRAVENOUS at 11:44

## 2021-01-01 RX ADMIN — QUETIAPINE FUMARATE 75 MG: 50 TABLET ORAL at 07:36

## 2021-01-01 RX ADMIN — MEROPENEM 500 MG: 500 INJECTION, POWDER, FOR SOLUTION INTRAVENOUS at 10:54

## 2021-01-01 RX ADMIN — Medication 0.4 MG/HR: at 06:47

## 2021-01-01 RX ADMIN — FUROSEMIDE 20 MG: 10 INJECTION, SOLUTION INTRAVENOUS at 01:10

## 2021-01-01 RX ADMIN — PROPOFOL 65 MCG/KG/MIN: 10 INJECTION, EMULSION INTRAVENOUS at 16:01

## 2021-01-01 RX ADMIN — Medication 2 PACKET: at 16:02

## 2021-01-01 RX ADMIN — Medication 0.09 MCG/KG/MIN: at 00:29

## 2021-01-01 RX ADMIN — PROPOFOL 65 MCG/KG/MIN: 10 INJECTION, EMULSION INTRAVENOUS at 09:28

## 2021-01-01 RX ADMIN — MEROPENEM 1 G: 1 INJECTION, POWDER, FOR SOLUTION INTRAVENOUS at 20:05

## 2021-01-01 RX ADMIN — POTASSIUM CHLORIDE 40 MEQ: 1.5 POWDER, FOR SOLUTION ORAL at 06:20

## 2021-01-01 RX ADMIN — FUROSEMIDE 20 MG: 10 INJECTION, SOLUTION INTRAVENOUS at 00:42

## 2021-01-01 RX ADMIN — PROPOFOL 60 MCG/KG/MIN: 10 INJECTION, EMULSION INTRAVENOUS at 10:51

## 2021-01-01 RX ADMIN — Medication 15 ML: at 09:03

## 2021-01-01 RX ADMIN — Medication 15 ML: at 09:32

## 2021-01-01 RX ADMIN — ENOXAPARIN SODIUM 60 MG: 60 INJECTION SUBCUTANEOUS at 21:16

## 2021-01-01 RX ADMIN — POTASSIUM CHLORIDE 40 MEQ: 1.5 POWDER, FOR SOLUTION ORAL at 16:45

## 2021-01-01 RX ADMIN — FUROSEMIDE 60 MG: 10 INJECTION, SOLUTION INTRAVENOUS at 20:54

## 2021-01-01 RX ADMIN — SENNOSIDES 5 ML: 8.8 LIQUID ORAL at 08:28

## 2021-01-01 RX ADMIN — Medication 15 ML: at 08:02

## 2021-01-01 RX ADMIN — PIPERACILLIN SODIUM AND TAZOBACTAM SODIUM 4.5 G: 4; .5 INJECTION, POWDER, LYOPHILIZED, FOR SOLUTION INTRAVENOUS at 14:53

## 2021-01-01 RX ADMIN — ENOXAPARIN SODIUM 50 MG: 60 INJECTION SUBCUTANEOUS at 10:00

## 2021-01-01 RX ADMIN — QUETIAPINE FUMARATE 75 MG: 50 TABLET ORAL at 08:08

## 2021-01-01 RX ADMIN — POTASSIUM CHLORIDE 20 MEQ: 1.5 POWDER, FOR SOLUTION ORAL at 06:58

## 2021-01-01 RX ADMIN — PANTOPRAZOLE SODIUM 40 MG: 40 TABLET, DELAYED RELEASE ORAL at 07:49

## 2021-01-01 RX ADMIN — PANTOPRAZOLE SODIUM 40 MG: 40 TABLET, DELAYED RELEASE ORAL at 08:22

## 2021-01-01 RX ADMIN — POTASSIUM CHLORIDE 10 MEQ: 7.46 INJECTION, SOLUTION INTRAVENOUS at 23:51

## 2021-01-01 RX ADMIN — POTASSIUM CHLORIDE 40 MEQ: 1.5 POWDER, FOR SOLUTION ORAL at 20:51

## 2021-01-01 RX ADMIN — SODIUM CHLORIDE, PRESERVATIVE FREE: 5 INJECTION INTRAVENOUS at 03:34

## 2021-01-01 RX ADMIN — QUETIAPINE FUMARATE 25 MG: 25 TABLET ORAL at 08:02

## 2021-01-01 RX ADMIN — SENNOSIDES 5 ML: 8.8 LIQUID ORAL at 21:16

## 2021-01-01 RX ADMIN — Medication 15 MG/HR: at 21:52

## 2021-01-01 RX ADMIN — ACETYLCYSTEINE 2 ML: 200 SOLUTION ORAL; RESPIRATORY (INHALATION) at 10:43

## 2021-01-01 RX ADMIN — PROPOFOL 20 MCG/KG/MIN: 10 INJECTION, EMULSION INTRAVENOUS at 20:09

## 2021-01-01 RX ADMIN — REMDESIVIR 100 MG: 100 INJECTION, POWDER, LYOPHILIZED, FOR SOLUTION INTRAVENOUS at 21:48

## 2021-01-01 RX ADMIN — Medication 15 ML: at 08:08

## 2021-01-01 RX ADMIN — DEXAMETHASONE 6 MG: 2 TABLET ORAL at 13:59

## 2021-01-01 RX ADMIN — PANTOPRAZOLE SODIUM 40 MG: 40 TABLET, DELAYED RELEASE ORAL at 08:38

## 2021-01-01 RX ADMIN — ACETYLCYSTEINE 2 ML: 200 SOLUTION ORAL; RESPIRATORY (INHALATION) at 19:00

## 2021-01-01 RX ADMIN — AZITHROMYCIN MONOHYDRATE 500 MG: 500 INJECTION, POWDER, LYOPHILIZED, FOR SOLUTION INTRAVENOUS at 15:15

## 2021-01-01 RX ADMIN — PROPOFOL 50 MCG/KG/MIN: 10 INJECTION, EMULSION INTRAVENOUS at 13:23

## 2021-01-01 RX ADMIN — MEROPENEM 500 MG: 500 INJECTION, POWDER, FOR SOLUTION INTRAVENOUS at 10:12

## 2021-01-01 RX ADMIN — MEROPENEM 500 MG: 500 INJECTION, POWDER, FOR SOLUTION INTRAVENOUS at 22:03

## 2021-01-01 RX ADMIN — POTASSIUM CHLORIDE 40 MEQ: 1.5 POWDER, FOR SOLUTION ORAL at 20:17

## 2021-01-01 RX ADMIN — INSULIN ASPART 1 UNITS: 100 INJECTION, SOLUTION INTRAVENOUS; SUBCUTANEOUS at 19:21

## 2021-01-01 RX ADMIN — ACETYLCYSTEINE 2 ML: 200 SOLUTION ORAL; RESPIRATORY (INHALATION) at 15:06

## 2021-01-01 RX ADMIN — Medication 11 MG/HR: at 14:18

## 2021-01-01 RX ADMIN — ACETYLCYSTEINE 2 ML: 200 SOLUTION ORAL; RESPIRATORY (INHALATION) at 07:45

## 2021-01-01 RX ADMIN — PANTOPRAZOLE SODIUM 40 MG: 40 TABLET, DELAYED RELEASE ORAL at 08:51

## 2021-01-01 RX ADMIN — MIDODRINE HYDROCHLORIDE 10 MG: 5 TABLET ORAL at 09:32

## 2021-01-01 RX ADMIN — PROPOFOL 20 MCG/KG/MIN: 10 INJECTION, EMULSION INTRAVENOUS at 19:39

## 2021-01-01 RX ADMIN — PIPERACILLIN SODIUM AND TAZOBACTAM SODIUM 4.5 G: 4; .5 INJECTION, POWDER, LYOPHILIZED, FOR SOLUTION INTRAVENOUS at 07:14

## 2021-01-01 RX ADMIN — EPOPROSTENOL 20 NG/KG/MIN: 1.5 INJECTION, POWDER, LYOPHILIZED, FOR SOLUTION INTRAVENOUS at 19:46

## 2021-01-01 RX ADMIN — Medication 2 PACKET: at 00:13

## 2021-01-01 RX ADMIN — SODIUM CHLORIDE, PRESERVATIVE FREE: 5 INJECTION INTRAVENOUS at 16:51

## 2021-01-01 RX ADMIN — MEROPENEM 500 MG: 500 INJECTION, POWDER, FOR SOLUTION INTRAVENOUS at 04:14

## 2021-01-01 RX ADMIN — PROPOFOL 60 MCG/KG/MIN: 10 INJECTION, EMULSION INTRAVENOUS at 22:47

## 2021-01-01 RX ADMIN — Medication 0.08 MCG/KG/MIN: at 08:06

## 2021-01-01 RX ADMIN — ENOXAPARIN SODIUM 105 MG: 120 INJECTION SUBCUTANEOUS at 21:04

## 2021-01-01 RX ADMIN — Medication 15 ML: at 08:03

## 2021-01-01 RX ADMIN — PIPERACILLIN SODIUM AND TAZOBACTAM SODIUM 4.5 G: 4; .5 INJECTION, POWDER, LYOPHILIZED, FOR SOLUTION INTRAVENOUS at 13:32

## 2021-01-01 RX ADMIN — POTASSIUM CHLORIDE 10 MEQ: 7.46 INJECTION, SOLUTION INTRAVENOUS at 03:35

## 2021-01-01 RX ADMIN — PROPOFOL 50 MCG/KG/MIN: 10 INJECTION, EMULSION INTRAVENOUS at 18:26

## 2021-01-01 RX ADMIN — SODIUM CHLORIDE, POTASSIUM CHLORIDE, SODIUM LACTATE AND CALCIUM CHLORIDE: 600; 310; 30; 20 INJECTION, SOLUTION INTRAVENOUS at 17:10

## 2021-01-01 RX ADMIN — FUROSEMIDE 40 MG: 10 INJECTION, SOLUTION INTRAVENOUS at 13:29

## 2021-01-01 RX ADMIN — POTASSIUM CHLORIDE 40 MEQ: 1.5 POWDER, FOR SOLUTION ORAL at 08:23

## 2021-01-01 RX ADMIN — Medication 3 PACKET: at 14:44

## 2021-01-01 RX ADMIN — MEROPENEM 500 MG: 500 INJECTION, POWDER, FOR SOLUTION INTRAVENOUS at 10:05

## 2021-01-01 RX ADMIN — QUETIAPINE FUMARATE 50 MG: 25 TABLET ORAL at 20:04

## 2021-01-01 RX ADMIN — DIPHENHYDRAMINE HYDROCHLORIDE 25 MG: 50 INJECTION, SOLUTION INTRAMUSCULAR; INTRAVENOUS at 07:58

## 2021-01-01 RX ADMIN — POTASSIUM CHLORIDE 40 MEQ: 20 SOLUTION ORAL at 05:48

## 2021-01-01 RX ADMIN — Medication 2 PACKET: at 16:18

## 2021-01-01 RX ADMIN — MINERAL OIL AND PETROLATUM: 150; 830 OINTMENT OPHTHALMIC at 08:49

## 2021-01-01 RX ADMIN — EPOPROSTENOL 20 NG/KG/MIN: 1.5 INJECTION, POWDER, LYOPHILIZED, FOR SOLUTION INTRAVENOUS at 02:13

## 2021-01-01 RX ADMIN — PROPOFOL 25 MCG/KG/MIN: 10 INJECTION, EMULSION INTRAVENOUS at 20:06

## 2021-01-01 RX ADMIN — ENOXAPARIN SODIUM 60 MG: 60 INJECTION SUBCUTANEOUS at 08:20

## 2021-01-01 RX ADMIN — MIDAZOLAM (PF) 1 MG/ML IN 0.9 % SODIUM CHLORIDE INTRAVENOUS SOLUTION 11 MG/HR: at 03:28

## 2021-01-01 RX ADMIN — Medication 2 MG/HR: at 06:21

## 2021-01-01 RX ADMIN — CHLORHEXIDINE GLUCONATE 0.12% ORAL RINSE 15 ML: 1.2 LIQUID ORAL at 07:32

## 2021-01-01 RX ADMIN — MIDAZOLAM (PF) 1 MG/ML IN 0.9 % SODIUM CHLORIDE INTRAVENOUS SOLUTION 10 MG/HR: at 22:55

## 2021-01-01 RX ADMIN — EPOPROSTENOL 20 NG/KG/MIN: 1.5 INJECTION, POWDER, LYOPHILIZED, FOR SOLUTION INTRAVENOUS at 14:00

## 2021-01-01 RX ADMIN — MIDODRINE HYDROCHLORIDE 10 MG: 5 TABLET ORAL at 16:44

## 2021-01-01 RX ADMIN — MICONAZOLE NITRATE: 20 POWDER TOPICAL at 14:19

## 2021-01-01 RX ADMIN — MEROPENEM 1 G: 1 INJECTION, POWDER, FOR SOLUTION INTRAVENOUS at 20:04

## 2021-01-01 RX ADMIN — Medication 15 MG/HR: at 09:24

## 2021-01-01 RX ADMIN — CHLORHEXIDINE GLUCONATE 0.12% ORAL RINSE 15 ML: 1.2 LIQUID ORAL at 08:50

## 2021-01-01 RX ADMIN — MIDODRINE HYDROCHLORIDE 10 MG: 5 TABLET ORAL at 11:35

## 2021-01-01 RX ADMIN — MIDODRINE HYDROCHLORIDE 10 MG: 5 TABLET ORAL at 08:39

## 2021-01-01 RX ADMIN — Medication 12 MG/HR: at 07:50

## 2021-01-01 RX ADMIN — ENOXAPARIN SODIUM 60 MG: 60 INJECTION SUBCUTANEOUS at 19:37

## 2021-01-01 RX ADMIN — MEROPENEM 1 G: 1 INJECTION, POWDER, FOR SOLUTION INTRAVENOUS at 04:33

## 2021-01-01 RX ADMIN — Medication 2 PACKET: at 16:52

## 2021-01-01 RX ADMIN — SENNOSIDES 5 ML: 8.8 LIQUID ORAL at 20:19

## 2021-01-01 RX ADMIN — SODIUM CHLORIDE, POTASSIUM CHLORIDE, SODIUM LACTATE AND CALCIUM CHLORIDE: 600; 310; 30; 20 INJECTION, SOLUTION INTRAVENOUS at 15:35

## 2021-01-01 RX ADMIN — PIPERACILLIN SODIUM AND TAZOBACTAM SODIUM 4.5 G: 4; .5 INJECTION, POWDER, LYOPHILIZED, FOR SOLUTION INTRAVENOUS at 20:58

## 2021-01-01 RX ADMIN — ENOXAPARIN SODIUM 60 MG: 60 INJECTION SUBCUTANEOUS at 20:07

## 2021-01-01 RX ADMIN — FUROSEMIDE 60 MG: 10 INJECTION, SOLUTION INTRAVENOUS at 04:11

## 2021-01-01 RX ADMIN — ACETYLCYSTEINE 2 ML: 200 SOLUTION ORAL; RESPIRATORY (INHALATION) at 10:36

## 2021-01-01 RX ADMIN — Medication 0.4 MG/HR: at 11:56

## 2021-01-01 RX ADMIN — PROPOFOL 75 MCG/KG/MIN: 10 INJECTION, EMULSION INTRAVENOUS at 00:32

## 2021-01-01 RX ADMIN — Medication 3 MG/HR: at 11:31

## 2021-01-01 RX ADMIN — Medication 2 PACKET: at 01:08

## 2021-01-01 RX ADMIN — PIPERACILLIN SODIUM AND TAZOBACTAM SODIUM 4.5 G: 4; .5 INJECTION, POWDER, LYOPHILIZED, FOR SOLUTION INTRAVENOUS at 08:32

## 2021-01-01 RX ADMIN — PROPOFOL 65 MCG/KG/MIN: 10 INJECTION, EMULSION INTRAVENOUS at 17:40

## 2021-01-01 RX ADMIN — FUROSEMIDE 40 MG: 10 INJECTION, SOLUTION INTRAVENOUS at 13:26

## 2021-01-01 RX ADMIN — FUROSEMIDE 20 MG: 10 INJECTION, SOLUTION INTRAVENOUS at 00:32

## 2021-01-01 RX ADMIN — CHLORHEXIDINE GLUCONATE 0.12% ORAL RINSE 15 ML: 1.2 LIQUID ORAL at 19:58

## 2021-01-01 RX ADMIN — REMDESIVIR 200 MG: 100 INJECTION, POWDER, LYOPHILIZED, FOR SOLUTION INTRAVENOUS at 21:54

## 2021-01-01 RX ADMIN — POTASSIUM CHLORIDE 40 MEQ: 1.5 POWDER, FOR SOLUTION ORAL at 06:32

## 2021-01-01 RX ADMIN — Medication 15 ML: at 07:51

## 2021-01-01 RX ADMIN — SODIUM CHLORIDE, PRESERVATIVE FREE: 5 INJECTION INTRAVENOUS at 08:41

## 2021-01-01 RX ADMIN — POTASSIUM CHLORIDE 20 MEQ: 1.5 POWDER, FOR SOLUTION ORAL at 17:16

## 2021-01-01 RX ADMIN — MIDAZOLAM (PF) 1 MG/ML IN 0.9 % SODIUM CHLORIDE INTRAVENOUS SOLUTION 6 MG/HR: at 00:44

## 2021-01-01 RX ADMIN — FUROSEMIDE 60 MG: 10 INJECTION, SOLUTION INTRAVENOUS at 18:25

## 2021-01-01 RX ADMIN — LIDOCAINE HYDROCHLORIDE 1 ML: 10 INJECTION, SOLUTION EPIDURAL; INFILTRATION; INTRACAUDAL; PERINEURAL at 14:42

## 2021-01-01 RX ADMIN — PROPOFOL 35 MCG/KG/MIN: 10 INJECTION, EMULSION INTRAVENOUS at 15:36

## 2021-01-01 RX ADMIN — CHLORHEXIDINE GLUCONATE 0.12% ORAL RINSE 15 ML: 1.2 LIQUID ORAL at 19:32

## 2021-01-01 RX ADMIN — QUETIAPINE FUMARATE 75 MG: 50 TABLET ORAL at 20:01

## 2021-01-01 RX ADMIN — Medication 0.1 MCG/KG/MIN: at 13:10

## 2021-01-01 RX ADMIN — CEFTRIAXONE SODIUM 2 G: 2 INJECTION, POWDER, FOR SOLUTION INTRAMUSCULAR; INTRAVENOUS at 12:33

## 2021-01-01 RX ADMIN — POTASSIUM CHLORIDE 40 MEQ: 1.5 POWDER, FOR SOLUTION ORAL at 08:47

## 2021-01-01 RX ADMIN — Medication 0.1 MCG/KG/MIN: at 16:51

## 2021-01-01 RX ADMIN — POTASSIUM CHLORIDE 20 MEQ: 20 SOLUTION ORAL at 06:06

## 2021-01-01 RX ADMIN — Medication 2 MG/HR: at 23:05

## 2021-01-01 RX ADMIN — IPRATROPIUM BROMIDE AND ALBUTEROL SULFATE 3 ML: .5; 3 SOLUTION RESPIRATORY (INHALATION) at 19:10

## 2021-01-01 RX ADMIN — Medication 15 ML: at 07:37

## 2021-01-01 RX ADMIN — CHLORHEXIDINE GLUCONATE 0.12% ORAL RINSE 15 ML: 1.2 LIQUID ORAL at 08:03

## 2021-01-01 ASSESSMENT — ACTIVITIES OF DAILY LIVING (ADL)
ADLS_ACUITY_SCORE: 20
ADLS_ACUITY_SCORE: 13
ADLS_ACUITY_SCORE: 20
ADLS_ACUITY_SCORE: 20
ADLS_ACUITY_SCORE: 22
ADLS_ACUITY_SCORE: 20
ADLS_ACUITY_SCORE: 22
ADLS_ACUITY_SCORE: 20
ADLS_ACUITY_SCORE: 18
ADLS_ACUITY_SCORE: 18
ADLS_ACUITY_SCORE: 20
ADLS_ACUITY_SCORE: 18
ADLS_ACUITY_SCORE: 20
ADLS_ACUITY_SCORE: 26
ADLS_ACUITY_SCORE: 20
ADLS_ACUITY_SCORE: 18
ADLS_ACUITY_SCORE: 20
ADLS_ACUITY_SCORE: 18
ADLS_ACUITY_SCORE: 20
ADLS_ACUITY_SCORE: 18
ADLS_ACUITY_SCORE: 18
ADLS_ACUITY_SCORE: 20
ADLS_ACUITY_SCORE: 13
ADLS_ACUITY_SCORE: 20
ADLS_ACUITY_SCORE: 18
ADLS_ACUITY_SCORE: 20
ADLS_ACUITY_SCORE: 13
ADLS_ACUITY_SCORE: 22
ADLS_ACUITY_SCORE: 20
ADLS_ACUITY_SCORE: 22
ADLS_ACUITY_SCORE: 20
ADLS_ACUITY_SCORE: 15
ADLS_ACUITY_SCORE: 22
ADLS_ACUITY_SCORE: 20
ADLS_ACUITY_SCORE: 18
ADLS_ACUITY_SCORE: 20
ADLS_ACUITY_SCORE: 18
ADLS_ACUITY_SCORE: 20
ADLS_ACUITY_SCORE: 18
ADLS_ACUITY_SCORE: 20
ADLS_ACUITY_SCORE: 22
ADLS_ACUITY_SCORE: 18
ADLS_ACUITY_SCORE: 13
ADLS_ACUITY_SCORE: 18
ADLS_ACUITY_SCORE: 13
ADLS_ACUITY_SCORE: 20
ADLS_ACUITY_SCORE: 18
ADLS_ACUITY_SCORE: 20
ADLS_ACUITY_SCORE: 26
ADLS_ACUITY_SCORE: 20
ADLS_ACUITY_SCORE: 20
ADLS_ACUITY_SCORE: 14
ADLS_ACUITY_SCORE: 20
ADLS_ACUITY_SCORE: 18
ADLS_ACUITY_SCORE: 20
ADLS_ACUITY_SCORE: 15
ADLS_ACUITY_SCORE: 20
ADLS_ACUITY_SCORE: 20
ADLS_ACUITY_SCORE: 13
ADLS_ACUITY_SCORE: 18
ADLS_ACUITY_SCORE: 20
ADLS_ACUITY_SCORE: 13
ADLS_ACUITY_SCORE: 18
ADLS_ACUITY_SCORE: 20
ADLS_ACUITY_SCORE: 18
ADLS_ACUITY_SCORE: 20
ADLS_ACUITY_SCORE: 13
ADLS_ACUITY_SCORE: 20
ADLS_ACUITY_SCORE: 18
ADLS_ACUITY_SCORE: 20
ADLS_ACUITY_SCORE: 18
ADLS_ACUITY_SCORE: 20
ADLS_ACUITY_SCORE: 22
ADLS_ACUITY_SCORE: 20
ADLS_ACUITY_SCORE: 22
ADLS_ACUITY_SCORE: 20
ADLS_ACUITY_SCORE: 13
ADLS_ACUITY_SCORE: 20
ADLS_ACUITY_SCORE: 18
ADLS_ACUITY_SCORE: 20
ADLS_ACUITY_SCORE: 18
ADLS_ACUITY_SCORE: 18
ADLS_ACUITY_SCORE: 22
ADLS_ACUITY_SCORE: 18
ADLS_ACUITY_SCORE: 20
ADLS_ACUITY_SCORE: 22
ADLS_ACUITY_SCORE: 20
ADLS_ACUITY_SCORE: 18
ADLS_ACUITY_SCORE: 20
ADLS_ACUITY_SCORE: 22
ADLS_ACUITY_SCORE: 20
ADLS_ACUITY_SCORE: 22
ADLS_ACUITY_SCORE: 20
ADLS_ACUITY_SCORE: 22
ADLS_ACUITY_SCORE: 20
ADLS_ACUITY_SCORE: 20
ADLS_ACUITY_SCORE: 18
ADLS_ACUITY_SCORE: 20
ADLS_ACUITY_SCORE: 20
ADLS_ACUITY_SCORE: 18
ADLS_ACUITY_SCORE: 20
ADLS_ACUITY_SCORE: 22
ADLS_ACUITY_SCORE: 20
ADLS_ACUITY_SCORE: 18
ADLS_ACUITY_SCORE: 19
ADLS_ACUITY_SCORE: 20
ADLS_ACUITY_SCORE: 18
ADLS_ACUITY_SCORE: 18
ADLS_ACUITY_SCORE: 20
ADLS_ACUITY_SCORE: 20
ADLS_ACUITY_SCORE: 18
ADLS_ACUITY_SCORE: 20
ADLS_ACUITY_SCORE: 22
ADLS_ACUITY_SCORE: 20
ADLS_ACUITY_SCORE: 22
ADLS_ACUITY_SCORE: 20
ADLS_ACUITY_SCORE: 18
ADLS_ACUITY_SCORE: 20
ADLS_ACUITY_SCORE: 22
ADLS_ACUITY_SCORE: 20
ADLS_ACUITY_SCORE: 20
ADLS_ACUITY_SCORE: 13
ADLS_ACUITY_SCORE: 20

## 2021-01-01 ASSESSMENT — VISUAL ACUITY
OU: OTHER (SEE COMMENT)

## 2021-01-01 ASSESSMENT — MIFFLIN-ST. JEOR
SCORE: 1486.5
SCORE: 1599.5
SCORE: 1537.5
SCORE: 1499.5
SCORE: 1520.5
SCORE: 1515.5
SCORE: 1569.5
SCORE: 1496.5
SCORE: 1497.5
SCORE: 1421.5
SCORE: 1484.5
SCORE: 1663.5
SCORE: 1673.5
SCORE: 1479.5
SCORE: 1510.5
SCORE: 1483.5
SCORE: 1640.5
SCORE: 1531.5
SCORE: 1678.5
SCORE: 1698.5
SCORE: 1652.5
SCORE: 1560.5
SCORE: 1556.5
SCORE: 1546.5
SCORE: 1488.5
SCORE: 1481.5
SCORE: 1706.5
SCORE: 1515.5
SCORE: 1646.5
SCORE: 1487.5
SCORE: 1488.5
SCORE: 1553.5
SCORE: 1578.5
SCORE: 1693.5
SCORE: 1548.5
SCORE: 1590.5
SCORE: 1680.5
SCORE: 1531.5
SCORE: 1653.5
SCORE: 1526.5
SCORE: 1613.5
SCORE: 1544.5

## 2021-04-13 PROBLEM — J96.01 ACUTE RESPIRATORY FAILURE WITH HYPOXEMIA (H): Status: ACTIVE | Noted: 2021-01-01

## 2021-04-13 NOTE — PHARMACY-ADMISSION MEDICATION HISTORY
Pharmacy Medication History  Admission medication history interview status for the 4/13/2021  admission is complete. See EPIC admission navigator for prior to admission medications     Location of Interview: Phone  Medication history sources: Patient's family/friend (spouse Ruddy), Surescripts and Care Everywhere    Significant changes made to the medication list:  Added all meds to list    In the past week, patient estimated taking medication this percent of the time:unknown    Additional medication history information:   Pt on bipap in isolation, so was unable to interview her    Medication reconciliation completed by provider prior to medication history? No    Time spent in this activity: 10 minutes    Prior to Admission medications    Medication Sig Last Dose Taking? Auth Provider   amLODIPine (NORVASC) 5 MG tablet Take 5 mg by mouth daily unknown Yes Unknown, Entered By History   metoprolol succinate ER (TOPROL-XL) 50 MG 24 hr tablet Take 75 mg by mouth daily unknown Yes Unknown, Entered By History   multivitamin, therapeutic (THERA-VIT) TABS tablet Take 1 tablet by mouth daily unknown Yes Unknown, Entered By History   naproxen sodium (ANAPROX) 220 MG tablet Take 220 mg by mouth 2 times daily as needed for moderate pain prn Yes Unknown, Entered By History       The information provided in this note is only as accurate as the sources available at the time of update(s)

## 2021-04-13 NOTE — H&P
Critical Care  Note      04/13/2021    Name: Sarahy Dunbar MRN#: [id number]   Age: 65 year old YOB: 1955     [facility] Day# 0  ICU DAY # 0    MV DAY # 0             Problem List:   Active Problems:    Acute respiratory failure with hypoxemia (H)           Summary/Hospital Course:     65-year-old female with no significant medical history who was found in bed with low saturations after welfare check.  The patient's  was out of town and was unable to contact his wife and the patient was found in bed with a saturation of approximately 20.  The patient is currently on BiPAP and given his shortness of breath is difficult to get a history from the most of the history is taken from the chart.  However at the outside hospital patient did deny cough fever and was confused and her history was limited.  Patient's  gives a history of hypertension and anxiety.  Review of systems is otherwise unremarkable.  The patient was admitted to the outside hospital where she was treated with noninvasive ventilation antibiotics and IV fluid.  Her condition stabilized and she was transferred to our ICU for further management and rule out of Covid.  On her arrival she was transitioned noninvasive ventilation and his saturation improved.    Assessment and plan :     Sarahy Dunbar IS a 65 year old female admitted on 4/13/2021 for rule out Covid pneumonia, altered mental status, hypoxemic respiratory failure requiring noninvasive ventilation.   I have personally reviewed the daily labs, imaging studies, cultures and discussed the case with referring physician and consulting physicians.     My assessment and plan by system for this patient is as follows:    Neurology/Psychiatry:   1.  ICU sedation not indicated at this time  2. Pain/analgesia as needed opioids and and acetaminophen  3.  History of anxiety present on admission we will monitor    Plan  Continue to monitor    Cardiovascular:   1.Hemodynamics  -hemodynamically stable at this time  2.Rhythm -normal sinus rhythm  3. Ischemia -no signs of ischemia  Plan  Continue to monitor    Pulmonary/Ventilator Management:   1. Airway patent airway  2. Oxygenation/ventilation/mechanics patient on BiPAP right now 18/10 100%  Plan  -Patient maintaining her saturations greater than 96% will wean oxygenation as tolerated.  No need for intubation and full ventilatory support at this time we will continue to monitor.  We will recheck chest x-ray  Will encourage self proning  GI and Nutrition :   1.  N.p.o. while on BiPAP    Plan  -If possible prolonged intubation we will place Dobbhoff tube and start tube feeds    Renal/Fluids/Electrolytes:   1.  We will check creatinine  2.  Electrolyte panel pending  3. Acid/base status ABG pending  4. Volume status appears euvolemic  Plan    - monitor function and electrolytes as needed with replacement per ICU protocols. - generally avoid nephrotoxic agents such as NSAID, IV contrast unless specifically required  - adjust medications as needed for renal clearance  - follow I/O's as appropriate.    Infectious Disease:   1.  Rule out Covid, Covid test sent patient started on dexamethasone and antibiotics.  Will hold on remdesivir until Covid test returns.    Plan  -Continue to monitor    Endocrine:   1.  Concern for stress-induced hyperglycemia we will continue to monitor    Plan  - ICU insulin protocol, goal sugar <180      Hematology/Oncology:   1.  CBC pending   Plan  -Continue to monitor     IV/Access:   1. Venous access -peripheral IVs  2. Arterial access -attempted arterial line unable to thread wire  3.  Plan  -Return continue to monitor at this time.      ICU Prophylaxis:   1. DVT: Hep Subq/ LMWH/mechanical  2. VAP: HOB 30 degrees, chlorhexidine rinse  3. Stress Ulcer: PPI/H2 blocker  4. Restraints: Nonviolent soft two point restraints required and necessary for patient safety and continued cares and good effect as patient continues  to pull at necessary lines, tubes despite education and distraction. Will readdress daily.   5. Wound care  -local wound care  6. Feeding -n.p.o. for now  7. Family Update: We will attempt to call has been  8. Disposition -remain in ICU        Key goals for next 24 hours:   1.  Assess Covid status  2.  Continue Decadron  3.  Continue perioperative antibiotics               Medical History:     Past Medical History:   Diagnosis Date     Arthritis     OA - Knee     Dyshidrotic eczema      Herpes simplex labialis      Hypersensitivity vasculitides      Hypertension      Obesity      Shingles 2016     Venous insufficiency      Venous stasis      Past Surgical History:   Procedure Laterality Date     ARTHROPLASTY KNEE Left 9/6/2016    Procedure: ARTHROPLASTY KNEE;  Surgeon: Amy Landers MD;  Location:  OR     ARTHROPLASTY KNEE Right 4/3/2018    Procedure: ARTHROPLASTY KNEE;  RIGHT TOTAL KNEE ARTHROPLASTY ;  Surgeon: Amy Landers MD;  Location:  OR     ARTHROSCOPY ANKLE  2/2/2012    Procedure:ARTHROSCOPY ANKLE; LEFT ANKLE ARTHROSCOPIC IRRIGATION AND DEBRIDEMENT, WITH HARDWARE REMOVAL (C-ARM); Surgeon:TONYA CAAL; Location: OR     ORTHOPEDIC SURGERY      Left tib/fib fracture     REMOVE HARDWARE ANKLE  2/2/2012    Procedure:REMOVE HARDWARE ANKLE; Surgeon:TONYA CAAL; Location: OR     Social History     Socioeconomic History     Marital status:      Spouse name: Not on file     Number of children: Not on file     Years of education: Not on file     Highest education level: Not on file   Occupational History     Not on file   Social Needs     Financial resource strain: Not on file     Food insecurity     Worry: Not on file     Inability: Not on file     Transportation needs     Medical: Not on file     Non-medical: Not on file   Tobacco Use     Smoking status: Never Smoker     Smokeless tobacco: Never Used   Substance and Sexual Activity     Alcohol use: Yes     Comment: glass a wine every  two weeks     Drug use: No     Sexual activity: Not on file   Lifestyle     Physical activity     Days per week: Not on file     Minutes per session: Not on file     Stress: Not on file   Relationships     Social connections     Talks on phone: Not on file     Gets together: Not on file     Attends Faith service: Not on file     Active member of club or organization: Not on file     Attends meetings of clubs or organizations: Not on file     Relationship status: Not on file     Intimate partner violence     Fear of current or ex partner: Not on file     Emotionally abused: Not on file     Physically abused: Not on file     Forced sexual activity: Not on file   Other Topics Concern     Parent/sibling w/ CABG, MI or angioplasty before 65F 55M? Not Asked   Social History Narrative     Not on file        Allergies   Allergen Reactions     Lisinopril-Hydrochlorothiazide Other (See Comments)     hyponatremia              Key Medications:       azithromycin  500 mg Intravenous Q24H     cefTRIAXone  1 g Intravenous Q24H     dexamethasone  6 mg Oral or Feeding Tube Daily     enoxaparin ANTICOAGULANT  50 mg Subcutaneous BID       lactated ringers       norepinephrine          Home Meds  No current facility-administered medications on file prior to encounter.   amLODIPine (NORVASC) 5 MG tablet, Take 5 mg by mouth daily  metoprolol succinate ER (TOPROL-XL) 50 MG 24 hr tablet, Take 75 mg by mouth daily  multivitamin, therapeutic (THERA-VIT) TABS tablet, Take 1 tablet by mouth daily  naproxen sodium (ANAPROX) 220 MG tablet, Take 220 mg by mouth 2 times daily as needed for moderate pain               Physical Examination:   Temp:  [97.2  F (36.2  C)] 97.2  F (36.2  C)  Pulse:  [101-103] 103  Resp:  [29-34] 29  BP: (116-171)/(66-94) 171/94  FiO2 (%):  [100 %] 100 %  SpO2:  [68 %-99 %] 99 %  No intake or output data in the 24 hours ending 04/13/21 1821  Wt Readings from Last 4 Encounters:   04/13/21 107 kg (235 lb 14.3 oz)    04/03/18 104.3 kg (230 lb)   09/06/16 100.2 kg (221 lb)   02/06/12 108.1 kg (238 lb 5.1 oz)     BP - Mean:  [] 118  FiO2 (%): 100 %  Resp: 29    No lab results found in last 7 days.    GEN: no acute distress   HEENT: head ncat, sclera anicteric, OP patent, trachea midline   PULM: Diminished spontaneous breath sounds CV/COR: RRR S1S2 no gallop,  No rub, no murmur  ABD: soft nontender, hypoactive bowel sounds, no mass  EXT:  Edema   warm  NEURO: grossly intact will follow commands on all 4 extremities  SKIN: no obvious rash  LINES: clean, dry intact         Data:   All data and imaging reviewed     ROUTINE ICU LABS (Last four results)  CMPNo lab results found in last 7 days.  CBCNo lab results found in last 7 days.  INRNo lab results found in last 7 days.  Arterial Blood GasNo lab results found in last 7 days.    All cultures:  No results for input(s): CULT in the last 168 hours.  No results found for this or any previous visit (from the past 24 hour(s)).      Billing: This patient is critically ill:yes. Total critical care time today 35 min. Excluding proceedures      Attempted to place arterial line, unable to thread wire, will continue to monitor.

## 2021-04-14 NOTE — PROGRESS NOTES
CPAP/BiPAP Settings  IPAP: 18  EPAP: 10  Rate: 16  FiO2: 60  Timed Inspiration (sec): 0.9    CPAP/BiPAP/AVAPS/AVAPS AE Alarms  High Pressure: 25  Low Pressure: 5  Low Pressure Delay: 20  High Rate (breaths/min): 45  Low Rate (breaths/min): 5  Alarm Volume Level: 10    CPAP/BiPAP/AVAPS/AVAPS AE Patient Assessment  Skin Assessment: intact.    Plan: Keep  Monitoring the pt on continuous BIPAP.  Vanessa Perez, RT

## 2021-04-14 NOTE — PROGRESS NOTES
Critical Care  Note      04/14/2021    Name: Sarahy Dunbar MRN#: [id number]   Age: 65 year old YOB: 1955     [facility] Day# 1  ICU DAY # 1    MV DAY # 1             Problem List:   Active Problems:    Acute respiratory failure with hypoxemia (H)           Summary/Hospital Course:     65-year-old female with no significant medical history who was found in bed with low saturations after welfare check.  The patient's  was out of town and was unable to contact his wife and the patient was found in bed with a saturation of approximately 20.  The patient is currently on BiPAP and given his shortness of breath is difficult to get a history from the most of the history is taken from the chart.  However at the outside hospital patient did deny cough fever and was confused and her history was limited.  Patient's  gives a history of hypertension and anxiety.  Review of systems is otherwise unremarkable.  The patient was admitted to the outside hospital where she was treated with noninvasive ventilation antibiotics and IV fluid.  Her condition stabilized and she was transferred to our ICU for further management and rule out of Covid.  On her arrival she was transitioned noninvasive ventilation and his saturation improved.  Over night the patient remained on NIV with decreasing oxygen requirements, she reports that her breathing is better.  However she did not tolerate transition HFNC.  Family declined transfer to [facility] at this time.      Assessment and plan :     Sarahy Dunbar IS a 65 year old female admitted on 4/13/2021 for rule out Covid pneumonia, altered mental status, hypoxemic respiratory failure requiring noninvasive ventilation.   I have personally reviewed the daily labs, imaging studies, cultures and discussed the case with referring physician and consulting physicians.     My assessment and plan by system for this patient is as follows:    Neurology/Psychiatry:   1.  ICU sedation  not indicated at this time  2. Pain/analgesia as needed opioids and and acetaminophen  3.  History of anxiety present on admission we will monitor    Plan  Continue to monitor, no signs of anxiety at  time    Cardiovascular:   1.Hemodynamics -hemodynamically stable at this time  2.Rhythm -normal sinus rhythm  3. Ischemia -no signs of ischemia  Plan  Continue to monitor    Pulmonary/Ventilator Management:   1. Airway patent airway  2. Oxygenation/ventilation/mechanics patient on BiPAP right now 18/10 100%  Plan  -Patient maintaining her saturations greater than 96% will wean oxygenation as tolerated.  No need for intubation and full ventilatory support at this time we will continue to monitor. CXR shows b/l infiltrates (reviewed myself)Will encourage self proning  Continue to wean oxygenation as tolerated, did not tolerate transition to HFNC  GI and Nutrition :   1.  N.p.o. while on BiPAP,     Plan  -If possible prolonged intubation we will place Dobbhoff tube and start tube feeds    Renal/Fluids/Electrolytes:   1.  Creatinine with in normal limits  2.  Hypokalemia  3. Normal ph, with adequate oxygenation  4. Volume status appears euvolemic  Plan    - monitor function and electrolytes as needed with replacement per ICU protocols. - generally avoid nephrotoxic agents such as NSAID, IV contrast unless specifically required  - adjust medications as needed for renal clearance  - follow I/O's as appropriate.    Infectious Disease:   1.  Covid positive    Plan  -Continue current Covid regiment, on antibiotics for possible HCAP    Endocrine:   1.  Concern for stress-induced hyperglycemia we will continue to monitor    Plan  - ICU insulin protocol, goal sugar <180      Hematology/Oncology:   1.  CBC pending   Plan  -Continue to monitor  High dose heparin     IV/Access:   1. Venous access -peripheral IVs  2. Arterial access -  3.  Plan  -Return continue to monitor at this time.      ICU Prophylaxis:   1. DVT: Heparin  2. VAP:  HOB 30 degrees, chlorhexidine rinse  3. Stress Ulcer: PPI/H2 blocker  4. Restraints: not indicated  5. Wound care  -local wound care  6. Feeding -n.p.o. for now  7. Family Update: Discuss with  , COVID dx, declined transfer to [facility]  8. Disposition -remain in ICU        Key goals for next 24 hours:   1.  Wean FiO2 as tolerated  2.  Continue on decadron  3.  Address nutrition               Medical History:     Past Medical History:   Diagnosis Date     Arthritis     OA - Knee     Dyshidrotic eczema      Herpes simplex labialis      Hypersensitivity vasculitides      Hypertension      Obesity      Shingles 2016     Venous insufficiency      Venous stasis      Past Surgical History:   Procedure Laterality Date     ARTHROPLASTY KNEE Left 9/6/2016    Procedure: ARTHROPLASTY KNEE;  Surgeon: Amy Landers MD;  Location:  OR     ARTHROPLASTY KNEE Right 4/3/2018    Procedure: ARTHROPLASTY KNEE;  RIGHT TOTAL KNEE ARTHROPLASTY ;  Surgeon: Amy Landers MD;  Location:  OR     ARTHROSCOPY ANKLE  2/2/2012    Procedure:ARTHROSCOPY ANKLE; LEFT ANKLE ARTHROSCOPIC IRRIGATION AND DEBRIDEMENT, WITH HARDWARE REMOVAL (C-ARM); Surgeon:TONYA CAAL; Location: OR     ORTHOPEDIC SURGERY      Left tib/fib fracture     REMOVE HARDWARE ANKLE  2/2/2012    Procedure:REMOVE HARDWARE ANKLE; Surgeon:TONYA CAAL; Location: OR     Social History     Socioeconomic History     Marital status:      Spouse name: Not on file     Number of children: Not on file     Years of education: Not on file     Highest education level: Not on file   Occupational History     Not on file   Social Needs     Financial resource strain: Not on file     Food insecurity     Worry: Not on file     Inability: Not on file     Transportation needs     Medical: Not on file     Non-medical: Not on file   Tobacco Use     Smoking status: Never Smoker     Smokeless tobacco: Never Used   Substance and Sexual Activity     Alcohol use: Yes      Comment: glass a wine every two weeks     Drug use: No     Sexual activity: Not on file   Lifestyle     Physical activity     Days per week: Not on file     Minutes per session: Not on file     Stress: Not on file   Relationships     Social connections     Talks on phone: Not on file     Gets together: Not on file     Attends [employer] service: Not on file     Active member of club or organization: Not on file     Attends meetings of clubs or organizations: Not on file     Relationship status: Not on file     Intimate partner violence     Fear of current or ex partner: Not on file     Emotionally abused: Not on file     Physically abused: Not on file     Forced sexual activity: Not on file   Other Topics Concern     Parent/sibling w/ CABG, MI or angioplasty before 65F 55M? Not Asked   Social History Narrative     Not on file        Allergies   Allergen Reactions     Lisinopril-Hydrochlorothiazide Other (See Comments)     hyponatremia              Key Medications:       remdesivir  100 mg Intravenous Q24H    And     sodium chloride 0.9%  50 mL Intravenous Q24H     azithromycin  500 mg Intravenous Q24H     cefTRIAXone  2 g Intravenous Q24H     dexamethasone  6 mg Oral or Feeding Tube Daily     enoxaparin ANTICOAGULANT  105 mg Subcutaneous BID       lactated ringers 100 mL/hr at 04/14/21 0400     norepinephrine          Home Meds  No current facility-administered medications on file prior to encounter.   amLODIPine (NORVASC) 5 MG tablet, Take 5 mg by mouth daily  metoprolol succinate ER (TOPROL-XL) 50 MG 24 hr tablet, Take 75 mg by mouth daily  multivitamin, therapeutic (THERA-VIT) TABS tablet, Take 1 tablet by mouth daily  naproxen sodium (ANAPROX) 220 MG tablet, Take 220 mg by mouth 2 times daily as needed for moderate pain               Physical Examination:   Temp:  [97.2  F (36.2  C)-99  F (37.2  C)] 98.8  F (37.1  C)  Pulse:  [] 87  Resp:  [23-34] 27  BP: ()/(50-94) 102/70  FiO2 (%):  [60 %-100 %] 60  %  SpO2:  [68 %-100 %] 97 %  No intake or output data in the 24 hours ending 04/13/21 1821  Wt Readings from Last 4 Encounters:   04/14/21 107.1 kg (236 lb 1.8 oz)   04/03/18 104.3 kg (230 lb)   09/06/16 100.2 kg (221 lb)   02/06/12 108.1 kg (238 lb 5.1 oz)     BP - Mean:  [] 81  FiO2 (%): 60 %  Resp: 27    Recent Labs   Lab 04/13/21 1813   PH 7.44   PCO2 35   PO2 198*   HCO3 24       GEN: no acute distress   HEENT: head ncat, sclera anicteric, OP patent, trachea midline   PULM: Diminished spontaneous breath sounds CV/COR: RRR S1S2 no gallop,  No rub, no murmur  ABD: soft nontender, hypoactive bowel sounds, no mass  EXT:  Edema   warm  NEURO: grossly intact will follow commands on all 4 extremities  SKIN: no obvious rash  LINES: clean, dry intact         Data:   All data and imaging reviewed     ROUTINE ICU LABS (Last four results)  CMP  Recent Labs   Lab 04/14/21  0619 04/13/21  1848    135   POTASSIUM 3.7 3.2*  3.1*   CHLORIDE 107 102   CO2 26 25   ANIONGAP 5 8   * 215*   BUN 29 30   CR 0.85 0.99   GFRESTIMATED 72 60*   GFRESTBLACK 83 69   ALICIA 8.2* 8.3*   MAG  --  2.4*   PROTTOTAL  --  6.9   ALBUMIN  --  2.7*   BILITOTAL  --  0.6   ALKPHOS  --  98   AST  --  55*   ALT  --  21     CBC  Recent Labs   Lab 04/14/21  0619 04/13/21  1848   WBC 9.3 12.1*   RBC 4.34 4.57   HGB 12.5 13.6   HCT 37.4 39.4   MCV 86 86   MCH 28.8 29.8   MCHC 33.4 34.5   RDW 12.8 12.9    247     INR  Recent Labs   Lab 04/13/21  1848   INR 1.27*     Arterial Blood Gas  Recent Labs   Lab 04/13/21  1813   PH 7.44   PCO2 35   PO2 198*   HCO3 24       All cultures:  No results for input(s): CULT in the last 168 hours.  No results found for this or any previous visit (from the past 24 hour(s)).      Billing: This patient is critically ill:yes. Total critical care time today 34 min. Excluding proceedures

## 2021-04-14 NOTE — PROGRESS NOTES
CPAP/BiPAP Settings  IPAP: 18  EPAP: 10   Rate: 16  FiO2: 60  Timed Inspiration (sec): 0.9    CPAP/BiPAP/AVAPS/AVAPS AE Alarms  High Pressure:25  Low Pressure: 5  Low Pressure Delay: 20  High Rate (breaths/min): 45  Low Rate (breaths/min): 5  Alarm Volume Level: 10    CPAP/BiPAP/AVAPS/AVAPS AE Patient Assessment  Skin Assessment: intact    Plan: Will continue to follow.    4/14/2021  Ashley Mendoza

## 2021-04-14 NOTE — PLAN OF CARE
Patient arrived to [facility] ICU at 17:15. Patient was on CPAP, per EMS and satting 65% and RR in the 40s.  Patient was initiated on Bipap, settings 18/10 100% FiO2 with improvement of her respiratory status. Arterial line placement attempted, but MD unable to thread wire.  Labs results and CXR pending.  Covid swab pending. Patient and  (via telephone) updated on POC, questions answered.

## 2021-04-14 NOTE — PLAN OF CARE
Neuro: Pt is alert, disoriented by day, PERRL. Fallow comands  Pulmonary: On BIPAP.O2 @ 60%, 18/10, Sat's 92-95's, RR mid to upper  20's,  LSC diminished, Pt tolerate BiPAP well, denies SOB.  CV:  NSR, HR 70-80  GI/: NPO, Pt retain urine, no urgent to void, was bladder scanned for 150 ml, MD notified ordered for brown placed, and was drained 950 ml. I&O Q 2 h, adequate UOP  VASC: R PIV x 1, L PIV x 2  Labs: Potasium 3.1, was replaced,  Skin: Intact, no open areas, skin appeared red blanchable, foam dressing applied to buttocks..   GTT: LR @ 100 continuous.   US to lower extremities, negative for DVT.12 lead ECG done - NSR.

## 2021-04-14 NOTE — PLAN OF CARE
OT/PT: Per discussion with RN, pt is not appropriate for therapy today as pt remains on BiPAP. Therapy will continue to follow.

## 2021-04-14 NOTE — PLAN OF CARE
Pt on continuous bipap support, unable to tolerate hi-flow NC, gradually incr O2 needs throughout shift. Low grade fevers. LS clear but diminished throughout. Adequate urine output per brown. NPO. No BM since admission. Replacing potassium.  updated by phone on POC.

## 2021-04-15 NOTE — PROGRESS NOTES
Critical Care  Note      04/15/2021    Name: Sarahy Dunbar MRN#: [id number]   Age: 65 year old YOB: 1955     [facility]    [facility] DAY # 2             Problem List:   Active Problems:    Acute respiratory failure with hypoxemia (H)           Summary/Hospital Course:     65-year-old female with no significant medical history who was found in bed with low saturations after welfare check.  The patient's  was out of town and was unable to contact his wife and the patient was found in bed with a saturation of approximately 20.  The patient is currently on BiPAP and given his shortness of breath is difficult to get a history from the most of the history is taken from the chart.  However at the outside hospital patient did deny cough fever and was confused and her history was limited.  Patient's  gives a history of hypertension and anxiety.  Review of systems is otherwise unremarkable.  The patient was admitted to the outside hospital where she was treated with noninvasive ventilation antibiotics and IV fluid.  Her condition stabilized and she was transferred to our ICU for further management and rule out of Covid.  On her arrival she was transitioned noninvasive ventilation and his saturation improved.  Over night the patient's condition worsened requiring higher levels of oxygen and increased work of breathing.  Discussion with patient and  to proceed with intubation.  Patient intubated and place on full support, Central line and arterial line placed.      Assessment and plan :     Sarahy Dunbar IS a 65 year old female admitted on 4/13/2021 for rule out Covid pneumonia, altered mental status, hypoxemic respiratory failure requiring noninvasive ventilation.   I have personally reviewed the daily labs, imaging studies, cultures and discussed the case with referring physician and consulting physicians.     My assessment and plan by system for this patient is as  follows:    Neurology/Psychiatry:   1.  Propofol/Versed for ICU sedation  2.Fentanyl infusion for pain  3.  History of anxiety present on admission we will monitor    Plan  Keep RASS negative 3 to 4  Cardiovascular:   1.Hemodynamics -hemodynamically stable at this time  2.Rhythm -normal sinus rhythm  3. Ischemia -no signs of ischemia  Plan  Continue to monitor    Pulmonary/Ventilator Management:   1. Airway patent airway  2. Full ventilatory support 20 by 400 FiO2 100% with PEEP of 12, wean FiO2 and PEEP as tolerated to keep PaO2 greater than 60 and O2 Sat greater than 92%  Consider prone position id worsens  Plan  -Continue current vent support, wean as tolerated    GI and Nutrition :   1.  N.p.o. ,     Plan  - we will place Dobbhoff tube and start tube feeds    Renal/Fluids/Electrolytes:   1.  Creatinine with in normal limits  2.  Hypokalemia  3. Normal ph, with adequate oxygenation  4. Volume status appears euvolemic  Plan    - monitor function and electrolytes as needed with replacement per ICU protocols. - generally avoid nephrotoxic agents such as NSAID, IV contrast unless specifically required  - adjust medications as needed for renal clearance  - follow I/O's as appropriate.    Infectious Disease:   1.  Covid positive    Plan  -Continue current Covid regiment, on antibiotics for possible HCAP    Endocrine:   1.  Concern for stress-induced hyperglycemia we will continue to monitor    Plan  - ICU insulin protocol, goal sugar <180      Hematology/Oncology:   1.  CBC pending   Plan  -Continue to monitor  High dose heparin     IV/Access:   1. Venous access -central line  2. Arterial access - femoral arterial line  3.  Plan  -Return continue to monitor at this time.      ICU Prophylaxis:   1. DVT: Heparin  2. VAP: HOB 30 degrees, chlorhexidine rinse  3. Stress Ulcer: PPI/H2 blocker  4. Restraints: Indicated for not pulling at lines  5. Wound care  -local wound care  6. Feeding -tubeffeds  7. Family Update:discuss  with  need for intubation and place on full ventilatory support.  He is in agreeement  8. Disposition -remain in ICU        Key goals for next 24 hours:   1.  Wean FiO2 as tolerated  2.  Continue on decadron  3.  Address nutrition               Medical History:     Past Medical History:   Diagnosis Date     Arthritis     OA - Knee     Dyshidrotic eczema      Herpes simplex labialis      Hypersensitivity vasculitides      Hypertension      Obesity      Shingles 2016     Venous insufficiency      Venous stasis      Past Surgical History:   Procedure Laterality Date     ARTHROPLASTY KNEE Left 9/6/2016    Procedure: ARTHROPLASTY KNEE;  Surgeon: Amy Landers MD;  Location:  OR     ARTHROPLASTY KNEE Right 4/3/2018    Procedure: ARTHROPLASTY KNEE;  RIGHT TOTAL KNEE ARTHROPLASTY ;  Surgeon: Amy Landers MD;  Location:  OR     ARTHROSCOPY ANKLE  2/2/2012    Procedure:ARTHROSCOPY ANKLE; LEFT ANKLE ARTHROSCOPIC IRRIGATION AND DEBRIDEMENT, WITH HARDWARE REMOVAL (C-ARM); Surgeon:TONYA CAAL; Location: OR     ORTHOPEDIC SURGERY      Left tib/fib fracture     REMOVE HARDWARE ANKLE  2/2/2012    Procedure:REMOVE HARDWARE ANKLE; Surgeon:TONYA CAAL; Location: OR     Social History     Socioeconomic History     Marital status:      Spouse name: Not on file     Number of children: Not on file     Years of education: Not on file     Highest education level: Not on file   Occupational History     Not on file   Social Needs     Financial resource strain: Not on file     Food insecurity     Worry: Not on file     Inability: Not on file     Transportation needs     Medical: Not on file     Non-medical: Not on file   Tobacco Use     Smoking status: Never Smoker     Smokeless tobacco: Never Used   Substance and Sexual Activity     Alcohol use: Yes     Comment: glass a wine every two weeks     Drug use: No     Sexual activity: Not on file   Lifestyle     Physical activity     Days per week: Not on file      Minutes per session: Not on file     Stress: Not on file   Relationships     Social connections     Talks on phone: Not on file     Gets together: Not on file     Attends [employer] service: Not on file     Active member of club or organization: Not on file     Attends meetings of clubs or organizations: Not on file     Relationship status: Not on file     Intimate partner violence     Fear of current or ex partner: Not on file     Emotionally abused: Not on file     Physically abused: Not on file     Forced sexual activity: Not on file   Other Topics Concern     Parent/sibling w/ CABG, MI or angioplasty before 65F 55M? Not Asked   Social History Narrative     Not on file        Allergies   Allergen Reactions     Lisinopril-Hydrochlorothiazide Other (See Comments)     hyponatremia              Key Medications:       remdesivir  100 mg Intravenous Q24H    And     sodium chloride 0.9%  50 mL Intravenous Q24H     azithromycin  500 mg Intravenous Q24H     cefTRIAXone  2 g Intravenous Q24H     chlorhexidine  15 mL Mouth/Throat Q12H     dexamethasone  6 mg Intravenous Q24H     enoxaparin ANTICOAGULANT  105 mg Subcutaneous BID       fentaNYL 50 mcg/hr (04/15/21 1209)     lactated ringers 100 mL/hr at 04/15/21 1013     midazolam 5 mg/hr (04/15/21 1243)     norepinephrine Stopped (04/15/21 1238)     propofol (DIPRIVAN) infusion 50 mcg/kg/min (04/15/21 1203)     vasopressin Stopped (04/15/21 1237)        Home Meds  No current facility-administered medications on file prior to encounter.   amLODIPine (NORVASC) 5 MG tablet, Take 5 mg by mouth daily  metoprolol succinate ER (TOPROL-XL) 50 MG 24 hr tablet, Take 75 mg by mouth daily  multivitamin, therapeutic (THERA-VIT) TABS tablet, Take 1 tablet by mouth daily  naproxen sodium (ANAPROX) 220 MG tablet, Take 220 mg by mouth 2 times daily as needed for moderate pain               Physical Examination:   Temp:  [98.2  F (36.8  C)-99.5  F (37.5  C)] 98.6  F (37  C)  Pulse:   [] 88  Resp:  [12-33] 30  BP: ()/(50-84) 141/68  FiO2 (%):  [65 %-100 %] 90 %  SpO2:  [87 %-98 %] 94 %  No intake or output data in the 24 hours ending 04/13/21 1821  Wt Readings from Last 4 Encounters:   04/15/21 109.4 kg (241 lb 2.9 oz)   04/03/18 104.3 kg (230 lb)   09/06/16 100.2 kg (221 lb)   02/06/12 108.1 kg (238 lb 5.1 oz)     BP - Mean:  [] 91  Ventilation Mode: CMV/AC  (Continuous Mandatory Ventilation/ Assist Control)  FiO2 (%): 90 %  Rate Set (breaths/minute): 20 breaths/min  Tidal Volume Set (mL): 400 mL  PEEP (cm H2O): 12 cmH2O  Oxygen Concentration (%): 40 %  Resp: 30    Recent Labs   Lab 04/15/21  1325 04/15/21  0350 04/13/21  1813   PH 7.37 7.43 7.44   PCO2 44 39 35   PO2 121* 71* 198*   HCO3 25 26 24   O2PER  --  90%  --        GEN: no acute distress   HEENT: head ncat, sclera anicteric, OP patent, trachea midline   PULM: Coarse spontaneous breath sounds prior to intubation, diminished at bases   CV/COR: RRR S1S2 no gallop,  No rub, no murmur  ABD: soft nontender, hypoactive bowel sounds, no mass  EXT:  Edema   warm  NEURO: grossly intact will follow commands on all 4 extremities  SKIN: no obvious rash  LINES: clean, dry intact         Data:   All data and imaging reviewed     ROUTINE ICU LABS (Last four results)  CMP  Recent Labs   Lab 04/15/21  0513 04/14/21  0619 04/13/21  1848    138 135   POTASSIUM 3.6 3.7 3.2*  3.1*   CHLORIDE 112* 107 102   CO2 26 26 25   ANIONGAP 6 5 8   * 128* 215*   BUN 27 29 30   CR 0.78 0.85 0.99   GFRESTIMATED 79 72 60*   GFRESTBLACK >90 83 69   ALICIA 7.8* 8.2* 8.3*   MAG  --   --  2.4*   PROTTOTAL  --   --  6.9   ALBUMIN  --   --  2.7*   BILITOTAL  --   --  0.6   ALKPHOS  --   --  98   AST  --   --  55*   ALT  --   --  21     CBC  Recent Labs   Lab 04/15/21  0513 04/14/21  0619 04/13/21  1848   WBC 14.8* 9.3 12.1*   RBC 3.88 4.34 4.57   HGB 11.3* 12.5 13.6   HCT 34.7* 37.4 39.4   MCV 89 86 86   MCH 29.1 28.8 29.8   MCHC 32.6 33.4 34.5    RDW 13.2 12.8 12.9    238 247     INR  Recent Labs   Lab 04/13/21  1848   INR 1.27*     Arterial Blood Gas  Recent Labs   Lab 04/15/21  1325 04/15/21  0350 04/13/21  1813   PH 7.37 7.43 7.44   PCO2 44 39 35   PO2 121* 71* 198*   HCO3 25 26 24   O2PER  --  90%  --        All cultures:  No results for input(s): CULT in the last 168 hours.  No results found for this or any previous visit (from the past 24 hour(s)).      Billing: This patient is critically ill:yes. Total critical care time today 33 min. Excluding proceedures

## 2021-04-15 NOTE — PROGRESS NOTES
[facility] ICU RESPIRATORY NOTE        Date of Admission: 4/13/2021    Date of Intubation (most recent): 04/15/21     Reason for Mechanical Ventilation: Respiratory failure    Number of Days on Mechanical Ventilation: 1    Met Criteria for Spontaneous Breathing Trial: No    Reason for No Spontaneous Breathing Trial: intubated today    Significant Events Today: Pt was intubated at 1327    ABG Results:   Recent Labs   Lab 04/15/21  1325 04/15/21  0350 04/13/21  1813   PH 7.37 7.43 7.44   PCO2 44 39 35   PO2 121* 71* 198*   HCO3 25 26 24   O2PER  --  90%  --        Current Vent Settings: Ventilation Mode: CMV/AC  (Continuous Mandatory Ventilation/ Assist Control)  FiO2 (%): 90 %  Rate Set (breaths/minute): 20 breaths/min  Tidal Volume Set (mL): 400 mL  PEEP (cm H2O): 12 cmH2O  Oxygen Concentration (%): 40 %  Resp: 30      Skin Assessment: clean and dry    Plan: Will continue to follow    Ashley Mendoza

## 2021-04-15 NOTE — PLAN OF CARE
OT/PT: therapies have been on hold as pt continues to have high respiratory needs, plan is now for likely intubation. OT and PT will sign off and complete orders at this time. Please issue new orders when patient able to tolerate participation.

## 2021-04-15 NOTE — ANESTHESIA CARE TRANSFER NOTE
Patient: Sarahy Dunbar    * No procedures listed *    Diagnosis: * No pre-op diagnosis entered *  Diagnosis Additional Information: No value filed.    Anesthesia Type:   No value filed.     Note:    Oropharynx: ventilatory support        Independent Airway: airway patency not satisfactory and stable    Vital Signs Stable: post-procedure vital signs reviewed and stable  Report to RN Given: handoff report given  Patient transferred to: ICU  Comments: Diagnosis: Respiratory Failure  Procedure: ICU Intubation  Cardiologist: Dr. Patten  Location: 349  ICU Handoff: Call for PAUSE to initiate/utilize ICU HANDOFF, Identified Patient, Identified Responsible Provider, Reviewed the Pertinent Medical History, Discussed Surgical Course, Reviewed Intra-OP Anesthesia Management and Issues during Anesthesia, Set Expectations for Post Procedure Period and Allowed Opportunity for Questions and Acknowledgement of Understanding      Vitals: (Last set prior to Anesthesia Care Transfer)  CRNA VITALS  4/15/2021 1131 - 4/15/2021 1231      4/15/2021             SpO2:  99 %    EKG:  Sinus rhythm        Electronically Signed By: DARRELL Mac CRNA  April 15, 2021  12:41 PM

## 2021-04-15 NOTE — ANESTHESIA PROCEDURE NOTES
Airway       Patient location during procedure: ICU ([facility] - Operating Room or Procedural Area)       Procedure Start/Stop Times: 4/15/2021 11:58 AM  Staff -        CRNA: Cristina Gilbert APRN CRNA       Other Anesthesia Staff: Bela Nguyễn       Performed By: CRNA  Consent for Airway        Urgency: emergent  Report Obtained from Primary Care Team       History regarding most recent potassium obtained: Yes       History regarding presence/absence of renal failure obtained:Yes       History regarding stroke/CVA obtained:Yes       History regarding presence/absence of NM disorder: YesIndications and Patient Condition       Indications for airway management: respiratory insufficiency       Mallampati: II     Induction type:intravenous       Mask difficulty assessment: 0 - not attempted    Final Airway Details       Final airway type: endotracheal airway       Successful airway: ETT - single  Endotracheal Airway Details        ETT size (mm): 7.0       Cuffed: yes       Successful intubation technique: video laryngoscopy       VL Blade Size: Glidescope 3       Grade View of Cords: 1       Adjucts: stylet       Position: Center       Measured from: gums/teeth       Secured at (cm): 22       Bite block used: None    Post intubation assessment        ETT secured, Vent settings by primary/ICU team, Primary/ICU team to review CXR, Sedation to be ordered by primary/ICU team and No apparent complications       Placement verified by: capnometry, equal breath sounds and chest rise        Number of attempts at approach: 1       Number of other approaches attempted: 0       Secured with: other (comment) (ICU tube estrella)       Ease of procedure: easy       Dentition: Intact and Unchanged    Medication(s) Administered   Medication Administration Time: 4/15/2021 11:58 AM

## 2021-04-15 NOTE — CONSULTS
"CLINICAL NUTRITION SERVICES  -  ASSESSMENT NOTE    Recommendations Ordered by Registered Dietitian (RD):   Initiate Vital High Protein at 15 ml/hr which provides 360 kcals, 31 g protein (0.7 g/kg), 40 g CHO, 301 ml free water and 0 g Fiber.   Total Provisions (Propofol and TF): 1207 kcal (11 kcal/kg)   FWF: 60 ml q 4 hours for tube patency   Certavite daily   Malnutrition:   % Weight Loss:  None noted  % Intake:  Decreased intake does not meet criteria for malnutrition - NPO x 3 days  Subcutaneous Fat Loss: Unable to assess  Muscle Loss: Unable to assess  Fluid Retention:  None noted    Malnutrition Diagnosis: Unable to determine without NFPE while Patient is COVID+      REASON FOR ASSESSMENT  Sarahy Dunbar is a 65 year old female seen by Registered Dietitian for Provider Order - Registered Dietitian to Assess and Order TF per Medical Nutrition Therapy Protocol - Dr Houser    NUTRITION HISTORY  - Unable to obtain nutrition history, patient intubated  NPO x 3 days, suspect poor PO prior to admission.    CURRENT NUTRITION ORDERS  Diet Order:     NPO     Current Intake/Tolerance:  - Patient NPO since admitted 4/13    NUTRITION FOCUSED PHYSICAL ASSESSMENT FOR DIAGNOSING MALNUTRITION)  No:  Deferred - Patient is COVID+          Observed:    N/A    Obtained from Chart/Interdisciplinary Team:  N/A    ANTHROPOMETRICS  Height: 5' 0\"   Admit Wt: 107 kg   Weight: 241 lbs 2.93 oz 109.4 kg   Body mass index is 47.1 kg/m .  Weight Status:  Obesity Grade III BMI >40  IBW: 45 kg, 243%  Weight History:  No wt loss noted  Wt Readings from Last 10 Encounters:   04/15/21 109.4 kg (241 lb 2.9 oz)   04/03/18 104.3 kg (230 lb)   09/06/16 100.2 kg (221 lb)   02/06/12 108.1 kg (238 lb 5.1 oz)     LABS  Labs reviewed    MEDICATIONS  Medications reviewed  LR @ 100 ml/hr  Versed, norepi held  Propofol @ 32.1 ml/hr - 847 kcals    ASSESSED NUTRITION NEEDS PER APPROVED PRACTICE GUIDELINES:    Dosing Weight: 107 kg for energy, 45 kg for " protein  Estimated Energy Needs:0065-0355 kcals (11-14 Kcal/Kg)  Justification: obese and vented  Estimated Protein Needs:  grams protein (2-2.5 g pro/Kg)  Justification: obesity guidelines   Estimated Fluid Needs: 1 ml/hr     MALNUTRITION:  % Weight Loss:  None noted  % Intake:  Decreased intake does not meet criteria for malnutrition - NPO x 3 days  Subcutaneous Fat Loss: Unable to assess  Muscle Loss: Unable to assess  Fluid Retention:  None noted    Malnutrition Diagnosis: Unable to determine without NFPE     NUTRITION DIAGNOSIS:  Inadequate oral intake related to intubation and sedation as evidenced by propofol meeting 56% of energy needs and 0% protein needs.     NUTRITION INTERVENTIONS  Recommendations / Nutrition Prescription  Initiate Vital High Protein at 15 ml/hr which provides 360 kcals, 31 g protein (0.7 g/kg), 40 g CHO, 301 ml free water and 0 g Fiber.   Add 6 pkts Prosource, 240 kcals and 66 g protein   Total Provisions (Prosource +Propofol +TF): 1447 kcal (14 kcal/kg), 97 g protein (2.2 g/kg)   FWF: 60 ml q 4 hours for tube patency   Certavite daily    Implementation  Nutrition education: Not appropriate at this time due to patient condition  EN Composition, EN Schedule,   Collaboration and Referral of Nutrition care - Discussed in rounds    Nutrition Goals  TF + Propofol to provide 100% of estimated needs.    MONITORING AND EVALUATION:  Progress towards goals will be monitored and evaluated per protocol and Practice Guidelines    Genoveva Beck  Dietetic Intern

## 2021-04-15 NOTE — PROGRESS NOTES
65-year-old female admitted to the ICU for Covid pneumonia.  Over the last year she had worsening respiratory status requiring intubation.  Given the need for frequent arterial blood draws the decision was made to place an arterial line.  Consent was obtained from the patient's .  Patient's was prepped and draped in the usual sterile fashion with the area over the patient's right femoral artery exposed.  A timeout was performed in accordance to [place] standards.  Using a hollow bore needle the femoral artery was cannulated with palpation there is return arterial blood.  The guidewire at this time would not thread easily so was removed intact.  I about 1 cm  more proximal and was able to recannulate the artery without complications.  This time the wire threaded without complications.  The needle was removed and using the Seldinger technique a long 20-gauge catheter was inserted over the wire.  The wire was removed and disposed of in the usual fashion.  There was a return of brisk arterial blood from the catheter and was hooked up to transducer which showed arterial waveform.  The line was sutured in the place and the line was dressed in usual fashion.  The patient tolerated the procedure well and I personally performed this procedure.

## 2021-04-15 NOTE — PLAN OF CARE
Neuro: Pt is A&O x 4, PERRL. Fallow comands  Pulmonary: On BIPAP.O2 @ 75%, 18/10, Sat's 92-95's, RR mid to upper  20's,  LSC diminished, Pt tolerate BiPAP well, denies SOB.  CV:  NSR, HR 70-80  GI/: NPO, Yeboah in place with adequate UOP.  VASC: R PIV x 1, L PIV x 2  Skin: Intact, no open areas, skin appeared red blanchable, foam dressing applied to buttocks..   GTT: LR @ 100 continuous.    Time of care: 6739-5496

## 2021-04-15 NOTE — PLAN OF CARE
CNS: A&O, lethargic at times    CVS: SR BP stable    Resp: Bipap 14/7 Fi02 90%, FI02 increased from 75 overnight to maintain sats >90    GI: NPO    : Yeboah for retention    Skin: intact    Access: PIV x3      Plan: Remdisiver, decadron, zithromax, rocephin, and BIPAP-maintain covid precautions, monitor resp status closely

## 2021-04-15 NOTE — ADDENDUM NOTE
Addendum  created 04/15/21 1242 by Cristina Gilbert APRN CRNA    Clinical Note Signed, Intraprocedure Event edited

## 2021-04-15 NOTE — PROCEDURES
65-year-old female admitted to the ICU for Covid pneumonia.  Over the last 24 hours she had worsening respiratory status requiring intubation.  The decision was made to place central access for possible administration of pressors.  Consent was obtained from the patient's .  The patient was prepped and draped in usual sterile fashion with the area of the patient's left subclavian vein exposed.  A timeout was performed in accordance with [place] standards.  A hollow bore needle was introduced parallel underneath the clavicle until there was the return of venous blood.  This was done on 1 pass.  The syringe was removed and venous blood was confirmed a wire was threaded without resistance.  Using the Seldinger technique a triple-lumen catheter was threaded to 18 cm and the wire was removed and disposed of in usual fashion.  There was blood return times all 3 ports and the line was sutured into place.  The wound was dressed in usual fashion.  The patient tolerated procedure well.  I personally performed this procedure.  I reviewed the chest x-ray which showed no evidence of pneumothorax and line in adequate position.

## 2021-04-15 NOTE — PROGRESS NOTES
Patient remained on Bipap overnight. FiO2 increased from 75% to 90%.     ABG drawn on Settings 14/7 90%  Recent Labs   Lab 04/15/21  0350 04/13/21  1813   PH 7.43 7.44   PCO2 39 35   PO2 71* 198*   HCO3 26 24   O2PER 90%  --        Anahi Brewer, RT

## 2021-04-16 NOTE — PROGRESS NOTES
[facility] ICU RESPIRATORY NOTE           Date of Admission: 4/13/2021     Date of Intubation (most recent): 04/15/21      Reason for Mechanical Ventilation: Respiratory failure     Number of Days on Mechanical Ventilation: 2     Met Criteria for Spontaneous Breathing Trial: No     Reason for No Spontaneous Breathing Trial: High PEEP / FiO2     Significant Events Today: Pt was intubated at 1327    Recent Labs   Lab 04/15/21  2200 04/15/21  1600 04/15/21  1325 04/15/21  0350   PH 7.35 7.37 7.37 7.43   PCO2 42 44 44 39   PO2 170* 158* 121* 71*   HCO3 23 25 25 26   O2PER  --   --   --  90%     Ventilation Mode: CMV/AC  (Continuous Mandatory Ventilation/ Assist Control)  FiO2 (%): 70 %  Rate Set (breaths/minute): 20 breaths/min  Tidal Volume Set (mL): 400 mL  PEEP (cm H2O): 12 cmH2O  Oxygen Concentration (%): 50 %  Resp: 25    NATHALY CASTANEDA, RT

## 2021-04-16 NOTE — PROGRESS NOTES
Critical Care  Note      04/16/2021    Name: Sarahy Dunbar MRN#: [id number]   Age: 65 year old YOB: 1955     [facility]    [facility] DAY # 3             Problem List:   Active Problems:    Acute respiratory failure with hypoxemia (H)           Summary/Hospital Course:     65-year-old female with no significant medical history who was found in bed with low saturations after welfare check.  The patient's  was out of town and was unable to contact his wife and the patient was found in bed with a saturation of approximately 20.  The patient is currently on BiPAP and given his shortness of breath is difficult to get a history from the most of the history is taken from the chart.  However at the outside hospital patient did deny cough fever and was confused and her history was limited.  Patient's  gives a history of hypertension and anxiety.  Review of systems is otherwise unremarkable.  The patient was admitted to the outside hospital where she was treated with noninvasive ventilation antibiotics and IV fluid.  Her condition stabilized and she was transferred to our ICU for further management and rule out of Covid.  On her arrival she was transitioned noninvasive ventilation and his saturation improved.   The patient's condition worsened requiring higher levels of oxygen and increased work of breathing.  Patient was intubated and has a stable night and we were able to wean oxygen requirements overnight.       Assessment and plan :     Sarahy Dunbar IS a 65 year old female admitted on 4/13/2021 for rule out Covid pneumonia, altered mental status, hypoxemic respiratory failure requiring noninvasive ventilation.   I have personally reviewed the daily labs, imaging studies, cultures and discussed the case with referring physician and consulting physicians.     My assessment and plan by system for this patient is as follows:    Neurology/Psychiatry:   1.  Propofol/Versed for ICU  sedation  2.Fentanyl infusion for pain  3.  History of anxiety present on admission we will monitor    Plan  Keep RASS negative 3 to 4    Cardiovascular:   1.Hemodynamics -hemodynamically stable at this time  2.Rhythm -normal sinus rhythm  3. Ischemia -no signs of ischemia  Plan  Continue to monitor    Pulmonary/Ventilator Management:   1. Airway patent airway  2. Full ventilatory support 20 by 400 FiO2 100% with PEEP of 12, wean FiO2 and PEEP as tolerated to keep PaO2 greater than 60 and O2 Sat greater than 92%  Consider prone position id worsens  Plan  -PEEP increased to 14 however able to wean FiO2  As tolerated, keep PaO2 greater than 60, lung protective strategy if needed     GI and Nutrition :   1.  N.p.o. ,     Plan  - we will place Dobbhoff tube and start tube feeds    Renal/Fluids/Electrolytes:   1.  Creatinine with in normal limits  2.  Hypokalemia (3.5)  3. Normal ph, with adequate oxygenation  4. Volume status appears euvolemic  Plan    - monitor function and electrolytes as needed with replacement per ICU protocols. - generally avoid nephrotoxic agents such as NSAID, IV contrast unless specifically required  - adjust medications as needed for renal clearance  - follow I/O's as appropriate.    Infectious Disease:   1.  Covid positive    Plan  -Continue current Covid regiment, on antibiotics for possible HCAP    Endocrine:   1.  Concern for stress-induced hyperglycemia we will continue to monitor    Plan  - ICU insulin protocol, goal sugar <180      Hematology/Oncology:   1.  CBC pending   Plan  -Continue to monitor  High dose heparin     IV/Access:   1. Venous access -central line  2. Arterial access - femoral arterial line  3.  Plan  -Return continue to monitor at this time.      ICU Prophylaxis:   1. DVT: Heparin  2. VAP: HOB 30 degrees, chlorhexidine rinse  3. Stress Ulcer: PPI/H2 blocker  4. Restraints: Indicated for not pulling at lines  5. Wound care  -local wound care  6. Feeding -tubeffeds  7.  Family Update:discussed with  weaning of FiO2 and PEEP, continue current COVID regiment  8. Disposition -remain in ICU        Key goals for next 24 hours:   1.  Wean FiO2 as tolerated  2.  Continue on decadron  3.  Start tubefeeds               Medical History:     Past Medical History:   Diagnosis Date     Arthritis     OA - Knee     Dyshidrotic eczema      Herpes simplex labialis      Hypersensitivity vasculitides      Hypertension      Obesity      Shingles 2016     Venous insufficiency      Venous stasis      Past Surgical History:   Procedure Laterality Date     ARTHROPLASTY KNEE Left 9/6/2016    Procedure: ARTHROPLASTY KNEE;  Surgeon: Amy Landers MD;  Location:  OR     ARTHROPLASTY KNEE Right 4/3/2018    Procedure: ARTHROPLASTY KNEE;  RIGHT TOTAL KNEE ARTHROPLASTY ;  Surgeon: Amy Landers MD;  Location:  OR     ARTHROSCOPY ANKLE  2/2/2012    Procedure:ARTHROSCOPY ANKLE; LEFT ANKLE ARTHROSCOPIC IRRIGATION AND DEBRIDEMENT, WITH HARDWARE REMOVAL (C-ARM); Surgeon:TONYA CAAL; Location: OR     ORTHOPEDIC SURGERY      Left tib/fib fracture     REMOVE HARDWARE ANKLE  2/2/2012    Procedure:REMOVE HARDWARE ANKLE; Surgeon:TONYA CAAL; Location: OR     Social History     Socioeconomic History     Marital status:      Spouse name: Not on file     Number of children: Not on file     Years of education: Not on file     Highest education level: Not on file   Occupational History     Not on file   Social Needs     Financial resource strain: Not on file     Food insecurity     Worry: Not on file     Inability: Not on file     Transportation needs     Medical: Not on file     Non-medical: Not on file   Tobacco Use     Smoking status: Never Smoker     Smokeless tobacco: Never Used   Substance and Sexual Activity     Alcohol use: Yes     Comment: glass a wine every two weeks     Drug use: No     Sexual activity: Not on file   Lifestyle     Physical activity     Days per week: Not on file      Minutes per session: Not on file     Stress: Not on file   Relationships     Social connections     Talks on phone: Not on file     Gets together: Not on file     Attends [employer] service: Not on file     Active member of club or organization: Not on file     Attends meetings of clubs or organizations: Not on file     Relationship status: Not on file     Intimate partner violence     Fear of current or ex partner: Not on file     Emotionally abused: Not on file     Physically abused: Not on file     Forced sexual activity: Not on file   Other Topics Concern     Parent/sibling w/ CABG, MI or angioplasty before 65F 55M? Not Asked   Social History Narrative     Not on file        Allergies   Allergen Reactions     Lisinopril-Hydrochlorothiazide Other (See Comments)     hyponatremia              Key Medications:       remdesivir  100 mg Intravenous Q24H    And     sodium chloride 0.9%  50 mL Intravenous Q24H     azithromycin  500 mg Intravenous Q24H     cefTRIAXone  2 g Intravenous Q24H     chlorhexidine  15 mL Mouth/Throat Q12H     dexamethasone  6 mg Intravenous Q24H     enoxaparin ANTICOAGULANT  105 mg Subcutaneous BID     multivitamins w/minerals  15 mL Per Feeding Tube Daily       dextrose       fentaNYL 100 mcg/hr (04/16/21 0756)     lactated ringers 100 mL/hr at 04/16/21 0755     midazolam 8 mg/hr (04/16/21 0757)     norepinephrine 0.01 mcg/kg/min (04/16/21 0755)     propofol (DIPRIVAN) infusion Stopped (04/15/21 2155)     sodium chloride 30 mL/hr at 04/16/21 0944     vasopressin Stopped (04/15/21 1237)        Home Meds  No current facility-administered medications on file prior to encounter.   amLODIPine (NORVASC) 5 MG tablet, Take 5 mg by mouth daily  metoprolol succinate ER (TOPROL-XL) 50 MG 24 hr tablet, Take 75 mg by mouth daily  multivitamin, therapeutic (THERA-VIT) TABS tablet, Take 1 tablet by mouth daily  naproxen sodium (ANAPROX) 220 MG tablet, Take 220 mg by mouth 2 times daily as needed for  moderate pain               Physical Examination:   Temp:  [96.4  F (35.8  C)-99.3  F (37.4  C)] 98.4  F (36.9  C)  Pulse:  [] 60  Resp:  [19-36] 23  BP: ()/(28-96) 118/67  MAP:  [64 mmHg-107 mmHg] 86 mmHg  Arterial Line BP: ()/(45-90) 130/60  FiO2 (%):  [60 %-100 %] 60 %  SpO2:  [85 %-100 %] 90 %  No intake or output data in the 24 hours ending 04/13/21 1821  Wt Readings from Last 4 Encounters:   04/16/21 109 kg (240 lb 4.8 oz)   04/03/18 104.3 kg (230 lb)   09/06/16 100.2 kg (221 lb)   02/06/12 108.1 kg (238 lb 5.1 oz)     Arterial Line BP: ()/(45-90) 130/60  MAP:  [64 mmHg-107 mmHg] 86 mmHg  BP - Mean:  [] 88  Ventilation Mode: CPAP/PS  (Continuous positive airway pressure with Pressure Support)  FiO2 (%): 60 %  Rate Set (breaths/minute): 20 breaths/min  Tidal Volume Set (mL): 400 mL  PEEP (cm H2O): 12 cmH2O  Pressure Support (cm H2O): 5 cmH2O  Oxygen Concentration (%): 65 %  Resp: 23    Recent Labs   Lab 04/15/21  2200 04/15/21  1600 04/15/21  1325 04/15/21  0350   PH 7.35 7.37 7.37 7.43   PCO2 42 44 44 39   PO2 170* 158* 121* 71*   HCO3 23 25 25 26   O2PER  --   --   --  90%       GEN: no acute distress   HEENT: head ncat, sclera anicteric, OP patent, trachea midline   PULM: Coarse spontaneous breath sounds prior to intubation, diminished at bases   CV/COR: RRR S1S2 no gallop,  No rub, no murmur  ABD: soft nontender, hypoactive bowel sounds, no mass  EXT:  Edema   warm  NEURO: grossly intact will follow commands on all 4 extremities  SKIN: no obvious rash  LINES: clean, dry intact         Data:   All data and imaging reviewed     ROUTINE ICU LABS (Last four results)  CMP  Recent Labs   Lab 04/16/21  0500 04/15/21  1600 04/15/21  0513 04/14/21  0619 04/13/21  1848   *  --  144 138 135   POTASSIUM 3.5 3.5 3.6 3.7 3.2*  3.1*   CHLORIDE 113*  --  112* 107 102   CO2 28  --  26 26 25   ANIONGAP 4  --  6 5 8   *  --  108* 128* 215*   BUN 23  --  27 29 30   CR 0.70  --  0.78  0.85 0.99   GFRESTIMATED >90  --  79 72 60*   GFRESTBLACK >90  --  >90 83 69   ALICIA 7.4*  --  7.8* 8.2* 8.3*   MAG  --   --   --   --  2.4*   PROTTOTAL  --   --   --   --  6.9   ALBUMIN  --   --   --   --  2.7*   BILITOTAL  --   --   --   --  0.6   ALKPHOS  --   --   --   --  98   AST  --   --   --   --  55*   ALT  --   --   --   --  21     CBC  Recent Labs   Lab 04/16/21  0500 04/15/21  0513 04/14/21  0619 04/13/21  1848   WBC 13.2* 14.8* 9.3 12.1*   RBC 3.69* 3.88 4.34 4.57   HGB 10.8* 11.3* 12.5 13.6   HCT 33.4* 34.7* 37.4 39.4   MCV 91 89 86 86   MCH 29.3 29.1 28.8 29.8   MCHC 32.3 32.6 33.4 34.5   RDW 13.4 13.2 12.8 12.9    269 238 247     INR  Recent Labs   Lab 04/13/21  1848   INR 1.27*     Arterial Blood Gas  Recent Labs   Lab 04/15/21  2200 04/15/21  1600 04/15/21  1325 04/15/21  0350   PH 7.35 7.37 7.37 7.43   PCO2 42 44 44 39   PO2 170* 158* 121* 71*   HCO3 23 25 25 26   O2PER  --   --   --  90%       All cultures:  No results for input(s): CULT in the last 168 hours.  No results found for this or any previous visit (from the past 24 hour(s)).      Billing: This patient is critically ill:yes. Total critical care time today 35 min. Excluding proceedures

## 2021-04-16 NOTE — PROGRESS NOTES
[facility] ICU RESPIRATORY NOTE        Date of Admission: 4/13/2021    Date of Intubation (most recent): 04/15/21     Reason for Mechanical Ventilation: Respiratory failure    Number of Days on Mechanical Ventilation: 2    Met Criteria for Spontaneous Breathing Trial: No    Reason for No Spontaneous Breathing Trial:     Significant Events Today: Peep increased to 14 rate of 20    ABG Results:   Recent Labs   Lab 04/15/21  2200 04/15/21  1600 04/15/21  1325 04/15/21  0350   PH 7.35 7.37 7.37 7.43   PCO2 42 44 44 39   PO2 170* 158* 121* 71*   HCO3 23 25 25 26   O2PER  --   --   --  90%       Current Vent Settings: Ventilation Mode: CPAP/PS  (Continuous positive airway pressure with Pressure Support)  FiO2 (%): 70 %  Rate Set (breaths/minute): 20 breaths/min  Tidal Volume Set (mL): 400 mL  PEEP (cm H2O): 12 cmH2O  Pressure Support (cm H2O): 5 cmH2O  Oxygen Concentration (%): 65 %  Resp: 22      Skin Assessment: Clean and dry    Plan: Will continue to follow    Ashley Mendoza

## 2021-04-16 NOTE — PROGRESS NOTES
BRIEF NUTRITION NOTE:    Monitoring TF adequacy.  A full Nutrition Assessment was completed 4/15.  See note for details.    NEW FINDINGS:  4/15:  AXR =  Feeding tube remains in the stomach (NG)   TF was never started yesterday.  Propofol off yesterday.  Norepinephrine low drip.  LR at 100 mL/hr.   (H)  Na 145 (H)  Per MD in rounds, Reglan was given yesterday with hopes that FT would migrate to P/P position.    Repeat AXR pending.    INTERVENTIONS:  Enteral Nutrition -->  Changed TF orders to begin Vital High Protein at 15 mL/hr;  Increase by 20 mL every 12 hrs to goal 55 mL/hr = 1320 kcals (12 kcal/kg), 115 gm pro (2.5 gm/kg), 147 gm CHO, no fiber, 1109 mL H20  Medical food supplement therapy --> Cancel Prosource as will no longer be needed  Multivitamin/mineral supplement therapy --> Will continue the Certavite for now    Wanda Porter, RD, LD, [facility]

## 2021-04-16 NOTE — PLAN OF CARE
Pt with continued SpO2 decline and incr work of breathing throughout AM. Intubated at 1158 after discussion with pt and spouse. Now tolerating vent well, sedated with versed and prop, pain managed with fent gtt. Levo gtt to maintain  MAP>65. Adequate urine output per brown. Keofeed tube inserted but in stomach, okay to give meds per MD. No BM since admission. Replaced potassium. Restraints placed d/t pulling at lines.

## 2021-04-16 NOTE — PROGRESS NOTES
Neuro: Withdraws to pain, follows no commands, PERRL  CV: Lose dose Levo, SR, pulses palpable.  Resp: Vent settings unchanged, 60 % FiO2, Peep 10  GI/Nutrition: TF off for the night per day shift MD, No BM,   : Yeboah UO adequate  Skin: Blanchable redness   Critical Labs: K replaced  ID: Remdesivir ordered and given  Social/Psych: No contact with family overnight.   Therapy: NA  Plan: Continue to wean as tolerated.     Jaz Rushing RN

## 2021-04-17 NOTE — PROGRESS NOTES
Patient with bigeminy with perfusing rate of 33. HTN at times. Replete mag and K.    Check troponin and ECG  Consider switch Fentanyl to dilaudid.    Obtain Head CT    Bucky Houser MD  Critical Care Medicine

## 2021-04-17 NOTE — PROGRESS NOTES
[facility] ICU RESPIRATORY NOTE        Date of Admission: 4/13/2021    Date of Intubation (most recent): 04/15/2021    Reason for Mechanical Ventilation: Respiratory Failure    Number of Days on Mechanical Ventilation: 3    Met Criteria for Spontaneous Breathing Trial: No    Reason for No Spontaneous Breathing Trial: Patient on Peep of 14    Significant Events Today: None    ABG Results:   Recent Labs   Lab 04/17/21  1220 04/15/21  2200 04/15/21  1600 04/15/21  1325 04/15/21  0350   PH 7.47* 7.35 7.37 7.37 7.43   PCO2 40 42 44 44 39   PO2 65* 170* 158* 121* 71*   HCO3 29* 23 25 25 26   O2PER 40%  --   --   --  90%       Current Vent Settings: Ventilation Mode: CMV/AC  (Continuous Mandatory Ventilation/ Assist Control)  FiO2 (%): 40 %  Rate Set (breaths/minute): 20 breaths/min  Tidal Volume Set (mL): 400 mL  PEEP (cm H2O): 14 cmH2O  Pressure Support (cm H2O): 5 cmH2O  Oxygen Concentration (%): 40 %  Resp: 20      Skin Assessment: No issues    Plan: Continue full ventilatory support    Remi Bradford, RT

## 2021-04-17 NOTE — PROVIDER NOTIFICATION
Provider notified with bradycardia of 38. Keep propofol at low dose and run versed together for proper sedation per Dr. Gaspar. OK with HR of 40s unless BP continue to be stable with low dose levophed. Next shift nurse updated. Will continue to monitor.

## 2021-04-17 NOTE — PROGRESS NOTES
Shift: 11pm-7am    Neuro: upon initial assessment, patient was unresponsive with no gag reflex. Prop was 20, decreased to 10, fent was 200 decreased to 150, veletri remains unchanged at 20. Bear-hugger placed due to low temp and cool skin.     Cardio: BP remained stable throughout the night with levo at 0.02. HR extremely rj with lows of 35 and stabilized in the mid 40's throughout shift. MD notified. BMP taken at 0300 showed the following:     Results for SYED VAZQUEZ (MRN [id number]) as of 4/17/2021 06:57   Ref. Range 4/16/2021 22:33 4/16/2021 23:54 4/17/2021 03:29 4/17/2021 03:29 4/17/2021 04:31   Potassium Latest Ref Range: 3.4 - 5.3 mmol/L    3.3 (L) 3.3 (L)     Replacement done. Recheck at 1200.     Resp: No vent changes. O2 stat remained stable.     GI/: Tf increased to 30. BS elevated, MD notified, sliding scale as well as long acting insulin started.     Skin: Open areas around folds, bruisingon left forearm around radial artery site, and open area linear at coccyx. Skin cleansed and foam placed on coccyx.

## 2021-04-17 NOTE — PROGRESS NOTES
Increasing oxygen requirements, unable to wean PEEP, will paralyze and prone this pm.  informed.    Bucky Houser MD  Critical Care Medicine

## 2021-04-17 NOTE — PROGRESS NOTES
Critical Care  Note      04/17/2021    Name: Sarahy Dunbar MRN#: [id number]   Age: 65 year old YOB: 1955     [facility]    [facility] DAY # 4             Problem List:   Active Problems:    Acute respiratory failure with hypoxemia (H)           Summary/Hospital Course:     65-year-old female with no significant medical history who was found in bed with low saturations after welfare check.  The patient's  was out of town and was unable to contact his wife and the patient was found in bed with a saturation of approximately 20.  The patient is currently on BiPAP and given his shortness of breath is difficult to get a history from the most of the history is taken from the chart.  However at the outside hospital patient did deny cough fever and was confused and her history was limited.  Patient's  gives a history of hypertension and anxiety.  Review of systems is otherwise unremarkable.  The patient was admitted to the outside hospital where she was treated with noninvasive ventilation antibiotics and IV fluid.  Her condition stabilized and she was transferred to our ICU for further management and rule out of Covid.  On her arrival she was transitioned noninvasive ventilation and his saturation improved.   The patient's condition worsened requiring higher levels of oxygen and increased work of breathing.  Patient was intubated and placed on Veletri overnight for increased oxygen demands, currently on FiO2 of 40%      Assessment and plan :     Sarahy Dunbar IS a 65 year old female admitted on 4/13/2021 for rule out Covid pneumonia, altered mental status, hypoxemic respiratory failure requiring noninvasive ventilation.   I have personally reviewed the daily labs, imaging studies, cultures and discussed the case with referring physician and consulting physicians.     My assessment and plan by system for this patient is as follows:    Neurology/Psychiatry:   1.  Propofol/Versed for  ICU sedation  2.Fentanyl infusion for pain  3.  History of anxiety present on admission we will monitor    Plan  Keep RASS negative 3 to 4    Cardiovascular:   1.Hemodynamics -hemodynamically stable at this time  2.Rhythm -normal sinus rhythm  3. Ischemia -no signs of ischemia  Plan  Continue to monitor, low dose norepi for multifactorial shock, most likely secondary to sedation and infection    Pulmonary/Ventilator Management:   1. Airway patent airway  2. Full ventilatory support 20 by 400 FiO2 100% with PEEP of 12, wean FiO2 and PEEP as tolerated to keep PaO2 greater than 60 and O2 Sat greater than 92%  Consider prone position if worsens or unable to wean  Plan  -PEEP increased to 12 however able to wean FiO2   On full strength Veletri, if able to wean PEEP to 10, will wean Veletri in the am    GI and Nutrition :   1.  N.p.o. ,   2. Concern for protein calorie malnutrition    Plan  - Continue tube feeds as tolerated    Renal/Fluids/Electrolytes:   1.  Creatinine with in normal limits  2.  Hypokalemia (3.3)  3. Repeat ABG after decreasing PEEP  4. Volume status appears euvolemic  Plan    - monitor function and electrolytes as needed with replacement per ICU protocols. - generally avoid nephrotoxic agents such as NSAID, IV contrast unless specifically required  - adjust medications as needed for renal clearance  - follow I/O's as appropriate.    Infectious Disease:   1.  Covid positive    Plan  -Continue current Covid regiment, on antibiotics for possible HCAP    Endocrine:   1.  Concern for stress-induced hyperglycemia we will continue to monitor    Plan  - ICU insulin protocol, goal sugar <180      Hematology/Oncology:   1.  CBC pending   Plan  -Continue to monitor  High dose heparin     IV/Access:   1. Venous access -central line  2. Arterial access - femoral arterial line  3.  Plan  -Return continue to monitor at this time.      ICU Prophylaxis:   1. DVT: Heparin  2. VAP: HOB 30 degrees, chlorhexidine rinse  3.  Stress Ulcer: PPI/H2 blocker  4. Restraints: Indicated for not pulling at lines  5. Wound care  -local wound care  6. Feeding -tubeffeds  7. Family Update:discussed with  weaning of FiO2 and PEEP,Explained to , patient is on 40% FiO2  8. Disposition -remain in ICU        Key goals for next 24 hours:   1.  Wean FiO2 as tolerated  2.  Continue on decadron  3.  Continue tubefeeds               Medical History:     Past Medical History:   Diagnosis Date     Arthritis     OA - Knee     Dyshidrotic eczema      Herpes simplex labialis      Hypersensitivity vasculitides      Hypertension      Obesity      Shingles 2016     Venous insufficiency      Venous stasis      Past Surgical History:   Procedure Laterality Date     ARTHROPLASTY KNEE Left 9/6/2016    Procedure: ARTHROPLASTY KNEE;  Surgeon: Amy Landers MD;  Location:  OR     ARTHROPLASTY KNEE Right 4/3/2018    Procedure: ARTHROPLASTY KNEE;  RIGHT TOTAL KNEE ARTHROPLASTY ;  Surgeon: Amy Landers MD;  Location:  OR     ARTHROSCOPY ANKLE  2/2/2012    Procedure:ARTHROSCOPY ANKLE; LEFT ANKLE ARTHROSCOPIC IRRIGATION AND DEBRIDEMENT, WITH HARDWARE REMOVAL (C-ARM); Surgeon:TONYA CAAL; Location: OR     ORTHOPEDIC SURGERY      Left tib/fib fracture     REMOVE HARDWARE ANKLE  2/2/2012    Procedure:REMOVE HARDWARE ANKLE; Surgeon:TONYA CAAL; Location: OR     Social History     Socioeconomic History     Marital status:      Spouse name: Not on file     Number of children: Not on file     Years of education: Not on file     Highest education level: Not on file   Occupational History     Not on file   Social Needs     Financial resource strain: Not on file     Food insecurity     Worry: Not on file     Inability: Not on file     Transportation needs     Medical: Not on file     Non-medical: Not on file   Tobacco Use     Smoking status: Never Smoker     Smokeless tobacco: Never Used   Substance and Sexual Activity     Alcohol use: Yes      Comment: glass a wine every two weeks     Drug use: No     Sexual activity: Not on file   Lifestyle     Physical activity     Days per week: Not on file     Minutes per session: Not on file     Stress: Not on file   Relationships     Social connections     Talks on phone: Not on file     Gets together: Not on file     Attends Rastafarian service: Not on file     Active member of club or organization: Not on file     Attends meetings of clubs or organizations: Not on file     Relationship status: Not on file     Intimate partner violence     Fear of current or ex partner: Not on file     Emotionally abused: Not on file     Physically abused: Not on file     Forced sexual activity: Not on file   Other Topics Concern     Parent/sibling w/ CABG, MI or angioplasty before 65F 55M? Not Asked   Social History Narrative     Not on file        Allergies   Allergen Reactions     Lisinopril-Hydrochlorothiazide Other (See Comments)     hyponatremia              Key Medications:       remdesivir  100 mg Intravenous Q24H    And     sodium chloride 0.9%  50 mL Intravenous Q24H     azithromycin  500 mg Intravenous Q24H     cefTRIAXone  2 g Intravenous Q24H     chlorhexidine  15 mL Mouth/Throat Q12H     dexamethasone  6 mg Intravenous Q24H     enoxaparin ANTICOAGULANT  105 mg Subcutaneous BID     insulin aspart  1-12 Units Subcutaneous Q4H     insulin glargine  10 Units Subcutaneous QAM AC     multivitamins w/minerals  15 mL Per Feeding Tube Daily       dextrose       epoprostenol (VELETRI) 20 mcg/mL in sterile water inhalation solution 20 ng/kg/min (04/17/21 0447)     fentaNYL 150 mcg/hr (04/17/21 0154)     lactated ringers 100 mL/hr at 04/17/21 0859     midazolam Stopped (04/16/21 2100)     norepinephrine 0.01 mcg/kg/min (04/17/21 0945)     propofol (DIPRIVAN) infusion 20 mcg/kg/min (04/17/21 1000)     sodium chloride 10 mL/hr at 04/16/21 1149     vasopressin Stopped (04/15/21 1237)        Home Meds  No current  facility-administered medications on file prior to encounter.   amLODIPine (NORVASC) 5 MG tablet, Take 5 mg by mouth daily  metoprolol succinate ER (TOPROL-XL) 50 MG 24 hr tablet, Take 75 mg by mouth daily  multivitamin, therapeutic (THERA-VIT) TABS tablet, Take 1 tablet by mouth daily  naproxen sodium (ANAPROX) 220 MG tablet, Take 220 mg by mouth 2 times daily as needed for moderate pain               Physical Examination:   Temp:  [95.9  F (35.5  C)-100.4  F (38  C)] 98.2  F (36.8  C)  Pulse:  [] 59  Resp:  [0-34] 20  MAP:  [65 mmHg-99 mmHg] 91 mmHg  Arterial Line BP: ()/(30-71) 139/66  FiO2 (%):  [40 %-100 %] 40 %  SpO2:  [71 %-99 %] 95 %  No intake or output data in the 24 hours ending 04/13/21 1821  Wt Readings from Last 4 Encounters:   04/17/21 112.4 kg (247 lb 12.8 oz)   04/03/18 104.3 kg (230 lb)   09/06/16 100.2 kg (221 lb)   02/06/12 108.1 kg (238 lb 5.1 oz)     Arterial Line BP: ()/(30-71) 139/66  MAP:  [65 mmHg-99 mmHg] 91 mmHg  Ventilation Mode: CMV/AC  (Continuous Mandatory Ventilation/ Assist Control)  FiO2 (%): 40 %  Rate Set (breaths/minute): 20 breaths/min  Tidal Volume Set (mL): 400 mL  PEEP (cm H2O): 14 cmH2O  Pressure Support (cm H2O): 5 cmH2O  Oxygen Concentration (%): 40 %  Resp: 20    Recent Labs   Lab 04/15/21  2200 04/15/21  1600 04/15/21  1325 04/15/21  0350   PH 7.35 7.37 7.37 7.43   PCO2 42 44 44 39   PO2 170* 158* 121* 71*   HCO3 23 25 25 26   O2PER  --   --   --  90%       GEN: no acute distress   HEENT: head ncat, sclera anicteric, OP patent, trachea midline   PULM: Coarse mechanical breath anteriorly   CV/COR: RRR S1S2 no gallop,  No rub, no murmur  ABD: soft nontender, hypoactive bowel sounds, no mass  EXT:  Edema   warm  NEURO:consistent with chemical sedation  SKIN: no obvious rash  LINES: clean, dry intact         Data:   All data and imaging reviewed     ROUTINE ICU LABS (Last four results)  CMP  Recent Labs   Lab 04/17/21  0431 04/17/21  0329 04/16/21  0500  04/15/21  1600 04/15/21  0513 04/14/21  0619 04/13/21  1848   NA  --  145* 145*  --  144 138 135   POTASSIUM 3.3* 3.3* 3.5 3.5 3.6 3.7 3.2*  3.1*   CHLORIDE  --  113* 113*  --  112* 107 102   CO2  --  29 28  --  26 26 25   ANIONGAP  --  3 4  --  6 5 8   GLC  --  193* 133*  --  108* 128* 215*   BUN  --  20 23  --  27 29 30   CR  --  0.64 0.70  --  0.78 0.85 0.99   GFRESTIMATED  --  >90 >90  --  79 72 60*   GFRESTBLACK  --  >90 >90  --  >90 83 69   ALICIA  --  7.7* 7.4*  --  7.8* 8.2* 8.3*   MAG  --   --   --   --   --   --  2.4*   PROTTOTAL  --   --   --   --   --   --  6.9   ALBUMIN  --   --   --   --   --   --  2.7*   BILITOTAL  --   --   --   --   --   --  0.6   ALKPHOS  --   --   --   --   --   --  98   AST  --   --   --   --   --   --  55*   ALT  --   --   --   --   --   --  21     CBC  Recent Labs   Lab 04/17/21  0431 04/16/21  0500 04/15/21  0513 04/14/21  0619   WBC 9.4 13.2* 14.8* 9.3   RBC 3.57* 3.69* 3.88 4.34   HGB 10.6* 10.8* 11.3* 12.5   HCT 33.0* 33.4* 34.7* 37.4   MCV 92 91 89 86   MCH 29.7 29.3 29.1 28.8   MCHC 32.1 32.3 32.6 33.4   RDW 13.4 13.4 13.2 12.8    328 269 238     INR  Recent Labs   Lab 04/13/21  1848   INR 1.27*     Arterial Blood Gas  Recent Labs   Lab 04/15/21  2200 04/15/21  1600 04/15/21  1325 04/15/21  0350   PH 7.35 7.37 7.37 7.43   PCO2 42 44 44 39   PO2 170* 158* 121* 71*   HCO3 23 25 25 26   O2PER  --   --   --  90%       All cultures:  No results for input(s): CULT in the last 168 hours.  No results found for this or any previous visit (from the past 24 hour(s)).      Billing: This patient is critically ill:yes. Total critical care time today 34 min. Excluding proceedures

## 2021-04-17 NOTE — PROGRESS NOTES
[facility] ICU RESPIRATORY NOTE        Date of Admission: 4/13/2021    Date of Intubation (most recent): 04/15/2021    Reason for Mechanical Ventilation: Respiratory failure    Number of Days on Mechanical Ventilation:2    Met Criteria for Spontaneous Breathing Trial: No    Reason for No Spontaneous Breathing Trial: pt is on full strengh veletri.    Significant Events Today: None.    ABG Results:   Recent Labs   Lab 04/15/21  2200 04/15/21  1600 04/15/21  1325 04/15/21  0350   PH 7.35 7.37 7.37 7.43   PCO2 42 44 44 39   PO2 170* 158* 121* 71*   HCO3 23 25 25 26   O2PER  --   --   --  90%       Current Vent Settings: Ventilation Mode: CMV/AC  (Continuous Mandatory Ventilation/ Assist Control)  FiO2 (%): 60 %  Rate Set (breaths/minute): 20 breaths/min  Tidal Volume Set (mL): 400 mL  PEEP (cm H2O): 14 cmH2O  Pressure Support (cm H2O): 5 cmH2O  Oxygen Concentration (%): 60 %  Resp: 20      Skin Assessment: Intact    Plan: will continue on full vent support and assess readiness for SBT daily.      Vargas Richardson, RT

## 2021-04-17 NOTE — PROGRESS NOTES
11a-11p    Neuro: Open eyes spontaneously. drowsy with versed. Now well sedated with propofol and fent. Not following.   Pulm: LS clear. Small ETT and oral secretion. On full vent support. Veletri started.   CV: SR/SB. On levo to keep MAP above 65  : Yeboah patent with marginal UOP  GI: on TF via NG. Rectal tube inserted  Extremities: Purposeful movements.  Skin: Crack in buttock skin folders. Bilateral groin denuded.   Lines: L internal jugular central line. R femoral Trinh. PIVx2 SL.   Family:  updated via phone  Plan: Continue to monitor and support.

## 2021-04-17 NOTE — PROGRESS NOTES
Neuro: Open eyes spontaneously. Easily arouses with voice. Not following. Pupils equal and reactive.   Pulm: LS clear. On full vent support. Desats with minimal cares.   CV: SR/SB. On low dose of levo.   : Yeboah patent with adequate UOP  GI: On TF. Rectal tube in place.   Extremities: Purposeful movements.   Skin: Cracks on buttock folder and groin abdominal folders  Lines: L internal jugular central line. R groin candy. PIVx2.   Family:  updated by intensivist  Plan: Plan for proning tonight once proper sedation is achieved.     Bradycardia to low 30s after adding versed. All sedation is stopped and pt back to HR of 50-60s. MD aware and 12 lead ordered.

## 2021-04-18 NOTE — PROGRESS NOTES
[facility] ICU RESPIRATORY NOTE        Date of Admission: 4/13/2021    Date of Intubation (most recent): 4/15/21    Reason for Mechanical Ventilation: resp failure    Number of Days on Mechanical Ventilation: 4    Met Criteria for Spontaneous Breathing Trial: Yes -     Reason for No Spontaneous Breathing Trial: per MD    Significant Events Today: none    ABG Results:   Recent Labs   Lab 04/17/21  2225 04/17/21 2000 04/17/21  1700 04/17/21  1220   PH 7.47* 7.43 7.47* 7.47*   PCO2 41 46* 40 40   PO2 101 81 69* 65*   HCO3 30* 30* 29* 29*   O2PER 60% 60% 50% 40%       Current Vent Settings: Ventilation Mode: CMV/AC  (Continuous Mandatory Ventilation/ Assist Control)  FiO2 (%): 60 %  Rate Set (breaths/minute): (S) 18 breaths/min  Tidal Volume Set (mL): 400 mL  PEEP (cm H2O): 16 cmH2O  Oxygen Concentration (%): 60 %  Resp: 17      Plan: continue full vent support.    Sami Miranda, RT

## 2021-04-18 NOTE — PROGRESS NOTES
[facility] ICU RESPIRATORY NOTE        Date of Admission: 4/13/2021    Date of Intubation (most recent): 4/15/21    Reason for Mechanical Ventilation: Respiratory Failure    Number of Days on Mechanical Ventilation: 4    Met Criteria for Spontaneous Breathing Trial: No    Reason for No Spontaneous Breathing Trial: Per MD    Significant Events Today: Proned    ABG Results:   Recent Labs   Lab 04/18/21  1212 04/18/21  0852 04/18/21  0530 04/17/21  2225   PH 7.40 7.41 7.40 7.47*   PCO2 50* 49* 49* 41   PO2 74* 88 108* 101   HCO3 31* 31* 30* 30*   O2PER 40% 60% 60% 60%       Current Vent Settings: Ventilation Mode: CMV/AC  (Continuous Mandatory Ventilation/ Assist Control)  FiO2 (%): 40 %  Rate Set (breaths/minute): 20 breaths/min  Tidal Volume Set (mL): 350 mL  PEEP (cm H2O): 12 cmH2O  Pressure Support (cm H2O): 40 cmH2O  Oxygen Concentration (%): 40 %  Resp: 20      Skin Assessment: No issues    Plan: Continue full ventilatory support.    Remi Bradford, RT

## 2021-04-18 NOTE — PLAN OF CARE
Shift Summary: 1900-0700-see previous note for acute changes    Neuro: RASS-5 on propofol, versed, dilaudid. PERRL. Prior to sedation for NMB, pt following commands intermittently.   CV: SB/SR. Perfusing bigeminal PACs much of time. HR was low as 43, dose of remdesivir held per MD because of this.  Resp: 60%/+16/350/20. See previous note for vent changes. Small amount of ETT secretions, clear LS. Full strength veletiri remains  GI: TF running at 55/hr (goal), 60q4hr FWF. Rectal tube with small amount of output.  : brown with 60-75/hr UOP.   Inf/Endo: 20 mEq K+ given for 3.7, recheck in A.M.  Lines/Gtts: R Fem Jessie removed d/t site symptomatic, replaced with L Rad Trinh. L subclavian CVC, PIVx2. Infusing LR, levophed, versed, dilaudid, propofol, vecuronium  Family: MD attempted to contact pts  for Trinh consent, unable to reach. Will update when able.    Judith Wilson RN on 4/18/2021 at 7:21 AM

## 2021-04-18 NOTE — PROVIDER NOTIFICATION
2030:Discussed with Dr. Bustillos ABG results of PO2 81 on 60%/+14. Plan to hold off on proning for now, continue to titrate sedation up until RASS -4/-5 and start paralytic. Then plan to increase PEEP to 16, decrease TV to 380 and obtain ABG. MD notified of tachyarrhythmia of about 10 beats in response to propofol bolus. Plan to continue at 20 mcg/kg/min and uptitrate/bolus dilaudid and versed.   Also discussed 2100 remdesevir dose in setting of bradycardia, received order to hold dose.    2240: MD updated with ABG PO2 101 on 60% following increase of PEEP from 14 to 16. Plan to remain supine and monitor.   Also updated MD of symptomatic femoral arterial line site. Groin area yeasty, scaly, and bleeding. Insertion site with brown/green discharge. MD to bedside to place radial line, and femoral line pulled.

## 2021-04-18 NOTE — PROGRESS NOTES
Critical Care  Note      04/18/2021    Name: Sarahy Dunbar MRN#: [id number]   Age: 65 year old YOB: 1955     [facility]  [facility] # 5    MV DAY # 5             Problem List:   Active Problems:    Acute respiratory failure with hypoxemia (H)           Summary/Hospital Course:     65-year-old female with no significant medical history who was found in bed with low saturations after welfare check.  The patient's  was out of town and was unable to contact his wife and the patient was found in bed with a saturation of approximately 20.  The patient is currently on BiPAP and given his shortness of breath is difficult to get a history from the most of the history is taken from the chart.  However at the outside hospital patient did deny cough fever and was confused and her history was limited.  Patient's  gives a history of hypertension and anxiety.  Review of systems is otherwise unremarkable.  The patient was admitted to the outside hospital where she was treated with noninvasive ventilation antibiotics and IV fluid.  Her condition stabilized and she was transferred to our ICU for further management and rule out of Covid.  On her arrival she was transitioned noninvasive ventilation and his saturation improved.   The patient's condition worsened requiring higher levels of oxygen and increased work of breathing.  Patient was intubated and placed on Veletri overnight for increased oxygen demands, currently on FiO2 of 40%, however we placed the patient proned and will remain prone today.      Assessment and plan :     Sarahy Dunbar IS a 65 year old female admitted on 4/13/2021 for rule out Covid pneumonia, altered mental status, hypoxemic respiratory failure requiring noninvasive ventilation.   I have personally reviewed the daily labs, imaging studies, cultures and discussed the case with referring physician and consulting physicians.     My assessment and plan by system for this  patient is as follows:    Neurology/Psychiatry:   1.  Propofol/Versed for ICU sedation  2.Fentanyl infusion for pain  3.  History of anxiety present on admission we will monitor  4. Vecuronium added for ventilator synchrony     Plan  Keep RASS negative 3 to 4    Cardiovascular:   1.Hemodynamics -hemodynamically stable at this time  2.Rhythm -normal sinus rhythm  3. Ischemia -no signs of ischemia  Plan  Continue to monitor, low dose norepi for multifactorial shock, most likely secondary to sedation and infection    Pulmonary/Ventilator Management:   1. Airway patent airway  2. Full ventilatory support 20 by 400 FiO2 100% with PEEP of 12, wean FiO2 and PEEP as tolerated to keep PaO2 greater than 60 and O2 Sat greater than 92%  Consider prone position if worsens or unable to wean  Plan  -PEEP increased to 16 however able to wean FiO2   On full strength Veletri, if able to wean PEEP to 12, will wean Veletri in the am.  Patient is now proned    GI and Nutrition :   1.  N.p.o. ,   2. Concern for protein calorie malnutrition    Plan  - Continue tube feeds as tolerated    Renal/Fluids/Electrolytes:   1.  Creatinine with in normal limits  2.  Hypokalemia (3.3)  3. Repeat ABG after decreasing PEEP  4. Volume status appears euvolemic  Plan    - monitor function and electrolytes as needed with replacement per ICU protocols. - generally avoid nephrotoxic agents such as NSAID, IV contrast unless specifically required  - adjust medications as needed for renal clearance  - follow I/O's as appropriate.    Infectious Disease:   1.  Covid positive    Plan  -Continue current Covid regiment, on antibiotics for possible HCAP    Endocrine:   1.  Concern for stress-induced hyperglycemia we will continue to monitor    Plan  - ICU insulin protocol, goal sugar <180      Hematology/Oncology:   1.  CBC pending   Plan  -Continue to monitor  High dose heparin     IV/Access:   1. Venous access -central line  2. Arterial access - radial arterial  line  3.  Plan  -Return continue to monitor at this time.      ICU Prophylaxis:   1. DVT: Heparin  2. VAP: HOB 30 degrees, chlorhexidine rinse  3. Stress Ulcer: PPI/H2 blocker  4. Restraints: Indicated for not pulling at lines  5. Wound care  -local wound care  6. Feeding -tubeffeds  7. Family Update:discussed with  weaning of FiO2 and PEEP,Explained to , patient is on 40% FiO2  8. Disposition -remain in ICU        Key goals for next 24 hours:   1.  Wean FiO2 as tolerated  2.  Continue on decadron  3.  Continue tubefeeds               Medical History:     Past Medical History:   Diagnosis Date     Arthritis     OA - Knee     Dyshidrotic eczema      Herpes simplex labialis      Hypersensitivity vasculitides      Hypertension      Obesity      Shingles 2016     Venous insufficiency      Venous stasis      Past Surgical History:   Procedure Laterality Date     ARTHROPLASTY KNEE Left 9/6/2016    Procedure: ARTHROPLASTY KNEE;  Surgeon: Amy Landers MD;  Location:  OR     ARTHROPLASTY KNEE Right 4/3/2018    Procedure: ARTHROPLASTY KNEE;  RIGHT TOTAL KNEE ARTHROPLASTY ;  Surgeon: Amy Landers MD;  Location:  OR     ARTHROSCOPY ANKLE  2/2/2012    Procedure:ARTHROSCOPY ANKLE; LEFT ANKLE ARTHROSCOPIC IRRIGATION AND DEBRIDEMENT, WITH HARDWARE REMOVAL (C-ARM); Surgeon:TONYA CAAL; Location: OR     ORTHOPEDIC SURGERY      Left tib/fib fracture     REMOVE HARDWARE ANKLE  2/2/2012    Procedure:REMOVE HARDWARE ANKLE; Surgeon:TONYA CAAL; Location: OR     Social History     Socioeconomic History     Marital status:      Spouse name: Not on file     Number of children: Not on file     Years of education: Not on file     Highest education level: Not on file   Occupational History     Not on file   Social Needs     Financial resource strain: Not on file     Food insecurity     Worry: Not on file     Inability: Not on file     Transportation needs     Medical: Not on file     Non-medical:  Not on file   Tobacco Use     Smoking status: Never Smoker     Smokeless tobacco: Never Used   Substance and Sexual Activity     Alcohol use: Yes     Comment: glass a wine every two weeks     Drug use: No     Sexual activity: Not on file   Lifestyle     Physical activity     Days per week: Not on file     Minutes per session: Not on file     Stress: Not on file   Relationships     Social connections     Talks on phone: Not on file     Gets together: Not on file     Attends Protestant service: Not on file     Active member of club or organization: Not on file     Attends meetings of clubs or organizations: Not on file     Relationship status: Not on file     Intimate partner violence     Fear of current or ex partner: Not on file     Emotionally abused: Not on file     Physically abused: Not on file     Forced sexual activity: Not on file   Other Topics Concern     Parent/sibling w/ CABG, MI or angioplasty before 65F 55M? Not Asked   Social History Narrative     Not on file        Allergies   Allergen Reactions     Lisinopril-Hydrochlorothiazide Other (See Comments)     hyponatremia              Key Medications:       remdesivir  100 mg Intravenous Q24H    And     sodium chloride 0.9%  50 mL Intravenous Q24H     artificial tears   Both Eyes Q8H     azithromycin  500 mg Intravenous Q24H     cefTRIAXone  2 g Intravenous Q24H     chlorhexidine  15 mL Mouth/Throat Q12H     dexamethasone  6 mg Intravenous Q24H     enoxaparin ANTICOAGULANT  105 mg Subcutaneous BID     insulin aspart  1-12 Units Subcutaneous Q4H     [START ON 4/19/2021] insulin glargine  12 Units Subcutaneous QAM AC     multivitamins w/minerals  15 mL Per Feeding Tube Daily     pantoprazole (PROTONIX) IV  40 mg Intravenous Daily with breakfast       dextrose       epoprostenol (VELETRI) 20 mcg/mL in sterile water inhalation solution 20 ng/kg/min (04/18/21 0639)     HYDROmorphone 0.6 mg/hr (04/18/21 0846)     lactated ringers 100 mL/hr at 04/18/21 6483      midazolam 10 mg/hr (04/18/21 0843)     norepinephrine 0.01 mcg/kg/min (04/18/21 0846)     propofol (DIPRIVAN) infusion 20 mcg/kg/min (04/18/21 0844)     sodium chloride 10 mL/hr at 04/18/21 0841     vasopressin Stopped (04/15/21 1237)     vecuronium (NORCURON) infusion ADULT 0.8 mcg/kg/min (04/18/21 0845)        Home Meds  No current facility-administered medications on file prior to encounter.   amLODIPine (NORVASC) 5 MG tablet, Take 5 mg by mouth daily  metoprolol succinate ER (TOPROL-XL) 50 MG 24 hr tablet, Take 75 mg by mouth daily  multivitamin, therapeutic (THERA-VIT) TABS tablet, Take 1 tablet by mouth daily  naproxen sodium (ANAPROX) 220 MG tablet, Take 220 mg by mouth 2 times daily as needed for moderate pain               Physical Examination:   Temp:  [95.9  F (35.5  C)-98.6  F (37  C)] 97  F (36.1  C)  Pulse:  [37-79] 56  Resp:  [9-20] 20  BP: (111)/(90) 111/90  MAP:  [61 mmHg-108 mmHg] 86 mmHg  Arterial Line BP: ()/(45-78) 126/63  FiO2 (%):  [40 %-60 %] 50 %  SpO2:  [85 %-100 %] 97 %  No intake or output data in the 24 hours ending 04/13/21 1821  Wt Readings from Last 4 Encounters:   04/18/21 118.6 kg (261 lb 7.5 oz)   04/03/18 104.3 kg (230 lb)   09/06/16 100.2 kg (221 lb)   02/06/12 108.1 kg (238 lb 5.1 oz)     Arterial Line BP: ()/(45-78) 126/63  MAP:  [61 mmHg-108 mmHg] 86 mmHg  BP - Mean:  [97] 97  Ventilation Mode: CMV/AC  (Continuous Mandatory Ventilation/ Assist Control)  FiO2 (%): 50 %  Rate Set (breaths/minute): 20 breaths/min  Tidal Volume Set (mL): 350 mL  PEEP (cm H2O): 14 cmH2O  Oxygen Concentration (%): 60 %  Resp: 20    Recent Labs   Lab 04/18/21  0852 04/18/21  0530 04/17/21  2225 04/17/21 2000   PH 7.41 7.40 7.47* 7.43   PCO2 49* 49* 41 46*   PO2 88 108* 101 81   HCO3 31* 30* 30* 30*   O2PER 60% 60% 60% 60%       GEN: no acute distress   HEENT: head ncat, sclera anicteric, OP patent, trachea midline   PULM: Coarse mechanical breath anteriorly   CV/COR: RRR S1S2 no gallop,   No rub, no murmur  ABD: soft nontender, hypoactive bowel sounds, no mass  EXT:  Edema   warm  NEURO:consistent with chemical sedation and paralysis  SKIN: no obvious rash  LINES: clean, dry intact         Data:   All data and imaging reviewed     ROUTINE ICU LABS (Last four results)  CMP  Recent Labs   Lab 04/18/21  0530 04/17/21 2000 04/17/21  1220 04/17/21  0431 04/17/21  0329 04/16/21  0500 04/15/21  0513 04/15/21  0513 04/13/21  1848 04/13/21  1848     --   --   --  145* 145*  --  144   < > 135   POTASSIUM 3.7  --  3.7 3.3* 3.3* 3.5   < > 3.6   < > 3.2*  3.1*   CHLORIDE 110*  --   --   --  113* 113*  --  112*   < > 102   CO2 31  --   --   --  29 28  --  26   < > 25   ANIONGAP 2*  --   --   --  3 4  --  6   < > 8   *  --   --   --  193* 133*  --  108*   < > 215*   BUN 20  --   --   --  20 23  --  27   < > 30   CR 0.52  --   --   --  0.64 0.70  --  0.78   < > 0.99   GFRESTIMATED >90  --   --   --  >90 >90  --  79   < > 60*   GFRESTBLACK >90  --   --   --  >90 >90  --  >90   < > 69   ALICIA 7.6*  --   --   --  7.7* 7.4*  --  7.8*   < > 8.3*   MAG  --  2.2  --   --   --   --   --   --   --  2.4*   PROTTOTAL  --   --   --   --   --   --   --   --   --  6.9   ALBUMIN  --   --   --   --   --   --   --   --   --  2.7*   BILITOTAL  --   --   --   --   --   --   --   --   --  0.6   ALKPHOS  --   --   --   --   --   --   --   --   --  98   AST  --   --   --   --   --   --   --   --   --  55*   ALT  --   --   --   --   --   --   --   --   --  21    < > = values in this interval not displayed.     CBC  Recent Labs   Lab 04/18/21  0530 04/17/21  0431 04/16/21  0500 04/15/21  0513   WBC 7.6 9.4 13.2* 14.8*   RBC 3.57* 3.57* 3.69* 3.88   HGB 10.3* 10.6* 10.8* 11.3*   HCT 32.5* 33.0* 33.4* 34.7*   MCV 91 92 91 89   MCH 28.9 29.7 29.3 29.1   MCHC 31.7 32.1 32.3 32.6   RDW 13.3 13.4 13.4 13.2    287 328 269     INR  Recent Labs   Lab 04/13/21  1848   INR 1.27*     Arterial Blood Gas  Recent Labs   Lab  04/18/21  0852 04/18/21  0530 04/17/21  2225 04/17/21 2000   PH 7.41 7.40 7.47* 7.43   PCO2 49* 49* 41 46*   PO2 88 108* 101 81   HCO3 31* 30* 30* 30*   O2PER 60% 60% 60% 60%       All cultures:  No results for input(s): CULT in the last 168 hours.  No results found for this or any previous visit (from the past 24 hour(s)).      Billing: This patient is critically ill:yes. Total critical care time today 33 min. Excluding proceedures

## 2021-04-18 NOTE — PROGRESS NOTES
Prone Positioning Note      Date of initial prone positionin21    Number of days prone: 1  Frequency of head turns: Q2  Was patient placed supine today: No  Reason for not placing patient supine today: Proned at 1000    ETT/Skin assessment:    Was ETT repositioned and re-taped today: Yes  Skin barriers used:  Mepilex on upper lip and cavlaon on cheeks    Plan:  Pt to remain proned for 16 hrs

## 2021-04-18 NOTE — PROGRESS NOTES
Neuro: Sedated and paralyzed. Pupils equal and reactive.   Pulm: LS clear. On full vent support. Very scant ETT secretion. Thin oral secretion.  CV: SB/SR w/ PVCs. BP stable with low levo gtt  : [place] patent with adequate UOP.   GI: Loose stools. Rectal tube patent. On TF at her goal via [place]  Extremities: Paralyzed   Skin: cracks on bilateral groin and coccyx. Scattered bruises   Lines: L internal jugular central line. L radial [place]. PIVx2 SL  Family:  updated by Dr. Houser  Plan: Prone 16hrs until 2am. Continue to monitor and support.

## 2021-04-18 NOTE — PROCEDURES
Arterial line placement:    The patient right groin arterial line site appears compromised and there is concern for skin changes at the siite.  Requires removal and change of the site for the arterial line.  The family was called several time and could not be reached.  The patient remains critically ill with a constant threat to life.  Atrial line in the right groin site appears infected: will remove the catheter in the right groin and place an emergnncy arterial line in the pattient' left wrist.      Procedure.     The patient appeared confortable on the ventilator.  The left wrist was prepped and draped. The left radial artery was identified by US guidance and an arterial cathter was placed in the left wrist using Seldinger technique.  The catheter was sutured in place with 3-0 silk suture.  An excellent arterial tracing was obtained.  A sterile dressing was applied.  The pattient tolerated the procedure well.  No complications noted.    Hussain Bustillos MD, Ph.D

## 2021-04-19 PROBLEM — U07.1 2019 NOVEL CORONAVIRUS DISEASE (COVID-19): Status: ACTIVE | Noted: 2021-01-01

## 2021-04-19 PROBLEM — J80 ARDS (ADULT RESPIRATORY DISTRESS SYNDROME) (H): Status: ACTIVE | Noted: 2021-01-01

## 2021-04-19 NOTE — CONSULTS
"[facility] Nurse Inpatient Assessment   Reason for consultation: Evaluate and treat panus, bilateral groin and gluteal cleft    Assessment     Panus and left groin with light, speckling of partial thickness denudement across base of skin folds, moist, no signs of fungal rash, BID clean with VASHE and dust with baby powder.     Right groin moisture from removed A Line with large are of firm induration from underlying hematoma (area demarcated by nursing this morning, MDs aware of induration). The base of the tissue along the base of the fold is partial thickness epidermis- to light sloughing noted in a 3cm area.  Will dress daily and monitor closely     Gluteal cleft has a long split along the base, dermal tissue, lightly bloody, no s/s/ of infection, midway along the split there is a deeper depression of tissue that looks more wound- like vs just a split, will need to perform vigilant wound cares and keep positioned side to side w good PIP measures.  Wound due to mix of pressure vs trauma vs shearing, not just pressure       Treatment Plan    Panus, and left groin and between the thighs skin care plan of care: BID   1. Clean tissue by wetting gauze/ Cristi wipe with VASHE wound cleanser, press to base of skin fold, let sit x 20minutes, pat dry  2. Dust with baby powder  3. Between the legs tuck a piece of blue Cardinal bed pad (folded so white touches skin and can cut down to size), this will be more effective vs InterDry when the tissue is so wet     Right groin and gluteal cleft- daily wound care  1. Wet gauze with VASHE wound cleanser, press to area and let soak x 15-20minutes, wipe clean  2. No Sting skin prep the entire area, dry completely and do not let skin touch until dry or it will be gummy and tack together  3. Apply Mepilex Sacral to tissue- make sure you get to the very base of the skin fold, then work on smooth out onto the \"walls\"  -time and date dress  *POSITOIN PT " SIDE TO SIDE ONLY IN BED* THIS IS GOOD FOR SKIN AND LUNGS    Recommended provider order: n/a   Orders Reviewed and Written  [facility] Nurse follow-up plan: weekly    Patient History    According to provider note(s):  65 year old female admitted on 4/13/2021 for rule out Covid pneumonia, altered mental status, hypoxemic respiratory failure requiring noninvasive ventilation.     Objective Data    Containment of urine/stool: Indwelling catheter and Internal fecal management  Active Diet Order:  Orders Placed This Encounter      NPO for Medical/Clinical Reasons Except for: Meds    Labs:   Recent Labs   Lab 04/19/21  1050 04/19/21  0258 04/17/21  0431 04/17/21  0431 04/13/21  1848 04/13/21  1848   ALBUMIN  --   --   --   --   --  2.7*   HGB  --  9.7*   < > 10.6*   < > 13.6   INR 1.23*  --   --   --   --  1.27*   WBC  --  13.0*   < > 9.4   < > 12.1*   CRP  --   --   --  204.0*   < > 286.0*    < > = values in this interval not displayed.     Output:   I/O last 3 completed shifts:  In: 5199.67 [I.V.:3519.67; NG/GT:360]  Out: 3320 [Urine:3220; Stool:100]  Risk Assessment:   Sensory Perception: 1-->completely limited  Moisture: 3-->occasionally moist  Activity: 1-->bedfast  Mobility: 1-->completely immobile  Nutrition: 3-->adequate  Friction and Shear: 1-->problem  Joss Score: 10    Physical Exam    Panus, bilateral groin and gluteal cleft assessment     Panus- partial thickness denudement scattered along entire line, mild serous to serosanguinous drainage, no erythema    Right groin  - upper belly tissue soft, bruising  -  base of skin fold, approximately 1cm x 9cm line of moist dermal tissue and 3cm area of soft, gray slough  - upper right thigh at the groin- hard, firm, deep purple bruising and induration ,no drainage    Left groin- scattered, light partial thickness dendudment, small serous drianage    Gluteal cleft- very base of skin fold is a 10cm x 1cm x 0.2cm wound, splitting nearly entire skin fold, slightly bloody in  the center area, no erythema    **    Interventions  Current support surface: Standard  Low air loss mattress with rotation,   Current off-loading measures: Pillows under calves and Pillows,   Visual inspection of wound(s) completed with RN  Wound Care: done per plan of care,   Supplies: reviewed, gathered, placed at the bedside and discussed with RN  Education provided: importance of repositioning  Discussed plan of care with Nurse    Edwin Gutierres, RN

## 2021-04-19 NOTE — PROVIDER NOTIFICATION
MD Notification    Notified Person Name: Dr. Pruitt    Notification Date/Time: 4/19 0200    Purpose of Notification: pt has new large hematoma to R groin. Also now supined; do you want to stop vec?    Orders Received: hgb ordered for hematoma - WNL. Continue current infusions until AM

## 2021-04-19 NOTE — PLAN OF CARE
Neuro: PERRL, unresponsive  CV: NSR c PVCs  Resp: CMV; L LS clear, R LS rhonchi. Minimal secretions  GI: [place] feed running at goal, flexiseal  : brown, good UO  Skin: wound to coccyx, open excoriations to perineal folds, large hematoma to R groin, generalized bruising  Lines: L radial art line  Access: L subclavian CVC, PIV x2  Gtts: vec, propofol, versed, dilaudid, levo, LR, TKO  Other: supined at 0200

## 2021-04-19 NOTE — PROGRESS NOTES
Comprehensive Daily ICU Note        Sarahy Dunbar MRN# [id number]   Age: 65 year old YOB: 1955     Date of Admission: 4/13/2021    Primary care provider: Rama Pop     CODE STATUS: FULL      Problem List:       Active Problems:    Acute respiratory failure with hypoxemia (H)    ARDS (adult respiratory distress syndrome) (H)    2019 novel coronavirus disease (COVID-19)           Treatment goals for next 24 hours:   Keep supine  Monitor leg  Continue current Trinh and use cuff for pressures  Stop paralytics    Pilar Yeung MD        Subjective/ Last 24 hours:   65-year-old female with no significant medical history who was found in bed with low saturations after welfare check.  Discovered to have cOVID. Maintained on NIV but subsequently required intubation. Has been alternating prone and supine. This morning placed supine and RNs noted a very large groin hematoma.          Mechanical Ventilation/Vitalsigns/IsandOs:       Temp:  [96.8  F (36  C)-99.3  F (37.4  C)] 99.1  F (37.3  C)  Pulse:  [54-77] 72  Resp:  [10-20] 20  BP: (106-133)/(56-66) 128/56  MAP:  [14 mmHg-104 mmHg] 55 mmHg  Arterial Line BP: (-)/(-14-77) 78/44  FiO2 (%):  [40 %-50 %] 50 %  SpO2:  [92 %-99 %] 95 %      Ventilation Mode: CMV/AC  (Continuous Mandatory Ventilation/ Assist Control)  FiO2 (%): 50 %  Rate Set (breaths/minute): 20 breaths/min  Tidal Volume Set (mL): 350 mL  PEEP (cm H2O): 12 cmH2O  Pressure Support (cm H2O): 40 cmH2O  Oxygen Concentration (%): 50 %  Resp: 20      Day since 4/15    Peak airway pressure: 29      Intake/Output Summary (Last 24 hours) at 4/19/2021 1013  Last data filed at 4/19/2021 0800  Gross per 24 hour   Intake 4831.6 ml   Output 3080 ml   Net 1751.6 ml     Net IO Since Admission: 12,184.54 mL [04/19/21 1013]           Physical Examination:     General: Stated age, intubated, NAD  HEENT: ET tube  Lungs: LCTAB anteriorly  CVS: RRR, S1S2  Abdomen: obese,  soft  Extremities/musculoskeletal: ansarca, warm  Neurology: heavily sedated  Skin: large hematoma on the right groin  Psychiatry: sedated  Exam of Line sites:  Left radial arterial line,             Feeding/Glucose:     Orders Placed This Encounter      NPO for Medical/Clinical Reasons Except for: Meds      Recent Labs   Lab 04/19/21  0748 04/19/21  0417 04/19/21  0258 04/19/21  0022 04/18/21  1939 04/18/21  1610 04/18/21  1212 04/18/21  0530 04/18/21  0530 04/17/21  0329 04/17/21  0329 04/16/21  0500 04/16/21  0500 04/15/21  0513 04/15/21  0513 04/14/21  0619 04/14/21  0619   GLC  --   --  164*  --   --   --   --   --  194*  --  193*  --  133*  --  108*  --  128*   * 175*  --  159* 176* 154* 150*   < >  --    < > 194*   < >  --    < >  --    < >  --     < > = values in this interval not displayed.              Medications:       artificial tears   Both Eyes Q8H     azithromycin  500 mg Intravenous Q24H     cefTRIAXone  2 g Intravenous Q24H     chlorhexidine  15 mL Mouth/Throat Q12H     dexamethasone  6 mg Intravenous Q24H     enoxaparin ANTICOAGULANT  105 mg Subcutaneous BID     insulin aspart  1-12 Units Subcutaneous Q4H     insulin glargine  12 Units Subcutaneous QAM AC     multivitamins w/minerals  15 mL Per Feeding Tube Daily     pantoprazole (PROTONIX) IV  40 mg Intravenous Daily with breakfast          dextrose       epoprostenol (VELETRI) 20 mcg/mL in sterile water inhalation solution 20 ng/kg/min (04/19/21 0742)     HYDROmorphone 0.6 mg/hr (04/19/21 0742)     lactated ringers 100 mL/hr at 04/19/21 0742     midazolam 10 mg/hr (04/19/21 0748)     norepinephrine 0.06 mcg/kg/min (04/19/21 0900)     propofol (DIPRIVAN) infusion 20 mcg/kg/min (04/19/21 0817)     sodium chloride 10 mL/hr at 04/19/21 0747     vasopressin Stopped (04/15/21 1237)     vecuronium (NORCURON) infusion ADULT 0.8 mcg/kg/min (04/19/21 9728)              Labs:         ROUTINE ICU LABS (Last four results)  CMP  Recent Labs   Lab  04/19/21 0258 04/18/21 1840 04/18/21  0530 04/17/21 2000 04/17/21  1220 04/17/21  0329 04/17/21 0329 04/16/21  0500 04/13/21 1848 04/13/21 1848     --  143  --   --   --  145* 145*   < > 135   POTASSIUM 3.9 4.1 3.7  --  3.7   < > 3.3* 3.5   < > 3.2*  3.1*   CHLORIDE 107  --  110*  --   --   --  113* 113*   < > 102   CO2 32  --  31  --   --   --  29 28   < > 25   ANIONGAP 3  --  2*  --   --   --  3 4   < > 8   *  --  194*  --   --   --  193* 133*   < > 215*   BUN 20  --  20  --   --   --  20 23   < > 30   CR 0.48*  --  0.52  --   --   --  0.64 0.70   < > 0.99   GFRESTIMATED >90  --  >90  --   --   --  >90 >90   < > 60*   GFRESTBLACK >90  --  >90  --   --   --  >90 >90   < > 69   ALICIA 7.5*  --  7.6*  --   --   --  7.7* 7.4*   < > 8.3*   MAG 2.0 2.1  --  2.2  --   --   --   --   --  2.4*   PHOS 2.6  --   --   --   --   --   --   --   --   --    PROTTOTAL  --   --   --   --   --   --   --   --   --  6.9   ALBUMIN  --   --   --   --   --   --   --   --   --  2.7*   BILITOTAL  --   --   --   --   --   --   --   --   --  0.6   ALKPHOS  --   --   --   --   --   --   --   --   --  98   AST  --   --   --   --   --   --   --   --   --  55*   ALT  --   --   --   --   --   --   --   --   --  21    < > = values in this interval not displayed.     CBC  Recent Labs   Lab 04/19/21  0258 04/18/21  0530 04/17/21  0431 04/16/21  0500   WBC 13.0* 7.6 9.4 13.2*   RBC 3.33* 3.57* 3.57* 3.69*   HGB 9.7* 10.3* 10.6* 10.8*   HCT 30.0* 32.5* 33.0* 33.4*   MCV 90 91 92 91   MCH 29.1 28.9 29.7 29.3   MCHC 32.3 31.7 32.1 32.3   RDW 13.2 13.3 13.4 13.4    285 287 328     INR  Recent Labs   Lab 04/13/21  1848   INR 1.27*     Arterial Blood Gas  Recent Labs   Lab 04/18/21  1611 04/18/21  1212 04/18/21  0852 04/18/21  0530   PH 7.43 7.40 7.41 7.40   PCO2 49* 50* 49* 49*   PO2 91 74* 88 108*   HCO3 32* 31* 31* 30*   O2PER 40% 40% 60% 60%       Other Lab Data:      Cultures:  No results for input(s): CULT in the last 168  hours.  Blood culture:  Invalid input(s): BC   Urine culture:  No results for input(s): URC in the last 168 hours.          Imaging/Other results:     No results found for this or any previous visit (from the past 24 hour(s)).                 Assessment and Plan:       Sarahy Dunbar IS a 65 year old female admitted on 4/13/2021 for Covid pneumonia, altered mental status, hypoxemic respiratory failure requiring intubation and mechanical ventilation  I have personally reviewed the daily labs, imaging studies, cultures and discussed the case with referring physician and consulting physicians.     My assessment and plan by system for this patient is as follows:    Neurology/Psychiatry:   1.  Propofol/Versed for ICU sedation  2. Fentanyl infusion for pain  3.  History of anxiety present on admission we will monitor    Plan  - Trial off paralytics  - Keep RASS negative 3 to 4    Cardiovascular:   1.Hemodynamics -low dose norepi for multifactorial shock, most likely secondary to sedation and infection. A line not accurately measuring blood pressure. Site of previous A line with very large hematoma.  2.Rhythm -episode of bradycardia that may have been related to Remdesivir. One dose remaining in course.   3. Ischemia -no signs of ischemia  Plan  Continue to monitor,   Wean vasopressors. Defer replacing arterial line at this time. If having to do significant ongoing titration will replace    Pulmonary/Ventilator Management:   1. ARDS from COVID. Re supined today. On 50% oxygen and Velitri  Plan  -Continue lung protective ventilation. Keep supine today if possible given possibility of bradycaria and because of evolving hematoma    GI and Nutrition :   1.  N.p.o. ,   2. Concern for protein calorie malnutrition    Plan  - Continue tube feeds as tolerated    Renal/Fluids/Electrolytes:   1. Renal function stable. Sodium has normalized. RN noting decreasing UOP. On pressors  Plan    - No changes for today. If significant drop  in UOP will recheck Creatinine. Otherwise recheck tomorrow.     Infectious Disease:   1.  Covid positive. On antibiotics for possible HCAP. No cultures available Has completed 6 days of therapy    Plan  -Continue current Covid regimen  - Stop antibiotics tomorrow    Endocrine:   1.  Concern for stress-induced hyperglycemia we will continue to monitor. Also on Dexamethasone per protocol    Plan  - ICU insulin protocol, goal sugar <180  - complete dexamethasone treatment      Hematology/Oncology:   1.  Large hematoma with 1 gram drop in Hgb of unknown significance. No evidence of ongoing bleeding. No evidence of acute clot either to suggest we need higher than standard anticoagulation for COVID  Plan  - Standard COVID anticoagulation     IV/Access:   1. Venous access -central line  2. Arterial access - radial arterial line  3.  Plan  -Return continue to monitor at this time.      ICU Prophylaxis:   1. DVT: Heparin  2. VAP: HOB 30 degrees, chlorhexidine rinse  3. Stress Ulcer: PPI/H2 blocker  4. Restraints: Indicated for not pulling at lines  5. Wound care  -local wound care  6. Feeding -tubeffeds  7. Family Update:discussed with    8. Disposition -remain in ICU      Pilar Yeung MD  CC time: 60 minutes

## 2021-04-19 NOTE — PROGRESS NOTES
CLINICAL NUTRITION SERVICES - REASSESSMENT NOTE      Malnutrition:  (4/15)  % Weight Loss:  None noted  % Intake:  Decreased intake does not meet criteria for malnutrition - NPO x 3 days  Subcutaneous Fat Loss: Unable to assess  Muscle Loss: Unable to assess  Fluid Retention:  None noted     Malnutrition Diagnosis: Unable to determine without Nutrition Focused Physical Exam        EVALUATION OF PROGRESS TOWARD GOALS   Diet:  NPO on vent    Nutrition Support:  TF was started on 4/16 at 15 mL/hr and increases by 20 mL every 12 hrs to goal (achieved 4/18 MN) as below:    Nutrition Support Enteral:  Type of Feeding Tube:  Nasojejunal  Enteral Frequency:  Continuous  Enteral Regimen:  Vital High Protein at 55 mL/hr  Total Enteral Provisions:  1320 kcals (12 kcal/kg), 115 gm pro (2.5 gm/kg), 147 gm CHO, no fiber, 1109 mL H20  Free Water Flush:  60 mL every 4 hrs  Certavite daily    Propofol running at 12.8 mL/hr = 338 kcals (lipid).  Total (TF + Propofol):  1658 kcals (15 kcal/kg and 110% energy needs)      Intake/Tolerance:    Stool x3 yesterday (100 mL) and x1 thus far today.  Fecal incontinence.  Labs noted and acceptable.  BGM:  176,159, 175, 159, 143 - High Resistant sliding scale insulin + Lantus 12 Units in am  I/O:  5199/3320.  Wt:  119.7 kg (up 12.7 kg since admit).  Pt with  2+ mild generalized edema.    ASSESSED NUTRITION NEEDS PER APPROVED PRACTICE GUIDELINES:     Dosing Weight: 107 kg for energy, 45 kg for protein  Estimated Energy Needs:2104-3609 kcals (11-14 Kcal/Kg)  Justification: obese and vented  Estimated Protein Needs:  grams protein (2-2.5 g pro/Kg)  Justification: obesity guidelines     NEW FINDINGS:   4/15:  AXR =  Feeding tube remains in the stomach (NG)  4/16:  AXR = tip in proximal jejunum (N[place])  4/17:  Proned  4/19:  Supine 0200  Skin:  wound to coccyx, open excoriations to perineal folds, large hematoma to R groin, generalized bruising --> WOCN consult pending    Patient on Vecuronium,  Versed, Norepinephrine, and LR at 100 mL/hr    Previous Goals (4/15):   TF + Propofol to provide 100% of estimated needs.  Evaluation: Met    Previous Nutrition Diagnosis 4/15):   Inadequate oral intake related to intubation and sedation as evidenced by propofol meeting 56% of energy needs and 0% protein needs.   Evaluation:  Completed      CURRENT NUTRITION DIAGNOSIS  No nutrition diagnosis identified at this time     INTERVENTIONS  Recommendations / Nutrition Prescription  Continue same TF regimen    Implementation  Collaboration and Referral of Nutrition care - Pt was discussed during ICU interdisciplinary rounds this morning.    Goals  TF Vital High Protein at 55 mL/hr will continue to meet % estimated needs    MONITORING AND EVALUATION:  Progress towards goals will be monitored and evaluated per protocol and Practice Guidelines    Wanda Poretr, RD, LD, CNSC

## 2021-04-19 NOTE — PROGRESS NOTES
[facility] ICU RESPIRATORY NOTE        Date of Admission: 4/13/2021    Date of Intubation (most recent): 4/15/21    Reason for Mechanical Ventilation: resp failure    Number of Days on Mechanical Ventilation: 5    Met Criteria for Spontaneous Breathing Trial: No    Reason for No Spontaneous Breathing Trial: per MD    Significant Events Today: none    ABG Results:   Recent Labs   Lab 04/19/21  1030 04/18/21  1611 04/18/21  1212 04/18/21  0852   PH 7.40 7.43 7.40 7.41   PCO2 53* 49* 50* 49*   PO2 88 91 74* 88   HCO3 33* 32* 31* 31*   O2PER FIO2 50% 40% 40% 60%       Current Vent Settings: Ventilation Mode: CMV/AC  (Continuous Mandatory Ventilation/ Assist Control)  FiO2 (%): 60 %  Rate Set (breaths/minute): 20 breaths/min  Tidal Volume Set (mL): 350 mL  PEEP (cm H2O): 12 cmH2O  Pressure Support (cm H2O): 40 cmH2O  Oxygen Concentration (%): 60 %  Resp: 20          Plan: continue full vent support.    Sami Miranda, RT

## 2021-04-19 NOTE — PROGRESS NOTES
[facility] ICU RESPIRATORY NOTE    Date of Admission: 4/13/2021    Date of Intubation (most recent): 4/15/21    Reason for Mechanical Ventilation:  Respiratory Failure     Number of Days on Mechanical Ventilation:   5                                       Met Criteria for Pressure Support Trial:  No    Reason for No Pressure Support Trial: Proned    Significant Events Today: Pt supined and etad placed at 2am.    ABG Results: Results for SYED VAZQUEZ (MRN [id number]) as of 4/19/2021 04:35   Ref. Range 4/18/2021 16:11   pH Arterial Latest Ref Range: 7.35 - 7.45 pH 7.43   pCO2 Arterial Latest Ref Range: 35 - 45 mm Hg 49 (H)   PO2 Arterial Latest Ref Range: 80 - 105 mm Hg 91   Bicarbonate Arterial Latest Ref Range: 21 - 28 mmol/L 32 (H)   Base Excess Art Latest Units: mmol/L 6.9   FIO2 Unknown 40%   Oxyhemoglobin Arterial Latest Ref Range: 92 - 100 % 97     Current Vent Settings: Ventilation Mode: CMV/AC  (Continuous Mandatory Ventilation/ Assist Control)  FiO2 (%): 40 %  Rate Set (breaths/minute): 20 breaths/min  Tidal Volume Set (mL): 350 mL  PEEP (cm H2O): 12 cmH2O  Pressure Support (cm H2O): 40 cmH2O  Oxygen Concentration (%): 40 %    Plan:  Continue on full support.

## 2021-04-20 PROBLEM — D62 ANEMIA DUE TO BLOOD LOSS, ACUTE: Status: ACTIVE | Noted: 2021-01-01

## 2021-04-20 PROBLEM — T14.8XXA HEMATOMA: Status: ACTIVE | Noted: 2021-01-01

## 2021-04-20 PROBLEM — R57.9 SHOCK (H): Status: ACTIVE | Noted: 2021-01-01

## 2021-04-20 NOTE — PROGRESS NOTES
Brief ICU Note    Notified about R femoral hematoma that is increasing in size. Fem line pulled yesterday AM. Hematoma examined - about 12 cm. Lots of ecchymosis. Hb checked and now 7.     Hold anticoagulation. Trend Hb. Transfuse Hb < 7. Try to find a sandbag or weight to place on right groin. Ultrasound in AM to look for pseudoaneurysm.     Herbie Rolon MD  [facility], Surgical Critical Care     Hb 6.7. Got consent from Ruddy. Will give 1 unit.

## 2021-04-20 NOTE — PROVIDER NOTIFICATION
MD Notification    Notified Person: MD    Notified Person Name:   Jordanmaria alejandra    Notification Date/Time:  04/19/2021 2115    Notification Interaction:  Face to face    Purpose of Notification:  Hgb: 7, down from 9.7 this AM    Orders Received:  Trend hgb every 6 hours, will transfuse if less than 7.     Comments:

## 2021-04-20 NOTE — PROGRESS NOTES
Comprehensive Daily ICU Note        Sarahy Dunbar MRN# [id number]   Age: 65 year old YOB: 1955     Date of Admission: 4/13/2021    Primary care provider: Rama Pop     CODE STATUS: FULL      Problem List:       Active Problems:    Acute respiratory failure with hypoxemia (H)    ARDS (adult respiratory distress syndrome) (H)    2019 novel coronavirus disease (COVID-19)    Hematoma    Anemia due to blood loss, acute    Shock (H)           Treatment goals for next 24 hours:   Keep supine  Monitor leg  Wean sedation  If stable tomorrow trial off flolan    Pilar Yeung MD        Subjective/ Last 24 hours:   65-year-old female with no significant medical history who was found in bed with low saturations after welfare check.  Discovered to have cOVID. Maintained on NIV but subsequently required intubation. Has been alternating prone and supine. Large groin hematoma noted yesterday. Overnight became larger and required transfusion. Otherwise no significant changes.          Mechanical Ventilation/Vitalsigns/IsandOs:       Temp:  [97.5  F (36.4  C)-99.7  F (37.6  C)] 97.5  F (36.4  C)  Pulse:  [53-78] 59  Resp:  [11-22] 19  BP: ()/(33-88) 112/48  MAP:  [26 mmHg-50 mmHg] 35 mmHg  Arterial Line BP: (33-70)/(22-40) 47/29  FiO2 (%):  [40 %-70 %] 50 %  SpO2:  [82 %-100 %] 98 %      Ventilation Mode: CMV/AC  (Continuous Mandatory Ventilation/ Assist Control)  FiO2 (%): 50 %  Rate Set (breaths/minute): 20 breaths/min  Tidal Volume Set (mL): 350 mL  PEEP (cm H2O): 12 cmH2O  Pressure Support (cm H2O): 40 cmH2O  Oxygen Concentration (%): 40 %  Resp: 19      Day since 4/15    Peak airway pressure: 29      Intake/Output Summary (Last 24 hours) at 4/20/2021 1806  Last data filed at 4/20/2021 1620  Gross per 24 hour   Intake 3149.55 ml   Output 1855 ml   Net 1294.55 ml     Net IO Since Admission: 15 liters           Physical Examination:     General: Stated age, intubated, NAD  HEENT: ET tube  Lungs: LCTAB  anteriorly  CVS: RRR, S1S2  Abdomen: obese, soft  Extremities/musculoskeletal: ansarca, warm  Neurology: heavily sedated  Skin: large hematoma on the right groin  Psychiatry: sedated  Exam of Line sites:  Left radial arterial line,           Feeding/Glucose:     Orders Placed This Encounter      NPO for Medical/Clinical Reasons Except for: Meds      Recent Labs   Lab 04/20/21  0732 04/20/21  0554 04/20/21  0344 04/20/21  0041 04/19/21  2023 04/19/21  1600 04/19/21  1201 04/19/21  0258 04/19/21  0258 04/18/21  0530 04/18/21  0530 04/17/21  0329 04/17/21  0329 04/16/21  0500 04/16/21  0500 04/15/21  0513 04/15/21  0513   GLC  --  127*  --   --   --   --   --   --  164*  --  194*  --  193*  --  133*  --  108*   *  --  150* 153* 183* 159* 143*   < >  --    < >  --    < > 194*   < >  --    < >  --     < > = values in this interval not displayed.              Medications:       sodium chloride 0.9%  50 mL Intravenous Q24H     artificial tears   Both Eyes Q8H     azithromycin  500 mg Intravenous Q24H     cefTRIAXone  2 g Intravenous Q24H     chlorhexidine  15 mL Mouth/Throat Q12H     dexamethasone  6 mg Intravenous Q24H     enoxaparin ANTICOAGULANT  0.5 mg/kg Subcutaneous BID     insulin aspart  1-12 Units Subcutaneous Q4H     insulin glargine  12 Units Subcutaneous QAM AC     multivitamins w/minerals  15 mL Per Feeding Tube Daily     pantoprazole (PROTONIX) IV  40 mg Intravenous Daily with breakfast          dextrose       epoprostenol (VELETRI) 20 mcg/mL in sterile water inhalation solution 20 ng/kg/min (04/20/21 0924)     HYDROmorphone 0.6 mg/hr (04/20/21 0714)     midazolam 10 mg/hr (04/20/21 0715)     norepinephrine 0.02 mcg/kg/min (04/20/21 0808)     propofol (DIPRIVAN) infusion 20 mcg/kg/min (04/20/21 0715)     sodium chloride 20 mL/hr at 04/20/21 0715     vasopressin Stopped (04/15/21 1237)     vecuronium (NORCURON) infusion ADULT Stopped (04/19/21 1048)              Labs:         ROUTINE ICU LABS (Last four  results)  CMP  Recent Labs   Lab 04/20/21 0554 04/19/21 0258 04/18/21 1840 04/18/21 0530 04/17/21 2000 04/17/21 0329 04/17/21 0329 04/13/21 1848 04/13/21 1848    142  --  143  --   --  145*   < > 135   POTASSIUM 4.2 3.9 4.1 3.7  --    < > 3.3*   < > 3.2*  3.1*   CHLORIDE 108 107  --  110*  --   --  113*   < > 102   CO2 35* 32  --  31  --   --  29   < > 25   ANIONGAP <1* 3  --  2*  --   --  3   < > 8   * 164*  --  194*  --   --  193*   < > 215*   BUN 32* 20  --  20  --   --  20   < > 30   CR 0.58 0.48*  --  0.52  --   --  0.64   < > 0.99   GFRESTIMATED >90 >90  --  >90  --   --  >90   < > 60*   GFRESTBLACK >90 >90  --  >90  --   --  >90   < > 69   ALICIA 7.1* 7.5*  --  7.6*  --   --  7.7*   < > 8.3*   MAG 2.5* 2.0 2.1  --  2.2  --   --   --  2.4*   PHOS  --  2.6  --   --   --   --   --   --   --    PROTTOTAL  --   --   --   --   --   --   --   --  6.9   ALBUMIN  --   --   --   --   --   --   --   --  2.7*   BILITOTAL  --   --   --   --   --   --   --   --  0.6   ALKPHOS  --   --   --   --   --   --   --   --  98   AST  --   --   --   --   --   --   --   --  55*   ALT  --   --   --   --   --   --   --   --  21    < > = values in this interval not displayed.     CBC  Recent Labs   Lab 04/20/21 0554 04/20/21  0000 04/19/21 2027 04/19/21 0258 04/18/21 0530 04/17/21  0431   WBC 16.5*  --   --  13.0* 7.6 9.4   RBC 2.42*  --   --  3.33* 3.57* 3.57*   HGB 7.0* 6.7* 7.0* 9.7* 10.3* 10.6*   HCT 21.7*  --   --  30.0* 32.5* 33.0*   MCV 90  --   --  90 91 92   MCH 28.9  --   --  29.1 28.9 29.7   MCHC 32.3  --   --  32.3 31.7 32.1   RDW 13.5  --   --  13.2 13.3 13.4     --   --  372 285 287     INR  Recent Labs   Lab 04/19/21  1050 04/13/21  1848   INR 1.23* 1.27*     Arterial Blood Gas  Recent Labs   Lab 04/20/21  0559 04/19/21  1030 04/18/21  1611 04/18/21  1212   PH 7.45 7.40 7.43 7.40   PCO2 49* 53* 49* 50*   PO2 59* 88 91 74*   HCO3 34* 33* 32* 31*   O2PER 40% FIO2 50% 40% 40%       Other Lab  Data:      Cultures:  No results for input(s): CULT in the last 168 hours.  Blood culture:  Invalid input(s): BC   Urine culture:  No results for input(s): URC in the last 168 hours.          Imaging/Other results:     Recent Results (from the past 24 hour(s))   US Post Vascular Access Low Ext Duplex Port    Narrative    Hematoma measuring 16.0 x 16.3 x 5.6 cm with no internal color flow.  There is a small branch vessel adjacent to this hematoma off of the  Right common femoral artery. Waveforms in this branch vessel are  normal, without too and fro flow. No pseudoaneurysm identified.     If the groin hematoma continues to expand, repeat ultrasound could be  considered.                       Assessment and Plan:       Sarahy Dunbar IS a 65 year old female admitted on 4/13/2021 for Covid pneumonia, altered mental status, hypoxemic respiratory failure requiring intubation and mechanical ventilation  I have personally reviewed the daily labs, imaging studies, cultures and discussed the case with referring physician and consulting physicians.     My assessment and plan by system for this patient is as follows:    Neurology/Psychiatry:   1.  Propofol/Versed for ICU sedation  2. Fentanyl infusion for pain  3.  History of anxiety present on admission we will monitor    Plan  - Wean sedation as possible. Change goal RASS to -3 to -4    Cardiovascular:   1.Hemodynamics -low dose norepi for multifactorial shock, most likely secondary to sedation and infection. A line not accurately measuring blood pressure. Site of previous A line with very large hematoma. On very low dose vasopressors today.   Plan  Continue to monitor,   Wean vasopressors. Defer replacing arterial line at this time.      Pulmonary/Ventilator Management:   1. ARDS from COVID. Relatively stable supine.  Plan  -Continue lung protective ventilation. Keep supine to monitor hematoma  - Trial off Velitri tomorrow if stable    GI and Nutrition :   1.  N.p.o. ,    2. Concern for protein calorie malnutrition    Plan  - Continue tube feeds as tolerated    Renal/Fluids/Electrolytes:   1. Renal function stable but very volume overloaded. Sodium has normalized  Plan  - One dose diuretic and reassess    Infectious Disease:   1.  Covid positive. On antibiotics for possible HCAP. No cultures available Has completed 6 days of therapy    Plan  -Continue current Covid regimen  - Stop antibiotics today and reevaluate if appears to be getting new infection    Endocrine:   1.  Concern for stress-induced hyperglycemia we will continue to monitor. Also on Dexamethasone per protocol    Plan  - ICU insulin protocol, goal sugar <180  - complete dexamethasone treatment      Hematology/Oncology:   1.  Large hematoma withneed for transfusion. Appears to have stabilized. Imaging noted no vessel that needs or is amenable to intervention.   Plan  - Monitor hemoglobin  - Hold anticoagulation     IV/Access:   1. Venous access -central line  2. Arterial access - radial arterial line        ICU Prophylaxis:   1. DVT: Heparin  2. VAP: HOB 30 degrees, chlorhexidine rinse  3. Stress Ulcer: PPI/H2 blocker  4. Restraints: Indicated for not pulling at lines  5. Wound care  -local wound care  6. Feeding -tubeffeds  7. Family Update: left message with  who spoke with nurses  8. Disposition -remain in ICU      Pilar Yeung MD  CC time: 50 minutes

## 2021-04-20 NOTE — PLAN OF CARE
CNS: unresponsive. PERRL. Goal RASS -4/-5. Paralytic stopped, BIS removed. TOF remains 0/4     CV: On levophed @ 0.05. Goal SBP > 95. L a-line positional and inaccurate readings- MD aware, taking BP from cuff pressures.    Resp: mech vent CMV. RR 20, FIO2 increased to 60%, PEEP 12, . holding off on proning per MD d/t hematoma.     GI: tube feed at goal of 55ml/hr, q4h 60ml flushes. Sugars covered with sliding scale insulin. Rectal tube removed for no output.    : Yeboah had low UOP but improved throughout shift w/o intervention- if worsens check creat.    Access: L a-line (leaking but draws), L subclavian triple lumen. PIV on R     Drips: propofol 20, dilaudid 0.3, versed 10, levo 0.05, ns tko (x2) @ 10 each, velitri full strength    Skin: excoriation/bleeding under bilateral abdominal pannus/groin. Open crack to sacrum. R femoral hematoma from old a-line site (marked and unchanged this shift) WO saw today, orders placed for cares.

## 2021-04-20 NOTE — PROGRESS NOTES
2015 Dr. Rolon asked to evaluate R femoral hematoma, it was noted to be increasing in size, site was marked for this change. Per MD hold enoxaparin dose and collect a STAT hemoglobin, will continue to monitor.

## 2021-04-20 NOTE — PROGRESS NOTES
[facility] ICU RESPIRATORY NOTE    Date of Admission: 4/13/2021    Date of Intubation (most recent): 4/15/21    Reason for Mechanical Ventilation: resp failure    Number of Days on Mechanical Ventilation: 6    Met Criteria for Pressure Support Trial: 6    Length of Pressure Support Trial: No    Reason for No Pressure Support Trial: Per MD    Significant Events Today: none    ABG Results: Results for YSED VAZQUEZ (MRN [id number]) as of 4/20/2021 04:19   Ref. Range 4/19/2021 10:30   pH Arterial Latest Ref Range: 7.35 - 7.45 pH 7.40   pCO2 Arterial Latest Ref Range: 35 - 45 mm Hg 53 (H)   PO2 Arterial Latest Ref Range: 80 - 105 mm Hg 88   Bicarbonate Arterial Latest Ref Range: 21 - 28 mmol/L 33 (H)   Base Excess Art Latest Units: mmol/L 6.7   FIO2 Unknown FIO2 50%   Oxyhemoglobin Arterial Latest Ref Range: 92 - 100 % 96     Current Vent Settings: Ventilation Mode: CMV/AC  (Continuous Mandatory Ventilation/ Assist Control)  FiO2 (%): 40 %  Rate Set (breaths/minute): 20 breaths/min  Tidal Volume Set (mL): 350 mL  PEEP (cm H2O): 12 cmH2O  Pressure Support (cm H2O): 40 cmH2O  Oxygen Concentration (%): 40 %    Plan:  Continue on full vent support

## 2021-04-21 NOTE — PROGRESS NOTES
[facility] ICU RESPIRATORY NOTE           Date of Admission: 4/13/2021     Date of Intubation (most recent): 4/15/2021     Reason for Mechanical Ventilation: Resp. failure     Number of Days on Mechanical Ventilation: 7     Met Criteria for Spontaneous Breathing Trial: No     Reason for No Spontaneous Breathing Trial: Per MD     Significant Events Today: None.    Recent Labs   Lab 04/20/21  0559 04/19/21  1030 04/18/21  1611 04/18/21  1212   PH 7.45 7.40 7.43 7.40   PCO2 49* 53* 49* 50*   PO2 59* 88 91 74*   HCO3 34* 33* 32* 31*   O2PER 40% FIO2 50% 40% 40%     Ventilation Mode: CMV/AC  (Continuous Mandatory Ventilation/ Assist Control)  FiO2 (%): 50 %  Rate Set (breaths/minute): 20 breaths/min  Tidal Volume Set (mL): 350 mL  PEEP (cm H2O): 12 cmH2O  Oxygen Concentration (%): 50 %  Resp: 20    NATHALY CASTANEDA, RT

## 2021-04-21 NOTE — PROGRESS NOTES
Comprehensive Daily ICU Note        Sarahy Dunbar MRN# [id number]   Age: 65 year old YOB: 1955     Date of Admission: 4/13/2021    Primary care provider: Rama Pop     CODE STATUS: FULL      Problem List:       Active Problems:    Acute respiratory failure with hypoxemia (H)    ARDS (adult respiratory distress syndrome) (H)    2019 novel coronavirus disease (COVID-19)    Hematoma    Anemia due to blood loss, acute    Shock (H)           Treatment goals for next 24 hours:   Re prone if O2 requirements incresae  Monitor leg  Continue to wean sedation  Diuresis    Pilar Yeung MD        Subjective/ Last 24 hours:   65-year-old female with no significant medical history who was found in bed with low saturations after welfare check.  Discovered to have cOVID. Maintained on NIV but subsequently required intubation. Has been alternating prone and supine. Large groin hematoma has developed at site of previous arterial line. Has received transfusions for this but not in last 24 hous. A little more hypoxic this morning.          Mechanical Ventilation/Vitalsigns/IsandOs:       Temp:  [97.3  F (36.3  C)-99.1  F (37.3  C)] 99  F (37.2  C)  Pulse:  [51-83] 61  Resp:  [10-28] 20  BP: ()/(28-67) 121/52  FiO2 (%):  [50 %] 50 %  SpO2:  [89 %-99 %] 97 %      Ventilation Mode: CMV/AC  (Continuous Mandatory Ventilation/ Assist Control)  FiO2 (%): 50 %  Rate Set (breaths/minute): 20 breaths/min  Tidal Volume Set (mL): (S) 380 mL  PEEP (cm H2O): 12 cmH2O  Oxygen Concentration (%): (S) 60 %  Resp: 20      Day since 4/15    Peak airway pressure: 29        Intake/Output Summary (Last 24 hours) at 4/21/2021 1227  Last data filed at 4/21/2021 1200  Gross per 24 hour   Intake 3505.8 ml   Output 2525 ml   Net 980.8 ml       Net IO Since Admission: 15.5 liters           Physical Examination:     General: Stated age, intubated, NAD  HEENT: ET tube  Lungs: LCTAB anteriorly  CVS: RRR, S1S2  Abdomen: obese,  soft  Extremities/musculoskeletal: ansarca, warm  Neurology: heavily sedated  Skin: large hematoma/eccymosis on the right groin that extends into perineal area.   Psychiatry: sedated  Exam of Line sites:  Left radial arterial line and left subclavian central line         Feeding/Glucose:     Orders Placed This Encounter      NPO for Medical/Clinical Reasons Except for: Meds      Recent Labs   Lab 04/21/21  0826 04/21/21  0516 04/21/21  0513 04/20/21  2334 04/20/21  2006 04/20/21  1648 04/20/21  1205 04/20/21  0554 04/20/21  0554 04/19/21  0258 04/19/21  0258 04/18/21  0530 04/18/21  0530 04/17/21  0329 04/17/21  0329 04/16/21  0500 04/16/21  0500   GLC  --   --  114*  --   --   --   --   --  127*  --  164*  --  194*  --  193*  --  133*   * 110*  --  171* 152* 135* 117*   < >  --    < >  --    < >  --    < > 194*   < >  --     < > = values in this interval not displayed.              Medications:       chlorhexidine  15 mL Mouth/Throat Q12H     dexamethasone  6 mg Intravenous Q24H     [Held by provider] enoxaparin ANTICOAGULANT  0.5 mg/kg Subcutaneous BID     insulin aspart  1-12 Units Subcutaneous Q4H     insulin glargine  12 Units Subcutaneous QAM AC     multivitamins w/minerals  15 mL Per Feeding Tube Daily     pantoprazole  40 mg Per Feeding Tube Daily          dextrose       epoprostenol (VELETRI) 20 mcg/mL in sterile water inhalation solution 20 ng/kg/min (04/21/21 1135)     HYDROmorphone 0.6 mg/hr (04/21/21 0800)     midazolam 4 mg/hr (04/21/21 0812)     norepinephrine 0.2 mcg/kg/min (04/21/21 0800)     propofol (DIPRIVAN) infusion 20 mcg/kg/min (04/21/21 0800)     sodium chloride 20 mL/hr at 04/20/21 0715     vasopressin Stopped (04/15/21 1237)     vecuronium (NORCURON) infusion ADULT Stopped (04/19/21 1048)              Labs:         ROUTINE ICU LABS (Last four results)  CMP  Recent Labs   Lab 04/21/21  0513 04/20/21  0554 04/19/21  0258 04/18/21  1840 04/18/21  0530 04/17/21 2000   * 142 142   --  143  --    POTASSIUM 4.5 4.2 3.9 4.1 3.7  --    CHLORIDE 110* 108 107  --  110*  --    CO2 36* 35* 32  --  31  --    ANIONGAP <1* <1* 3  --  2*  --    * 127* 164*  --  194*  --    BUN 31* 32* 20  --  20  --    CR 0.52 0.58 0.48*  --  0.52  --    GFRESTIMATED >90 >90 >90  --  >90  --    GFRESTBLACK >90 >90 >90  --  >90  --    ALICIA 7.3* 7.1* 7.5*  --  7.6*  --    MAG  --  2.5* 2.0 2.1  --  2.2   PHOS  --   --  2.6  --   --   --      CBC  Recent Labs   Lab 04/21/21  0513 04/20/21  1830 04/20/21  1145 04/20/21  0554 04/19/21  0258 04/19/21  0258 04/18/21  0530   WBC 14.5*  --   --  16.5*  --  13.0* 7.6   RBC 2.43*  --   --  2.42*  --  3.33* 3.57*   HGB 7.2* 8.0* 6.6* 7.0*   < > 9.7* 10.3*   HCT 21.9*  --   --  21.7*  --  30.0* 32.5*   MCV 90  --   --  90  --  90 91   MCH 29.6  --   --  28.9  --  29.1 28.9   MCHC 32.9  --   --  32.3  --  32.3 31.7   RDW 14.2  --   --  13.5  --  13.2 13.3     --   --  269  --  372 285    < > = values in this interval not displayed.     INR  Recent Labs   Lab 04/20/21  1440 04/19/21  1050   INR 1.08 1.23*     Arterial Blood Gas  Recent Labs   Lab 04/21/21  1008 04/20/21  0559 04/19/21  1030 04/18/21  1611 04/18/21  1212   PH 7.46* 7.45 7.40 7.43 7.40   PCO2 50* 49* 53* 49* 50*   PO2 78* 59* 88 91 74*   HCO3 35* 34* 33* 32* 31*   O2PER  --  40% FIO2 50% 40% 40%       Other Lab Data:      Cultures:  No results for input(s): CULT in the last 168 hours.  Blood culture:  Invalid input(s): BC   Urine culture:  No results for input(s): URC in the last 168 hours.          Imaging/Other results:     No results found for this or any previous visit (from the past 24 hour(s)).    Bilateral infiltrates appear similar to slightly worse since the last examination         Assessment and Plan:       Sarahy Dunbar IS a 65 year old female admitted on 4/13/2021 for Covid pneumonia, altered mental status, hypoxemic respiratory failure requiring intubation and mechanical ventilation  I have  personally reviewed the daily labs, imaging studies, cultures and discussed the case with referring physician and consulting physicians.     My assessment and plan by system for this patient is as follows:    Neurology/Psychiatry:   1.  Propofol/Versed for ICU sedation  2.  Hydromorphone infusion for pain  3.  History of anxiety present on admission we will monitor    Plan  - Wean sedation as possible. Change goal RASS to -2 to -3    Cardiovascular:   1.Hemodynamics -low dose norepi for multifactorial shock, most likely secondary to sedation and infection. A line not accurately measuring blood pressure. Site of previous A line with very large hematoma. On very low dose vasopressors today. Trinh draws but doesn't measure BP  Plan  Continue to monitor,   Wean vasopressors. Defer replacing arterial line at this time.      Pulmonary/Ventilator Management:   1. ARDS from COVID. No significant changes. Tachypnic as sedation weaned. Turned up TV to 380 and RR decreased.   Plan  -Continue current ventilator settings and see if oxygenation improves if she has less work of breathing. If unable to tolerate may replace prone.  - Trial off Velitri tomorrow when    GI and Nutrition :   1.  N.p.o. ,   2. Concern for protein calorie malnutrition    Plan  - Continue tube feeds as tolerated    Renal/Fluids/Electrolytes:   1. Renal function stable but very volume overloaded. Sodium now on the rise  Plan  - One dose diuretic and reassess  - increase free water    Infectious Disease:   1.  Covid positive. Completed Remdesivir. On steroids Also completed 6 days of antibiotics.  Plan  -Continue current Covid regimen      Endocrine:   1.  Concern for stress-induced hyperglycemia we will continue to monitor. Also on Dexamethasone per protocol    Plan  - ICU insulin protocol, goal sugar <180  - complete dexamethasone treatment      Hematology/Oncology:   1.  Large hematoma with need for transfusion. Appears to have stabilized. Imaging noted  no vessel that needs or is amenable to intervention. Hemoglobin dropped again slgihtly today but not need for transfusion  Plan  - Monitor hemoglobin  - Start low dose heparin subcu and monitor.      IV/Access:   1. Venous access -central line  2. Arterial access - radial arterial line        ICU Prophylaxis:   1. DVT: Heparin  2. VAP: HOB 30 degrees, chlorhexidine rinse  3. Stress Ulcer: PPI/H2 blocker  4. Restraints: Indicated for not pulling at lines  5. Wound care  -local wound care  6. Feeding -tubeffeds  7. Family Update:  on the phone  8. Disposition -remain in ICU      Pilar Yeung MD  CC time: 50 minutes

## 2021-04-21 NOTE — PROGRESS NOTES
[facility] RESPIRATORY NOTE        Date of Admission: 4/13/2021    Date of Intubation (most recent): 4/15/2021    Reason for Mechanical Ventilation: Resp. failure    Number of Days on Mechanical Ventilation: 6    Met Criteria for Spontaneous Breathing Trial: No    Reason for No Spontaneous Breathing Trial: Per MD    Significant Events Today: None.    ABG Results:   Recent Labs   Lab 04/20/21  0559 04/19/21  1030 04/18/21  1611 04/18/21  1212   PH 7.45 7.40 7.43 7.40   PCO2 49* 53* 49* 50*   PO2 59* 88 91 74*   HCO3 34* 33* 32* 31*   O2PER 40% FIO2 50% 40% 40%       Current Vent Settings: Ventilation Mode: CMV/AC  (Continuous Mandatory Ventilation/ Assist Control)  FiO2 (%): 50 %  Rate Set (breaths/minute): 20 breaths/min  Tidal Volume Set (mL): 350 mL  PEEP (cm H2O): 12 cmH2O  Pressure Support (cm H2O): 40 cmH2O  Oxygen Concentration (%): 50 %  Resp: 19        Vargas Richardson, RT

## 2021-04-21 NOTE — PLAN OF CARE
CV: SR w/occ PAC/PVC.  Attempted to turn norepi off but SBP drops to 80s. Hgb 7 at noon; 7.2 at 1800.   Resp:  CMV 60%/380/20/P5.  Lungs dim at bases, mostly clear upper lobes. Minimal secretions.  Neuro:  Versed decreased to 4; opens eyes, not following or tracking. Versed increased back to 6 and propfol to 30 in preparation for re-proning this evening.   GI: TF at goal; free water increased to 200ml q 4hr4s. No BM this shift.   :  40 lasix x ; diuresed almost 3L.    Skin: Right groin hematoma unchanged.  Excoriated in folds of groin and pannus. Additional mepilex to left groin to protect while prone.  Wound care done per order.  Coccyx unchanged.    Gtts: Dilaudid 0.6, Propofol 30, Norepi 0.02, Versed 6, tko x 2.   Plan: Re-prone this evening for 16hrs.  Q6hr Hgb.

## 2021-04-21 NOTE — PLAN OF CARE
No significant changes overnight. Remains on ventilator with settings unchanged. Withdrawing from pain intermittently. Yeboah with good urine output. Will continue to monitor.

## 2021-04-21 NOTE — PLAN OF CARE
CNS: sedated, opens eyes periodically with repositioning. Pupils PERRL. RASS -4/-5. titrating versed down, currently @ 6mg/hr    CV: On levophed @ 0.02. Goal SBP > 95. 1U PRBC's given for hgb of 6.7, recheck 1800    Resp: mech vent CMV. RR 20, FIO2 increased to 50% PEEP 12, . Full strength velitri    GI: tube feed at goal of 55ml/hr, q4h 60ml flushes. No coverage needed for sugars. No BM this shift, BS active    : Yeboah with adequate UOP    Access: L a-line (leaking but draws), L subclavian triple lumen

## 2021-04-22 NOTE — PROGRESS NOTES
SPIRITUAL HEALTH SERVICES: Tele-Encounter  Patient Location: ICU  Spoke with: Spouse (Ruddy)    Referral Source: Length of Stay    DATA:  Visited by telephone with spouse. Ruddy talked about the difficulty with not being able to visit his wife in the hospital. He stated that he is a person of deep lou who believes that the outcome is entirely in the hands of God. Ruddy said that there are many people praying for Pat's recovery and that he feels well-supported through his [employer] where he and Pat have been actively involved for a long time. Ruddy stated that he does not know how he or anyone could get through a situation like this one in the absence of a deep lou.    Ruddy requested that SH offer prayer for his wife. He also requested that we communicate to PT how much he misses and loves her.    Offered emotional and spiritual support through active listening and reflective conversation.    PLAN:  Offer prayer for PT and communicate Ruddy's message to his wife via bedside nurse. Follow-up with spouse while PT remains in hospital.    John Mello Resident    ______________________________    Type of service:  Telephone Visit     has received verbal consent for a TelephoneVisit from the patient? Yes    Distance Provider Location: designated [place] office or home office (secure setting)    Mode of Communication: telephone (via [employer] phone or [employer] tele-call-number ([phone]))

## 2021-04-22 NOTE — PROGRESS NOTES
[facility] ICU RESPIRATORY NOTE        Date of Admission: 4/13/2021    Date of Intubation (most recent): 4/15/21    Reason for Mechanical Ventilation: Res Failure    Number of Days on Mechanical Ventilation: 8    Met Criteria for Spontaneous Breathing Trial: No    Significant Events Today: None    ABG Results:   Recent Labs   Lab 04/21/21 2025 04/21/21  1008 04/20/21  0559 04/19/21  1030 04/18/21  1611   PH 7.50* 7.46* 7.45 7.40 7.43   PCO2 47* 50* 49* 53* 49*   PO2 159* 78* 59* 88 91   HCO3 37* 35* 34* 33* 32*   O2PER 60%  --  40% FIO2 50% 40%       Current Vent Settings: Ventilation Mode: CMV/AC  (Continuous Mandatory Ventilation/ Assist Control)  FiO2 (%): 50 %  Rate Set (breaths/minute): 20 breaths/min  Tidal Volume Set (mL): 380 mL  PEEP (cm H2O): 12 cmH2O  Oxygen Concentration (%): 60 %  Resp: 20      Skin Assessment: no Skin issues    Plan: Continue full vent support and daily wean assessments     Anahi Brewer, RT

## 2021-04-22 NOTE — PLAN OF CARE
Afebrile. BP stable. Titrating levo. Decreased sedation. Gave diuretics with good UOP. LS diminished. Pt more awake, not consistently following. Able to move right arm slighty, nods yes in response to some questions. Plan to continue to wean medications and vent as tolerated.

## 2021-04-22 NOTE — PROGRESS NOTES
ABG done, PaO2 159 on 60% FiO2.  P/F ratio 265. Will keep supine per MD.     CHAPITO Fajardo, RRT

## 2021-04-22 NOTE — PROGRESS NOTES
Comprehensive Daily ICU Note        Sarahy Dunbar MRN# [id number]   Age: 65 year old YOB: 1955     Date of Admission: 4/13/2021    Primary care provider: Rama Pop     CODE STATUS: FULL      Problem List:       Active Problems:    Acute respiratory failure with hypoxemia (H)    ARDS (adult respiratory distress syndrome) (H)    2019 novel coronavirus disease (COVID-19)    Hematoma    Anemia due to blood loss, acute    Shock (H)           Treatment goals for next 24 hours:   Re prone if O2 requirements incresae  Monitor leg  Continue to wean sedation  Diuresis    Pilar Yeung MD        Subjective/ Last 24 hours:   65-year-old female with no significant medical history who was found in bed with low saturations after welfare check.  Discovered to have cOVID. Maintained on NIV but subsequently required intubation. Has been alternating prone and supine. Large groin hematoma has developed at site of previous arterial line. Has now received three units of blood. Oxygenation has improved in the last 24 hours; possibly because of receiving a dose of diuretic.          Mechanical Ventilation/Vitalsigns/IsandOs:     Temp:  [97  F (36.1  C)-99.3  F (37.4  C)] 97.3  F (36.3  C)  Pulse:  [50-76] 70  Resp:  [0-23] 23  BP: ()/() 121/65  FiO2 (%):  [50 %-60 %] 60 %  SpO2:  [96 %-100 %] 100 %      Ventilation Mode: CMV/AC  (Continuous Mandatory Ventilation/ Assist Control)  FiO2 (%): 60 %  Rate Set (breaths/minute): 16 breaths/min  Tidal Volume Set (mL): 380 mL  PEEP (cm H2O): 12 cmH2O  Oxygen Concentration (%): 55 %  Resp: 23      Day since 4/15    Peak airway pressure: 29      Intake/Output Summary (Last 24 hours) at 4/22/2021 1657  Last data filed at 4/22/2021 1600  Gross per 24 hour   Intake 3632.43 ml   Output 4040 ml   Net -407.57 ml     Net IO Since Admission: 14.9 liters           Physical Examination:     General: Stated age, eyes open, NAD  HEENT: ET tube, NG tube  Lungs: LCTAB  anteriorly  CVS: RRR, S1S2  Abdomen: obese, soft  Extremities/musculoskeletal: ansarca, warm  Neurology: eyes open  Skin: large hematoma/eccymosis on the right groin that extends into perineal area. Has not changed significantly in the last two days.   Psychiatry: sedated  Exam of Line sites:  Left radial arterial line and left subclavian central line         Feeding/Glucose:     Orders Placed This Encounter      NPO for Medical/Clinical Reasons Except for: Meds      Recent Labs   Lab 04/22/21  1555 04/22/21  1238 04/22/21  0826 04/22/21  0510 04/22/21  0301 04/21/21  2338 04/21/21  1937 04/21/21  0513 04/21/21  0513 04/20/21  0554 04/20/21  0554 04/19/21  0258 04/19/21  0258 04/18/21  0530 04/18/21  0530 04/17/21  0329 04/17/21  0329   GLC  --   --   --  111*  --   --   --   --  114*  --  127*  --  164*  --  194*  --  193*   * 100* 102*  --  115* 137* 160*   < >  --    < >  --    < >  --    < >  --    < > 194*    < > = values in this interval not displayed.              Medications:       chlorhexidine  15 mL Mouth/Throat Q12H     [Held by provider] enoxaparin ANTICOAGULANT  0.5 mg/kg Subcutaneous BID     insulin aspart  1-12 Units Subcutaneous Q4H     insulin glargine  12 Units Subcutaneous QAM AC     multivitamins w/minerals  15 mL Per Feeding Tube Daily     pantoprazole  40 mg Per Feeding Tube Daily          dextrose       epoprostenol (VELETRI) 20 mcg/mL in sterile water inhalation solution 20 ng/kg/min (04/22/21 1133)     HYDROmorphone 0.4 mg/hr (04/22/21 1338)     midazolam 4 mg/hr (04/22/21 0323)     norepinephrine 0.01 mcg/kg/min (04/22/21 1600)     propofol (DIPRIVAN) infusion 20 mcg/kg/min (04/22/21 1345)     sodium chloride 20 mL/hr at 04/22/21 0343              Labs:         ROUTINE ICU LABS (Last four results)  CMP  Recent Labs   Lab 04/22/21  0510 04/21/21  0513 04/20/21  0554 04/19/21  0258 04/18/21  1840    145* 142 142  --    POTASSIUM 4.0 4.5 4.2 3.9 4.1   CHLORIDE 105 110* 108 107   --    CO2 36* 36* 35* 32  --    ANIONGAP 1* <1* <1* 3  --    * 114* 127* 164*  --    BUN 30 31* 32* 20  --    CR 0.47* 0.52 0.58 0.48*  --    GFRESTIMATED >90 >90 >90 >90  --    GFRESTBLACK >90 >90 >90 >90  --    ALICIA 7.4* 7.3* 7.1* 7.5*  --    MAG 2.6*  --  2.5* 2.0 2.1   PHOS  --   --   --  2.6  --      CBC  Recent Labs   Lab 04/22/21  1354 04/22/21  0510 04/21/21  2345 04/21/21  1808 04/21/21  0513 04/21/21  0513 04/20/21  0554 04/20/21  0554 04/19/21  0258 04/19/21  0258   WBC  --  12.4*  --   --   --  14.5*  --  16.5*  --  13.0*   RBC  --  2.18*  --   --   --  2.43*  --  2.42*  --  3.33*   HGB 7.4* 6.5* 7.0* 7.2*   < > 7.2*   < > 7.0*   < > 9.7*   HCT  --  19.9*  --   --   --  21.9*  --  21.7*  --  30.0*   MCV  --  91  --   --   --  90  --  90  --  90   MCH  --  29.8  --   --   --  29.6  --  28.9  --  29.1   MCHC  --  32.7  --   --   --  32.9  --  32.3  --  32.3   RDW  --  14.2  --   --   --  14.2  --  13.5  --  13.2   PLT  --  190  --   --   --  239  --  269  --  372    < > = values in this interval not displayed.     INR  Recent Labs   Lab 04/20/21  1440 04/19/21  1050   INR 1.08 1.23*     Arterial Blood Gas  Recent Labs   Lab 04/22/21  0810 04/21/21  2025 04/21/21  1008 04/20/21  0559 04/19/21  1030   PH 7.49* 7.50* 7.46* 7.45 7.40   PCO2 48* 47* 50* 49* 53*   PO2 98 159* 78* 59* 88   HCO3 36* 37* 35* 34* 33*   O2PER 60 60%  --  40% FIO2 50%       Other Lab Data:      Cultures:  No results for input(s): CULT in the last 168 hours.  Blood culture:  Invalid input(s): BC   Urine culture:  No results for input(s): URC in the last 168 hours.          Imaging/Other results:     No results found for this or any previous visit (from the past 24 hour(s)).    Bilateral infiltrates appear similar to slightly worse since the last examination         Assessment and Plan:       Sarahy Dunbar IS a 65 year old female admitted on 4/13/2021 for Covid pneumonia, altered mental status, hypoxemic respiratory failure  requiring intubation and mechanical ventilation  I have personally reviewed the daily labs, imaging studies, cultures and discussed the case with referring physician and consulting physicians.     My assessment and plan by system for this patient is as follows:    Neurology/Psychiatry:   1.  Propofol/Versed for ICU sedation  2.  Hydromorphone infusion for pain  3.  History of anxiety present on admission we will monitor    Plan  - Wean sedation as possible. Change goal RASS to -1 to -2.    Cardiovascular:   1.Hemodynamics -low dose norepi for multifactorial shock, most likely secondary to sedation and infection. Vaspopressor needs are now minimal.  Plan  Continue to monitor,   Wean vasopressors. Defer replacing arterial line at this time.      Pulmonary/Ventilator Management:   1. ARDS from COVID. Slightly improved, however airway pressures above goal. Unable to do esophageal manometry but sujspect we are overestimating her transpulmonary pressures. Alkalotic on blood gas (mostly metabolic)  Plan  -Decrease TV/RR today  -Trial off velitri  - Continue diuresis    GI and Nutrition :   1.  N.p.o. ,   2. Concern for protein calorie malnutrition    Plan  - Continue tube feeds as tolerated    Renal/Fluids/Electrolytes:   1. Renal function stable but very volume overloaded. Sodium has corrected w increased free water and patient with brisk response to diuresis but increasingly alkalotic.   Plan  - Redose diuretics  - Continue free water at current rate    Infectious Disease:   1.  Covid positive. Completed Remdesivir. On steroids Also completed 6 days of antibiotics.  Plan  -Monitor and test/treat as necessary      Endocrine:   1.  Stress induced hyperglycemia. On 12 units long acting plus some sliding scale. Also on Dexamethasone per protocol. Last dose today,    Plan  - Stop long acting now that she will be off steroids and reassess.   - complete dexamethasone treatment      Hematology/Oncology:   1.  Large hematoma with  need for transfusion. Appears to have stabilized. Imaging noted no vessel that needs or is amenable to intervention. However, is continuing to need blooc so possible she is bleeding from somewhere else. No signs of GI blood loss.  Plan  -Continue to hold heparin.  - If continues to bleed evaluate further.     IV/Access:   1. Venous access -central line  2. Arterial access - radial arterial line        ICU Prophylaxis:   1. DVT: Heparin  2. VAP: HOB 30 degrees, chlorhexidine rinse  3. Stress Ulcer: PPI/H2 blocker  4. Restraints: Indicated for not pulling at lines  5. Wound care  -local wound care  6. Feeding -tubeffeds  7. Family Update:  on the phone  8. Disposition -remain in ICU      Pilar Yeung MD  CC time: 50 minutes

## 2021-04-22 NOTE — PROGRESS NOTES
CLINICAL NUTRITION SERVICES - REASSESSMENT NOTE      Malnutrition:  (4/15)  % Weight Loss:  None noted  % Intake:  Decreased intake does not meet criteria for malnutrition - NPO x 3 days  Subcutaneous Fat Loss: Unable to assess  Muscle Loss: Unable to assess  Fluid Retention:  None noted     Malnutrition Diagnosis: Unable to determine without Nutrition Focused Physical Exam      EVALUATION OF PROGRESS TOWARD GOALS   Diet: NPO    Nutrition Support:  TF via [place] goal Vital High Protein at 55 mL/hr = 1320 kcals (12 kcal/kg), 115 gm pro (2.5 gm/kg), 147 gm CHO, no fiber, 1109 mL H20   :  Prop at 12.8 mL/hr = 338 kcals   Total (TF + Prop):  1658 kcals (15 kcal/kg & 110% energy needs)   FWF: 200 ml q 4 hours   Certavite daily     Intake/Tolerance:   -TF briefly off on   -Wt: 122.7 kg (-fluid overload per MD)  Continues on versed, norepi and prop @ 12.8 ml/hr - 338 kcals  Last BM 4/20 x 3  K/Phos -wnl, Ma.6 (H)  Na: 142 -normal, improved from 145  Acceptable BGM: 160, 137, 115, 111, 102 --> 1 unit of insulin given and 12 units of Lantus    ASSESSED NUTRITION NEEDS:  Dosing Weight: 107 kg for energy, 45 kg for protein  Estimated Energy Needs:4787-1456 kcals (11-14 Kcal/Kg)  Justification: obese and vented  Estimated Protein Needs:  grams protein (2-2.5 g pro/Kg)  Justification: obesity guidelines     NEW FINDINGS:   : US = Complex fluid collection in the right groin measuring 16.0 x 16.3 x  5.6 cm, likely hematoma. Small branch vessel adjacent to this hematoma off the right common  femoral artery with normal arterial waveforms and no to and fro flow.  : Supine    Previous Goals:   TF Vital High Protein at 55 mL/hr will continue to meet % estimated needs  Evaluation: Met    Previous Nutrition Diagnosis:   No nutrition diagnosis identified at this time   Evaluation: No change    CURRENT NUTRITION DIAGNOSIS  No nutrition diagnosis identified at this time     INTERVENTIONS  Recommendations /  Nutrition Prescription  Continue same TF regimen    Implementation  Collaboration and Referral of Nutrition care - Pt discussed at ICU rounds    Goals  TF will continue to meet % estimated needs    MONITORING AND EVALUATION:  Progress towards goals will be monitored and evaluated per protocol and Practice Guidelines    Genoveva Beck  Dietetic Intern

## 2021-04-22 NOTE — PROVIDER NOTIFICATION
MD Notification    Notified Person: MD    Notified Person Name:  Dr. Pizarro     Notification Date/Time:  04/22/2021  4511    Notification Interaction:  telephone    Purpose of Notification:  Critical lab, Hgb: 6.5    Orders Received:  Transfuse 1 Unit PRBC    Comments:

## 2021-04-22 NOTE — PLAN OF CARE
[id number]    No acute assessment changes overnight, pt spontaneously opening eyes at times. Remained supine overnight. Hgb this morning 6.5, transfusing 1 unit PRBC when ready.

## 2021-04-22 NOTE — PROGRESS NOTES
[facility] ICU RESPIRATORY NOTE        Date of Admission: 4/13/2021    Date of Intubation (most recent): 4/15/21    Reason for Mechanical Ventilation: Resp failure    Number of Days on Mechanical Ventilation: 8    Met Criteria for Spontaneous Breathing Trial: No    Reason for No Spontaneous Breathing Trial: Per MD    Significant Events Today:  None    ABG Results:   Recent Labs   Lab 04/22/21  0810 04/21/21 2025 04/21/21  1008 04/20/21  0559 04/19/21  1030   PH 7.49* 7.50* 7.46* 7.45 7.40   PCO2 48* 47* 50* 49* 53*   PO2 98 159* 78* 59* 88   HCO3 36* 37* 35* 34* 33*   O2PER 60 60%  --  40% FIO2 50%       Current Vent Settings: Ventilation Mode: CMV/AC  (Continuous Mandatory Ventilation/ Assist Control)  FiO2 (%): 60 %  Rate Set (breaths/minute): 16 breaths/min  Tidal Volume Set (mL): 380 mL  PEEP (cm H2O): 12 cmH2O  Oxygen Concentration (%): 55 %  Resp: 23      Plan:  Pt to remain on full vent support overnight    Murray Valentine, RT

## 2021-04-23 NOTE — PROGRESS NOTES
Comprehensive Daily ICU Note        Sarahy Dunbar MRN# [id number]   Age: 65 year old YOB: 1955     Date of Admission: 4/13/2021    Primary care provider: Rama Pop     CODE STATUS: FULL      Problem List:       Active Problems:    Acute respiratory failure with hypoxemia (H)    ARDS (adult respiratory distress syndrome) (H)    2019 novel coronavirus disease (COVID-19)    Hematoma    Anemia due to blood loss, acute    Shock (H)           Treatment goals for next 24 hours:   Work to achieve best balance between lung protection and level of alertness  Resume DVT prophylaxis    Pilar Yeung MD        Subjective/ Last 24 hours:   65-year-old female with no significant medical history who was found in bed with low saturations after welfare check.  Discovered to have cOVID. Maintained on NIV but subsequently required intubation. Has been alternating prone and supine. Large groin hematoma has developed at site of previous arterial line. Has now received three units of blood. Oxygenation has improved; possibly because of diuresis.         Mechanical Ventilation/Vitalsigns/IsandOs:     Temp:  [97.3  F (36.3  C)-100  F (37.8  C)] 100  F (37.8  C)  Pulse:  [] 91  Resp:  [7-44] 28  BP: ()/(44-86) 106/53  FiO2 (%):  [50 %-70 %] 70 %  SpO2:  [93 %-98 %] 94 %      Ventilation Mode: CMV/AC  (Continuous Mandatory Ventilation/ Assist Control)  FiO2 (%): 70 %  Rate Set (breaths/minute): 16 breaths/min  Tidal Volume Set (mL): 360 mL  PEEP (cm H2O): 12 cmH2O  Oxygen Concentration (%): 50 %  Resp: 28      Day since 4/15    Peak airway pressure: 29      Intake/Output Summary (Last 24 hours) at 4/23/2021 1223  Last data filed at 4/23/2021 1200  Gross per 24 hour   Intake 3205.33 ml   Output 4545 ml   Net -1339.67 ml     Net IO Since Admission: 13.3 liters         Physical Examination:     General: Stated age, eyes open, NAD  HEENT: ET tube, NG tube  Lungs: LCTAB anteriorly  CVS: RRR, S1S2  Abdomen:  obese, soft  Extremities/musculoskeletal: ansarca, warm  Neurology: eyes open, follows some commands  Skin: large hematoma/eccymosis on the right groin that extends into perineal area. Has regressed in the last few days.  Psychiatry: sedated  Exam of Line sites:  Left radial arterial line and left subclavian central line         Feeding/Glucose:     Orders Placed This Encounter      NPO for Medical/Clinical Reasons Except for: Meds      Recent Labs   Lab 04/23/21  1143 04/23/21  0931 04/23/21  0427 04/23/21  0029 04/22/21 2002 04/22/21  1555 04/22/21  1238 04/22/21  0510 04/22/21  0510 04/21/21  0513 04/21/21  0513 04/20/21  0554 04/20/21  0554 04/19/21  0258 04/19/21  0258 04/18/21  0530 04/18/21  0530   GLC  --   --  89  --   --   --   --   --  111*  --  114*  --  127*  --  164*  --  194*   * 89  --  113* 139* 113* 100*   < >  --    < >  --    < >  --    < >  --    < >  --     < > = values in this interval not displayed.              Medications:       chlorhexidine  15 mL Mouth/Throat Q12H     [Held by provider] enoxaparin ANTICOAGULANT  0.5 mg/kg Subcutaneous BID     insulin aspart  1-12 Units Subcutaneous Q4H     multivitamins w/minerals  15 mL Per Feeding Tube Daily     pantoprazole  40 mg Per Feeding Tube Daily          dextrose       epoprostenol (VELETRI) 20 mcg/mL in sterile water inhalation solution 20 ng/kg/min (04/23/21 0534)     HYDROmorphone 0.4 mg/hr (04/23/21 1156)     midazolam 5 mg/hr (04/23/21 0939)     norepinephrine Stopped (04/22/21 2000)     propofol (DIPRIVAN) infusion Stopped (04/22/21 2100)     sodium chloride 10 mL/hr at 04/22/21 2000              Labs:         ROUTINE ICU LABS (Last four results)  CMP  Recent Labs   Lab 04/23/21  0427 04/22/21  0510 04/21/21  0513 04/20/21  0554 04/19/21  0258    142 145* 142 142   POTASSIUM 3.8 4.0 4.5 4.2 3.9   CHLORIDE 105 105 110* 108 107   CO2 32 36* 36* 35* 32   ANIONGAP 2* 1* <1* <1* 3   GLC 89 111* 114* 127* 164*   BUN 31* 30 31*  32* 20   CR 0.47* 0.47* 0.52 0.58 0.48*   GFRESTIMATED >90 >90 >90 >90 >90   GFRESTBLACK >90 >90 >90 >90 >90   ALICIA 7.5* 7.4* 7.3* 7.1* 7.5*   MAG 2.4* 2.6*  --  2.5* 2.0   PHOS  --   --   --   --  2.6   PROTTOTAL 5.2*  --   --   --   --    ALBUMIN 1.8*  --   --   --   --    BILITOTAL 0.4  --   --   --   --    ALKPHOS 75  --   --   --   --    AST 42  --   --   --   --    ALT 46  --   --   --   --      CBC  Recent Labs   Lab 04/23/21  0427 04/22/21  1354 04/22/21  0510 04/21/21  2345 04/21/21  0513 04/21/21  0513 04/20/21  0554 04/20/21  0554   WBC 14.8*  --  12.4*  --   --  14.5*  --  16.5*   RBC 2.59*  --  2.18*  --   --  2.43*  --  2.42*   HGB 7.8* 7.4* 6.5* 7.0*   < > 7.2*   < > 7.0*   HCT 23.7*  --  19.9*  --   --  21.9*  --  21.7*   MCV 92  --  91  --   --  90  --  90   MCH 30.1  --  29.8  --   --  29.6  --  28.9   MCHC 32.9  --  32.7  --   --  32.9  --  32.3   RDW 14.1  --  14.2  --   --  14.2  --  13.5     --  190  --   --  239  --  269    < > = values in this interval not displayed.     INR  Recent Labs   Lab 04/20/21  1440 04/19/21  1050   INR 1.08 1.23*     Arterial Blood Gas  Recent Labs   Lab 04/22/21  0810 04/21/21  2025 04/21/21  1008 04/20/21  0559 04/19/21  1030   PH 7.49* 7.50* 7.46* 7.45 7.40   PCO2 48* 47* 50* 49* 53*   PO2 98 159* 78* 59* 88   HCO3 36* 37* 35* 34* 33*   O2PER 60 60%  --  40% FIO2 50%       Other Lab Data:      Cultures:  No results for input(s): CULT in the last 168 hours.  Blood culture:  Invalid input(s): BC   Urine culture:  No results for input(s): URC in the last 168 hours.          Imaging/Other results:     No results found for this or any previous visit (from the past 24 hour(s)).    Bilateral infiltrates appear similar to slightly worse since the last examination         Assessment and Plan:       Sarahy Dunbar IS a 65 year old female admitted on 4/13/2021 for Covid pneumonia, altered mental status, hypoxemic respiratory failure requiring intubation and  mechanical ventilation  I have personally reviewed the daily labs, imaging studies, cultures and discussed the case with referring physician and consulting physicians.     My assessment and plan by system for this patient is as follows:    Neurology/Psychiatry:   1.  Propofol/Versed for ICU sedation  2.  Hydromorphone infusion for pain  3.  History of anxiety present on admission we will monitor  Plan  -Wean sedation if possible.    Cardiovascular:   1.Hemodynamics -low dose norepi for multifactorial shock, most likely secondary to sedation and infection. Vasopressors have been stopped.  Plan  Continue to monitor    Pulmonary/Ventilator Management:   1. ARDS from COVID. Stable, although up on her o2 again this afternoon. Suspect we are overestimating her transpulmonary pressures. Alkalotic on blood gas (mostly metabolic) yesterday. Tachypnic and more hypoxic this afternoon. Velitiri has been weaned to half strength and oxygen levels are unchanged.  Plan  -ABG. Will either increase sedation or liberalize TV pending these results.   -Trial off velitri  - Continue diuresis    GI and Nutrition :   1.  N.p.o.    2. Concern for protein calorie malnutrition    Plan  - Continue tube feeds as tolerated    Renal/Fluids/Electrolytes:   1. Renal function stable but very volume overloaded. Sodium has corrected w increased free water and patient with brisk response to diuresis. Alkalosis less pronounced after a dose of Acetazolamide.  Plan  - Redose diuretics again today.   - Decrease free water    Infectious Disease:   1.  Covid positive. Completed Remdesivir. On steroids. Also completed 6 days of antibiotics.  Plan  -Monitor and test/treat as necessary      Endocrine:   1.  Stress induced hyperglycemia. Sugars are normal now that Dexamethasone has been stopped and long acting insulin discontinued as well.    Plan  - Insulin sliding scale only for now.      Hematology/Oncology:   1.  Large hematoma with need for transfusion.  This is better and hemoglobin now stable for about 24 hours.  Plan  Resume enoxaparin.     IV/Access:   1. Venous access -central line  2. Arterial access - radial arterial line. Will remove today as we are only doing daily blood gases.      ICU Prophylaxis:   1. DVT: Heparin  2. VAP: HOB 30 degrees, chlorhexidine rinse  3. Stress Ulcer: PPI/H2 blocker  4. Restraints: Indicated for not pulling at lines  5. Wound care  -local wound care  6. Feeding -tubeffeds  7. Family Update:  on the phone  8. Disposition -remain in ICU      Pilar Yeung MD  CC time: 50 minutes

## 2021-04-23 NOTE — PLAN OF CARE
Shift [id number]:  Neuro: Pt is sedated with versed/dilaudid gtts to goal RASS. Following commands, weak but equal strength. PERRL. Denies pain.  Resp: LS are coarse. Stimulated cough. Pt vented, FiO2 50%, PEEP 12. No additional weaning overnight.  CV: Tachy 90s-110s overnight, tele ST. MAP goals reached overnight. Edema to general and arms, improvement in BLE.  GI: Bowel sounds faint. Not passing gas. TF at goal rate   : Yeboah in place with brisk urine output.   Skin: Turn/repo q2h, no additional skin concerns. Wound care done per WOC orders   Labs: Hmg stable overnight.  Additional Info: Plan on continuing to monitor

## 2021-04-23 NOTE — PLAN OF CARE
Neuro: pt drowsy, open eyes to voice, follows command, deconditioned. Sedation drips titrated for goal RASS    CV: SR, BP wnl    Pulm: tolerating vent settings, small creamy secretions.    GI/: TF at goal. No BM since 4/18, dulcolax tablet given. Yeboah with good response to lasix and diamox    Skin: right groin hematoma unchanged, b/l groin skin abrasion, sacral midline skin cracked, foam in place.    Lines: left SCV TLC, left rad art line    Restraints: NA    Plan: Rest overnight on vent., sedation vacation tomorrow. Verified with ICU MD, plan for recheck hgb with am labs. No serial at this time. Continue to monitor

## 2021-04-23 NOTE — PROGRESS NOTES
[facility] ICU RESPIRATORY NOTE    Date of Admission:  4/13/2021    Date of Intubation (most recent): 4/15/21    Reason for Mechanical Ventilation: Resp failure    Number of Days on Mechanical Ventilation: 9    Met Criteria for Pressure Support Trial: No    Reason for No Pressure Support Trial: Per MD    Significant Events Today: None    ABG Results: Results for SYED VAZQUEZ (MRN [id number]) as of 4/23/2021 05:13   Ref. Range 4/22/2021 08:10   pH Arterial Latest Ref Range: 7.35 - 7.45 pH 7.49 (H)   pCO2 Arterial Latest Ref Range: 35 - 45 mm Hg 48 (H)   PO2 Arterial Latest Ref Range: 80 - 105 mm Hg 98   Bicarbonate Arterial Latest Ref Range: 21 - 28 mmol/L 36 (H)   Base Excess Art Latest Units: mmol/L 11.5   FIO2 Unknown 60   Oxyhemoglobin Arterial Latest Ref Range: 92 - 100 % 98     Current Vent Settings: Ventilation Mode: CMV/AC  (Continuous Mandatory Ventilation/ Assist Control)  FiO2 (%): 50 %  Rate Set (breaths/minute): 16 breaths/min  Tidal Volume Set (mL): 360 mL  PEEP (cm H2O): 12 cmH2O  Oxygen Concentration (%): 50%    Plan:  Continue on full vent support

## 2021-04-23 NOTE — PROGRESS NOTES
[facility] ICU RESPIRATORY NOTE           Date of Admission: 4/13/2021     Date of Intubation (most recent): 4/15/21     Reason for Mechanical Ventilation: Resp failure     Number of Days on Mechanical Ventilation: 9     Met Criteria for Spontaneous Breathing Trial: No     Reason for No Spontaneous Breathing Trial: Per MD     Significant Events Today:  None     ABG Results:           Recent Labs   Lab 04/22/21  0810 04/21/21 2025 04/21/21  1008 04/20/21  0559 04/19/21  1030   PH 7.49* 7.50* 7.46* 7.45 7.40   PCO2 48* 47* 50* 49* 53*   PO2 98 159* 78* 59* 88   HCO3 36* 37* 35* 34* 33*   O2PER 60 60%  --  40% FIO2 50%       Current Vent Settings: Ventilation Mode: CMV/AC  (Continuous Mandatory Ventilation/ Assist Control)  FiO2 (%): 60 %  Rate Set (breaths/minute): 16 breaths/min  Tidal Volume Set (mL): 380 mL  PEEP (cm H2O): 12 cmH2O  Oxygen Concentration (%): 55 %  Resp: 23        Plan:  Pt to remain on full vent support overnight    Murray Valentine, RT

## 2021-04-24 NOTE — PROGRESS NOTES
Comprehensive Daily ICU Note        Sarahy Dunbar MRN# [id number]   Age: 65 year old YOB: 1955     Date of Admission: 4/13/2021    Primary care provider: Rama Pop     CODE STATUS: FULL      Problem List:       Active Problems:    Acute respiratory failure with hypoxemia (H)    ARDS (adult respiratory distress syndrome) (H)    2019 novel coronavirus disease (COVID-19)    Hematoma    Anemia due to blood loss, acute    Shock (H)           Treatment goals for next 24 hours:   Re prone if O2 requirements increase  Monitor leg  Continue to wean sedation  Diuresis  Follow Hgb        Subjective/ Last 24 hours:   65-year-old female with no significant medical history who was found in bed with low saturations after welfare check.  Discovered to have cOVID. Maintained on NIV but subsequently required intubation. Has been alternating prone and supine. Large groin hematoma has developed at site of previous arterial line. Has now received three units of blood. Oxygenation has improved in the last 24 hours; possibly because of receiving a dose of diuretic.   4/24: FiO2 weaned slightly         Mechanical Ventilation/Vitalsigns/IsandOs:     Temp:  [99  F (37.2  C)-100.8  F (38.2  C)] 100.2  F (37.9  C)  Pulse:  [] 101  Resp:  [23-55] 32  BP: ()/(25-71) 112/54  FiO2 (%):  [60 %-100 %] 70 %  SpO2:  [76 %-98 %] 90 %      Ventilation Mode: CMV/AC  (Continuous Mandatory Ventilation/ Assist Control)  FiO2 (%): 70 %  Rate Set (breaths/minute): 16 breaths/min  Tidal Volume Set (mL): 360 mL  PEEP (cm H2O): 12 cmH2O  Oxygen Concentration (%): 70 %  Resp: (!) 32      Day since 4/15    Peak airway pressure: 29      Intake/Output Summary (Last 24 hours) at 4/22/2021 1657  Last data filed at 4/22/2021 1600  Gross per 24 hour   Intake 3632.43 ml   Output 4040 ml   Net -407.57 ml     Net IO Since Admission: 14.9 liters           Physical Examination:     General: Stated age, eyes open, NAD  HEENT: ET tube, NG  tube  Lungs: LCTAB anteriorly  CVS: RRR, S1S2  Abdomen: obese, soft  Extremities/musculoskeletal: ansarca, warm  Neurology: eyes open  Skin: large hematoma/eccymosis on the right groin that extends into perineal area. Has not changed significantly in the last two days.   Psychiatry: sedated  Exam of Line sites:  Left radial arterial line and left subclavian central line         Feeding/Glucose:     Orders Placed This Encounter      NPO for Medical/Clinical Reasons Except for: Meds      Recent Labs   Lab 04/24/21  0753 04/24/21  0540 04/24/21  0501 04/24/21  0001 04/23/21 2003 04/23/21  1609 04/23/21  1143 04/23/21  0427 04/23/21  0427 04/22/21  0510 04/22/21  0510 04/21/21  0513 04/21/21  0513 04/20/21  0554 04/20/21  0554 04/19/21  0258 04/19/21  0258   GLC  --  97  --   --   --   --   --   --  89  --  111*  --  114*  --  127*  --  164*   *  --  91 99 119* 115* 102*   < >  --    < >  --    < >  --    < >  --    < >  --     < > = values in this interval not displayed.              Medications:       chlorhexidine  15 mL Mouth/Throat Q12H     [Held by provider] enoxaparin ANTICOAGULANT  0.5 mg/kg Subcutaneous BID     insulin aspart  1-12 Units Subcutaneous Q4H     multivitamins w/minerals  15 mL Per Feeding Tube Daily     pantoprazole  40 mg Per Feeding Tube Daily          dextrose       epoprostenol (VELETRI) 20 mcg/mL in sterile water inhalation solution Stopped (04/23/21 1435)     HYDROmorphone 0.4 mg/hr (04/23/21 2000)     midazolam 6 mg/hr (04/23/21 2000)     norepinephrine Stopped (04/24/21 0807)     propofol (DIPRIVAN) infusion 50 mcg/kg/min (04/24/21 0806)     sodium chloride 10 mL/hr at 04/24/21 0807              Labs:         ROUTINE ICU LABS (Last four results)  CMP  Recent Labs   Lab 04/24/21  0540 04/23/21  0427 04/22/21  0510 04/21/21  0513 04/20/21  0554 04/19/21  0258    139 142 145* 142 142   POTASSIUM 3.5 3.8 4.0 4.5 4.2 3.9   CHLORIDE 105 105 105 110* 108 107   CO2 31 32 36* 36* 35*  32   ANIONGAP 5 2* 1* <1* <1* 3   GLC 97 89 111* 114* 127* 164*   BUN 34* 31* 30 31* 32* 20   CR 0.62 0.47* 0.47* 0.52 0.58 0.48*   GFRESTIMATED >90 >90 >90 >90 >90 >90   GFRESTBLACK >90 >90 >90 >90 >90 >90   ALICIA 7.3* 7.5* 7.4* 7.3* 7.1* 7.5*   MAG  --  2.4* 2.6*  --  2.5* 2.0   PHOS  --   --   --   --   --  2.6   PROTTOTAL  --  5.2*  --   --   --   --    ALBUMIN  --  1.8*  --   --   --   --    BILITOTAL  --  0.4  --   --   --   --    ALKPHOS  --  75  --   --   --   --    AST  --  42  --   --   --   --    ALT  --  46  --   --   --   --      CBC  Recent Labs   Lab 04/24/21  0540 04/23/21  0427 04/22/21  1354 04/22/21  0510 04/21/21  0513 04/21/21  0513   WBC 10.0 14.8*  --  12.4*  --  14.5*   RBC 2.28* 2.59*  --  2.18*  --  2.43*   HGB 6.9* 7.8* 7.4* 6.5*   < > 7.2*   HCT 21.6* 23.7*  --  19.9*  --  21.9*   MCV 95 92  --  91  --  90   MCH 30.3 30.1  --  29.8  --  29.6   MCHC 31.9 32.9  --  32.7  --  32.9   RDW 14.6 14.1  --  14.2  --  14.2    192  --  190  --  239    < > = values in this interval not displayed.     INR  Recent Labs   Lab 04/20/21  1440 04/19/21  1050   INR 1.08 1.23*     Arterial Blood Gas  Recent Labs   Lab 04/24/21  0540 04/23/21  1035 04/22/21  0810 04/21/21 2025 04/20/21  0559 04/20/21  0559   PH 7.40 7.45 7.49* 7.50*   < > 7.45   PCO2 50* 44 48* 47*   < > 49*   PO2 69* 70* 98 159*   < > 59*   HCO3 31* 30* 36* 37*   < > 34*   O2PER  --  70% 60 60%  --  40%    < > = values in this interval not displayed.       Other Lab Data:      Cultures:  No results for input(s): CULT in the last 168 hours.  Blood culture:  Invalid input(s): BC   Urine culture:  No results for input(s): URC in the last 168 hours.          Imaging/Other results:     No results found for this or any previous visit (from the past 24 hour(s)).    Bilateral infiltrates appear similar to slightly worse since the last examination         Assessment and Plan:       Sarahy Dunbar IS a 65 year old female admitted on 4/13/2021  for Covid pneumonia, altered mental status, hypoxemic respiratory failure requiring intubation and mechanical ventilation  I have personally reviewed the daily labs, imaging studies, cultures and discussed the case with referring physician and consulting physicians.     My assessment and plan by system for this patient is as follows:    Neurology/Psychiatry:   1.  Propofol/Versed for ICU sedation  2.  Hydromorphone infusion for pain  3.  History of anxiety present on admission we will monitor    Plan  - Wean sedation as possible, still needs some element of sedation given vent dyssynchrony. RASS to -1 to -2.    Cardiovascular:   1.Hemodynamics -minimal norepi for multifactorial shock, most likely secondary to sedation and infection. Vaspopressor needs are now minimal.  Plan  Continue to monitor,   Wean vasopressors. Defer replacing arterial line at this time.      Pulmonary/Ventilator Management:   1. ARDS from COVID.  Oxygenation slightly improved this morning I suspect is due to better vent dyssynchrony with mild increase in sedation.  FiO2 now weaned to 60%, PEEP remains 12.  Plan  -Wean off FiO2 as able  -Trial off velitri 4/23, oxygenation relatively unchanged so we will keep off  - Continue diuresis, Lasix 80 mg IV x1    GI and Nutrition :   1.  N.p.o. ,   2. Concern for protein calorie malnutrition    Plan  - Continue tube feeds as tolerated    Renal/Fluids/Electrolytes:   1. Renal function stable but very volume overloaded. Sodium has corrected w increased free water and patient with brisk response to diuresis but increasingly alkalotic.   Plan  - Redose diuretics as above  - Continue free water at current rate    Infectious Disease:   1.  Covid positive. Completed Remdesivir. On steroids Also completed 6 days of antibiotics.  Plan  -Monitor and test/treat as necessary      Endocrine:   1.  Stress induced hyperglycemia. On 12 units long acting plus some sliding scale. Also on Dexamethasone per protocol, now  off.     Plan  - Stop long acting now that she will be off steroids and reassess.   - completed dexamethasone treatment      Hematology/Oncology:   1.  Large hematoma with need for transfusion. Appears to have stabilized. Imaging noted no vessel that needs or is amenable to intervention.  Hemoglobin 6.9 this a.m. and received 1 unit PRBCs.  Recheck is 8.0.  Lovenox on hold.  Plan  -Continue to hold Lovenox.  -If hemoglobin stable this afternoon, will restart  - If continues to bleed evaluate further.     IV/Access:   1. Venous access -central line  2. Arterial access - radial arterial line        ICU Prophylaxis:   1. DVT: Heparin  2. VAP: HOB 30 degrees, chlorhexidine rinse  3. Stress Ulcer: PPI/H2 blocker  4. Restraints: Indicated for not pulling at lines  5. Wound care  -local wound care  6. Feeding -tubeffeds  7. Family Update:  on the phone  8. Disposition -remain in ICU      Leonard Waters MD  CC time: 45 minutes

## 2021-04-24 NOTE — PROGRESS NOTES
[facility] ICU RESPIRATORY NOTE    Date of Admission: 4/13/2021    Date of Intubation (most recent): 4/15/21    Reason for Mechanical Ventilation: Resp failure    Number of Days on Mechanical Ventilation: 10    Met Criteria for Pressure Support Trial: No    Reason for No Pressure Support Trial:Per MD    Significant Events Today: None    ABG Results:Results for SYED VAZQUEZ (MRN [id number]) as of 4/24/2021 05:27   Ref. Range 4/23/2021 10:35   pH Arterial Latest Ref Range: 7.35 - 7.45 pH 7.45   pCO2 Arterial Latest Ref Range: 35 - 45 mm Hg 44   PO2 Arterial Latest Ref Range: 80 - 105 mm Hg 70 (L)   Bicarbonate Arterial Latest Ref Range: 21 - 28 mmol/L 30 (H)   Base Excess Art Latest Units: mmol/L 5.4   FIO2 Unknown 70%   Oxyhemoglobin Arterial Latest Ref Range: 92 - 100 % 94   Current Vent Settings: Ventilation Mode: CMV/AC  (Continuous Mandatory Ventilation/ Assist Control)  FiO2 (%): 65 %  Rate Set (breaths/minute): 16 breaths/min  Tidal Volume Set (mL): 380 mL  PEEP (cm H2O): 12 cmH2O  Oxygen Concentration (%): 65 %      Plan:  Continue on full vent support

## 2021-04-25 NOTE — PROGRESS NOTES
[facility] ICU RESPIRATORY NOTE        Date of Admission: 4/13/2021    Date of Intubation (most recent): 4/15/21    Reason for Mechanical Ventilation: Res. failure    Number of Days on Mechanical Ventilation: 11    Met Criteria for Spontaneous Breathing Trial: No    Reason for No Spontaneous Breathing Trial: Per MD    Significant Events Today: None    ABG Results:   Recent Labs   Lab 04/25/21  0435 04/24/21  0914 04/24/21  0540 04/23/21  1035 04/22/21  0810 04/21/21 2025   PH 7.42 7.43 7.40 7.45 7.49* 7.50*   PCO2 55* 48* 50* 44 48* 47*   PO2 149* 107* 69* 70* 98 159*   HCO3 36* 32* 31* 30* 36* 37*   O2PER  --  70  --  70% 60 60%       Current Vent Settings: Ventilation Mode: CMV/AC  (Continuous Mandatory Ventilation/ Assist Control)  FiO2 (%): 60 %  Rate Set (breaths/minute): 16 breaths/min  Tidal Volume Set (mL): 360 mL  PEEP (cm H2O): 12 cmH2O  Oxygen Concentration (%): 60 %  Resp: 23      Plan: Continue full ventilatory support    Surya Crowell, RT

## 2021-04-25 NOTE — PROGRESS NOTES
Patient unresponsive, on extensive sedation. 65% FiO2 and Peep of 12. Yeboah exchanged. No contact with family overnight. Continuing to monitor.     Jaz Rushing RN

## 2021-04-25 NOTE — PROGRESS NOTES
[facility] ICU RESPIRATORY NOTE     Date of Admission: 4/13/2021     Date of Intubation (most recent): 4/15/21     Reason for Mechanical Ventilation: Resp failure     Number of Days on Mechanical Ventilation: 11     Met Criteria for Pressure Support Trial: No     Reason for No Pressure Support Trial:Per MD     Significant Events Today: None     ABG Results:Results for SYED VAZQUEZ (MRN [id number]) as of 4/24/2021 05:27    Ref. Range 4/23/2021 10:35   pH Arterial Latest Ref Range: 7.35 - 7.45 pH 7.45   pCO2 Arterial Latest Ref Range: 35 - 45 mm Hg 44   PO2 Arterial Latest Ref Range: 80 - 105 mm Hg 70 (L)   Bicarbonate Arterial Latest Ref Range: 21 - 28 mmol/L 30 (H)   Base Excess Art Latest Units: mmol/L 5.4   FIO2 Unknown 70%   Oxyhemoglobin Arterial Latest Ref Range: 92 - 100 % 94   Current Vent Settings: Ventilation Mode: CMV/AC  (Continuous Mandatory Ventilation/ Assist Control)  FiO2 (%): 65 %  Rate Set (breaths/minute): 16 breaths/min  Tidal Volume Set (mL): 380 mL  PEEP (cm H2O): 12 cmH2O  Oxygen Concentration (%): 65 %        Plan:  Continue on full vent support    Vargas Richardson, RT

## 2021-04-25 NOTE — PROGRESS NOTES
Comprehensive Daily ICU Note        Sarahy Dunbar MRN# [id number]   Age: 65 year old YOB: 1955     Date of Admission: 4/13/2021    Primary care provider: Rama Pop     CODE STATUS: FULL      Problem List:       Active Problems:    Acute respiratory failure with hypoxemia (H)    ARDS (adult respiratory distress syndrome) (H)    2019 novel coronavirus disease (COVID-19)    Hematoma    Anemia due to blood loss, acute    Shock (H)           Treatment goals for next 24 hours:   Re prone if O2 requirements increase  Diuresis  Follow Hgb        Subjective/ Last 24 hours:   65-year-old female with no significant medical history who was found in bed with low saturations after welfare check.  Discovered to have cOVID. Maintained on NIV but subsequently required intubation. Has been alternating prone and supine. Large groin hematoma has developed at site of previous arterial line. Has now received three units of blood. Oxygenation has improved in the last 24 hours; possibly because of receiving a dose of diuretic.   4/24: FiO2 weaned slightly  4/25: No major events overnight.  FiO2 remains stable.         Mechanical Ventilation/Vitalsigns/IsandOs:     Temp:  [99.1  F (37.3  C)-100.8  F (38.2  C)] 99.3  F (37.4  C)  Pulse:  [57-89] 82  Resp:  [19-64] 26  BP: ()/(37-76) 109/46  FiO2 (%):  [60 %-80 %] 65 %  SpO2:  [86 %-98 %] 94 %      Ventilation Mode: CMV/AC  (Continuous Mandatory Ventilation/ Assist Control)  FiO2 (%): 65 %  Rate Set (breaths/minute): 16 breaths/min  Tidal Volume Set (mL): 360 mL  PEEP (cm H2O): 12 cmH2O  Oxygen Concentration (%): 80 %  Resp: 26      Day since 4/15    Peak airway pressure: 29      Intake/Output Summary (Last 24 hours) at 4/22/2021 1657  Last data filed at 4/22/2021 1600  Gross per 24 hour   Intake 3632.43 ml   Output 4040 ml   Net -407.57 ml     Net IO Since Admission: 14.9 liters           Physical Examination:     General: Stated age, eyes open, NAD  HEENT:  ET tube, NG tube  Lungs: LCTAB anteriorly  CVS: RRR, S1S2  Abdomen: obese, soft  Extremities/musculoskeletal: ansarca, warm  Neurology: eyes open  Skin: large hematoma/eccymosis on the right groin that extends into perineal area. Has not changed significantly in the last two days.   Psychiatry: sedated  Exam of Line sites:  Left radial arterial line and left subclavian central line         Feeding/Glucose:     Orders Placed This Encounter      NPO for Medical/Clinical Reasons Except for: Meds      Recent Labs   Lab 04/25/21  0840 04/25/21  0435 04/25/21  0118 04/24/21  2026 04/24/21  1617 04/24/21  1056 04/24/21  0540 04/24/21  0540 04/23/21  0427 04/23/21  0427 04/22/21  0510 04/22/21  0510 04/21/21  0513 04/21/21  0513 04/20/21  0554 04/20/21  0554   GLC  --  110*  --   --   --   --   --  97  --  89  --  111*  --  114*  --  127*   * 115* 107* 132* 134* 113*   < >  --    < >  --    < >  --    < >  --    < >  --     < > = values in this interval not displayed.              Medications:       chlorhexidine  15 mL Mouth/Throat Q12H     enoxaparin ANTICOAGULANT  0.5 mg/kg Subcutaneous BID     insulin aspart  1-12 Units Subcutaneous Q4H     multivitamins w/minerals  15 mL Per Feeding Tube Daily     pantoprazole  40 mg Per Feeding Tube Daily          dextrose       HYDROmorphone 0.4 mg/hr (04/25/21 0647)     midazolam 6 mg/hr (04/25/21 0647)     norepinephrine 0.1 mcg/kg/min (04/25/21 0847)     propofol (DIPRIVAN) infusion 60 mcg/kg/min (04/25/21 0833)     sodium chloride 10 mL/hr at 04/24/21 0807              Labs:         ROUTINE ICU LABS (Last four results)  CMP  Recent Labs   Lab 04/25/21  0435 04/24/21  1615 04/24/21  0540 04/23/21  0427 04/22/21  0510 04/20/21  0554 04/20/21  0554 04/19/21  0258     --  141 139 142   < > 142 142   POTASSIUM 3.4 3.0* 3.5 3.8 4.0   < > 4.2 3.9   CHLORIDE 103  --  105 105 105   < > 108 107   CO2 36*  --  31 32 36*   < > 35* 32   ANIONGAP 2*  --  5 2* 1*   < > <1* 3   GLC  110*  --  97 89 111*   < > 127* 164*   BUN 28  --  34* 31* 30   < > 32* 20   CR 0.62  --  0.62 0.47* 0.47*   < > 0.58 0.48*   GFRESTIMATED >90  --  >90 >90 >90   < > >90 >90   GFRESTBLACK >90  --  >90 >90 >90   < > >90 >90   ALICIA 7.5*  --  7.3* 7.5* 7.4*   < > 7.1* 7.5*   MAG  --  2.3  --  2.4* 2.6*  --  2.5* 2.0   PHOS  --  3.6  --   --   --   --   --  2.6   PROTTOTAL  --   --   --  5.2*  --   --   --   --    ALBUMIN  --   --   --  1.8*  --   --   --   --    BILITOTAL  --   --   --  0.4  --   --   --   --    ALKPHOS  --   --   --  75  --   --   --   --    AST  --   --   --  42  --   --   --   --    ALT  --   --   --  46  --   --   --   --     < > = values in this interval not displayed.     CBC  Recent Labs   Lab 04/25/21  0435 04/24/21  1615 04/24/21  1107 04/24/21  0540 04/23/21  0427   WBC 14.4*  --  14.6* 10.0 14.8*   RBC 2.74*  --  2.70* 2.28* 2.59*   HGB 8.1* 9.0* 8.0* 6.9* 7.8*   HCT 25.2*  --  24.8* 21.6* 23.7*   MCV 92  --  92 95 92   MCH 29.6  --  29.6 30.3 30.1   MCHC 32.1  --  32.3 31.9 32.9   RDW 14.3  --  14.2 14.6 14.1     --  171 178 192     INR  Recent Labs   Lab 04/20/21  1440 04/19/21  1050   INR 1.08 1.23*     Arterial Blood Gas  Recent Labs   Lab 04/25/21  0435 04/24/21  0914 04/24/21  0540 04/23/21  1035 04/22/21  0810 04/21/21 2025   PH 7.42 7.43 7.40 7.45 7.49* 7.50*   PCO2 55* 48* 50* 44 48* 47*   PO2 149* 107* 69* 70* 98 159*   HCO3 36* 32* 31* 30* 36* 37*   O2PER  --  70  --  70% 60 60%       Other Lab Data:      Cultures:  No results for input(s): CULT in the last 168 hours.  Blood culture:  Invalid input(s): BC   Urine culture:  No results for input(s): URC in the last 168 hours.          Imaging/Other results:     No results found for this or any previous visit (from the past 24 hour(s)).    Bilateral infiltrates appear similar to slightly worse since the last examination         Assessment and Plan:       Sarahy Dunbar IS a 65 year old female admitted on 4/13/2021 for Covid  pneumonia, altered mental status, hypoxemic respiratory failure requiring intubation and mechanical ventilation  I have personally reviewed the daily labs, imaging studies, cultures and discussed the case with referring physician and consulting physicians.     My assessment and plan by system for this patient is as follows:    Neurology/Psychiatry:   1.  Propofol/Versed for ICU sedation  2.  Hydromorphone infusion for pain  3.  History of anxiety present on admission we will monitor    Plan  - Wean sedation as possible, still needs some element of sedation given vent dyssynchrony. RASS to -1 to -2.    Cardiovascular:   1.Hemodynamics- minimal norepi for multifactorial shock, most likely secondary to sedation and infection. Vaspopressor needs are now minimal.  Plan  Continue to monitor  Wean vasopressors.     Pulmonary/Ventilator Management:   1. ARDS from COVID.  Oxygenation stable, FiO2 remains at 60%, PEEP remains 12.  Plan  -Wean off FiO2 as able  -Trial off velitri 4/23, oxygenation relatively unchanged so we will keep off  - Continue diuresis, Lasix 80 mg IV x1    GI and Nutrition :   1.  N.p.o. ,   2. Concern for protein calorie malnutrition    Plan  - Continue tube feeds as tolerated    Renal/Fluids/Electrolytes:   1. Renal function stable but very volume overloaded. Sodium has corrected w increased free water and patient with brisk response to diuresis but increasingly alkalotic.   Plan  - Redose diuretics as above  - Continue free water at current rate    Infectious Disease:   1.  Covid positive. Completed Remdesivir. On steroids Also completed 6 days of antibiotics.  Plan  -Monitor and test/treat as necessary      Endocrine:   1.  Stress induced hyperglycemia. On 12 units long acting plus some sliding scale. Also on Dexamethasone per protocol, now off.     Plan  - Stop long acting now that she will be off steroids and reassess.   - completed dexamethasone treatment      Hematology/Oncology:   1.  Large  hematoma with need for transfusion. Appears to have stabilized. Imaging noted no vessel that needs or is amenable to intervention.  Hemoglobin stable overnight and this AM, Lovenox back on.  Plan  -Continue Lovenox.  -If continues to bleed evaluate further      IV/Access:   1. Venous access -central line  2. Arterial access - radial arterial line    ICU Prophylaxis:   1. DVT: Lovenox  2. VAP: HOB 30 degrees, chlorhexidine rinse  3. Stress Ulcer: PPI/H2 blocker  4. Restraints: Indicated for not pulling at lines  5. Wound care  -local wound care  6. Feeding -tubeffeds  7. Family Update:  on the phone  8. Disposition -remain in ICU    Leonard Waters MD  CC time: 45 minutes

## 2021-04-25 NOTE — PLAN OF CARE
Remains near max ordered for sedation and on full vent support. Prns given as needed with cares d/t gag reflex but remains unresponsive and not withdrawing. More lasix now and had to go slightly up on levophed. Diuresing well. Pp+ skin warm. Hematoma seemed controlled-Dr. Waters assessed again later in day after lovenox dose this am. Hgb rechecked and stabled. Skin cared for per wocn. All care explained and questions answered as able.

## 2021-04-25 NOTE — PLAN OF CARE
Neuro: sedated/unresponsive, slight cough/gag with oral cares and positioning  CV: SR/SB pp+, skin warm.  Pulm: full vent support down to 65% from 70% diuresing with lasix today well; abg reviewed with MD. Coarse and minimal secretions  GI/: Tolerating TF; Yeboah-diuresing well  Gtts: dilaudid, propofol, versed, levo  Lines: a line-for ABGs (using cuff BP for pressor titrations per Dr. Waters) , central line, Yeboah (retention/diuresing),     Skin cared for per orders from WOCN (orders obtained for follow up due to progress of wounds as it appears).    Update:  Ruddy video'ed with pt and visited. Care was updated with  and plan of care reviewed by Dr. Waters and myself and all questions answered.

## 2021-04-26 NOTE — PLAN OF CARE
No changes.  face time with pt. Appreciated the time on facetime with her.  Informed  that we do not have CD players here and he stated he would drop one off and requested that we play the CD's in the room. Cont to monitor.

## 2021-04-26 NOTE — PROGRESS NOTES
[facility] ICU RESPIRATORY NOTE           Date of Admission: 4/13/2021     Date of Intubation (most recent): 4/15/21     Reason for Mechanical Ventilation: Res. failure     Number of Days on Mechanical Ventilation: 12     Met Criteria for Spontaneous Breathing Trial: No     Reason for No Spontaneous Breathing Trial: Per MD     Significant Events Today: None overnight.     ABG Results:            Recent Labs   Lab 04/25/21  0435 04/24/21  0914 04/24/21  0540 04/23/21  1035 04/22/21  0810 04/21/21 2025   PH 7.42 7.43 7.40 7.45 7.49* 7.50*   PCO2 55* 48* 50* 44 48* 47*   PO2 149* 107* 69* 70* 98 159*   HCO3 36* 32* 31* 30* 36* 37*   O2PER  --  70  --  70% 60 60%       Current Vent Settings: Ventilation Mode: CMV/AC  (Continuous Mandatory Ventilation/ Assist Control)  FiO2 (%): 60 %  Rate Set (breaths/minute): 16 breaths/min  Tidal Volume Set (mL): 360 mL  PEEP (cm H2O): 12 cmH2O  Oxygen Concentration (%): 60 %  Resp: 23        Plan: Continue full ventilatory support     Vargas Richardson, RT

## 2021-04-26 NOTE — PROGRESS NOTES
CLINICAL NUTRITION SERVICES - REASSESSMENT NOTE    ADDENDUM:  Discussed with RN this afternoon regarding TF rate and Propofol  --> Plan today to wean Propofol to Versed  Current Propofol rate providing approx 678 kcals  Plan: Increase TF to 30 mL/hr and continue TID Prosource. Will reassess tomorrow for ability to advance back to goal rate of 55 mL/hr   - TF Vital High Protein at 30 mL/hr = 720 kcal and 62 gm protein  -  Prosource 3 pkts/day = 120 kcal and 33 gm protein  - Propofol at 25.7 mL/hr = 678 kcal  - TOTAL = 1518 kcal (14 kcal/kg) & 95 gm protein (2 gm/kg)      Recommendations Ordered by Registered Dietitian (RD):   -  Decrease TF Vital High Protein to 15 mL/hr given increased Propofol requirements   - Vital High Protein at 15 mL/hr = 360 kcal and 31 gm protein  - Add Prosource 3 pkts/day = 120 kcal and 33 gm protein  - TOTAL (TF + Prosource + Propofol) = 1752 kcal (16 kcal/kg and 116% estimated needs) and 64 gm protein (1.4 gm/kg and 71% estimated needs)   Malnutrition: (4/15)  % Weight Loss:  None noted  % Intake:  Decreased intake does not meet criteria for malnutrition - NPO x 3 days  Subcutaneous Fat Loss: Unable to assess  Muscle Loss: Unable to assess  Fluid Retention:  None noted     Malnutrition Diagnosis: Unable to determine without Nutrition Focused Physical Exam         EVALUATION OF PROGRESS TOWARD GOALS   Diet:  NPO, vented     Nutrition Support:  TF continues at goal rate below ~    Nutrition Support Enteral:  Type of Feeding Tube: NJT   Enteral Frequency:  Continuous  Enteral Regimen: Vital High Protein   Enteral Provisions: 1320 kcals (12 kcal/kg), 115 gm pro (2.5 gm/kg), 147 gm CHO, no fiber, 1109 mL H20   - Propofol at 48.2 mL/hr = 1272 kcal/day   TOTAL Provisions =  2592 kcal (24 kcal/kg and 172% estimated energy needs)   Free Water Flush: 200 mL q 4 hrs (per MD 4/21)   Certavite daily     Intake/Tolerance:    TF: currently overfeeding with increased Propofol kcals   Labs reviewed: Gina  138 (NL), K/Mg/Phos WNL   Recent Labs   Lab 04/26/21  1145 04/26/21  0807 04/26/21  0430 04/26/21  0409 04/25/21  2357 04/25/21  2019 04/25/21  1456 04/25/21  0435 04/25/21  0435 04/24/21  0540 04/24/21  0540 04/23/21  0427 04/23/21  0427 04/22/21  0510 04/22/21  0510 04/21/21  0513 04/21/21  0513   GLC  --   --  124*  --   --   --   --   --  110*  --  97  --  89  --  111*  --  114*   * 141*  --  122* 121* 123* 137*   < > 115*   < >  --    < >  --    < >  --    < >  --     < > = values in this interval not displayed.     Medications reviewed: Lasix, Levophed restarted 4/23 and now increased to 14 mL/hr, Propofol restarted 4/23 and now increased to 48.2 mL/hr, Senokot started 4/25 BID  Stooling: Last BM recorded x1 4/24   Wt: 118.7 kg   Vitals:    04/20/21 0000 04/21/21 0629 04/22/21 0400 04/23/21 0400   Weight: 123.2 kg (271 lb 9.7 oz) 124 kg (273 lb 5.9 oz) 122.7 kg (270 lb 8.1 oz) 120.7 kg (266 lb 1.5 oz)    04/25/21 0400   Weight: 118.7 kg (261 lb 11 oz)         ASSESSED NUTRITION NEEDS:  Dosing Weight: 107 kg for energy, 45 kg for protein  Estimated Energy Needs:0881-6057 kcals (11-14 Kcal/Kg)  Justification: obese and vented  Estimated Protein Needs:  grams protein (2-2.5 g pro/Kg)  Justification: obesity guidelines       NEW FINDINGS:   WOCN continues to follow     Previous Goals:   TF will continue to meet % estimated needs  Evaluation: Not met    Previous Nutrition Diagnosis:   No nutrition diagnosis identified at this time   Evaluation: Completed, modified below       CURRENT NUTRITION DIAGNOSIS  Excessive energy intake related to TF reliance and increased Propofol provisions as evidenced by meeting 172% estimated energy needs     INTERVENTIONS  Recommendations / Nutrition Prescription  -  Decrease TF Vital High Protein to 15 mL/hr given increased Propofol and pressor requirements   - Vital High Protein at 15 mL/hr = 360 kcal and 31 gm protein  - Add Prosource 3 pkts/day = 120 kcal and 33  gm protein  - TOTAL (TF + Prosource + Propofol) = 1752 kcal (16 kcal/kg and 116% estimated needs) and 64 gm protein (1.4 gm/kg and 71% estimated needs)    Implementation  EN Composition: as above   Collaboration and Referral of Nutrition care: patient was discussed during ICU rounds     Goals  EN + Prosource + TF to meet between % estimated energy needs       MONITORING AND EVALUATION:  Progress towards goals will be monitored and evaluated per protocol and Practice Guidelines      Rachel Adam RD, LD  Clinical Dietitian

## 2021-04-26 NOTE — PROGRESS NOTES
Neuro: Unresponsive, on large amounts of sedation, see MAR. PERRL.   CV: SR, Levo requirements increased, SBP and MAPs maintained. Pulses palpable.   Resp: 60%, Peep 12. Tolerating. Tachypenic at times. No desaturations  GI/Nutrition: TF infusing, tolerating  : Yeboah output adequate  Skin: Great care to skin wound in groin and fissure to sacrum, Followd WOC orders.  Critical Labs: Replaced K  ID: WBC increasing  Social/Psych: No contact with family overnight  Therapy: NA  Plan: Continue to monitor, titrate drips, and wean.       Jaz Rushing RN

## 2021-04-26 NOTE — PHARMACY-VANCOMYCIN DOSING SERVICE
Pharmacy Vancomycin Initial Note  Date of Service 2021  Patient's  1955  65 year old, female    Indication: Ventilator-Associated Pneumonia    Current estimated CrCl = Estimated Creatinine Clearance: 125 mL/min (based on SCr of 0.53 mg/dL).    Creatinine for last 3 days  2021:  5:40 AM Creatinine 0.62 mg/dL  2021:  4:35 AM Creatinine 0.62 mg/dL  2021:  4:30 AM Creatinine 0.53 mg/dL    Recent Vancomycin Level(s) for last 3 days  No results found for requested labs within last 72 hours.      Vancomycin IV Administrations (past 72 hours)      No vancomycin orders with administrations in past 72 hours.                Nephrotoxins and other renal medications (From now, onward)    Start     Dose/Rate Route Frequency Ordered Stop    21 1600  vancomycin 1500 mg in 0.9% NaCl 250 ml intermittent infusion 1,500 mg      1,500 mg  over 90 Minutes Intravenous EVERY 8 HOURS 21 1430      21 1430  furosemide (LASIX) injection 60 mg      60 mg  over 1-3 Minutes Intravenous ONCE 21 1402      04/15/21 1200  norepinephrine (LEVOPHED) 16 mg in  mL infusion CENTRAL LINE      0.03-0.4 mcg/kg/min × 107 kg (Dosing Weight)  3-40.1 mL/hr  Intravenous CONTINUOUS 04/15/21 1146            Contrast Orders - past 72 hours (72h ago, onward)    None        Plan:  1. Start vancomycin  1500 mg IV q8h.   2. Vancomycin monitoring method: Trough (Method 1 = dosing nomogram)  3. Vancomycin therapeutic monitoring goal: 15-20 mg/L  4. Pharmacy will check vancomycin levels as appropriate in 1-3 Days.    5. Serum creatinine levels will be ordered a minimum of twice weekly.      Juany Jackson, formerly [facility]

## 2021-04-26 NOTE — PROGRESS NOTES
Comprehensive Daily ICU Note        Sarahy Dunbar MRN# [id number]   Age: 65 year old YOB: 1955     Date of Admission: 4/13/2021    Primary care provider: Rama Pop     CODE STATUS: FULL      Problem List:       Active Problems:    Acute respiratory failure with hypoxemia (H)    ARDS (adult respiratory distress syndrome) (H)    2019 novel coronavirus disease (COVID-19)    Hematoma    Anemia due to blood loss, acute    Shock (H)           Treatment goals for next 24 hours:   Resume antibiotics and steroids  Continue diuresis attempts  Continue lung protective ventilation          Subjective/ Last 24 hours:   65-year-old female with no significant medical history who was found in bed with low saturations after welfare check.  Discovered to have cOVID. Maintained on NIV but subsequently required intubation. Has been alternating prone and supine. Large groin hematoma has developed at site of previous arterial line. Has now received three units of blood. Oxygenation has improved in the last 24 hours; possibly because of receiving a dose of diuretic.   4/24: FiO2 weaned slightly  4/25: No major events overnight.  FiO2 remains stable.  4/26: FiO2 increased. Also increased dyssynchrony requiring more sedation         Mechanical Ventilation/Vitalsigns/IsandOs:     Temp:  [99.5  F (37.5  C)-100.6  F (38.1  C)] 100.6  F (38.1  C)  Pulse:  [78-99] 97  Resp:  [21-33] 23  BP: ()/(31-65) 114/51  MAP:  [1 mmHg-144 mmHg] 69 mmHg  Arterial Line BP: (1-162)/(1-158) 98/55  FiO2 (%):  [60 %-65 %] 60 %  SpO2:  [94 %-98 %] 98 %      Ventilation Mode: CMV/AC  (Continuous Mandatory Ventilation/ Assist Control)  FiO2 (%): 60 %  Rate Set (breaths/minute): 16 breaths/min  Tidal Volume Set (mL): 360 mL  PEEP (cm H2O): 12 cmH2O  Oxygen Concentration (%): 60 %  Resp: 23      Day since 4/15    Peak airway pressure: 29      Intake/Output Summary (Last 24 hours) at 4/26/2021 1044  Last data filed at 4/26/2021  1000  Gross per 24 hour   Intake 4807.29 ml   Output 3825 ml   Net 982.29 ml     Net IO Since Admission: +16 liters           Physical Examination:     General: Stated age, sedated  HEENT: ET tube, NG tube  Lungs: LCTAB anteriorly  CVS: RRR, S1S2  Abdomen: obese, soft  Extremities/musculoskeletal: ansarca, warm  Neurology: eyes open  Skin: large hematoma/eccymosis on the right groin that extends into perineal area. Has regressed since my last examination  Psychiatry: sedated  Exam of Line sites:  Left radial arterial line and left subclavian central line         Feeding/Glucose:     Orders Placed This Encounter      NPO for Medical/Clinical Reasons Except for: Meds      Recent Labs   Lab 04/26/21  0807 04/26/21  0430 04/26/21  0409 04/25/21  2357 04/25/21  2019 04/25/21  1456 04/25/21  1116 04/25/21  0435 04/25/21  0435 04/24/21  0540 04/24/21  0540 04/23/21  0427 04/23/21  0427 04/22/21  0510 04/22/21  0510 04/21/21  0513 04/21/21  0513   GLC  --  124*  --   --   --   --   --   --  110*  --  97  --  89  --  111*  --  114*   *  --  122* 121* 123* 137* 138*   < > 115*   < >  --    < >  --    < >  --    < >  --     < > = values in this interval not displayed.              Medications:       chlorhexidine  15 mL Mouth/Throat Q12H     enoxaparin ANTICOAGULANT  0.5 mg/kg Subcutaneous BID     insulin aspart  1-12 Units Subcutaneous Q4H     multivitamins w/minerals  15 mL Per Feeding Tube Daily     pantoprazole  40 mg Per Feeding Tube Daily     protein modular  3 packet Per Feeding Tube Daily     sennosides  5 mL Oral BID          dextrose       HYDROmorphone 0.4 mg/hr (04/26/21 0800)     midazolam 6 mg/hr (04/26/21 0854)     norepinephrine 0.14 mcg/kg/min (04/26/21 0800)     propofol (DIPRIVAN) infusion 40 mcg/kg/min (04/26/21 1022)     sodium chloride 10 mL/hr at 04/26/21 0800              Labs:         ROUTINE ICU LABS (Last four results)  CMP  Recent Labs   Lab 04/26/21  0430 04/25/21  2030 04/25/21  1454  04/25/21 0435 04/24/21  1615 04/24/21  0540 04/23/21  0427 04/22/21  0510     --   --  141  --  141 139 142   POTASSIUM 3.7 4.0 3.5 3.4 3.0* 3.5 3.8 4.0   CHLORIDE 101  --   --  103  --  105 105 105   CO2 36*  --   --  36*  --  31 32 36*   ANIONGAP 1*  --   --  2*  --  5 2* 1*   *  --   --  110*  --  97 89 111*   BUN 21  --   --  28  --  34* 31* 30   CR 0.53  --   --  0.62  --  0.62 0.47* 0.47*   GFRESTIMATED >90  --   --  >90  --  >90 >90 >90   GFRESTBLACK >90  --   --  >90  --  >90 >90 >90   ALICIA 7.7*  --   --  7.5*  --  7.3* 7.5* 7.4*   MAG 2.3  --   --   --  2.3  --  2.4* 2.6*   PHOS 2.8  --   --   --  3.6  --   --   --    PROTTOTAL  --   --   --   --   --   --  5.2*  --    ALBUMIN  --   --   --   --   --   --  1.8*  --    BILITOTAL  --   --   --   --   --   --  0.4  --    ALKPHOS  --   --   --   --   --   --  75  --    AST  --   --   --   --   --   --  42  --    ALT  --   --   --   --   --   --  46  --      CBC  Recent Labs   Lab 04/26/21  0430 04/25/21  1313 04/25/21 0435 04/24/21  1615 04/24/21  1107 04/24/21  0540   WBC 16.7*  --  14.4*  --  14.6* 10.0   RBC 2.60*  --  2.74*  --  2.70* 2.28*   HGB 7.8* 8.7* 8.1* 9.0* 8.0* 6.9*   HCT 24.4*  --  25.2*  --  24.8* 21.6*   MCV 94  --  92  --  92 95   MCH 30.0  --  29.6  --  29.6 30.3   MCHC 32.0  --  32.1  --  32.3 31.9   RDW 14.5  --  14.3  --  14.2 14.6     --  162  --  171 178     INR  Recent Labs   Lab 04/20/21  1440 04/19/21  1050   INR 1.08 1.23*     Arterial Blood Gas  Recent Labs   Lab 04/26/21  0430 04/25/21  0435 04/24/21  0914 04/24/21  0540 04/23/21  1035 04/22/21  0810 04/21/21 2025   PH 7.39 7.42 7.43 7.40 7.45 7.49* 7.50*   PCO2 60* 55* 48* 50* 44 48* 47*   PO2 127* 149* 107* 69* 70* 98 159*   HCO3 36* 36* 32* 31* 30* 36* 37*   O2PER  --   --  70  --  70% 60 60%       Other Lab Data:      Cultures:  No results for input(s): CULT in the last 168 hours.  Blood culture:  Invalid input(s): BC   Urine culture:  No results for  input(s): URC in the last 168 hours.          Imaging/Other results:     Recent Results (from the past 24 hour(s))   XR Chest Port 1 View    Narrative    XR CHEST PORT 1 VIEW 4/25/2021 12:55 PM    HISTORY: ARDS    COMPARISON: 4/21/2021      Impression    IMPRESSION: Endotracheal tube in the low trachea and feeding tube  coursing below the left hemidiaphragm, tip outside the field-of-view.  Worsening multifocal patchy and airspace opacities throughout the  lungs concerning for worsening ARDS and/or pneumonia. Left subclavian  central venous catheter tip in the upper SVC. No pleural effusion or  pneumothorax.    PARAM MCCAIN MD       Bilateral infiltrates appear similar to slightly worse since the last examination         Assessment and Plan:       Sarahy Dunbar IS a 65 year old female admitted on 4/13/2021 for Covid pneumonia, altered mental status, hypoxemic respiratory failure requiring intubation and mechanical ventilation  I have personally reviewed the daily labs, imaging studies, cultures and discussed the case with referring physician and consulting physicians.     My assessment and plan by system for this patient is as follows:    Neurology/Psychiatry:   1.  Propofol/Versed for ICU sedation  2.  Hydromorphone infusion for pain  3.  History of anxiety present on admission we will monitor    Plan  - Patient having difficulty tolearting higher RASS levels because of dyssynchrony and hypoxia. Also appears to be having hypotension related to the Propofol  - Transition from propofol back to Midazolam.     Cardiovascular:   1.Hemodynamics- multifactorial shock, most likely secondary to sedation and possibly sepsis. Vaspopressor needs have increased again.   Plan  Transition off Propofol and wean vasopressors if possible.     Pulmonary/Ventilator Management:   1. ARDS from COVID.  Oxygenation stable, FiO2 remains at 60%, PEEP remains 12. However, now having more hypercapnia and peak airway pressures.    Plan  -Wean off FiO2 as able  -Trial off velitri 4/23, oxygenation relatively unchanged so we will keep off  - Repeat diuresis  - Resume steroids for short course  - Workup for VAP with empiric antibiotics.    GI and Nutrition :   1.  N.p.o. ,   2. Concern for protein calorie malnutrition    Plan  - Continue tube feeds as tolerated    Renal/Fluids/Electrolytes:   1. Renal function stable but very volume overloaded. Sodium has corrected w increased free water and patient with brisk response to diuresis but increasingly alkalotic.   Plan  - Redose diuretics as above  - Continue free water at current rate    Infectious Disease:   1.  Covid positive. Completed Remdesivi and steroids Also completed 6 days of antibiotics. Signs now of possible VAP  Plan  -Monitor and test/treat as necessary  - Empiric therapy and testing for VAP       Endocrine:   1.  Stress induced hyperglycemia while was on Dexamethasone. Now resuming  Plan  - Monitor sugars.      Hematology/Oncology:   1.  Large hematoma with need for transfusion. Appears to have stabilized. Imaging noted no vessel that needs or is amenable to intervention. Lovenox has been restarted  Plan  -Continue Lovenox.  -If continues to bleed evaluate further      IV/Access:   1. Venous access -central line  2. Arterial access - radial arterial line    ICU Prophylaxis:   1. DVT: Lovenox  2. VAP: HOB 30 degrees, chlorhexidine rinse  3. Stress Ulcer: PPI/H2 blocker  4. Restraints: Indicated for not pulling at lines  5. Wound care  -local wound care  6. Feeding -tube feeds  7. Family Update:  and sisters on the phone  8. Disposition -remain in ICU    Pilar Yeung MD  50 minutes

## 2021-04-27 NOTE — PLAN OF CARE
Intubated/sedated. On propofol, versed, levo, and dilaudid. Art line dampened. TF infusing. Yeboah in place. Bilateral groin drsg changed per [facility].

## 2021-04-27 NOTE — PROGRESS NOTES
[facility] ICU RESPIRATORY NOTE        Date of Admission: 4/13/2021    Date of Intubation (most recent): 4/15/21    Reason for Mechanical Ventilation: Resp. failure    Number of Days on Mechanical Ventilation: 13      Reason for No Spontaneous Breathing Trial: Per MD    Significant Events Today: None    ABG Results:   Recent Labs   Lab 04/27/21  0833 04/26/21  0430 04/25/21  0435 04/24/21  0914 04/23/21  1035 04/23/21  1035 04/22/21  0810 04/21/21 2025   PH 7.40 7.39 7.42 7.43   < > 7.45 7.49* 7.50*   PCO2 63* 60* 55* 48*   < > 44 48* 47*   PO2 92 127* 149* 107*   < > 70* 98 159*   HCO3 39* 36* 36* 32*   < > 30* 36* 37*   O2PER  --   --   --  70  --  70% 60 60%    < > = values in this interval not displayed.       Current Vent Settings: Ventilation Mode: CMV/AC  (Continuous Mandatory Ventilation/ Assist Control)  FiO2 (%): 50 %  Rate Set (breaths/minute): 16 breaths/min  Tidal Volume Set (mL): 360 mL  PEEP (cm H2O): 12 cmH2O  Oxygen Concentration (%): 60 %  Resp: 24          Plan: Continue to monitor and full ventilatory support.    Tito Field

## 2021-04-27 NOTE — PLAN OF CARE
Pronned at 1030, RR upper 20's, moderate ET pink tinged secretions.  ABg's pending, good urine output from lasix, K+ pending.  Video chat with  and updated.  Sm. Bm. TF increased. Coarse lungs. Afebrile.

## 2021-04-27 NOTE — PROVIDER NOTIFICATION
DATE:  4/27/2021   TIME OF RECEIPT FROM LAB:  0515- Reported to other RN and told to me  LAB TEST:  Hemoglobin  LAB VALUE:  6.8  RESULTS GIVEN WITH READ-BACK TO (PROVIDER):  Dr. Augustine  TIME LAB VALUE REPORTED TO PROVIDER:   0579

## 2021-04-27 NOTE — PROGRESS NOTES
Prone Positioning Note      Date of initial prone positionin21    Number of days prone:  1  Frequency of head turns: Q2    ETT/Skin assessment:       Was ETT repositioned and re-taped today:Yes    Skin assessment around ETT:   PT had small skin tears on both cheeks when ETAD was removed  Skin barriers used:   Mepilex was placed on upper lip and over skin tears.  Cavalon was also placed on skin under tape    Plan:  Pt to be proned 16 hours and on full vent support overnight

## 2021-04-27 NOTE — PLAN OF CARE
Patient intubated, sedation decreased overnight, prop to 10 mcg/kg/hr, continue versed and dilaudid. Levo decreased to 0.09 mcg/kg/min. Tele . Yeboah in place, adequate UOP. Turn/repo. No family updated this shift. Continue to monitor.

## 2021-04-27 NOTE — PROGRESS NOTES
Chart reviewed, patient was seen for a complete reassessment yesterday -- see note dated 4/26/21 for details.    Current receiving the following TF regimen ~    Nutrition Support Enteral:  Type of Feeding Tube: NJ  Enteral Frequency:  Continuous  Enteral Regimen: Vital HP at 30 mL/hr  Total Enteral Provisions: 720 kcal, 62 g protein   Free Water Flush: 200 mL every 4 hours  ProSource 3 pkts per day= 120 kcal and 33 g protein   Propofol now off this am (with plans to stay off per Dr. Yeung)  Total (TF + ProSource)= 840 kcal (8 kcal/kg), 95 g protein (2.1 g/kg)    Norepi drip  K normal  BGM < 180 with High sliding scale insulin  Last BM 4/24  I/O 3856/4025, wt 118 kg (up overall)    Receiving Certavite per [facility] protocol     ASSESSED NUTRITION NEEDS:  Dosing Weight: 107 kg for energy, 45 kg for protein  Estimated Energy Needs:7279-8392 kcals (11-14 Kcal/Kg)  Justification: obese and vented  Estimated Protein Needs:  grams protein (2-2.5 g pro/Kg)  Justification: obesity guidelines     Patient now proned, plans for Propofol to stay off  OK to increase TF to goal per Dr. Yeung    Orders written to increase TF rate to 40 mL/hr now and then in 8 hours increase to goal of Vital HP at 55 mL/hr to provide:  1320 kcal (12 kcal/kg), 115 g protein (2.5 g/kg), 147 g CHO, no fiber, 1109 mL H2O  Discontinue the ProSource as no longer needed    Leslie Porter, RD, LD, CNSC   Clinical Dietitian - [facility]

## 2021-04-27 NOTE — PROGRESS NOTES
[facility] ICU RESPIRATORY NOTE           Date of Admission: 4/13/2021     Date of Intubation (most recent): 4/15/21     Reason for Mechanical Ventilation: Res. failure     Number of Days on Mechanical Ventilation: 13     Met Criteria for Spontaneous Breathing Trial: No     Reason for No Spontaneous Breathing Trial: Per MD     Significant Events Today: None overnight.     ABG Results:                   Recent Labs   Lab 04/25/21  0435 04/24/21  0914 04/24/21  0540 04/23/21  1035 04/22/21  0810 04/21/21 2025   PH 7.42 7.43 7.40 7.45 7.49* 7.50*   PCO2 55* 48* 50* 44 48* 47*   PO2 149* 107* 69* 70* 98 159*   HCO3 36* 32* 31* 30* 36* 37*   O2PER  --  70  --  70% 60 60%       Current Vent Settings: Ventilation Mode: CMV/AC  (Continuous Mandatory Ventilation/ Assist Control)  FiO2 (%): 60 %  Rate Set (breaths/minute): 16 breaths/min  Tidal Volume Set (mL): 360 mL  PEEP (cm H2O): 12 cmH2O  Oxygen Concentration (%): 60 %  Resp: 23        Plan: Continue full ventilatory support.    Vargas Richardson, RT

## 2021-04-27 NOTE — PROGRESS NOTES
Comprehensive Daily ICU Note        Sarahy Dunbar MRN# [id number]   Age: 65 year old YOB: 1955     Date of Admission: 4/13/2021    Primary care provider: Rama Pop     CODE STATUS: FULL      Problem List:       Active Problems:    Acute respiratory failure with hypoxemia (H)    ARDS (adult respiratory distress syndrome) (H)    2019 novel coronavirus disease (COVID-19)    Hematoma    Anemia due to blood loss, acute    Shock (H)           Treatment goals for next 24 hours:   Reprone  Stop Vancomycin and continue Meropenem while awaiting cultures  Continue steroids for short course          Subjective/ Last 24 hours:   65-year-old female with no significant medical history who was found in bed with low saturations after welfare check.  Discovered to have cOVID. Maintained on NIV but subsequently required intubation. Has been alternating prone and supine. Large groin hematoma has developed at site of previous arterial line. Has now received three units of blood. Oxygenation has improved in the last 24 hours; possibly because of receiving a dose of diuretic.   4/24: FiO2 weaned slightly  4/25: No major events overnight.  FiO2 remains stable.  4/26: FiO2 increased. Also increased dyssynchrony requiring more sedation  4/27: worsening compliance         Mechanical Ventilation/Vitalsigns/IsandOs:     Temp:  [98.4  F (36.9  C)-100.9  F (38.3  C)] 98.4  F (36.9  C)  Pulse:  [] 77  Resp:  [17-31] 24  BP: (103-155)/(46-97) 132/68  MAP:  [62 mmHg-116 mmHg] 73 mmHg  Arterial Line BP: ()/() 112/42  FiO2 (%):  [50 %-60 %] 50 %  SpO2:  [91 %-100 %] 95 %      Ventilation Mode: CMV/AC  (Continuous Mandatory Ventilation/ Assist Control)  FiO2 (%): 50 %  Rate Set (breaths/minute): 16 breaths/min  Tidal Volume Set (mL): 360 mL  PEEP (cm H2O): 12 cmH2O  Oxygen Concentration (%): 60 %  Resp: 24      Day since 4/15    Peak airway pressure: 29      Intake/Output Summary (Last 24 hours) at 4/27/2021  1050  Last data filed at 4/27/2021 1000  Gross per 24 hour   Intake 3723.47 ml   Output 3945 ml   Net -221.53 ml       Net IO Since Admission: +15.7 liters           Physical Examination:     General: Stated age, sedated  HEENT: ET tube, NG tube  Lungs: LCTAB anteriorly  CVS: RRR, S1S2  Abdomen: obese, soft  Extremities/musculoskeletal: ansarca, warm  Neurology: eyes open  Skin: large hematoma/eccymosis on the right groin that extends into perineal area. Has regressed since my last examination  Psychiatry: sedated  Exam of Line sites:  Left radial arterial line and left subclavian central line         Feeding/Glucose:     Orders Placed This Encounter      NPO for Medical/Clinical Reasons Except for: Meds      Recent Labs   Lab 04/27/21  0835 04/27/21  0455 04/27/21  0454 04/26/21  2353 04/26/21 2025 04/26/21  1700 04/26/21  1145 04/26/21  0430 04/26/21  0430 04/25/21  0435 04/25/21  0435 04/24/21  0540 04/24/21  0540 04/23/21  0427 04/23/21  0427 04/22/21  0510 04/22/21  0510   GLC  --  144*  --   --   --   --   --   --  124*  --  110*  --  97  --  89  --  111*   *  --  151* 144* 163* 151* 119*   < >  --    < > 115*   < >  --    < >  --    < >  --     < > = values in this interval not displayed.              Medications:       chlorhexidine  15 mL Mouth/Throat Q12H     dexamethasone  6 mg Oral or Feeding Tube Daily     enoxaparin ANTICOAGULANT  0.5 mg/kg Subcutaneous BID     furosemide  60 mg Intravenous Q12H     insulin aspart  1-12 Units Subcutaneous Q4H     meropenem  500 mg Intravenous Q6H     multivitamins w/minerals  15 mL Per Feeding Tube Daily     pantoprazole  40 mg Per Feeding Tube Daily     protein modular  3 packet Per Feeding Tube Daily     sennosides  5 mL Oral BID          dextrose       HYDROmorphone 0.4 mg/hr (04/27/21 0605)     midazolam 8 mg/hr (04/27/21 0836)     norepinephrine 0.05 mcg/kg/min (04/27/21 0916)     propofol (DIPRIVAN) infusion Stopped (04/27/21 6180)     sodium chloride 10  mL/hr at 04/26/21 0800              Labs:         ROUTINE ICU LABS (Last four results)  CMP  Recent Labs   Lab 04/27/21 0455 04/26/21 0430 04/25/21  2030 04/25/21  1456 04/25/21 0435 04/24/21  1615 04/24/21  0540 04/23/21  0427 04/22/21  0510    138  --   --  141  --  141 139 142   POTASSIUM 3.7 3.7 4.0 3.5 3.4 3.0* 3.5 3.8 4.0   CHLORIDE 103 101  --   --  103  --  105 105 105   CO2 38* 36*  --   --  36*  --  31 32 36*   ANIONGAP <1* 1*  --   --  2*  --  5 2* 1*   * 124*  --   --  110*  --  97 89 111*   BUN 22 21  --   --  28  --  34* 31* 30   CR 0.43* 0.53  --   --  0.62  --  0.62 0.47* 0.47*   GFRESTIMATED >90 >90  --   --  >90  --  >90 >90 >90   GFRESTBLACK >90 >90  --   --  >90  --  >90 >90 >90   ALICIA 7.8* 7.7*  --   --  7.5*  --  7.3* 7.5* 7.4*   MAG  --  2.3  --   --   --  2.3  --  2.4* 2.6*   PHOS  --  2.8  --   --   --  3.6  --   --   --    PROTTOTAL  --   --   --   --   --   --   --  5.2*  --    ALBUMIN  --   --   --   --   --   --   --  1.8*  --    BILITOTAL  --   --   --   --   --   --   --  0.4  --    ALKPHOS  --   --   --   --   --   --   --  75  --    AST  --   --   --   --   --   --   --  42  --    ALT  --   --   --   --   --   --   --  46  --      CBC  Recent Labs   Lab 04/27/21 0455 04/26/21 0430 04/25/21  1313 04/25/21  0435 04/24/21  1107 04/24/21  1107   WBC 16.6* 16.7*  --  14.4*  --  14.6*   RBC 2.25* 2.60*  --  2.74*  --  2.70*   HGB 6.8* 7.8* 8.7* 8.1*   < > 8.0*   HCT 21.2* 24.4*  --  25.2*  --  24.8*   MCV 94 94  --  92  --  92   MCH 30.2 30.0  --  29.6  --  29.6   MCHC 32.1 32.0  --  32.1  --  32.3   RDW 14.5 14.5  --  14.3  --  14.2    182  --  162  --  171    < > = values in this interval not displayed.     INR  Recent Labs   Lab 04/20/21  1440   INR 1.08     Arterial Blood Gas  Recent Labs   Lab 04/27/21  0833 04/26/21  0430 04/25/21  0435 04/24/21  0914 04/23/21  1035 04/23/21  1035 04/22/21  0810 04/21/21  2025   PH 7.40 7.39 7.42 7.43   < > 7.45 7.49* 7.50*    PCO2 63* 60* 55* 48*   < > 44 48* 47*   PO2 92 127* 149* 107*   < > 70* 98 159*   HCO3 39* 36* 36* 32*   < > 30* 36* 37*   O2PER  --   --   --  70  --  70% 60 60%    < > = values in this interval not displayed.       Other Lab Data:      Cultures:  No results for input(s): CULT in the last 168 hours.  Blood culture:  Invalid input(s): BC   Urine culture:  No results for input(s): URC in the last 168 hours.          Imaging/Other results:     No results found for this or any previous visit (from the past 24 hour(s)).    Bilateral infiltrates appear similar to slightly worse since the last examination         Assessment and Plan:       Sarahy Dunbar IS a 65 year old female admitted on 4/13/2021 for Covid pneumonia, altered mental status, hypoxemic respiratory failure requiring intubation and mechanical ventilation  I have personally reviewed the daily labs, imaging studies, cultures and discussed the case with referring physician and consulting physicians.     My assessment and plan by system for this patient is as follows:    Neurology/Psychiatry:   1.  Propofol/Versed for ICU sedation  2.  Hydromorphone infusion for pain  3.  History of anxiety present on admission we will monitor    Plan  - Patient having difficulty tolearting higher RASS levels because of dyssynchrony and hypoxia. Also appears to be having hypotension related to the Propofol as blood pressure is improving as propofol is being weaned.  - Stop Propofol. Continue with goal RASS of -3 to -4.    Cardiovascular:   1.Hemodynamics- multifactorial shock, most likely secondary to sedation and possibly sepsis. Vaspopressor needs have increased again.   Plan  Transition off Propofol and wean vasopressors if possible.     Pulmonary/Ventilator Management:   1. ARDS from COVID.  Oxygenation stable to improved; FiO2 is 50%, PEEP remains 12. However, now having more hypercapnia and peak airway pressures; this is generally worsening. Resumed antibiotics and  steroids yesterday. Velitri was not making any difference in her clinical condition and was stopped.   Plan  -Wean off FiO2 as able  -Trial off velitri 4/23, oxygenation relatively unchanged so we will keep off  - Intensify diuresis  - Continue steroids/antibiotics.    GI and Nutrition :   1.  N.p.o. ,   2. Concern for protein calorie malnutrition  Plan  - Continue tube feeds as tolerated    Renal/Fluids/Electrolytes:   1. Renal function stable but very volume overloaded. Sodium has corrected w increased free water and patient with brisk response to diuresis but only slightly net negative.   Plan  -Increase diuretic dose  - Continue free water at current rate    Infectious Disease:   1.  Covid positive. Completed Remdesivir and steroids Also completed 6 days of antibiotics. Signs now of possible [facility]. GNR in sputum.   Plan  -NArrow antibiotics when able  - Continue repeat steroid burst      Endocrine:   1.  Stress induced hyperglycemia while on Dexamethasone.  Plan  - Monitor sugars.      Hematology/Oncology:   1.  Large hematoma with need for transfusion. Appears to have stabilized. Imaging noted no vessel that needs or is amenable to intervention. Lovenox has been restarted but now losing blood again.  Plan  -Continue Lovenox and recheck hemoglobin this evening.  -If still bleeding tonight will hold lovenox again and order CT Scan.     IV/Access:   1. Venous access -central line  2. Arterial access - radial arterial line    ICU Prophylaxis:   1. DVT: Lovenox  2. VAP: HOB 30 degrees, chlorhexidine rinse  3. Stress Ulcer: PPI/H2 blocker  4. Restraints: Indicated for not pulling at lines  5. Wound care  -local wound care  6. Feeding -tube feeds  7. Family Update:  on the phone  8. Disposition -remain in ICU    Pilar Yeung MD  45 minutes

## 2021-04-27 NOTE — PROGRESS NOTES
Attempted to see patient to fulfill wound consult for right groin hematoma, bilateral groin gluteal cleft, pannus and  Consult on 4.24.2021 however patient is prone. WOC team try again .

## 2021-04-28 NOTE — PLAN OF CARE
Shift 4028-1442: BP has dropped at times w/ random ectopy; SpO2 randomly dropped to low 80's near start of shift requiring increase in O2 needs; tachypneic. Pulm: Lungs: coarse throughout, mechanically ventilated, red-streaked secretions. GI/: Multiple BM's w/ significant output; [place] in place: WDL output. Diet: Continuous enteral feedings. Access: A-line, CVC x 3 lumens; Levo gtt, Dilaudid gtt, versed gtt, NS for TKO.    New:  - Significant and continuous amount of loose stool BM's requiring rectal tube placement--see charted stool occurrences  - A-line went bad, removed  - Pt became dyssynchronous w/ vent, sedation RASS goals increased; propofol gtt re-started  - Tube Feeding now at goal rate  - Electrolyte replacement protocol followed

## 2021-04-28 NOTE — PROGRESS NOTES
TELE:  Notified of vent dyssynchrony.  Deepening RASS goal to -4 to -5.  Will proceed with paralysis if needed.

## 2021-04-28 NOTE — PROGRESS NOTES
[facility] ICU RESPIRATORY NOTE           Date of Admission: 4/13/2021     Date of Intubation (most recent): 4/15/21     Reason for Mechanical Ventilation: Resp. failure     Number of Days on Mechanical Ventilation: 14       Reason for No Spontaneous Breathing Trial: Per MD     Significant Events Tonight: None  Recent Labs   Lab 04/27/21  1445 04/27/21  0833 04/26/21  0430 04/25/21  0435 04/24/21  0914 04/23/21  1035 04/23/21  1035 04/22/21  0810   PH 7.44 7.40 7.39 7.42 7.43   < > 7.45 7.49*   PCO2 59* 63* 60* 55* 48*   < > 44 48*   PO2 73* 92 127* 149* 107*   < > 70* 98   HCO3 40* 39* 36* 36* 32*   < > 30* 36*   O2PER ASAF  --   --   --  70  --  70% 60    < > = values in this interval not displayed.     Ventilation Mode: CMV/AC  (Continuous Mandatory Ventilation/ Assist Control)  FiO2 (%): 70 %  Rate Set (breaths/minute): 16 breaths/min  Tidal Volume Set (mL): 330 mL  PEEP (cm H2O): 12 cmH2O  Oxygen Concentration (%): 50 %  Resp: 17   Rt will monitor pt on a vent full support.

## 2021-04-28 NOTE — PROGRESS NOTES
Comprehensive Daily ICU Note        Sarahy Dunbar MRN# [id number]   Age: 65 year old YOB: 1955     Date of Admission: 4/13/2021    Primary care provider: Rama Pop     CODE STATUS: FULL      Problem List:       Active Problems:    Acute respiratory failure with hypoxemia (H)    ARDS (adult respiratory distress syndrome) (H)    2019 novel coronavirus disease (COVID-19)    Hematoma    Anemia due to blood loss, acute    Shock (H)           Treatment goals for next 24 hours:   Reprone  Stop Vancomycin and continue Meropenem while awaiting cultures  Continue steroids for short course          Subjective/ Last 24 hours:   65-year-old female with no significant medical history who was found in bed with low saturations after welfare check.  Discovered to have cOVID. Maintained on NIV but subsequently required intubation. Has been alternating prone and supine. Large groin hematoma has developed at site of previous arterial line. Has now received three units of blood. Oxygenation has improved in the last 24 hours; possibly because of receiving a dose of diuretic.   4/24: FiO2 weaned slightly  4/25: No major events overnight.  FiO2 remains stable.  4/26: FiO2 increased. Also increased dyssynchrony requiring more sedation  4/27: worsening compliance  4/28: worsening compliance. Still requiring heavy sedation.          Mechanical Ventilation/Vitalsigns/IsandOs:     Temp:  [96.6  F (35.9  C)-99.1  F (37.3  C)] 99.1  F (37.3  C)  Pulse:  [] 90  Resp:  [13-32] 24  BP: ()/() 98/57  MAP:  [66 mmHg-248 mmHg] 248 mmHg  Arterial Line BP: (100-140)/(44-97) 110/75  FiO2 (%):  [50 %-100 %] 70 %  SpO2:  [85 %-100 %] 96 %      Ventilation Mode: CMV/AC  (Continuous Mandatory Ventilation/ Assist Control)  FiO2 (%): 70 %  Rate Set (breaths/minute): 16 breaths/min  Tidal Volume Set (mL): 330 mL  PEEP (cm H2O): 12 cmH2O  Oxygen Concentration (%): 60 %  Resp: 24      Day since 4/15    Peak airway  pressure: low 30s      Intake/Output Summary (Last 24 hours) at 4/28/2021 1503  Last data filed at 4/28/2021 1430  Gross per 24 hour   Intake 4169.49 ml   Output 5055 ml   Net -885.51 ml     Net IO Since Admission: +15.7 liters           Physical Examination:     General: Stated age, sedated  HEENT: ET tube, NG tube  Lungs: LCTAB posteriorly, breathing over the vent but synchronous.   CVS: RRR, S1S2  Abdomen: obese, soft  Extremities/musculoskeletal: ansarca, warm  Neurology: heavily sedated  Skin: scars from pre vious knee/ankle s urgery. Unable to visualize hematoma today.   Psychiatry: sedated  Exam of Line sites:  Unable to examine         Feeding/Glucose:     Orders Placed This Encounter      NPO for Medical/Clinical Reasons Except for: Meds      Recent Labs   Lab 04/28/21  1220 04/28/21  0740 04/28/21  0415 04/28/21  0016 04/27/21  2008 04/27/21  1616 04/27/21  1203 04/27/21  0455 04/27/21  0455 04/26/21  0430 04/26/21  0430 04/25/21  0435 04/25/21  0435 04/24/21  0540 04/24/21  0540 04/23/21  0427 04/23/21  0427   GLC  --   --  126*  --   --   --   --   --  144*  --  124*  --  110*  --  97  --  89   * 122*  --  127* 134* 135* 123*   < >  --    < >  --    < > 115*   < >  --    < >  --     < > = values in this interval not displayed.              Medications:       chlorhexidine  15 mL Mouth/Throat Q12H     dexamethasone  6 mg Oral or Feeding Tube Daily     enoxaparin ANTICOAGULANT  0.5 mg/kg Subcutaneous BID     furosemide  60 mg Intravenous Q12H     insulin aspart  1-12 Units Subcutaneous Q4H     meropenem  500 mg Intravenous Q6H     multivitamins w/minerals  15 mL Per Feeding Tube Daily     pantoprazole  40 mg Per Feeding Tube Daily     sennosides  5 mL Oral BID          dextrose       HYDROmorphone 0.4 mg/hr (04/28/21 0343)     midazolam 10 mg/hr (04/28/21 0916)     norepinephrine 0.05 mcg/kg/min (04/28/21 3858)     propofol (DIPRIVAN) infusion 50 mcg/kg/min (04/28/21 1217)     sodium chloride 20  mL/hr at 04/28/21 0757              Labs:         ROUTINE ICU LABS (Last four results)  CMP  Recent Labs   Lab 04/28/21  0415 04/28/21  0020 04/27/21  1435 04/27/21  0455 04/26/21  0430 04/25/21  0435 04/25/21  0435 04/24/21  1615 04/23/21  0427 04/23/21  0427     --   --  141 138  --  141  --    < > 139   POTASSIUM 3.6 3.5 3.0* 3.7 3.7   < > 3.4 3.0*   < > 3.8   CHLORIDE 104  --   --  103 101  --  103  --    < > 105   CO2 42*  --   --  38* 36*  --  36*  --    < > 32   ANIONGAP <1*  --   --  <1* 1*  --  2*  --    < > 2*   *  --   --  144* 124*  --  110*  --    < > 89   BUN 29  --   --  22 21  --  28  --    < > 31*   CR 0.48*  --   --  0.43* 0.53  --  0.62  --    < > 0.47*   GFRESTIMATED >90  --   --  >90 >90  --  >90  --    < > >90   GFRESTBLACK >90  --   --  >90 >90  --  >90  --    < > >90   ALICIA 7.7*  --   --  7.8* 7.7*  --  7.5*  --    < > 7.5*   MAG 2.6*  --  2.6*  --  2.3  --   --  2.3  --  2.4*   PHOS  --   --   --   --  2.8  --   --  3.6  --   --    PROTTOTAL  --   --   --   --   --   --   --   --   --  5.2*   ALBUMIN  --   --   --   --   --   --   --   --   --  1.8*   BILITOTAL  --   --   --   --   --   --   --   --   --  0.4   ALKPHOS  --   --   --   --   --   --   --   --   --  75   AST  --   --   --   --   --   --   --   --   --  42   ALT  --   --   --   --   --   --   --   --   --  46    < > = values in this interval not displayed.     CBC  Recent Labs   Lab 04/28/21  0415 04/27/21  1614 04/27/21  0455 04/26/21  0430 04/25/21  0435 04/25/21 0435   WBC 15.9*  --  16.6* 16.7*  --  14.4*   RBC 2.64*  --  2.25* 2.60*  --  2.74*   HGB 7.7* 7.7* 6.8* 7.8*   < > 8.1*   HCT 24.8*  --  21.2* 24.4*  --  25.2*   MCV 94  --  94 94  --  92   MCH 29.2  --  30.2 30.0  --  29.6   MCHC 31.0*  --  32.1 32.0  --  32.1   RDW 14.8  --  14.5 14.5  --  14.3     --  203 182  --  162    < > = values in this interval not displayed.     INR  No lab results found in last 7 days.  Arterial Blood Gas  Recent Labs    Lab 04/28/21  0831 04/27/21  1445 04/27/21  0833 04/26/21  0430 04/24/21  0914 04/24/21  0914 04/23/21  1035 04/23/21  1035   PH 7.36 7.44 7.40 7.39   < > 7.43   < > 7.45   PCO2 73* 59* 63* 60*   < > 48*   < > 44   PO2 92 73* 92 127*   < > 107*   < > 70*   HCO3 41* 40* 39* 36*   < > 32*   < > 30*   O2PER 60 ASAF  --   --   --  70  --  70%    < > = values in this interval not displayed.       Other Lab Data:      Cultures:  Recent Labs   Lab 04/26/21  1850   CULT Moderate growth  Pseudomonas aeruginosa  Susceptibility testing in progress  *  Moderate growth  Enterococcus faecalis  Susceptibility testing in progress  *  Plus  Light growth  Normal dario       Blood culture:  Invalid input(s): BC   Urine culture:  No results for input(s): URC in the last 168 hours.          Imaging/Other results:     No results found for this or any previous visit (from the past 24 hour(s)).    Bilateral infiltrates appear similar to slightly worse since the last examination         Assessment and Plan:       Sarahy Dunbar IS a 65 year old female admitted on 4/13/2021 for Covid pneumonia, altered mental status, hypoxemic respiratory failure requiring intubation and mechanical ventilation  I have personally reviewed the daily labs, imaging studies, cultures and discussed the case with referring physician and consulting physicians.     My assessment and plan by system for this patient is as follows:    Neurology/Psychiatry:   1.  Propofol/Versed for ICU sedation  2.  Hydromorphone infusion for pain  3.  History of anxiety present on admission we will monitor    Plan  - Patient having difficulty tolearting higher RASS levels because of dyssynchrony and hypoxia. Unable to tolerate being off the Propofol.   - Continue heavy sedation    Cardiovascular:   1.Hemodynamics- multifactorial shock, most likely secondary to sedation and possibly sepsis. Vaspopressor needs are stable.  Plan  Transition off Propofol and wean vasopressors if  possible.     Pulmonary/ID:   1. ARDS from COVID.  Oxygenation stable. However, compliance/ventilation is worsening despite maximal therapies including antibiotics and resuming steroids and continuing lung protective ventilation. Plateau pressures are at goal today.   2. VAP. Pseudomonas and Enterococcus growing. Sensitivities pending.   Plan  - Continue lung protective ventilation   - Continue attempts at diuresis  - Continue steroids/antibiotics. Will likely stay on Meropenem    GI and Nutrition :   1.  N.p.o. ,   2. Concern for protein calorie malnutrition  Plan  - Continue tube feeds as tolerated    Renal/Fluids/Electrolytes:   1. Renal function stable but very volume overloaded. Sodium has corrected w increased free water and patient net negative in the last 24 hours.   Plan  - Continue current diuretic and free water regimen.      Endocrine:   1.  Stress induced hyperglycemia while on Dexamethasone.  Plan  - Monitor sugars.      Hematology/Oncology:   1.  Large hematoma with need for transfusion. Appears to have stabilized. Imaging noted no vessel that needs or is amenable to intervention. Lovenox has been restarted and hemoglobin stable now for 24 hours.  Plan  -Continue Lovenox.     IV/Access:   1. Venous access -central line  2. Arterial access - removed    ICU Prophylaxis:   1. DVT: Lovenox  2. VAP: HOB 30 degrees, chlorhexidine rinse  3. Stress Ulcer: PPI  4. Restraints: Indicated for not pulling at lines  5. Wound care  - local wound care  6. Feeding -tube feeds  7. Family Update:  on the phone  8. Disposition -remain in ICU    Pilar Yeung MD  45 minutes

## 2021-04-28 NOTE — PROGRESS NOTES
Chart reviewed, refer to complete RD assessment dated 4/26 for more details    Currently receiving the following TF regimen ~    Nutrition Support Enteral:  Type of Feeding Tube: NJT  Enteral Frequency:  Continuous  Enteral Regimen: Vital HP at 55 mL/hr   Total Enteral Provisions: 1320 kcal (12 kcal/kg), 115 g protein (2.5 g/kg), 147 g CHO, no fiber, 1109 mL H2O  Free Water Flush: 200 mL q 4 hrs     Meds reviewed  Labs reviewed    ASSESSED NUTRITION NEEDS:  Dosing Weight: 107 kg for energy, 45 kg for protein  Estimated Energy Needs:0604-1968 kcals (11-14 Kcal/Kg)  Justification: obese and vented  Estimated Protein Needs:  grams protein (2-2.5 g pro/Kg)  Justification: obesity guidelines     Patient continues proned  Propofol was resumed last night --> now overfeeding kcals while TF is at goal rate     Plan:  Decrease TF today to 30 mL/hr while Propofol remains elevated. RD to reassess daily for advancement back to goal rate of 55 mL/hr  Vital HP at 30 mL/hr = 720 kcal, 62 g protein  Propofol at 32.1 mL/hr = 847 kcal  Total Provisions = 1567 kcal (14 kcal/kg), 62 gm protein (1.3 gm/kg and 70% estimated needs     If high Propofol continues, will add back Pro[employer] pkts for additional protein vs increasing TF rate to goal     Rachel Adam, RD, LD  Clinical Dietitian

## 2021-04-28 NOTE — PROGRESS NOTES
Pt continues to be proned and in COVID isolation.  Today pt was unable to be turned supine  I discussed care needs with RN, re inforced current wound/ skin care orders and WOC will follow up first thing in the morning to see if pt will be turned supine, then we can assess R groin, etc needs.

## 2021-04-29 NOTE — PROVIDER NOTIFICATION
Pt coughing with minimal stimulation and during head turns, pt also breathing over the vent by 2-4 breaths and is dyssynchronous at times. Intensivist notified, no paralytic will be added at this time, consider if pt becomes more tachypneic or dyssynchronous. Instructed to bolus pt with 0.5 dilaudid prior to head turn.

## 2021-04-29 NOTE — PROGRESS NOTES
[facility] ICU RESPIRATORY NOTE        Date of Admission: 4/13/2021    Date of Intubation (most recent): 4/15/21    Reason for Mechanical Ventilation: respiratory failure    Number of Days on Mechanical Ventilation: 13    Met Criteria for Spontaneous Breathing Trial: No    Reason for No Spontaneous Breathing Trial: Per MD    Significant Events Today: None    ABG Results:   Recent Labs   Lab 04/28/21  2145 04/28/21  0831 04/27/21  1445 04/27/21  0833 04/26/21  0430 04/24/21  0914 04/24/21  0914   PH  --  7.36 7.44 7.40 7.39   < > 7.43   PCO2  --  73* 59* 63* 60*   < > 48*   PO2  --  92 73* 92 127*   < > 107*   HCO3  --  41* 40* 39* 36*   < > 32*   O2PER 50% 60 ASAF  --   --   --  70    < > = values in this interval not displayed.       Current Vent Settings: Ventilation Mode: CMV/AC  (Continuous Mandatory Ventilation/ Assist Control)  FiO2 (%): 50 %  Rate Set (breaths/minute): 20 breaths/min  Tidal Volume Set (mL): 330 mL  PEEP (cm H2O): 12 cmH2O  Oxygen Concentration (%): 50 %  Resp: 25    Plan: Continue to monitor the pt on full support and head turn every 2 hrs.    Vanessa Perez, RT

## 2021-04-29 NOTE — PLAN OF CARE
CNS: Pupils equal and reactive. Pt heavily sedated, does not withdraw to pain, coughs with head turns.     CVS: SR w/ occasional PAC's, Levo at 0.05 overnight to maintain sys >90 and map >65    Resp: Mech vent 50% Tv 330 PEEP 12 RR 20, pt needing 100% with head turns, currently proned.     GI: TF @ goal 55, Q4H 200 FW, pt having one episode of green bile subglottic output-intensivist notified, possible OG placement when supine, rectal tube in place for loose stools    : Yeboah in place, adequat output overnight.     Skin: R groin hematoma, unable to assess with pt in prone position, coccyx-fissure, WOC following for both.     Access: L subclavian CVC    Family:  Ruddy    Plan:  Q2H head turns, monitor resp status closely, electrolyte replacement, possible change to supine position today.

## 2021-04-29 NOTE — PROGRESS NOTES
[facility]  WO Nurse Inpatient Wound Assessment     Reason for consultation: Evaluate and treat pannus and groin wounds, hematoma on right thigh. Attempted to see on the weekend and again this week and third time was the charm.     Assessment  Right thigh Hematoma  wound due to arterial line, linear pannus and bilateral groin open areas due to intertrigo, shear and friction. Gluteal cleft wound smaller however with slough in the wound bed. Photos taken through plastic bag which causes cloudiness.     Treatment Plan  Pannus and groin wounds: Daily    Cleanse wounds with wound cleanser  Apply a thick layer of triad paste #[id number] to wounds  If you feel it necessary okay to cover with foam dressings.   Once the wound beds are only pink and not yellow, no longer draining okay to switch to interdry ag    SACRUM  1. Clean wound with saline or MicroKlenz Spray, pat dry    Apply a thick layer of triad paste to wound only  2. Wipe the surrounding periwound tissue with several skin preps to help with dressing sticking  3. Press a Mepilex  Sacral Dressing (PS#[id number])  to the area, making sure to conform nicely to skin curvatures.  4. Time and date dressing change  -REPOSITION ALL PATIENTS SIDE TO SIDE ONLY WHEN IN BED, EVERY 2 HOURS,   -HEELS MUST BE KEPT ELEVATED AT ALL TIMES, USING AT LEAST 2 PILLOWS UNDER EACH CALF, ASSURING HEELS ARE FLOATING  -WHEN UP TO THE CHAIR PT NEEDS TO FULLY OFF LOAD EVERY 2 HOURS    Orders Written  Recommended provider order: None, at this time  WOC Nurse follow-up plan: weekly  Nursing to notify the Provider(s) and re-consult the WO Nurse if wound(s) deteriorates or new skin concern.    Patient History  According to provider note(s):  65-year-old female with no significant medical history who was found in bed with low saturations after welfare check.  The patient's  was out of town and was unable to contact his wife and the patient was found in bed with a  saturation of approximately 20.  The patient is currently on BiPAP and given his shortness of breath is difficult to get a history from the most of the history is taken from the chart.  However at the outside hospital patient did deny cough fever and was confused and her history was limited.  Patient's  gives a history of hypertension and anxiety.  Review of systems is otherwise unremarkable.  The patient was admitted to the outside hospital where she was treated with noninvasive ventilation antibiotics and IV fluid.  Her condition stabilized and she was transferred to our ICU for further management and rule out of Covid.  On her arrival she was transitioned noninvasive ventilation and his saturation improved.    Objective Data  Containment of urine/stool: Indwelling catheter and Internal fecal management    Active Diet Order:  Orders Placed This Encounter      NPO for Medical/Clinical Reasons Except for: Meds    Output:   I/O last 3 completed shifts:  In: 4554.64 [I.V.:1974.64; NG/GT:1260]  Out: 5490 [Urine:5090; Stool:400]    Risk Assessment:   Sensory Perception: 1-->completely limited  Moisture: 4-->rarely moist  Activity: 1-->bedfast  Mobility: 1-->completely immobile  Nutrition: 3-->adequate  Friction and Shear: 1-->problem  Joss Score: 11                          Labs:   Recent Labs   Lab 04/29/21  0430 04/26/21  0430 04/26/21  0430 04/23/21  0427 04/23/21 0427   ALBUMIN  --   --   --   --  1.8*   HGB 7.1*   < > 7.8*   < > 7.8*   WBC 10.2   < > 16.7*   < > 14.8*   CRP  --   --  257.0*  --   --     < > = values in this interval not displayed.       Physical Exam  Skin inspection: focused gluteal cleft, pannus and groin and hematoma        Wound Location:  Gluteal cleft  Date of last photo:  4/29/2021  Wound History: documented on the flowsheet on 4/16 @1200, by the staff's reaction and the previous woc note it sounds as if it has improved significantly during the time she was proned.  Measurements  (length x width x depth, in cm):  8.0 x 1.0 x utdcm   Wound Base: 50,50% dermis and slough  Tunneling: N/A  Undermining: N/A  Palpation of the wound bed: boggy   Periwound skin: intact  Color: normal and consistent with surrounding tissue  Temperature: cool  Drainage: small  Description of drainage: yellow  Odor: none  Pain: patient sedated      Wound Location:  Left abdominal fold/pannus/groin  Date of last photo:  4.29  Wound History: likely a chronic recurring issue edges are irregular as above showing evidence of shear and friction due to body habitus   Measurements (length x width x depth, in cm):  1.5 x 10 x 0.1cm   Wound Base: 100% dermis  Palpation of the wound bed: fluctuance   Periwound skin: edges show shear and are even with the wound bed  Color: normal and consistent with surrounding tissue and purple  Temperature: normal   Drainage: none  Description of drainage: none  Odor: none        Wound Location:  Right groin/pannus?   Date of last photo:  4.29  Wound History: chronic intertriginous wounds, shear, some pressure  Measurements (length x width x depth, in cm):  1.5 x 9.0 x 0.2 cm   Wound Base: 50,50% granulation tissue and slough  Palpation of the wound bed: firm   Periwound skin: ecchymosis and edges are palpable and irregular as above showing evidence of shear and friction due to body habitus   Color: purple and red  Temperature: hot  Drainage: small  Description of drainage: serosanguinous  Odor: none        Wound Location:  Right thigh, pannus pubic area hematoma  Date of last photo:  4.29  Wound History: chronic due to arterial line, however induration and ecchymosis decreased  Measurements (length x width x depth, in cm):15x 30 cm induration   Wound Base: small superficial area of 100% epidermis open blisters indurated   Palpation of the wound bed: firm   Periwound skin: ecchymosis and indurated  Color: purple  Temperature: hot  Drainage: none  Description of drainage: none  Odor:  none      Interventions  Current support surface: Bariatric evolve  Current off-loading measures: Pillows and Wedge positioning system  Visual inspection of wound(s) completed  Wound Care: completed by RN  Supplies: ordered: triad paste  Education provided: plan of care and Moisture management  Discussed plan of care with Nurse    Mckayla Hurst RN, CWOCN

## 2021-04-29 NOTE — PROGRESS NOTES
CLINICAL NUTRITION SERVICES - REASSESSMENT NOTE      Malnutrition: (4/15)  % Weight Loss:  None noted  % Intake:  Decreased intake does not meet criteria for malnutrition - NPO x 3 days  Subcutaneous Fat Loss: Unable to assess  Muscle Loss: Unable to assess  Fluid Retention:  None noted     Malnutrition Diagnosis: Unable to determine without Nutrition Focused Physical Exam       EVALUATION OF PROGRESS TOWARD GOALS   Diet:  NPO on vent     Nutrition Support:  Patient is receiving TF at goal as follows ~    Nutrition Support Enteral:  Type of Feeding Tube: NJ  Enteral Frequency:  Continuous  Enteral Regimen: Vital HP at 55 mL/hr  Total Enteral Provisions: 1320 kcal (12 kcal/kg), 115 g protein (2.5 g/kg), 147 g CHO, no fiber, 1109 mL H2O  Free Water Flush: 200 mL every 4 hours   Propofol at 12.8 mL/hr= 338 kcal   Total = 1658 kcal (15 kcal/kg and 111% needs)    Intake/Tolerance:    Orders were written yesterday to decrease TF rate to 30 mL/hr due to high Propofol rates but appears this was not done  Propofol rates have been quite variable over the last week leading to high calorie provisions    K normal  Mg 2.5 (L)  Stool 400 mL   I/O 4554/5490, wt 121.2 kg (up overall)  BGM < 150       ASSESSED NUTRITION NEEDS:  Dosing Weight: 107 kg for energy, 45 kg for protein  Estimated Energy Needs:6068-0176 kcals (11-14 Kcal/Kg)  Justification: obese and vented  Estimated Protein Needs:  grams protein (2-2.5 g pro/Kg)  Justification: obesity guidelines       NEW FINDINGS:   4/27:  Proned   4/29:  Supine 1030     4/29: WOCN = Right thigh Hematoma  wound due to arterial line, linear pannus and bilateral groin open areas due to intertrigo, shear and friction. Gluteal cleft wound smaller however with slough in the wound bed. Photos taken through plastic bag which causes cloudiness.  - Receiving Certavite daily per WOCN protocol     Patient continues on Norepi drip      Previous Goals (4/26):   EN + Prosource + TF to meet  between % estimated energy needs   Evaluation: Met    Previous Nutrition Diagnosis (4/26):   Excessive energy intake related to TF reliance and increased Propofol provisions as evidenced by meeting 172% estimated energy needs   Evaluation: Improving      CURRENT NUTRITION DIAGNOSIS  No nutrition diagnosis identified at this time    INTERVENTIONS  Recommendations / Nutrition Prescription  Continue Vital HP at 55 mL/hr as above     Implementation  Collaboration and Referral of Nutrition care:  Patient discussed today during interdisciplinary bedside rounds     Goals  TF + Propofol will meet % needs     MONITORING AND EVALUATION:  Progress towards goals will be monitored and evaluated per protocol and Practice Guidelines    Leslie Porter RD, LD, CNSC   Clinical Dietitian - [facility]

## 2021-04-29 NOTE — PROGRESS NOTES
Comprehensive Daily ICU Note        Sarahy Dunbar MRN# [id number]   Age: 65 year old YOB: 1955     Date of Admission: 4/13/2021    Primary care provider: Rama Pop     CODE STATUS: FULL      Problem List:       Active Problems:    Acute respiratory failure with hypoxemia (H)    ARDS (adult respiratory distress syndrome) (H)    2019 novel coronavirus disease (COVID-19)    Hematoma    Anemia due to blood loss, acute    Shock (H)           Treatment goals for next 24 hours:   Acetazolamide  Continue supine  Continue steroids for short course          Subjective/ Last 24 hours:   65-year-old female with no significant medical history who was found in bed with low saturations after welfare check.  Discovered to have cOVID. Maintained on NIV but subsequently required intubation. Has been alternating prone and supine. Large groin hematoma has developed at site of previous arterial line. Has now received three units of blood. Oxygenation has improved in the last 24 hours; possibly because of receiving a dose of diuretic.   4/24: FiO2 weaned slightly  4/25: No major events overnight.  FiO2 remains stable.  4/26: FiO2 increased. Also increased dyssynchrony requiring more sedation  4/27: worsening compliance  4/28: worsening compliance. Still requiring heavy sedation.   4/29: no significant changes. Tolerating being supine         Mechanical Ventilation/Vitalsigns/IsandOs:     Temp:  [96.6  F (35.9  C)-99.3  F (37.4  C)] 96.6  F (35.9  C)  Pulse:  [] 80  Resp:  [16-31] 16  BP: ()/(37-79) 104/57  FiO2 (%):  [50 %-100 %] 50 %  SpO2:  [91 %-100 %] 100 %      Ventilation Mode: CMV/AC  (Continuous Mandatory Ventilation/ Assist Control)  FiO2 (%): 50 %  Rate Set (breaths/minute): 20 breaths/min  Tidal Volume Set (mL): 330 mL  PEEP (cm H2O): 12 cmH2O  Oxygen Concentration (%): 50 %  Resp: 16      Day since 4/15    Peak airway pressure: low 30s      Intake/Output Summary (Last 24 hours) at  4/29/2021 1348  Last data filed at 4/29/2021 1200  Gross per 24 hour   Intake 4835.03 ml   Output 4145 ml   Net 690.03 ml         Net IO Since Admission: +12 litrs liters           Physical Examination:     General: Stated age, sedated  HEENT: ET tube, NG tube  Lungs: LCTAB anteriorly  CVS: RRR, S1S2  Abdomen: obese, soft  Extremities/musculoskeletal: ansarca, warm  Neurology: heavily sedated  Skin: scars from previous knee/ankle s urgery. Hematoma has regressed but remains an indurated area with tight and blistered skin.   Psychiatry: sedated  Exam of Line sites:  Central line site looks clean          Feeding/Glucose:     Orders Placed This Encounter      NPO for Medical/Clinical Reasons Except for: Meds      Recent Labs   Lab 04/29/21  0749 04/29/21  0433 04/29/21  0430 04/29/21  0002 04/28/21  2035 04/28/21  1607 04/28/21  1220 04/28/21  0415 04/28/21  0415 04/27/21  0455 04/27/21  0455 04/26/21  0430 04/26/21  0430 04/25/21  0435 04/25/21  0435 04/24/21  0540 04/24/21  0540   GLC  --   --  98  --   --   --   --   --  126*  --  144*  --  124*  --  110*  --  97   BGM 88 102*  --  123* 139* 126* 108*   < >  --    < >  --    < >  --    < > 115*   < >  --     < > = values in this interval not displayed.              Medications:       chlorhexidine  15 mL Mouth/Throat Q12H     dexamethasone  6 mg Oral or Feeding Tube Daily     enoxaparin ANTICOAGULANT  0.5 mg/kg Subcutaneous BID     furosemide  60 mg Intravenous Q12H     insulin aspart  1-12 Units Subcutaneous Q4H     meropenem  500 mg Intravenous Q6H     multivitamins w/minerals  15 mL Per Feeding Tube Daily     pantoprazole  40 mg Per Feeding Tube Daily     sennosides  5 mL Oral BID          dextrose       HYDROmorphone 0.2 mg/hr (04/29/21 0800)     midazolam 10 mg/hr (04/29/21 0800)     norepinephrine 0.04 mcg/kg/min (04/29/21 0833)     propofol (DIPRIVAN) infusion 60 mcg/kg/min (04/29/21 1010)     sodium chloride 20 mL/hr at 04/28/21 9877              Labs:          ROUTINE ICU LABS (Last four results)  CMP  Recent Labs   Lab 04/29/21  0430 04/28/21  2030 04/28/21  1305 04/28/21  0415 04/27/21  1435 04/27/21  1435 04/27/21  0455 04/26/21  0430 04/24/21  1615 04/24/21  1615 04/23/21  0427 04/23/21 0427     --   --  142  --   --  141 138   < >  --    < > 139   POTASSIUM 3.4 4.1 3.2* 3.6   < > 3.0* 3.7 3.7   < > 3.0*   < > 3.8   CHLORIDE 100  --   --  104  --   --  103 101   < >  --    < > 105   CO2 44*  --   --  42*  --   --  38* 36*   < >  --    < > 32   ANIONGAP <1*  --   --  <1*  --   --  <1* 1*   < >  --    < > 2*   GLC 98  --   --  126*  --   --  144* 124*   < >  --    < > 89   BUN 29  --   --  29  --   --  22 21   < >  --    < > 31*   CR 0.41*  --   --  0.48*  --   --  0.43* 0.53   < >  --    < > 0.47*   GFRESTIMATED >90  --   --  >90  --   --  >90 >90   < >  --    < > >90   GFRESTBLACK >90  --   --  >90  --   --  >90 >90   < >  --    < > >90   ALICIA 7.9*  --   --  7.7*  --   --  7.8* 7.7*   < >  --    < > 7.5*   MAG 2.5*  --   --  2.6*  --  2.6*  --  2.3  --  2.3  --  2.4*   PHOS  --   --   --   --   --   --   --  2.8  --  3.6  --   --    PROTTOTAL  --   --   --   --   --   --   --   --   --   --   --  5.2*   ALBUMIN  --   --   --   --   --   --   --   --   --   --   --  1.8*   BILITOTAL  --   --   --   --   --   --   --   --   --   --   --  0.4   ALKPHOS  --   --   --   --   --   --   --   --   --   --   --  75   AST  --   --   --   --   --   --   --   --   --   --   --  42   ALT  --   --   --   --   --   --   --   --   --   --   --  46    < > = values in this interval not displayed.     CBC  Recent Labs   Lab 04/29/21  0430 04/28/21  0415 04/27/21  1614 04/27/21  0455 04/26/21  0430   WBC 10.2 15.9*  --  16.6* 16.7*   RBC 2.43* 2.64*  --  2.25* 2.60*   HGB 7.1* 7.7* 7.7* 6.8* 7.8*   HCT 23.1* 24.8*  --  21.2* 24.4*   MCV 95 94  --  94 94   MCH 29.2 29.2  --  30.2 30.0   MCHC 30.7* 31.0*  --  32.1 32.0   RDW 14.6 14.8  --  14.5 14.5    244  --  203 182      INR  No lab results found in last 7 days.  Arterial Blood Gas  Recent Labs   Lab 04/29/21  0835 04/28/21  2145 04/28/21  0831 04/27/21  1445 04/27/21  0833   PH 7.43  --  7.36 7.44 7.40   PCO2 70*  --  73* 59* 63*   PO2 64*  --  92 73* 92   HCO3 46*  --  41* 40* 39*   O2PER 50% 50% 60 ASAF  --        Other Lab Data:      Cultures:  Recent Labs   Lab 04/26/21  1850   CULT Moderate growth  Pseudomonas aeruginosa  *  Moderate growth  Enterococcus faecalis  *  Plus  Light growth  Normal dario       Blood culture:  Invalid input(s): BC   Urine culture:  No results for input(s): URC in the last 168 hours.          Imaging/Other results:     Recent Results (from the past 24 hour(s))   US Lower Extremity Venous Duplex Right    Narrative    ULTRASOUND LOWER EXTREMITY VENOUS DUPLEX RIGHT 4/28/2021 3:56 PM    CLINICAL HISTORY/INDICATION: Evaluate for deep vein thrombosis.    COVID positive, leg swelling.    COMPARISON: 4/13/2021    TECHNIQUE:   Grayscale, color-flow, and spectral waveform analysis were performed  of the deep veins of the right lower extremity    FINDINGS: Exam is extremely limited secondary to patient being prone  and intubated. The right common femoral, great saphenous, profunda  femoral, posterior tibial and peroneal veins are not visualized. The  left femoral and popliteal veins are patent. There is a complex fluid  collection in the right upper thigh that is partly visualized measures  12.0 x 7.1 x 7.3 cm, likely hematoma.    The contralateral left common femoral vein could not be visualized due  to positioning. The femoral vein and upper thigh demonstrates normal  compressibility, spectral waveform, color flow and augmentation.      Impression    IMPRESSION:   1. Extremely limited exam due to patient positioning. Where visualized  there is no right DVT.  2. Complex fluid collection in the right upper thigh measuring 12.0 x  7.1 x 7.3 cm, this is measuring smaller when compared to the prior  study,  however exam is extremely limited given patient positioning.    MYRANDA KELLY, DO       Bilateral infiltrates appear similar to slightly worse since the last examination         Assessment and Plan:       Sarahy Dunbar IS a 65 year old female admitted on 4/13/2021 for Covid pneumonia, altered mental status, hypoxemic respiratory failure requiring intubation and mechanical ventilation  I have personally reviewed the daily labs, imaging studies, cultures and discussed the case with referring physician and consulting physicians.     My assessment and plan by system for this patient is as follows:    Neurology/Psychiatry:   1.  Propofol/Versed for ICU sedation  2.  Hydromorphone infusion for pain  3.  History of anxiety present on admission we will monitor    Plan  - Try weaning sedation today given patient's relative stability    Cardiovascular:   1.Hemodynamics- multifactorial shock, most likely secondary to sedation and possibly sepsis. Vaspopressor needs are stable.  Plan  Continue low dose vasopressors    Pulmonary/ID:   1. ARDS from COVID.  Oxygenation stable. However, compliance/ventilation is worsening despite maximal therapies including antibiotics and resuming steroids and continuing lung protective ventilation. Plateau pressures are at goal today.   2. VAP with Pseudomonas and Enteroccocus.  Plan  - Continue lung protective ventilation   - Continue attempts at diuresis  - Meropenem    GI and Nutrition :   1.  N.p.o. ,   2. Concern for protein calorie malnutrition  Plan  - Continue tube feeds as tolerated    Renal/Fluids/Electrolytes:   1. Renal function stable but very volume overloaded. Sodium has corrected w increased free water and patient net negative in the last 24 hours.   Plan  - Continue current diuretic and free water regimen. One dose Acetazolamide.     Endocrine:   1.  Stress induced hyperglycemia while on Dexamethasone.  Plan  - Monitor sugars.    Hematology/Oncology:   1.  Large hematoma  with need for transfusion. Appears to have stabilized. Imaging noted no vessel that needs or is amenable to intervention. Lovenox has been restarted and hemoglobin stable now for 24 hours.  Plan  -Continue Lovenox.     IV/Access:   1. Venous access -central line  2. Arterial access - removed    ICU Prophylaxis:   1. DVT: Lovenox  2. VAP: HOB 30 degrees, chlorhexidine rinse  3. Stress Ulcer: PPI  4. Restraints: Indicated for not pulling at lines  5. Wound care  - local wound care  6. Feeding -tube feeds  7. Family Update:  on the phone  8. Disposition -remain in ICU    Pilar Yeung MD  35 minutes

## 2021-04-29 NOTE — PLAN OF CARE
Patient supined this AM. Plan to leave supine per . Remains on levo, prop, versed, and dilaudid gtts.  updated over phone this AM.    N: Decreasing sedation throughout the day since being supine. Patient is beginning to wince to painful stimulus but does not withdraw, follow commands, or opens eyes. PEERL. Will continue to come down on sedation as able  CV:SR with occasional PAC's. Levo required to keep MAP>65. Afebrile  LS: FIO2 50%/PEEP 12. Tends to desat with turns but recovers. Scant amount of secretions. LS coarse throughout.  GI/: Yeboah with adequate urine output. Flexiseal in place. TF at goal with FWF through NJ  IV: Levophed, propofol, dilaudid, and versed gtts through CVC  Wound care done as per ordered.

## 2021-04-29 NOTE — PROGRESS NOTES
Replaced by [facility] ICU RESPIRATORY NOTE    Date of Admission: 4/13/2021    Date of Intubation (most recent): 4/13/2021    Reason for Mechanical Ventilation: respiratory failure    Number of Days on Mechanical Ventilation: 14    Met Criteria for Pressure Support Trial: No    Significant Events Today: none    ABG Results: Results for SYED VAZQUEZ (MRN [id number]) as of 4/29/2021 05:05   Ref. Range 4/28/2021 21:45   FIO2 Unknown 50%   Ph Venous Latest Ref Range: 7.32 - 7.43 pH 7.40   PCO2 Venous Latest Ref Range: 40 - 50 mm Hg 70 (H)   PO2 Venous Latest Ref Range: 25 - 47 mm Hg 43   Bicarbonate Venous Latest Ref Range: 21 - 28 mmol/L 43 (H)   Base Excess Venous Latest Units: mmol/L 16.4   Oxyhemoglobin Venous Latest Units: % 77     Current Vent Settings: Ventilation Mode: CMV/AC  (Continuous Mandatory Ventilation/ Assist Control)  FiO2 (%): 50 %  Rate Set (breaths/minute): 20 breaths/min  Tidal Volume Set (mL): 330 mL  PEEP (cm H2O): 12 cmH2O  Oxygen Concentration (%): 50 %    Plan:  Pt proned, q2 head turns, will supine later today

## 2021-04-30 NOTE — PROGRESS NOTES
SPIRITUAL HEALTH SERVICES: Tele-Encounter  Patient Location: ICU  Spoke with: Spouse (Ruddy)    Referral Source: Follow up with Family    DATA:  Ruddy indicates that he remains very concerned about his wife's medical condition but is hoping and praying for the best. He disclosed that he is able to communicate with Pat remotely and believes that she is aware of his presence and hears him when he tells her that he loves her and that he and many others are praying for her recovery. He related that he read the names of persons from his [employer] that have been enlisted to pray for her.    Ruddy stated that he has been able to occupy himself during this difficult time with his interests which include playing golf, entertaining friends, and cooking.    Ruddy indicates that he appreciates involvement of SH and believes that prayer is an important part of his wife's journey towards recovery. Ruddy requested that  continue practice of praying periodically at the door of his wife's room.    Provided emotional and spiritual support through life review, active listening and reflection and prayer.    PLAN:  Follow-up with spouse while PT remains in hospital.      John Hong  Chaplain Resident      ______________________________    Type of service:  Telephone Visit     has received verbal consent for a TelephoneVisit from the patient? Yes    Distance Provider Location: designated [place] office or home office (secure setting)    Mode of Communication: telephone (via [employer] phone or [employer] tele-call-number ([phone]))

## 2021-04-30 NOTE — PROGRESS NOTES
BRIEF NUTRITION NOTE:    Monitoring TF delivery and adequacy.  A full Nutrition Reassessment was completed 4/29.  See note for details.    NEW FINDINGS:  Remains on Norepinephrine.  Propofol running at 41.7 mL/hr = 1100 kcals (lipid) - for sedation  TF Vital High Protein at 55 mL/hr = 1320 kcal (12 kcal/kg), 115 g protein (2.5 g/kg), 147 g CHO, no fiber, 1109 mL H2O  Total (TF + Propofol):  2420 kcals (23 kcal/kg and 161% energy needs).    INTERVENTIONS:  Enteral Nutrition --> Will decrease TF Vital High Protein to 10 mL/hr = 240 kcals, 21 gm pro, 27 gm CHO, 200 mL H20, no fiber  Medical food supplement therapy --> Will start Prosource 2 packets every 8 hrs = 240 kcals, 66 gm pro  Total (TF + Prosource + Propofol):  1580 kcals (15 kcal/kg and 105% energy needs), 87 gm pro (1.9 gm/kg and 97% protein needs).    Wanda Porter, ROSELYN, LD, CNSC

## 2021-04-30 NOTE — PLAN OF CARE
Dyssynchronous with the vent. Prop, versed and dilaudid increased and pt still continues to over breath vent in the 30s RR and cough, md aware. Dilaudid PRN given. Levo gtt infusing. Flexiseal and brown in place. K replaced.

## 2021-04-30 NOTE — PROGRESS NOTES
[facility] ICU RESPIRATORY NOTE    Date of Admission: 4/13/2021    Date of Intubation (most recent): 4/13/2021    Reason for Mechanical Ventilation: respiratory failure    Number of Days on Mechanical Ventilation: 15    Met Criteria for Pressure Support Trial: No    Reason for No Pressure Support Trial: Per MD     Significant Events Today: None    ABG Results: Results for SYED VAZQUEZ (MRN [id number]) as of 4/30/2021 04:38   Ref. Range 4/29/2021 08:35   pH Arterial Latest Ref Range: 7.35 - 7.45 pH 7.43   pCO2 Arterial Latest Ref Range: 35 - 45 mm Hg 70 (H)   PO2 Arterial Latest Ref Range: 80 - 105 mm Hg 64 (L)   Bicarbonate Arterial Latest Ref Range: 21 - 28 mmol/L 46 (HH)   Base Excess Art Latest Units: mmol/L 19.7   FIO2 Unknown 50%   Oxyhemoglobin Arterial Latest Ref Range: 92 - 100 % 90 (L)     Current Vent Settings: Ventilation Mode: CMV/AC  (Continuous Mandatory Ventilation/ Assist Control)  FiO2 (%): 50 %  Rate Set (breaths/minute): 20 breaths/min  Tidal Volume Set (mL): 330 mL  PEEP (cm H2O): 12 cmH2O  Oxygen Concentration (%): 60 %    Plan:  Etad replaced, cloth tape taken off, skin looks good, no issues. Continue on full vent support

## 2021-04-30 NOTE — PLAN OF CARE
Pt asynchronous at times and overbreathing the vent. Sedation increased with prn doses available. Lungs coarse, diminished with scant ETT secretions. Pt diuresing well with lasix. Diamox dose given once tonight. Levo gtt titrated to keep MAP >65. Pt remains unresponsive.  able to camera in to see pt on Ipad, updated on plan of care.

## 2021-04-30 NOTE — PROVIDER NOTIFICATION
Pt's RR 39. Dr. Yeung notified. Order received to give Versed 2mg bolus and additional bolus of dilaudid.

## 2021-04-30 NOTE — PROGRESS NOTES
Comprehensive Daily ICU Note        Sarahy Dunbar MRN# [id number]   Age: 65 year old YOB: 1955     Date of Admission: 4/13/2021    Primary care provider: Rama Pop     CODE STATUS: FULL      Problem List:       Active Problems:    Acute respiratory failure with hypoxemia (H)    ARDS (adult respiratory distress syndrome) (H)    2019 novel coronavirus disease (COVID-19)    Hematoma    Anemia due to blood loss, acute    Shock (H)           Treatment goals for next 24 hours:   Redose Acetazolamide and continue attempts at diuresis.    Continue supine  Taper steroids. Decreasing Dexmamethasone today.   Increase sedation         Subjective/ Last 24 hours:   65-year-old female with no significant medical history who was found in bed with low saturations after welfare check.  Discovered to have cOVID. Maintained on NIV but subsequently required intubation. Has been alternating prone and supine. Large groin hematoma has developed at site of previous arterial line. Has now received three units of blood. Oxygenation has improved in the last 24 hours; possibly because of receiving a dose of diuretic.   4/24: FiO2 weaned slightly  4/25: No major events overnight.  FiO2 remains stable.  4/26: FiO2 increased. Also increased dyssynchrony requiring more sedation  4/27: worsening compliance  4/28: worsening compliance. Still requiring heavy sedation.   4/29: no significant changes. Tolerating being supine  4/30: More dyssynchrony and higher airway pressures. Increasing sedation.          Mechanical Ventilation/Vitalsigns/IsandOs:     Temp:  [98.1  F (36.7  C)-99.7  F (37.6  C)] 99.7  F (37.6  C)  Pulse:  [68-95] 84  Resp:  [15-68] 26  BP: ()/(44-74) 96/52  FiO2 (%):  [50 %-60 %] 50 %  SpO2:  [91 %-98 %] 96 %      Ventilation Mode: CMV/AC  (Continuous Mandatory Ventilation/ Assist Control)  FiO2 (%): 50 %  Rate Set (breaths/minute): 20 breaths/min  Tidal Volume Set (mL): 330 mL  PEEP (cm H2O): 12  cmH2O  Oxygen Concentration (%): 50 %  Resp: 26      Day since 4/15    Peak airway pressure: mid 40s      Intake/Output Summary (Last 24 hours) at 4/30/2021 1702  Last data filed at 4/30/2021 1600  Gross per 24 hour   Intake 4506.49 ml   Output 3965 ml   Net 541.49 ml       Net IO Since Admission: +11 liters           Physical Examination:     General: Stated age, sedated  HEENT: ET tube, NG tube  Lungs: LCTAB anteriorly  CVS: RRR, S1S2  Abdomen: obese, soft  Extremities/musculoskeletal: ansarca, warm  Neurology: heavily sedated  Skin: scars from previous knee/ankle surgery. Hematoma has regressed but remains an indurated area with tight and blistered skin.   Psychiatry: sedated  Exam of Line sites:  Central line site looks clean          Feeding/Glucose:     Orders Placed This Encounter      NPO for Medical/Clinical Reasons Except for: Meds      Recent Labs   Lab 04/30/21  1141 04/30/21  0827 04/30/21  0415 04/30/21  0409 04/29/21  2341 04/29/21  1920 04/29/21  1559 04/29/21  0430 04/29/21  0430 04/28/21  0415 04/28/21  0415 04/27/21  0455 04/27/21  0455 04/26/21  0430 04/26/21  0430 04/25/21  0435 04/25/21  0435   GLC  --   --  119*  --   --   --   --   --  98  --  126*  --  144*  --  124*  --  110*   * 105*  --  111* 112* 141* 121*   < >  --    < >  --    < >  --    < >  --    < > 115*    < > = values in this interval not displayed.              Medications:       chlorhexidine  15 mL Mouth/Throat Q12H     dexamethasone  6 mg Oral or Feeding Tube Daily     enoxaparin ANTICOAGULANT  0.5 mg/kg Subcutaneous BID     furosemide  60 mg Intravenous Q12H     insulin aspart  1-12 Units Subcutaneous Q4H     meropenem  500 mg Intravenous Q6H     multivitamins w/minerals  15 mL Per Feeding Tube Daily     pantoprazole  40 mg Per Feeding Tube Daily     protein modular  2 packet Per Feeding Tube Q8H     sennosides  5 mL Oral BID          dextrose       HYDROmorphone 0.4 mg/hr (04/30/21 0800)     midazolam 10 mg/hr  (04/30/21 0845)     norepinephrine 0.08 mcg/kg/min (04/30/21 1520)     propofol (DIPRIVAN) infusion 65 mcg/kg/min (04/30/21 1336)     sodium chloride 20 mL/hr at 04/30/21 0800              Labs:         ROUTINE ICU LABS (Last four results)  CMP  Recent Labs   Lab 04/30/21  0415 04/29/21  1600 04/29/21  1137 04/29/21  0430 04/28/21  0415 04/28/21  0415 04/27/21  1435 04/27/21  1435 04/27/21  0455 04/26/21  0430 04/24/21  1615 04/24/21  1615     --   --  143  --  142  --   --  141 138   < >  --    POTASSIUM 3.5 3.6 3.3* 3.4   < > 3.6   < > 3.0* 3.7 3.7   < > 3.0*   CHLORIDE 100  --   --  100  --  104  --   --  103 101   < >  --    CO2 39*  --   --  44*  --  42*  --   --  38* 36*   < >  --    ANIONGAP 1*  --   --  <1*  --  <1*  --   --  <1* 1*   < >  --    *  --   --  98  --  126*  --   --  144* 124*   < >  --    BUN 29  --   --  29  --  29  --   --  22 21   < >  --    CR 0.49*  --   --  0.41*  --  0.48*  --   --  0.43* 0.53   < >  --    GFRESTIMATED >90  --   --  >90  --  >90  --   --  >90 >90   < >  --    GFRESTBLACK >90  --   --  >90  --  >90  --   --  >90 >90   < >  --    ALICIA 7.7*  --   --  7.9*  --  7.7*  --   --  7.8* 7.7*   < >  --    MAG 2.5*  --   --  2.5*  --  2.6*  --  2.6*  --  2.3  --  2.3   PHOS  --   --   --   --   --   --   --   --   --  2.8  --  3.6    < > = values in this interval not displayed.     CBC  Recent Labs   Lab 04/30/21  0415 04/29/21  0430 04/28/21  0415 04/27/21  1614 04/27/21  0455   WBC 12.1* 10.2 15.9*  --  16.6*   RBC 2.40* 2.43* 2.64*  --  2.25*   HGB 7.3* 7.1* 7.7* 7.7* 6.8*   HCT 23.5* 23.1* 24.8*  --  21.2*   MCV 98 95 94  --  94   MCH 30.4 29.2 29.2  --  30.2   MCHC 31.1* 30.7* 31.0*  --  32.1   RDW 15.1* 14.6 14.8  --  14.5    237 244  --  203     INR  No lab results found in last 7 days.  Arterial Blood Gas  Recent Labs   Lab 04/29/21  0835 04/28/21  2145 04/28/21  0831 04/27/21  1445 04/27/21  0833   PH 7.43  --  7.36 7.44 7.40   PCO2 70*  --  73* 59* 63*    PO2 64*  --  92 73* 92   HCO3 46*  --  41* 40* 39*   O2PER 50% 50% 60 ASAF  --        Other Lab Data:      Cultures:  Recent Labs   Lab 04/26/21  1850   CULT Moderate growth  Pseudomonas aeruginosa  *  Moderate growth  Enterococcus faecalis  *  Plus  Light growth  Normal dario       Blood culture:  Invalid input(s): BC   Urine culture:  No results for input(s): URC in the last 168 hours.          Imaging/Other results:     No results found for this or any previous visit (from the past 24 hour(s)).    Bilateral infiltrates on 4/28 appear similar to slightly worse since the last examination         Assessment and Plan:       Sarahy Dunbar IS a 65 year old female admitted on 4/13/2021 for Covid pneumonia, altered mental status, hypoxemic respiratory failure requiring intubation and mechanical ventilation  I have personally reviewed the daily labs, imaging studies, cultures and discussed the case with referring physician and consulting physicians.     My assessment and plan by system for this patient is as follows:    Neurology/Psychiatry:   1.  Patient with difficulty in tolerating lung protective ventilator settings. Today, 4/30, we are having to increase sedation.     Plan  - As above. Will attempt to wean back on Propofol as this is impacting her blood pressure.    Cardiovascular:   1.Hemodynamics- multifactorial shock, most likely secondary to sedation and possibly sepsis. Vaspopressor needs are stable.  Plan  Continue low dose vasopressors    Pulmonary/ID:   1. ARDS from COVID.  Oxygenation stable. However, compliance iis worsening despite maximal therapies including antibiotics and resuming steroids and continuing lung protective ventilation. Plateau pressures are in the high 30s today. RR at times up to 45. Attempted PS with high levels of support but patient continued to be dyssynchronous and uncomfortable.  2. VAP with Pseudomonas and Enteroccocus.  3. Positive COVID-19 test on 4/13  Plan  - Continue  lung protective ventilation. Will decrease TV to 300 today to attempt to better achieve plateau pressures.    - Continue attempts at diuresis.   - Meropenem  - Start to wean steroids  - Have discussed with  that patient will likely be considered recovered at day 19 and he will be able to visit  - If patient is stable to improved she will require a tracheostomy.     GI and Nutrition :   1.  N.p.o. ,   2. Concern for protein calorie malnutrition  Plan  - Continue tube feeds as tolerated    Renal/Fluids/Electrolytes:   1. Renal function stable but very volume overloaded. Sodium has corrected w increased free water. Net positive fluid balance.   Plan  - Increase diuretic dose and decrease free water dose.     Endocrine:   1.  Stress induced hyperglycemia while on Dexamethasone.  Plan  - Monitor sugars.  - Decrease Dexamethasone.    Hematology/Oncology/Skin:   1.  Large hematoma with need for transfusion. Appears to have stabilized. Imaging noted no vessel that needs or is amenable to intervention. Lovenox has been restarted and hemoglobin relatively stable now.    Plan  -Continue Lovenox.     IV/Access:   1. Venous access -central line  2. Arterial access - removed    ICU Prophylaxis:   1. DVT: Lovenox  2. VAP: HOB 30 degrees, chlorhexidine rinse  3. Stress Ulcer: PPI  4. Restraints: Indicated for not pulling at lines  5. Wound care  - local wound care  6. Feeding -tube feeds  7. Family Update:  on the phone  8. Disposition -remain in ICU    Pilar Yeung MD  45 minutes

## 2021-05-01 PROBLEM — J95.851 VENTILATOR ASSOCIATED PNEUMONIA (H): Status: ACTIVE | Noted: 2021-01-01

## 2021-05-01 NOTE — PROCEDURES
[facility]    Procedure: US hematoma aspiration    Date/Time: 5/1/2021 1:06 PM  Performed by: Brendan Montelongo MD  Authorized by: Brendan Montelongo MD     UNIVERSAL PROTOCOL   Site Marked: Yes  Prior Images Obtained and Reviewed:  Yes  Required items: Required blood products, implants, devices and special equipment available    Patient identity confirmed:  Verbally with patient, arm band and provided demographic data  NA - No sedation, light sedation, or local anesthesia  Confirmation Checklist:  Patient's identity using two indicators, relevant allergies, procedure was appropriate and matched the consent or emergent situation and correct equipment/implants were available  Time out: Immediately prior to the procedure a time out was called    Universal Protocol: the Joint Commission Universal Protocol was followed    Preparation: Patient was prepped and draped in usual sterile fashion    ESBL (mL):  0         ANESTHESIA    Anesthesia: Local infiltration  Local Anesthetic:  Lidocaine 1% without epinephrine  Anesthetic Total (mL):  10      SEDATION    Patient Sedated: No    See dictated procedure note for full details.  PROCEDURE   Patient Tolerance:  Patient tolerated the procedure well with no immediate complications  Describe Procedure:     Right groin hematoma.  More fluid component superficially.  This was aspirated with 7 mL serosanguinous fluid removed.  Will send for cultures.  Length of time physician/provider present for 1:1 monitoring during sedation: 0

## 2021-05-01 NOTE — PROGRESS NOTES
Comprehensive Daily ICU Note        Sarahy Dunbar MRN# [id number]   Age: 65 year old YOB: 1955     Date of Admission: 4/13/2021    Primary care provider: Rama Pop     CODE STATUS: FULL      Problem List:       Active Problems:    Acute respiratory failure with hypoxemia (H)    ARDS (adult respiratory distress syndrome) (H)    2019 novel coronavirus disease (COVID-19)    Hematoma    Anemia due to blood loss, acute    Shock (H)    Ventilator associated pneumonia (H)           Treatment goals for next 24 hours:   Continue heavy sedation  Evaluate hematoma. Aspirate if fluid collection.   Continue lung protective ventilation  Workup for new infection  Increase sedation         Subjective/ Last 24 hours:   65-year-old female with no significant medical history who was found in bed with low saturations after welfare check.  Discovered to have cOVID. Maintained on NIV but subsequently required intubation. Has been alternating prone and supine. Large groin hematoma has developed at site of previous arterial line. Has now received three units of blood. Oxygenation has improved in the last 24 hours; possibly because of receiving a dose of diuretic.   4/24: FiO2 weaned slightly  4/25: No major events overnight.  FiO2 remains stable.  4/26: FiO2 increased. Also increased dyssynchrony requiring more sedation  4/27: worsening compliance  4/28: worsening compliance. Still requiring heavy sedation.   4/29: no significant changes. Tolerating being supine  4/30: More dyssynchrony and higher airway pressures. Increasing sedation.   5/1: Febrile, more acidotic and dyssynchronous         Mechanical Ventilation/Vitalsigns/IsandOs:     Temp:  [98.6  F (37  C)-101.1  F (38.4  C)] 100.6  F (38.1  C)  Pulse:  [] 96  Resp:  [12-50] 30  BP: ()/(41-71) 110/60  FiO2 (%):  [50 %-60 %] 60 %  SpO2:  [87 %-100 %] 94 %      Ventilation Mode: CMV/AC  (Continuous Mandatory Ventilation/ Assist Control)  FiO2 (%):  60 %  Rate Set (breaths/minute): 30 breaths/min  Tidal Volume Set (mL): 330 mL  PEEP (cm H2O): 12 cmH2O  Oxygen Concentration (%): 60 %  Resp: 30      Day since 4/15    Peak airway pressure: mid 40s        Intake/Output Summary (Last 24 hours) at 5/1/2021 1336  Last data filed at 5/1/2021 1200  Gross per 24 hour   Intake 3887.2 ml   Output 5080 ml   Net -1192.8 ml   Net:  +7 liters      Net IO Since Admission: +11 liters           Physical Examination:     General: Stated age, sedated  HEENT: ET tube, NG tube  Lungs: LCTAB anteriorly, dyssynchronous when on less sedation.   CVS: RRR, S1S2  Abdomen: obese, soft  Extremities/musculoskeletal: ansarca, warm  Neurology: heavily sedated  Skin: scars from previous knee/ankle surgery. Hematoma has regressed but remains an indurated area with tight and blistered skin; this has not changed significantly since yesterday.   Psychiatry: sedated  Exam of Line sites:  Central line site looks clean          Feeding/Glucose:     Orders Placed This Encounter      NPO for Medical/Clinical Reasons Except for: Meds      Recent Labs   Lab 05/01/21  1239 05/01/21  0808 05/01/21  0425 05/01/21  0348 04/30/21  2343 04/30/21  1930 04/30/21  1610 04/30/21  0415 04/30/21  0415 04/29/21  0430 04/29/21  0430 04/28/21  0415 04/28/21  0415 04/27/21  0455 04/27/21  0455 04/26/21  0430 04/26/21  0430   GLC  --   --  91  --   --   --   --   --  119*  --  98  --  126*  --  144*  --  124*   * 85  --  87 115* 147* 111*   < >  --    < >  --    < >  --    < >  --    < >  --     < > = values in this interval not displayed.              Medications:       chlorhexidine  15 mL Mouth/Throat Q12H     dexamethasone  4 mg Oral or Feeding Tube Daily     enoxaparin ANTICOAGULANT  0.5 mg/kg Subcutaneous BID     furosemide  60 mg Intravenous Q8H     insulin aspart  1-12 Units Subcutaneous Q4H     meropenem  500 mg Intravenous Q6H     multivitamins w/minerals  15 mL Per Feeding Tube Daily     pantoprazole  40  mg Per Feeding Tube Daily     protein modular  2 packet Per Feeding Tube Q8H     sennosides  5 mL Oral BID          dextrose       HYDROmorphone 1 mg/hr (05/01/21 0353)     midazolam 12 mg/hr (05/01/21 1109)     norepinephrine 0.12 mcg/kg/min (05/01/21 0709)     propofol (DIPRIVAN) infusion 55 mcg/kg/min (05/01/21 1224)     sodium chloride 20 mL/hr at 04/30/21 0800              Labs:         ROUTINE ICU LABS (Last four results)  CMP  Recent Labs   Lab 05/01/21  0941 05/01/21  0425 04/30/21  0415 04/29/21  1600 04/29/21  0430 04/29/21  0430 04/28/21  0415 04/28/21  0415 04/26/21  0430 04/26/21  0430 04/24/21  1615 04/24/21  1615   NA  --  140 140  --   --  143  --  142   < > 138   < >  --    POTASSIUM 3.4 3.4 3.5 3.6   < > 3.4   < > 3.6   < > 3.7   < > 3.0*   CHLORIDE  --  100 100  --   --  100  --  104   < > 101   < >  --    CO2  --  41* 39*  --   --  44*  --  42*   < > 36*   < >  --    ANIONGAP  --  <1* 1*  --   --  <1*  --  <1*   < > 1*   < >  --    GLC  --  91 119*  --   --  98  --  126*   < > 124*   < >  --    BUN  --  29 29  --   --  29  --  29   < > 21   < >  --    CR  --  0.47* 0.49*  --   --  0.41*  --  0.48*   < > 0.53   < >  --    GFRESTIMATED  --  >90 >90  --   --  >90  --  >90   < > >90   < >  --    GFRESTBLACK  --  >90 >90  --   --  >90  --  >90   < > >90   < >  --    ALICIA  --  7.8* 7.7*  --   --  7.9*  --  7.7*   < > 7.7*   < >  --    MAG  --  2.7* 2.5*  --   --  2.5*  --  2.6*   < > 2.3  --  2.3   PHOS  --   --   --   --   --   --   --   --   --  2.8  --  3.6    < > = values in this interval not displayed.     CBC  Recent Labs   Lab 05/01/21  0425 04/30/21 0415 04/29/21  0430 04/28/21 0415   WBC 11.7* 12.1* 10.2 15.9*   RBC 2.39* 2.40* 2.43* 2.64*   HGB 7.0* 7.3* 7.1* 7.7*   HCT 23.8* 23.5* 23.1* 24.8*    98 95 94   MCH 29.3 30.4 29.2 29.2   MCHC 29.4* 31.1* 30.7* 31.0*   RDW 15.8* 15.1* 14.6 14.8    252 237 244     INR  No lab results found in last 7 days.  Arterial Blood Gas  Recent  Labs   Lab 05/01/21  0918 05/01/21  0425 04/30/21  2200 04/29/21  0835 04/28/21  2145 04/28/21  0831 04/27/21  1445   PH 7.32*  --   --  7.43  --  7.36 7.44   PCO2 82*  --   --  70*  --  73* 59*   PO2 44*  --   --  64*  --  92 73*   HCO3 42*  --   --  46*  --  41* 40*   O2PER  --  50% 50 50% 50% 60 ASAF       Other Lab Data:      Cultures:  Recent Labs   Lab 04/26/21  1850   CULT Moderate growth  Pseudomonas aeruginosa  *  Moderate growth  Enterococcus faecalis  *  Plus  Light growth  Normal dario       Blood culture:  Invalid input(s): BC   Urine culture:  No results for input(s): URC in the last 168 hours.          Imaging/Other results:     No results found for this or any previous visit (from the past 24 hour(s)).    CXR with worsening patchy opacities         Assessment and Plan:       Sarahy Dunbar IS a 65 year old female admitted on 4/13/2021 for Covid pneumonia with ARDS, altered mental status, hypoxemic respiratory failure requiring intubation and mechanical ventilation  I have personally reviewed the daily labs, imaging studies, cultures and discussed the case with referring physician and consulting physicians.     My assessment and plan by system for this patient is as follows:    Neurology/Psychiatry:   1.  Patient with difficulty in tolerating lung protective ventilator settings. Today, 5/1, we are again having to increase her sedation.   Plan  - Continue    Cardiovascular:   1.Hemodynamics- multifactorial shock, most likely secondary to sedation and possibly sepsis. Vaspopressor needs are stable.  Plan  Continue low dose vasopressors    Pulmonary/ID:   1. ARDS from COVID.  Oxygenation stable. However, compliance is worsening despite maximal therapies including antibiotics and resuming steroids and continuing lung protective ventilation. Plateau pressures are in the high 30s today. RR at times up to 45. Attempted PS with high levels of support but patient continued to be dyssynchronous and  uncomfortable. Patient having a lot of difficulty tolerating TV of 300. PCO2 climbing as well. CXR with worsening patchy bilateral infiltrates  2. VAP with Pseudomonas and Enteroccocus.  3. Positive COVID-19 test on 4/13  4. Higher fevers on 5/1. WBC and procalcitonin similar to previous  Plan  - Increase back to TV of 330  - Continue attempts at diuresis.   - Continue Meropenem. Have ordered repeat cultures and will culture fluid collection in leg as well if radiologists feel it is indicated.   - Have discussed with  that patient will likely be considered recovered at day 19 and he will be able to visit  - If patient is stable to improved she will require a tracheostomy.   - Continue steroids. Chest CT today to evaluate for acute changes vs. Evolving fibrosis.     GI and Nutrition :   1.  N.p.o. ,   2. Concern for protein calorie malnutrition  Plan  - Continue tube feeds as tolerated    Renal/Fluids/Electrolytes:   1. Renal function stable but very volume overloaded. Sodium has corrected w increased free water. Net positive fluid balance. Is responding to diuresis.   Plan  -Continue current diuretic/free water regimen for today.     Endocrine:   1.  Stress induced hyperglycemia while on Dexamethasone.  Plan  - Monitor sugars.  - Continue current dose of Dexamethasone.    Hematology/Oncology/Skin:   1. Large hematoma with need for transfusion. Appears to have stabilized. Imaging noted no vessel that needs or is amenable to intervention. Lovenox has been restarted and hemoglobin relatively stable now.  However, bedside ultrasound today appeared to show a fluid collection and area is quite indurated  Plan  -Continue Lovenox.  - Formal US with aspiration if confirms fluid pocket.      IV/Access:   1. Venous access -central line  2. Arterial access - removed    ICU Prophylaxis:   1. DVT: Lovenox  2. VAP: HOB 30 degrees, chlorhexidine rinse  3. Stress Ulcer: PPI  4. Restraints: Indicated for not pulling at  lines  5. Wound care  - local wound care  6. Feeding -tube feeds  7. Family Update:  on the phone  8. Disposition -remain in ICU    Pilar Yeung MD  55 minutes

## 2021-05-01 NOTE — PROGRESS NOTES
-Patient CT scan of the chest was reviewed.  Bilateral fibrosis are noted.  This probably is a harbinger of worsening end-stage lung disease.  It is reflected by worsening ventilation.  -Might have possible underlying organizing pneumonia.  Agree with steroids.  We will decide further tomorrow to consider for possible high-dose steroids.  Might also consider bronchoscopy if needed.  -Called the  and updated him of the situation.  Explained to him that we will try to aggressively diurese him treated with antibiotic for possible bacterial pneumonia and treated with high-dose steroids.  Explained that most of her lung appears to be fibrosed and she might be starting the current situation without significant improvement.  -All the questions were answered criticality and the severity of the disease was conveyed to the  he expressed understanding.  He would prefer to continue with the current management for next 4 to 5 days and follow-up after that.    Total of 32 minutes of critical care time was spent in this encounter.

## 2021-05-01 NOTE — PROGRESS NOTES
[facility] ICU RESPIRATORY NOTE        Date of Admission: 4/13/2021    Date of Intubation (most recent):  4/15/21    Reason for Mechanical Ventilation:  Resp failue    Number of Days on Mechanical Ventilation: 17    Met Criteria for Spontaneous Breathing Trial: No    Reason for No Spontaneous Breathing Trial: Per MD    Significant Events Today: None    ABG Results:   Recent Labs   Lab 05/01/21  0918 05/01/21  0425 04/30/21  2200 04/29/21  0835 04/28/21  2145 04/28/21  0831 04/27/21  1445   PH 7.32*  --   --  7.43  --  7.36 7.44   PCO2 82*  --   --  70*  --  73* 59*   PO2 44*  --   --  64*  --  92 73*   HCO3 42*  --   --  46*  --  41* 40*   O2PER  --  50% 50 50% 50% 60 ASAF       Current Vent Settings: Ventilation Mode: CMV/AC  (Continuous Mandatory Ventilation/ Assist Control)  FiO2 (%): 60 %  Rate Set (breaths/minute): 30 breaths/min  Tidal Volume Set (mL): 330 mL  PEEP (cm H2O): 12 cmH2O  Oxygen Concentration (%): 60 %  Resp: 30      Plan:  Pt to remain on full vent support overnight    Murray Valentine, RT

## 2021-05-01 NOTE — PLAN OF CARE
Notified MD at 2311 PM regarding critical results read back.      Spoke with: Dr. Lewis    Orders were not obtained.    Comments: VBG results: pH 7.28 pCO2 89 pO2 47 Bicarb 41    0500: pH 7.35 CO2 78 O2 38 Bcarb 42

## 2021-05-02 NOTE — PROGRESS NOTES
[facility] ICU RESPIRATORY NOTE           Date of Admission: 4/13/2021     Date of Intubation (most recent):  4/15/21     Reason for Mechanical Ventilation:  Resp failue     Number of Days on Mechanical Ventilation: 18     Met Criteria for Spontaneous Breathing Trial: No     Reason for No Spontaneous Breathing Trial: Per MD     Significant Events Today: None     ABG Results:             Recent Labs   Lab 05/01/21  0918 05/01/21  0425 04/30/21  2200 04/29/21  0835 04/28/21  2145 04/28/21  0831 04/27/21  1445   PH 7.32*  --   --  7.43  --  7.36 7.44   PCO2 82*  --   --  70*  --  73* 59*   PO2 44*  --   --  64*  --  92 73*   HCO3 42*  --   --  46*  --  41* 40*   O2PER  --  50% 50 50% 50% 60 ASAF       Current Vent Settings: Ventilation Mode: CMV/AC  (Continuous Mandatory Ventilation/ Assist Control)  FiO2 (%): 60 %  Rate Set (breaths/minute): 30 breaths/min  Tidal Volume Set (mL): 330 mL  PEEP (cm H2O): 12 cmH2O  Oxygen Concentration (%): 60 %  Resp: 30        Plan:  Pt to remain on full vent support overnight    Murray Valentine, RT

## 2021-05-02 NOTE — PROGRESS NOTES
Comprehensive Daily ICU Note        Sarahy Dunbar MRN# [id number]   Age: 65 year old YOB: 1955     Date of Admission: 4/13/2021    Primary care provider: Rama Pop     CODE STATUS: FULL      Problem List:       Active Problems:    Acute respiratory failure with hypoxemia (H)    ARDS (adult respiratory distress syndrome) (H)    2019 novel coronavirus disease (COVID-19)    Hematoma    Anemia due to blood loss, acute    Shock (H)    Ventilator associated pneumonia (H)           Treatment goals for next 24 hours:   Continue heavy sedation  Increase steroids  Continue lung protective ventilation  Patient will be recovered from COVID on 5/3 and  will visit.         Subjective/ Last 24 hours:   65-year-old female with no significant medical history who was found in bed with low saturations after welfare check.  Discovered to have COVID on 4/13. Maintained on NIV but subsequently required intubation. Has been alternating prone and supine and had been making some small improvements but over the last week she has been declining with worsening compliance and hypercapnia as well as inability to tolerate being awake. She has also developed VAP, which has been treated. Other complications include a large hematoma related to an arterial line necessitating a number of transfusions.  Today, 5/2, had a transfusion reaction. Subsequently tolerated a different unit of blood. On less sedation today than yesterday but not any more alert.            Mechanical Ventilation/Vitalsigns/IsandOs:     Temp:  [97.2  F (36.2  C)-100.6  F (38.1  C)] 99.1  F (37.3  C)  Pulse:  [] 80  Resp:  [9-32] 21  BP: ()/(40-74) 103/56  FiO2 (%):  [50 %-60 %] 50 %  SpO2:  [89 %-100 %] 93 %      Ventilation Mode: CMV/AC  (Continuous Mandatory Ventilation/ Assist Control)  FiO2 (%): 50 %  Rate Set (breaths/minute): 30 breaths/min  Tidal Volume Set (mL): 330 mL  PEEP (cm H2O): 12 cmH2O  Oxygen Concentration (%): 50  %  Resp: 21      Day since 4/15    Peak airway pressure: mid 40s      Intake/Output Summary (Last 24 hours) at 5/2/2021 1514  Last data filed at 5/2/2021 1400  Gross per 24 hour   Intake 3604.01 ml   Output 5170 ml   Net -1565.99 ml       Net IO Since Admission: +3.6 liters         Physical Examination:     General: Stated age, heavily sedated  HEENT: ET tube, NG tube  Lungs: crackles anteriorly, occasionally is very dyssynchronous  CVS: RRR, S1S2  Abdomen: obese, soft  Extremities/musculoskeletal: ansarca, warm  Neurology: heavily sedated  Skin: scars from previous knee/ankle surgery. Hematoma has regressed but remains an indurated area with tight and blistered skin; this has not changed significantly in the last few days.   Psychiatry: sedated  Exam of Line sites:  Central line site looks clean          Feeding/Glucose:     Orders Placed This Encounter      NPO for Medical/Clinical Reasons Except for: Meds      Recent Labs   Lab 05/02/21  1152 05/02/21  0900 05/02/21  0420 05/02/21  0410 05/02/21  0025 05/01/21  1938 05/01/21  1542 05/01/21  0425 05/01/21  0425 04/30/21  0415 04/30/21  0415 04/29/21  0430 04/29/21  0430 04/28/21  0415 04/28/21  0415 04/27/21  0455 04/27/21  0455   GLC  --   --   --  90  --   --   --   --  91  --  119*  --  98  --  126*  --  144*   * 83 90  --  100* 138* 120*   < >  --    < >  --    < >  --    < >  --    < >  --     < > = values in this interval not displayed.              Medications:       chlorhexidine  15 mL Mouth/Throat Q12H     enoxaparin ANTICOAGULANT  0.5 mg/kg Subcutaneous BID     furosemide  60 mg Intravenous Q8H     insulin aspart  1-12 Units Subcutaneous Q4H     meropenem  500 mg Intravenous Q6H     methylPREDNISolone  125 mg Intravenous Q24H     multivitamins w/minerals  15 mL Per Feeding Tube Daily     pantoprazole  40 mg Per Feeding Tube Daily     protein modular  2 packet Per Feeding Tube Q8H     sennosides  5 mL Oral BID          dextrose       HYDROmorphone  1 mg/hr (05/01/21 2206)     midazolam 7 mg/hr (05/02/21 1146)     norepinephrine 0.09 mcg/kg/min (05/02/21 0053)     propofol (DIPRIVAN) infusion 25 mcg/kg/min (05/02/21 0857)     sodium chloride 20 mL/hr at 04/30/21 0800              Labs:         ROUTINE ICU LABS (Last four results)  CMP  Recent Labs   Lab 05/02/21  1150 05/02/21  0410 05/01/21  0941 05/01/21  0425 04/30/21  0415 04/29/21  0430 04/29/21  0430 04/28/21  0415 04/28/21 0415 04/26/21  0430 04/26/21  0430   NA  --  142  --  140 140  --  143  --  142   < > 138   POTASSIUM 3.2* 3.0* 3.4 3.4 3.5   < > 3.4   < > 3.6   < > 3.7   CHLORIDE  --  100  --  100 100  --  100  --  104   < > 101   CO2  --  44*  --  41* 39*  --  44*  --  42*   < > 36*   ANIONGAP  --  <1*  --  <1* 1*  --  <1*  --  <1*   < > 1*   GLC  --  90  --  91 119*  --  98  --  126*   < > 124*   BUN  --  30  --  29 29  --  29  --  29   < > 21   CR  --  0.44*  --  0.47* 0.49*  --  0.41*  --  0.48*   < > 0.53   GFRESTIMATED  --  >90  --  >90 >90  --  >90  --  >90   < > >90   GFRESTBLACK  --  >90  --  >90 >90  --  >90  --  >90   < > >90   ALICIA  --  7.9*  --  7.8* 7.7*  --  7.9*  --  7.7*   < > 7.7*   MAG  --   --   --  2.7* 2.5*  --  2.5*  --  2.6*   < > 2.3   PHOS  --   --   --   --   --   --   --   --   --   --  2.8    < > = values in this interval not displayed.     CBC  Recent Labs   Lab 05/02/21  0410 05/01/21  0425 04/30/21  0415 04/29/21  0430   WBC 9.5 11.7* 12.1* 10.2   RBC 2.29* 2.39* 2.40* 2.43*   HGB 6.8* 7.0* 7.3* 7.1*   HCT 22.8* 23.8* 23.5* 23.1*    100 98 95   MCH 29.7 29.3 30.4 29.2   MCHC 29.8* 29.4* 31.1* 30.7*   RDW 15.9* 15.8* 15.1* 14.6    253 252 237     INR  No lab results found in last 7 days.  Arterial Blood Gas  Recent Labs   Lab 05/01/21  0918 05/01/21  0425 04/30/21  2200 04/29/21  0835 04/28/21  2145 04/28/21  0831 04/27/21  1445   PH 7.32*  --   --  7.43  --  7.36 7.44   PCO2 82*  --   --  70*  --  73* 59*   PO2 44*  --   --  64*  --  92 73*   HCO3 42*   --   --  46*  --  41* 40*   O2PER  --  50% 50 50% 50% 60 ASAF       Other Lab Data:      Cultures:  Recent Labs   Lab 05/01/21  0941 05/01/21  0935 04/26/21  1850   CULT No growth after 18 hours PENDING Moderate growth  Pseudomonas aeruginosa  *  Moderate growth  Enterococcus faecalis  *  Plus  Light growth  Normal dario       Blood culture:  Invalid input(s): BC   Urine culture:  No results for input(s): URC in the last 168 hours.          Imaging/Other results:     Recent Results (from the past 24 hour(s))   XR Chest Port 1 View    Narrative    CHEST ONE VIEW PORTABLE   5/1/2021 2:04 PM     HISTORY:  Respiratory failure.    COMPARISON: 4/25/2021.      Impression    IMPRESSION: Endotracheal tube is unchanged, with tip 1.3 cm above the  bertrand. Enteric tube, tip not seen, but below the diaphragm. Left  subclavian central venous catheter, with tip in the upper SVC.  Extensive predominantly airspace opacities in the lungs bilaterally  are not significantly changed. No pneumothorax. Heart size appears  stable.    CISCO FONSECA MD   CT Chest w/o Contrast    Narrative    CT CHEST WITHOUT CONTRAST 5/1/2021 5:39 PM    CLINICAL HISTORY: Pulmonary fibrosis. COVID-19 pneumonia.    TECHNIQUE: CT chest without IV contrast. Multiplanar reformats were  obtained. Dose reduction techniques were used.  CONTRAST: None.  COMPARISON: None.    FINDINGS:   LUNGS AND PLEURA: An endotracheal tube is in place, with tip 3 cm  above the bertrand. There is extensive interstitial, groundglass, and  airspace opacities throughout both lungs. No pneumothorax. No  convincing evidence for honeycombing. No pleural effusions.    MEDIASTINUM/AXILLAE: No enlarged lymph nodes are identified in the  chest. No pericardial effusion.    CORONARY ARTERY CALCIFICATION: Mild.    UPPER ABDOMEN: An enteric tube is in place, tip not seen, but  extending at least to the ligament of Treitz. Limited noncontrast  views of the upper abdomen are otherwise  unremarkable.    MUSCULOSKELETAL: Unremarkable.      Impression    IMPRESSION:   Extensive interstitial, groundglass, and airspace opacities throughout  both lungs could all be related to changes of COVID-19 pneumonia,  however some degree of underlying lung fibrosis cannot be excluded.    CISCO FONSECA MD                Assessment and Plan:       Sarahy Dunbar IS a 65 year old female admitted on 4/13/2021 for Covid pneumonia with ARDS, altered mental status, hypoxemic respiratory failure requiring intubation and mechanical ventilation  I have personally reviewed the daily labs, imaging studies, cultures and discussed the case with referring physician and consulting physicians.     My assessment and plan by system for this patient is as follows:    Neurology/Psychiatry:   1.  Patient with difficulty in tolerating lung protective ventilator settings.  Plan  - Continue to wean sedation as possible.     Cardiovascular:   1.Hemodynamics- hypotension related to sedation. Vaspopressor needs are stable and appears to have adequate perfusion.  Plan   - Continue low dose vasopressors    Pulmonary/ID:   1. ARDS from COVID.  Compliance has been worsening despite maximal therapies including antibiotics and resuming steroids and continuing lung protective ventilation. CT showing extensive changes, some of which may be chronic. Dyssynchrony was helped by changing out ventilator  2. VAP with Pseudomonas and Enteroccocus.  3. Positive COVID-19 test on 4/13  4. Higher fevers on 5/1. WBC and procalcitonin similar to previous; no positive culture results and fever curve has returned to near normal.  Fluid collection in hematoma was aspirated by radiologist; serosanguinous and not concerning for infection.   Plan  - continue lung protective settings.  - Continue attempts at diuresis.   - Continue Meropenem which was first ordered on 4/26.   - Have discussed with  that patient will be considered recovered on 5/3 and that  he can visit.   - If patient is stable to improved she will require a tracheostomy.   - Increase steroid dose for a few days.  feels he will likely pursue comfort care if there are no significant improvements by the end of this coming week.     GI and Nutrition :   1.  N.p.o. ,   2. Concern for protein calorie malnutrition  Plan  - Continue tube feeds as tolerated    Renal/Fluids/Electrolytes:   1. Renal function stable but a lot of anasarca Sodium has corrected w increased free water. Net positive fluid balance. Is responding to diuresis without adverse effect on kidnesy.   Plan  -Continue current diuretic/free water regimen for today.   - Replace electrolytes. K low today. Recheck all tomorrow.     Endocrine:   1.  Stress induced hyperglycemia while on Dexamethasone. Have change to Methylprednisolone today.   Plan  - Monitor sugars.  - Continue current dose of Methylprednisolone.    Hematology/Oncology/Skin:   1. Large hematoma with acute blood loss anemia. Bleeding has now stopped. Imaging noted no vessel that needs or is amenable to intervention. Lovenox has been restarted and hemoglobin relatively stable now.  Bedside ultrasound showed fluid collection within hematoma that was aspirated and did not appear infected.  2. Critical illness anemia.   3. Transfusion reaction on 5/2. Received a different unit and tolerated well.  Plan  -Continue Lovenox as patient does not appear to be bleeding at this time.     IV/Access:   1. Venous access -central line  2. Arterial access - removed    ICU Prophylaxis:   1. DVT: Lovenox  2. VAP: HOB 30 degrees, chlorhexidine rinse  3. Stress Ulcer: PPI  4. Wound care  - local wound care for hematoma  5. Feeding -tube feeds  6. Family Update:  on the phone  7. Disposition -remain in ICU    Pilar Yeung MD  55 minutes

## 2021-05-02 NOTE — PROGRESS NOTES
[facility] ICU RESPIRATORY NOTE    Date of Admission: 4/13/2021    Date of Intubation (most recent): 4/15/21    Reason for Mechanical Ventilation:  Resp failue    Number of Days on Mechanical Ventilation: 18    Met Criteria for Pressure Support Trial: No    Reason for No Pressure Support Trial: Per MD    Significant Events Today: None    ABG Results: Results for SYED VAZQUEZ (MRN [id number]) as of 5/2/2021 04:41   Ref. Range 5/1/2021 09:18   pH Arterial Latest Ref Range: 7.35 - 7.45 pH 7.32 (L)   pCO2 Arterial Latest Ref Range: 35 - 45 mm Hg 82 (HH)   PO2 Arterial Latest Ref Range: 80 - 105 mm Hg 44 (L)   Bicarbonate Arterial Latest Ref Range: 21 - 28 mmol/L 42 (H)   Base Excess Art Latest Units: mmol/L 12.5   Oxyhemoglobin Arterial Latest Ref Range: 92 - 100 % 73 (L)     Current Vent Settings: Ventilation Mode: CMV/AC  (Continuous Mandatory Ventilation/ Assist Control)  FiO2 (%): 50 %  Rate Set (breaths/minute): 30 breaths/min  Tidal Volume Set (mL): 330 mL  PEEP (cm H2O): 12 cmH2O  Oxygen Concentration (%): 50 %    Plan:  Pt to remain on full vent support overnight

## 2021-05-03 NOTE — PROGRESS NOTES
Comprehensive Daily ICU Note        Sarahy Dunbar MRN# [id number]   Age: 65 year old YOB: 1955     Date of Admission: 4/13/2021    Primary care provider: Rama Pop     CODE STATUS: FULL      Problem List:       Active Problems:    Acute respiratory failure with hypoxemia (H)    ARDS (adult respiratory distress syndrome) (H)    2019 novel coronavirus disease (COVID-19)    Hematoma    Anemia due to blood loss, acute    Shock (H)    Ventilator associated pneumonia (H)           Treatment goals for next 24 hours:   Continue heavy sedation  Continue increased steroids  Continue lung protective ventilation        Subjective/ Last 24 hours:   65-year-old female with no significant medical history who was found in bed with low saturations after welfare check.  Discovered to have COVID on 4/13. Maintained on NIV but subsequently required intubation on 4/15. Initially proned but now largely kept supine.  Overall has had worsening lung compliance, worsening ventilatory deficits, and inability to tolerate weaning of sedation. She has also developed VAP, which has been treated. Other complications include a large hematoma related to an arterial line necessitating a number of transfusions. On less sedation today than yesterday but not any more alert.             Mechanical Ventilation/Vitalsigns/IsandOs:     Temp:  [97.2  F (36.2  C)-100.2  F (37.9  C)] 99.3  F (37.4  C)  Pulse:  [71-94] 79  Resp:  [12-32] 29  BP: ()/(37-74) 97/56  FiO2 (%):  [50 %] 50 %  SpO2:  [91 %-99 %] 95 %      Ventilation Mode: CMV/AC  (Continuous Mandatory Ventilation/ Assist Control)  FiO2 (%): 50 %  Rate Set (breaths/minute): 30 breaths/min  Tidal Volume Set (mL): 330 mL  PEEP (cm H2O): 12 cmH2O  Pressure Support (cm H2O): 5 cmH2O  Oxygen Concentration (%): 50 %  Resp: 29      Vented since 4/15    Peak airway pressure: mid 30s      Intake/Output Summary (Last 24 hours) at 5/3/2021 0858  Last data filed at 5/3/2021  0600  Gross per 24 hour   Intake 2987.63 ml   Output 4695 ml   Net -1707.37 ml          Physical Examination:     General: Stated age, heavily sedated  HEENT: ET tube, NG tube  Lungs: crackles anteriorly, occasionally is very dyssynchronous  CVS: RRR, S1S2  Abdomen: obese, soft  Extremities/musculoskeletal: ansarca, warm  Neurology: heavily sedated  Skin: scars from previous knee/ankle surgery. Hematoma has regressed but remains an indurated area with tight and blistered skin; this has not changed significantly in the last few days.   Psychiatry: sedated  Exam of Line sites:  Central line site looks clean          Feeding/Glucose:     Orders Placed This Encounter      NPO for Medical/Clinical Reasons Except for: Meds      Recent Labs   Lab 05/03/21  0405 05/03/21  0402 05/03/21  0014 05/02/21  2045 05/02/21  1558 05/02/21  1152 05/02/21  0900 05/02/21  0410 05/02/21  0410 05/01/21  0425 05/01/21  0425 04/30/21  0415 04/30/21  0415 04/29/21  0430 04/29/21  0430 04/28/21  0415 04/28/21  0415   GLC  --  88  --   --   --   --   --   --  90  --  91  --  119*  --  98  --  126*   BGM 83  --  87 136* 150* 101* 83   < >  --    < >  --    < >  --    < >  --    < >  --     < > = values in this interval not displayed.              Medications:       chlorhexidine  15 mL Mouth/Throat Q12H     enoxaparin ANTICOAGULANT  0.5 mg/kg Subcutaneous BID     furosemide  60 mg Intravenous Q8H     insulin aspart  1-12 Units Subcutaneous Q4H     meropenem  500 mg Intravenous Q6H     methylPREDNISolone  125 mg Intravenous Q24H     multivitamins w/minerals  15 mL Per Feeding Tube Daily     pantoprazole  40 mg Per Feeding Tube Daily     protein modular  2 packet Per Feeding Tube Q8H     sennosides  5 mL Oral BID          dextrose       HYDROmorphone 0.5 mg/hr (05/03/21 0000)     midazolam 7 mg/hr (05/03/21 0203)     norepinephrine 0.05 mcg/kg/min (05/03/21 0410)     propofol (DIPRIVAN) infusion 25 mcg/kg/min (05/03/21 7454)     sodium chloride  20 mL/hr at 04/30/21 0800              Labs:         ROUTINE ICU LABS (Last four results)  CMP  Recent Labs   Lab 05/03/21 0402 05/02/21 1757 05/02/21  1150 05/02/21 0410 05/01/21 0425 05/01/21 0425 04/30/21 0415 04/29/21 0430 04/29/21 0430   *  --   --  142  --  140 140  --  143   POTASSIUM 3.2* 4.2 3.2* 3.0*   < > 3.4 3.5   < > 3.4   CHLORIDE 103  --   --  100  --  100 100  --  100   CO2 44*  --   --  44*  --  41* 39*  --  44*   ANIONGAP <1*  --   --  <1*  --  <1* 1*  --  <1*   GLC 88  --   --  90  --  91 119*  --  98   BUN 27  --   --  30  --  29 29  --  29   CR 0.41*  --   --  0.44*  --  0.47* 0.49*  --  0.41*   GFRESTIMATED >90  --   --  >90  --  >90 >90  --  >90   GFRESTBLACK >90  --   --  >90  --  >90 >90  --  >90   ALICIA 7.9*  --   --  7.9*  --  7.8* 7.7*  --  7.9*   MAG 2.6*  --   --   --   --  2.7* 2.5*  --  2.5*   PHOS 2.2*  --   --   --   --   --   --   --   --     < > = values in this interval not displayed.     CBC  Recent Labs   Lab 05/03/21 0402 05/02/21 1757 05/02/21 0410 05/01/21 0425 04/30/21 0415   WBC 8.1  --  9.5 11.7* 12.1*   RBC 2.58*  --  2.29* 2.39* 2.40*   HGB 7.7* 8.0* 6.8* 7.0* 7.3*   HCT 25.3*  --  22.8* 23.8* 23.5*   MCV 98  --  100 100 98   MCH 29.8  --  29.7 29.3 30.4   MCHC 30.4*  --  29.8* 29.4* 31.1*   RDW 16.0*  --  15.9* 15.8* 15.1*     --  240 253 252     INR  No lab results found in last 7 days.  Arterial Blood Gas  Recent Labs   Lab 05/02/21  1256 05/01/21  0918 05/01/21  0425 04/30/21  2200 04/29/21  0835 04/28/21  0831 04/28/21  0831 04/27/21  1445   PH  --  7.32*  --   --  7.43  --  7.36 7.44   PCO2  --  82*  --   --  70*  --  73* 59*   PO2  --  44*  --   --  64*  --  92 73*   HCO3  --  42*  --   --  46*  --  41* 40*   O2PER 50%  --  50% 50 50%   < > 60 ASAF    < > = values in this interval not displayed.       Other Lab Data:      Cultures:  Recent Labs   Lab 05/01/21  0947 05/01/21  0941 05/01/21  0935 04/26/21  1850   CULT Culture in progress   <1000 colonies/mL  urogenital dario   No growth after 2 days Culture in progress Moderate growth  Pseudomonas aeruginosa  *  Moderate growth  Enterococcus faecalis  *  Plus  Light growth  Normal dario       Blood culture:  Invalid input(s): BC   Urine culture:  No results for input(s): URC in the last 168 hours.          Imaging/Other results:     No results found for this or any previous visit (from the past 24 hour(s)).             Assessment and Plan:       Sarahy Dunbar IS a 65 year old female admitted on 4/13/2021 for Covid pneumonia with ARDS, altered mental status, hypoxemic respiratory failure requiring intubation and mechanical ventilation  I have personally reviewed the daily labs, imaging studies, cultures and discussed the case with referring physician and consulting physicians.     My assessment and plan by system for this patient is as follows:    Neurology/Psychiatry:   1.  Patient with difficulty in tolerating lung protective ventilator settings.  Plan  - Continue to wean sedation as possible.     Cardiovascular:   1.Hemodynamics- hypotension related to sedation. Vaspopressor needs are stable and appears to have adequate perfusion.  Plan   - Continue low dose vasopressors    Pulmonary/ID:   1. ARDS from COVID.  Compliance has been worsening despite maximal therapies including antibiotics and resuming steroids and continuing lung protective ventilation. CT showing extensive changes, some of which may now be permanent.  2. VAP with Pseudomonas and Enteroccocus.  3. Positive COVID-19 test on 4/13  4. Higher fevers on 5/1. WBC and procalcitonin similar to previous; no positive culture results and fever curve has returned to near normal.  Fluid collection in hematoma was aspirated by radiologist; serosanguinous and not concerning for infection.   Plan  - continue lung protective settings.  - Continue attempts at diuresis (see renal below).   - Continue Meropenem which was first ordered on 4/26.   -  If patient is stable to improved she will require a tracheostomy.   - Increase steroid dose for a few days (methylpred 125 q24hr).     GI and Nutrition :   1.  Continue tube feeds as tolerated  2.  PPI for pud prophy    Renal/Fluids/Electrolytes:   1. Renal function stable but a lot of anasarca Sodium has corrected w increased free water. Net positive fluid balance. Is responding to diuresis without adverse effect on kidneys.   Plan  -Continue current diuretic/free water regimen for today. Lasix 60 q8hr.  - Replace electrolytes. K low today. Recheck all tomorrow and PM bmp's while diuresing.     Endocrine:   1.  Stress induced hyperglycemia while on Dexamethasone. Have changed to Methylprednisolone today.   Plan  - Monitor sugars; improved today  - Continue current dose of Methylprednisolone    Hematology/Oncology/Skin:   1. Large hematoma with acute blood loss anemia. Bleeding has now stopped. Imaging noted no vessel that needs or is amenable to intervention. Lovenox has been restarted and hemoglobin relatively stable now.  Bedside ultrasound showed fluid collection within hematoma that was aspirated and did not appear infected.  2. Critical illness anemia.   3. Transfusion reaction on 5/2. Received a different unit and tolerated well.  Plan  -Continue Lovenox as patient does not appear to be bleeding at this time.     IV/Access:   1. Venous access -central line  2. Arterial access - removed    ICU Prophylaxis:   1. DVT: Lovenox  2. VAP: HOB 30 degrees, chlorhexidine rinse  3. Stress Ulcer: PPI  4. Wound care  - local wound care for hematoma  5. Feeding -tube feeds  6. Family Update:  on the phone  7. Disposition -remain in ICU    I spent 50 minutes on the unit providing critical care for this patient.
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
## 2021-05-03 NOTE — PLAN OF CARE
No major events today. Patient became tachypenic and had significant amount of coughing when sedation lightened. Levo remains on to keep MAP>65    N:RAAS -4/-5. Unresponsive. PEERL  CV: SR. Levo to keep MAP>65 Tmax 100  LS: Vent settings remain the same. Scant amount of secretions  GI/:Yeboah with adequate urine output. Flexiseal in place. TF at goal with FWF  IV:CVC with levo, versed, propofol, and dilaudid gtts.

## 2021-05-03 NOTE — PLAN OF CARE
Problem: Adult Inpatient Plan of Care  Goal: Absence of Hospital-Acquired Illness or Injury  Intervention: Identify and Manage Fall Risk  Recent Flowsheet Documentation  Taken 5/2/2021 2000 by Fredi Glover RN  Safety Promotion/Fall Prevention:   bed alarm on   clutter free environment maintained   safety round/check completed  Taken 5/2/2021 1600 by Fredi Glover RN  Safety Promotion/Fall Prevention:   bed alarm on   clutter free environment maintained   safety round/check completed  Taken 5/2/2021 1200 by Fredi Glover RN  Safety Promotion/Fall Prevention:   bed alarm on   clutter free environment maintained   safety round/check completed  Intervention: Prevent Skin Injury  Recent Flowsheet Documentation  Taken 5/2/2021 2200 by Fredi Glover RN  Body Position:   side-lying   right  Taken 5/2/2021 2000 by Fredi Glover RN  Body Position:   side-lying   left  Taken 5/2/2021 1800 by Fredi Glover RN  Body Position:   side-lying   right  Taken 5/2/2021 1600 by Fredi Glover RN  Body Position:   side-lying   left  Taken 5/2/2021 1400 by Fredi Glover RN  Body Position:   side-lying   right  Taken 5/2/2021 1200 by Fredi Glover RN  Body Position:   side-lying   left  Taken 5/2/2021 1000 by Fredi Glover RN  Body Position:   side-lying   right  Intervention: Prevent and Manage VTE (Venous Thromboembolism) Risk  Recent Flowsheet Documentation  Taken 5/2/2021 2000 by Fredi Glover RN  VTE Prevention/Management: pneumatic compression device  Taken 5/2/2021 1600 by Fredi Glover RN  VTE Prevention/Management: pneumatic compression device  Taken 5/2/2021 1200 by Fredi Glover RN  VTE Prevention/Management: pneumatic compression device  Intervention: Prevent Infection  Recent Flowsheet Documentation  Taken 5/2/2021 2000 by Fredi Glover RN  Infection Prevention:   hand hygiene promoted   personal protective equipment utilized   rest/sleep promoted  Taken 5/2/2021 1600 by Adalberto  SANDRA Rai  Infection Prevention:   hand hygiene promoted   personal protective equipment utilized   rest/sleep promoted  Taken 5/2/2021 1200 by Fredi Glover RN  Infection Prevention:   hand hygiene promoted   personal protective equipment utilized   rest/sleep promoted  Goal: Optimal Comfort and Wellbeing  Intervention: Provide Person-Centered Care  Recent Flowsheet Documentation  Taken 5/2/2021 2000 by Fredi Glover RN  Trust Relationship/Rapport:   care explained   reassurance provided  Taken 5/2/2021 1600 by Fredi Glover RN  Trust Relationship/Rapport:   care explained   reassurance provided  Taken 5/2/2021 1200 by Fredi Glover RN  Trust Relationship/Rapport:   care explained   reassurance provided     Problem: Communication Impairment (Mechanical Ventilation, Invasive)  Goal: Effective Communication  Intervention: Ensure Effective Communication  Recent Flowsheet Documentation  Taken 5/2/2021 2000 by Fredi Glover RN  Communication Enhancement Strategies:   one-step directions provided   repetition utilized  Taken 5/2/2021 1600 by Fredi Glover RN  Communication Enhancement Strategies:   one-step directions provided   repetition utilized  Taken 5/2/2021 1200 by Fredi Glover RN  Communication Enhancement Strategies:   one-step directions provided   repetition utilized     Problem: Device-Related Complication Risk (Mechanical Ventilation, Invasive)  Goal: Optimal Device Function  Intervention: Optimize Device Care and Function  Recent Flowsheet Documentation  Taken 5/2/2021 2000 by Fredi Glover RN  Airway Safety Measures:   all equipment/monitors on and audible   manual resuscitator at bedside   suction regulator   suction equipment   oxygen flowmeter  Taken 5/2/2021 1600 by Fredi Glover RN  Airway Safety Measures:   all equipment/monitors on and audible   manual resuscitator at bedside   suction regulator   suction equipment   oxygen flowmeter  Taken 5/2/2021 1200 by Fredi Glover  RN  Airway Safety Measures:   all equipment/monitors on and audible   manual resuscitator at bedside   suction regulator   suction equipment   oxygen flowmeter     Problem: Inability to Wean (Mechanical Ventilation, Invasive)  Goal: Mechanical Ventilation Liberation  Intervention: Promote Extubation and Mechanical Ventilation Liberation  Recent Flowsheet Documentation  Taken 5/2/2021 2000 by Fredi Glover RN  Medication Review/Management: medications reviewed  Taken 5/2/2021 1600 by Fredi Glover RN  Medication Review/Management: medications reviewed  Taken 5/2/2021 1200 by Fredi Glover RN  Medication Review/Management: medications reviewed     Problem: Ventilator-Induced Lung Injury (Mechanical Ventilation, Invasive)  Goal: Absence of Ventilator-Induced Lung Injury  Intervention: Prevent Ventilator-Associated Pneumonia  Recent Flowsheet Documentation  Taken 5/2/2021 2000 by Fredi Glover RN  VAP Prevention Bundle:   HOB elevation maintained   oral care regularly provided   stress ulcer prophylaxis provided   vent circuit breaks minimized   VTE prophylaxis provided  Head of Bed (HOB) Positioning: HOB at 30 degrees  VAP Prevention Contraindications: per provider order  VAP Prevention Measures: completed  Taken 5/2/2021 1600 by Fredi Glover RN  VAP Prevention Bundle:   HOB elevation maintained   oral care regularly provided   stress ulcer prophylaxis provided   vent circuit breaks minimized   VTE prophylaxis provided  Head of Bed (HOB) Positioning: HOB at 30 degrees  VAP Prevention Contraindications: per provider order  VAP Prevention Measures: completed  Taken 5/2/2021 1200 by Fredi Glover RN  VAP Prevention Bundle:   HOB elevation maintained   oral care regularly provided   stress ulcer prophylaxis provided   vent circuit breaks minimized   VTE prophylaxis provided  Head of Bed (HOB) Positioning: HOB at 30 degrees  VAP Prevention Contraindications: per provider order  VAP Prevention Measures:  completed

## 2021-05-03 NOTE — PROGRESS NOTES
CLINICAL NUTRITION SERVICES - REASSESSMENT NOTE    5/4 Addendum - TF orders were not yet completed to increase to 25 mL/hr as outlined below. Chart reviewed this morning and Propofol increased providing higher kcal provisions. Will plan to continue TF at 10 mL/hr + Prosource 2 pkts q 8 hrs as currently outlined, meeting a total of 1327 kcal (12 kcal/kg) and 87 gm protein (1.9 gram/kg and 97% estimated needs)       Recommendations Ordered by Registered Dietitian (RD):   Increase TF to 25 mL/hr =  600 kcal, 52 gm protein, 66 gm CHO and 0 gm fiber  Prosource 2 pkts every 8 hrs = 240 kcals, 66 gm pro  Propofol at 16.1 mL/hr = 425 kcal   Total provisions (TF + Propofol + Propofol) = 1265 kcal (12 kcal/kg) and 118 gm protein (2.6 gm/kg and 102% estimated needs)   Malnutrition: (4/15)  % Weight Loss:  None noted  % Intake:  Decreased intake does not meet criteria for malnutrition - NPO x 3 days  Subcutaneous Fat Loss: Unable to assess  Muscle Loss: Unable to assess  Fluid Retention:  None noted     Malnutrition Diagnosis: Unable to determine without Nutrition Focused Physical Exam       EVALUATION OF PROGRESS TOWARD GOALS   Diet:  NPO, vented     Nutrition Support:  TF had been running at goal rate but turned down on 4/30 secondary to high Propofol provisions ~    Nutrition Support Enteral:  Type of Feeding Tube: NJT   Enteral Frequency:  Continuous  Enteral Regimen: Vital HP at 10 mL/hr   Enteral Provisions: 240 kcals, 21 gm pro, 27 gm CHO, 200 mL H20, no fiber  Prosource 2 packets every 8 hrs = 240 kcals, 66 gm pro  Propofol at 16.1 mL/hr = 425 kcal/day   Total (TF + Prosource + Propofol) = 905 kcal (8 kcal/kg and 77% estimated needs) and 87 gm protein (1.9 gram/kg and 97% estimated needs)   Free Water Flush: 150 mL q 4 hrs (Per MD on 4/30)       Intake/Tolerance:    Labs reviewed: Na 145 (H), K 3.5 (NL), Mg 2.6 (H), Phos 2.2 (L)   Medications reviewed: Lasix, Levophed, Propofol   Stooling:  - BM x1 on 5/1  - BM x3 on  5/2   I/O 3490/5500  Wt: 111 kg -- trending back towards admission wt with diuresis   Vitals:    04/29/21 0630 04/30/21 0415 05/01/21 0400 05/02/21 0200   Weight: 121.2 kg (267 lb 3.2 oz) 117.4 kg (258 lb 13.1 oz) 114.7 kg (252 lb 13.9 oz) 113.3 kg (249 lb 12.5 oz)    05/03/21 0400   Weight: 111.2 kg (245 lb 2.4 oz)         ASSESSED NUTRITION NEEDS:  Dosing Weight: 107 kg for energy, 45 kg for protein  Estimated Energy Needs: 8349-2899 kcals (11-14 Kcal/Kg)  Justification: obese and vented  Estimated Protein Needs:  grams protein (2-2.5 g pro/Kg)  Justification: obesity guidelines       NEW FINDINGS:   Chart reviewed   Remains supine   Inability to tolerate weaning of sedation   Potential for goals of care discussion if no significant improvements    Previous Goals:   TF + Propofol will meet % needs   Evaluation: Met    Previous Nutrition Diagnosis:   No nutrition diagnosis identified at this time  Evaluation: Completed      CURRENT NUTRITION DIAGNOSIS  Inadequate energy intake related to NPO, TF held at trickle rate as evidenced by meeting 77% estimated energy needs with Propofol and Prosource      INTERVENTIONS  Recommendations / Nutrition Prescription  Increase TF to 25 mL/hr to better meet nutrient needs while on Propofol  Continue Prosource pkts daily     Implementation  EN Schedule: as above    Collaboration and Referral of Nutrition care: patient was discussed during ICU rounds    Goals  EN + Propofol + Prosurce to meet % estimated needs       MONITORING AND EVALUATION:  Progress towards goals will be monitored and evaluated per protocol and Practice Guidelines      Rachel Adam RD, LD  Clinical Dietitian

## 2021-05-03 NOTE — PROGRESS NOTES
[facility] ICU RESPIRATORY NOTE           Date of Admission: 4/13/2021     Date of Intubation (most recent):  4/15/21     Reason for Mechanical Ventilation:  Resp failue     Number of Days on Mechanical Ventilation: 18     Met Criteria for Spontaneous Breathing Trial: No     Reason for No Spontaneous Breathing Trial: Per MD     Significant Events Today: None     ABG Results:                     Recent Labs   Lab 05/01/21  0918 05/01/21  0425 04/30/21  2200 04/29/21  0835 04/28/21  2145 04/28/21  0831 04/27/21  1445   PH 7.32*  --   --  7.43  --  7.36 7.44   PCO2 82*  --   --  70*  --  73* 59*   PO2 44*  --   --  64*  --  92 73*   HCO3 42*  --   --  46*  --  41* 40*   O2PER  --  50% 50 50% 50% 60 ASAF       Current Vent Settings: Ventilation Mode: CMV/AC  (Continuous Mandatory Ventilation/ Assist Control)  FiO2 (%): 60 %  Rate Set (breaths/minute): 30 breaths/min  Tidal Volume Set (mL): 330 mL  PEEP (cm H2O): 12 cmH2O  Oxygen Concentration (%): 60 %  Resp: 30        Plan:  Pt to remain on full vent support overnight    Murray Valentine, RT

## 2021-05-03 NOTE — PROGRESS NOTES
Infection Prevention Note:  SEVERE-TO-CRITICAL ILLNESS:    Following criteria for mildly immunocompromised patients OR those with severe-to-critical COVID-19 illness,  patient meets criteria for discontinuation of Special Precautions:    FOR SYMPTOMATIC - 20 days following symptom onset, >24 hr fever free without fever-reducing meds, AND substantial improvement in COVID-19 symptoms.      Today, 5/3/21 is day 20 but the patient is still extremely sick.  Therefore, the patient needs to remain in Special precautions until there is substantial improvement in symptoms.    Franny Mart RN, BSN Infection Preventionist

## 2021-05-03 NOTE — PROGRESS NOTES
[facility] ICU RESPIRATORY NOTE        Date of Admission: 4/13/2021    Date of Intubation (most recent): 04/15/21    Reason for Mechanical Ventilation: Resp failure    Number of Days on Mechanical Ventilation: 19    Met Criteria for Spontaneous Breathing Trial:  NO    Reason for No Spontaneous Breathing Trial: Per MD    Significant Events Today: None    ABG Results:   Recent Labs   Lab 05/02/21  1256 05/01/21  0918 05/01/21  0425 04/30/21  2200 04/29/21  0835 04/28/21  0831 04/28/21  0831 04/27/21  1445   PH  --  7.32*  --   --  7.43  --  7.36 7.44   PCO2  --  82*  --   --  70*  --  73* 59*   PO2  --  44*  --   --  64*  --  92 73*   HCO3  --  42*  --   --  46*  --  41* 40*   O2PER 50%  --  50% 50 50%   < > 60 ASAF    < > = values in this interval not displayed.       Current Vent Settings: Ventilation Mode: CMV/AC  (Continuous Mandatory Ventilation/ Assist Control)  FiO2 (%): 50 %  Rate Set (breaths/minute): 30 breaths/min  Tidal Volume Set (mL): 330 mL  PEEP (cm H2O): 12 cmH2O  Pressure Support (cm H2O): 5 cmH2O  Oxygen Concentration (%): 50 %  Resp: 24      Skin Assessment: Clean and dry    Plan: Pt to remain on full vent support    Ashley Mendoza

## 2021-05-04 NOTE — PROGRESS NOTES
[facility] ICU RESPIRATORY NOTE        Date of Admission: 4/13/2021    Date of Intubation (most recent): 4/15/21    Reason for Mechanical Ventilation: Resp failure    Number of Days on Mechanical Ventilation: 19    Met Criteria for Spontaneous Breathing Trial: No    Reason for No Spontaneous Breathing Trial: per MD    Significant Events Today: none    ABG Results:   Recent Labs   Lab 05/02/21  1256 05/01/21  0918 05/01/21  0425 04/30/21  2200 04/29/21  0835 04/28/21  0831 04/28/21  0831 04/27/21  1445   PH  --  7.32*  --   --  7.43  --  7.36 7.44   PCO2  --  82*  --   --  70*  --  73* 59*   PO2  --  44*  --   --  64*  --  92 73*   HCO3  --  42*  --   --  46*  --  41* 40*   O2PER 50%  --  50% 50 50%   < > 60 ASAF    < > = values in this interval not displayed.       Current Vent Settings: Ventilation Mode: CMV/AC  (Continuous Mandatory Ventilation/ Assist Control)  FiO2 (%): 50 %  Rate Set (breaths/minute): 30 breaths/min  Tidal Volume Set (mL): 330 mL  PEEP (cm H2O): 12 cmH2O  Pressure Support (cm H2O): 5 cmH2O  Oxygen Concentration (%): 50 %  Resp: 28          Plan: continue full vent support.    Sami Miranda, RT

## 2021-05-04 NOTE — PROGRESS NOTES
Comprehensive Daily ICU Note        Sarahy Dunbar MRN# [id number]   Age: 65 year old YOB: 1955     Date of Admission: 4/13/2021    Primary care provider: Rama Pop     CODE STATUS: FULL      Problem List:       Active Problems:    Acute respiratory failure with hypoxemia (H)    ARDS (adult respiratory distress syndrome) (H)    2019 novel coronavirus disease (COVID-19)    Hematoma    Anemia due to blood loss, acute    Shock (H)    Ventilator associated pneumonia (H)           Treatment goals for next 24 hours:   Continue heavy sedation  Continue increased steroids for one more day  Continue lung protective ventilation        Subjective/ Last 24 hours:   65-year-old female with no significant medical history who was found in bed with low saturations after welfare check.  Discovered to have COVID on 4/13. Maintained on NIV but subsequently required intubation on 4/15. Initially proned but now largely kept supine.  Overall has had worsening lung compliance, worsening ventilatory deficits, and inability to tolerate weaning of sedation. She has also developed VAP, which has been treated. Other complications include a large hematoma related to an arterial line necessitating a number of transfusions.   Unable to obtain history directly this morning due to intubated/vented status.         Mechanical Ventilation/Vitalsigns/IsandOs:     Temp:  [98.1  F (36.7  C)-100.6  F (38.1  C)] 99.1  F (37.3  C)  Pulse:  [] 79  Resp:  [26-59] 35  BP: ()/(45-80) 99/52  FiO2 (%):  [50 %] 50 %  SpO2:  [91 %-98 %] 94 %      Ventilation Mode: CMV/AC  (Continuous Mandatory Ventilation/ Assist Control)  FiO2 (%): 50 %  Rate Set (breaths/minute): 30 breaths/min  Tidal Volume Set (mL): 330 mL  PEEP (cm H2O): 12 cmH2O  Pressure Support (cm H2O): 5 cmH2O  Oxygen Concentration (%): 50 %  Resp: (!) 35      Vented since 4/15    Peak airway pressure: mid 30s      Intake/Output Summary (Last 24 hours) at 5/3/2021  0838  Last data filed at 5/3/2021 0600  Gross per 24 hour   Intake 2987.63 ml   Output 4695 ml   Net -1707.37 ml          Physical Examination:     General: Stated age, heavily sedated  HEENT: ET tube, NG tube  Lungs: crackles anteriorly, occasionally is very dyssynchronous  CVS: RRR, S1S2  Abdomen: obese, soft  Extremities/musculoskeletal: ansarca, warm  Neurology: heavily sedated  Skin: scars from previous knee/ankle surgery. Hematoma has regressed but remains an indurated area with tight and blistered skin; this has not changed significantly in the last few days.   Psychiatry: sedated  Exam of Line sites:  Central line site looks clean          Feeding/Glucose:     Orders Placed This Encounter      NPO for Medical/Clinical Reasons Except for: Meds      Recent Labs   Lab 05/04/21  0530 05/04/21  0431 05/04/21  0055 05/04/21  0054 05/03/21  2034 05/03/21  1718 05/03/21  1151 05/03/21  0832 05/03/21  0402 05/03/21  0402 05/02/21  0410 05/02/21  0410 05/01/21  0425 05/01/21  0425 04/30/21  0415 04/30/21  0415   GLC 90  --  90  --   --   --   --   --   --  88  --  90  --  91  --  119*   BGM  --  98  --  93 109* 138* 102* 93   < >  --    < >  --    < >  --    < >  --     < > = values in this interval not displayed.              Medications:       chlorhexidine  15 mL Mouth/Throat Q12H     enoxaparin ANTICOAGULANT  0.5 mg/kg Subcutaneous BID     furosemide  60 mg Intravenous Q8H     insulin aspart  1-12 Units Subcutaneous Q4H     meropenem  1 g Intravenous Q8H     methylPREDNISolone  125 mg Intravenous Q24H     multivitamins w/minerals  15 mL Per Feeding Tube Daily     pantoprazole  40 mg Per Feeding Tube Daily     potassium chloride  20 mEq Intravenous Once     protein modular  2 packet Per Feeding Tube Q8H     sennosides  5 mL Oral BID          dextrose       HYDROmorphone 0.5 mg/hr (05/04/21 0043)     midazolam 7 mg/hr (05/04/21 0043)     norepinephrine 0.05 mcg/kg/min (05/04/21 0415)     propofol (DIPRIVAN) infusion  50 mcg/kg/min (05/04/21 0527)     sodium chloride 20 mL/hr at 05/03/21 2300              Labs:         ROUTINE ICU LABS (Last four results)  CMP  Recent Labs   Lab 05/04/21  0530 05/04/21  0055 05/03/21  0845 05/03/21 0402 05/02/21  0410 05/02/21  0410 05/01/21 0425 05/01/21 0425 04/30/21 0415    144  --  145*  --  142  --  140 140   POTASSIUM 3.8 3.2* 3.5 3.2*   < > 3.0*   < > 3.4 3.5   CHLORIDE 99 100  --  103  --  100  --  100 100   CO2 >45* >45*  --  44*  --  44*  --  41* 39*   ANIONGAP Not Calculated Not Calculated  --  <1*  --  <1*  --  <1* 1*   GLC 90 90  --  88  --  90  --  91 119*   BUN 28 27  --  27  --  30  --  29 29   CR 0.44* 0.39*  --  0.41*  --  0.44*  --  0.47* 0.49*   GFRESTIMATED >90 >90  --  >90  --  >90  --  >90 >90   GFRESTBLACK >90 >90  --  >90  --  >90  --  >90 >90   ALICIA 7.9* 8.0*  --  7.9*  --  7.9*  --  7.8* 7.7*   MAG 2.6*  --   --  2.6*  --   --   --  2.7* 2.5*   PHOS 3.0  --   --  2.2*  --   --   --   --   --     < > = values in this interval not displayed.     CBC  Recent Labs   Lab 05/04/21 0530 05/03/21 0402 05/02/21 1757 05/02/21 0410 05/01/21 0425   WBC 8.6 8.1  --  9.5 11.7*   RBC 2.77* 2.58*  --  2.29* 2.39*   HGB 8.1* 7.7* 8.0* 6.8* 7.0*   HCT 27.6* 25.3*  --  22.8* 23.8*    98  --  100 100   MCH 29.2 29.8  --  29.7 29.3   MCHC 29.3* 30.4*  --  29.8* 29.4*   RDW 16.5* 16.0*  --  15.9* 15.8*    242  --  240 253     INR  No lab results found in last 7 days.  Arterial Blood Gas  Recent Labs   Lab 05/02/21  1256 05/01/21  0918 05/01/21  0425 04/30/21  2200 04/29/21  0835 04/28/21  0831 04/28/21  0831 04/27/21  1445   PH  --  7.32*  --   --  7.43  --  7.36 7.44   PCO2  --  82*  --   --  70*  --  73* 59*   PO2  --  44*  --   --  64*  --  92 73*   HCO3  --  42*  --   --  46*  --  41* 40*   O2PER 50%  --  50% 50 50%   < > 60 ASAF    < > = values in this interval not displayed.       Other Lab Data:      Cultures:  Recent Labs   Lab 05/01/21  0948  05/01/21  0941 05/01/21  0935   CULT 50,000 to 100,000 colonies/mL  Candida albicans  Susceptibility testing not routinely done  *  <1000 colonies/mL  urogenital dario   No growth after 3 days Light growth  Pseudomonas aeruginosa  Piperacillin/Tazobactam susceptibilities in progress  5.3.21  *  Light growth  Strain 2  Pseudomonas aeruginosa  Susceptibility testing in progress  *  Culture in progress     Blood culture:  Invalid input(s): BC   Urine culture:  No results for input(s): URC in the last 168 hours.          Imaging/Other results:     No results found for this or any previous visit (from the past 24 hour(s)).             Assessment and Plan:       Sarahy Dunbar IS a 65 year old female admitted on 4/13/2021 for Covid pneumonia with ARDS, altered mental status, hypoxemic respiratory failure requiring intubation and mechanical ventilation  I have personally reviewed the daily labs, imaging studies, cultures and discussed the case with referring physician and consulting physicians.     My assessment and plan by system for this patient is as follows:    Neurology/Psychiatry:   1.  Sedation:  Propofol and midazolam drips  2. Analgesia:  Dilaudid drip  Plan  - Continue to wean sedation as possible.     Cardiovascular:   1.Hemodynamics- hypotension related to sedation. Vaspopressor needs are stable and appears to have adequate perfusion.  Plan   - Continue low dose vasopressors    Pulmonary/ID:   1. ARDS from COVID.  Compliance has been worsening despite maximal therapies including antibiotics and resuming steroids and continuing lung protective ventilation. CT showing extensive changes, some of which may now be permanent.  2. VAP with Pseudomonas and Enteroccocus.  3. Positive COVID-19 test on 4/13  4. Higher fevers on 5/1. WBC and procalcitonin similar to previous; no positive culture results and fever curve has returned to near normal.  Fluid collection in hematoma was aspirated by radiologist;  serosanguinous and not concerning for infection.   Plan  - continue lung protective settings.  - Continue attempts at diuresis (see renal below).   - Continue Meropenem which was first ordered on 4/26.   - If patient is stable to improved she will require a tracheostomy.   - Increase steroid dose for a few days (methylpred 125 q24hr).     GI and Nutrition :   1.  Continue tube feeds as tolerated  2.  PPI for pud prophy    Renal/Fluids/Electrolytes:   1. Renal function stable but a lot of anasarca Sodium has corrected w increased free water. Net positive fluid balance. Is responding to diuresis without adverse effect on kidneys.   Plan  -Continue current diuretic/free water regimen for today. Lasix 60 q8hr.  - Replace electrolytes. High K protocol. Recheck all tomorrow and PM bmp's while diuresing.     Endocrine:   1.  Stress induced hyperglycemia while on Dexamethasone. Have changed to Methylprednisolone today.   Plan  - Monitor sugars; improved today  - Continue current dose of Methylprednisolone    Hematology/Oncology/Skin:   1. Large hematoma with acute blood loss anemia. Bleeding has now stopped. Imaging noted no vessel that needs or is amenable to intervention. Lovenox has been restarted and hemoglobin relatively stable now.  Bedside ultrasound showed fluid collection within hematoma that was aspirated and did not appear infected.  2. Critical illness anemia.   3. Transfusion reaction on 5/2. Received a different unit and tolerated well.  Plan  -Continue Lovenox as patient does not appear to be bleeding at this time.     IV/Access:   1. Venous access -central line  2. Arterial access - removed    ICU Prophylaxis:   1. DVT: Lovenox  2. VAP: HOB 30 degrees, chlorhexidine rinse  3. Stress Ulcer: PPI  4. Wound care  - local wound care for hematoma  5. Feeding -tube feeds  6. Family Update:  on the phone  7. Disposition -remain in ICU    I spent 45 minutes on the unit providing critical care for this  patient.

## 2021-05-04 NOTE — PROGRESS NOTES
Intensivist:  Brief goals of care discussion with  in light of patient's failure to progress on ventilator.  Discussed possible options with  including possible need for trach in the near future, or transition to comfort measures.  No decision on this made today,  would like to discuss things with palliative care and we are tentatively planning a family meeting with patient's sisters.  We did discuss code status and agreeable to DNR.  Consult placed for palliative care.    All questions asked/answered.

## 2021-05-04 NOTE — PROGRESS NOTES
[facility] ICU RESPIRATORY NOTE           Date of Admission: 4/13/2021     Date of Intubation (most recent):  4/15/21     Reason for Mechanical Ventilation:  Resp failue     Number of Days on Mechanical Ventilation: 19     Met Criteria for Spontaneous Breathing Trial: No     Reason for No Spontaneous Breathing Trial: Per MD     Significant Events Today: None     ABG Results:   Recent Labs   Lab 05/02/21  1256 05/01/21  0918 05/01/21  0425 04/30/21  2200 04/29/21  0835 04/28/21  0831 04/28/21  0831 04/27/21  1445   PH  --  7.32*  --   --  7.43  --  7.36 7.44   PCO2  --  82*  --   --  70*  --  73* 59*   PO2  --  44*  --   --  64*  --  92 73*   HCO3  --  42*  --   --  46*  --  41* 40*   O2PER 50%  --  50% 50 50%   < > 60 ASAF    < > = values in this interval not displayed.     Ventilation Mode: CMV/AC  (Continuous Mandatory Ventilation/ Assist Control)  FiO2 (%): 50 %  Rate Set (breaths/minute): 30 breaths/min  Tidal Volume Set (mL): 330 mL  PEEP (cm H2O): 12 cmH2O  Pressure Support (cm H2O): 5 cmH2O  Oxygen Concentration (%): 50 %  Resp: 26    CHAPITO Fajardo, RRT

## 2021-05-04 NOTE — PLAN OF CARE
Patient remains intubated and sedated in ICU. Vent settings unchanged. Sedation unchanged - dyssynchronous with vent at times (stacking and overbreathing) but O2 sats stable. Levophed unchanged to maintain MAP >65. HR SR. Large UOP with lasix administration overnight. No contact with family.

## 2021-05-05 NOTE — PROGRESS NOTES
Comprehensive Daily ICU Note        Sarahy Dunbar MRN# [id number]   Age: 65 year old YOB: 1955     Date of Admission: 4/13/2021    Primary care provider: Rama Pop     CODE STATUS: FULL      Problem List:       Active Problems:    Acute respiratory failure with hypoxemia (H)    ARDS (adult respiratory distress syndrome) (H)    2019 novel coronavirus disease (COVID-19)    Hematoma    Anemia due to blood loss, acute    Shock (H)    Ventilator associated pneumonia (H)        Subjective/ Last 24 hours:   65-year-old female with no significant medical history who was found in bed with low saturations after welfare check.  Discovered to have COVID on 4/13. Maintained on NIV but subsequently required intubation on 4/15. Initially proned but now largely kept supine.  Overall has had worsening lung compliance, worsening ventilatory deficits, and inability to tolerate weaning of sedation. She has also developed VAP, which has been treated. Other complications include a large hematoma related to an arterial line necessitating a number of transfusions.   No events overnight.  Unable to obtain history directly this morning due to intubated/vented status.         Mechanical Ventilation/Vitalsigns/IsandOs:     Temp:  [98.8  F (37.1  C)-100.2  F (37.9  C)] 99.1  F (37.3  C)  Pulse:  [] 76  Resp:  [23-51] 26  BP: ()/(33-72) 99/54  FiO2 (%):  [50 %] 50 %  SpO2:  [91 %-100 %] 95 %      Ventilation Mode: CMV/AC  (Continuous Mandatory Ventilation/ Assist Control)  FiO2 (%): 50 %  Rate Set (breaths/minute): 30 breaths/min  Tidal Volume Set (mL): 330 mL  PEEP (cm H2O): 12 cmH2O  Oxygen Concentration (%): 50 %  Resp: 26      Vented since 4/15    Peak airway pressure: mid 30s      Intake/Output Summary (Last 24 hours) at 5/5/2021 0911  Last data filed at 5/5/2021 0800  Gross per 24 hour   Intake 2957.62 ml   Output 5660 ml   Net -2702.38 ml          Physical Examination:     General: Stated age,  heavily sedated  HEENT: ET tube, NG tube  Lungs: crackles anteriorly, occasionally is very dyssynchronous  CVS: RRR, S1S2  Abdomen: obese, soft  Extremities/musculoskeletal: ansarca, warm  Neurology: heavily sedated  Skin: scars from previous knee/ankle surgery. Hematoma has regressed but remains an indurated area with tight and blistered skin; this has not changed significantly in the last few days.   Psychiatry: sedated  Exam of Line sites:  Central line site looks clean          Feeding/Glucose:     Orders Placed This Encounter      NPO for Medical/Clinical Reasons Except for: Meds      Recent Labs   Lab 05/05/21  0431 05/05/21  0430 05/05/21  0011 05/04/21  2057 05/04/21  1801 05/04/21  1609 05/04/21  0821 05/04/21  0530 05/04/21  0431 05/04/21  0055 05/03/21  0402 05/03/21  0402 05/02/21  0410 05/02/21  0410   GLC  --  98  --   --  134*  --   --  90  --  90  --  88  --  90   BGM 95  --  101* 115*  --  162* 108*  --  98  --    < >  --    < >  --     < > = values in this interval not displayed.              Medications:       chlorhexidine  15 mL Mouth/Throat Q12H     enoxaparin ANTICOAGULANT  0.5 mg/kg Subcutaneous BID     furosemide  60 mg Intravenous Q8H     insulin aspart  1-12 Units Subcutaneous Q4H     meropenem  1 g Intravenous Q8H     methylPREDNISolone  125 mg Intravenous Q24H     multivitamins w/minerals  15 mL Per Feeding Tube Daily     pantoprazole  40 mg Per Feeding Tube Daily     protein modular  2 packet Per Feeding Tube Q8H     sennosides  5 mL Oral BID          dextrose       HYDROmorphone 0.5 mg/hr (05/04/21 2000)     midazolam 7 mg/hr (05/05/21 0906)     norepinephrine 0.06 mcg/kg/min (05/05/21 0432)     propofol (DIPRIVAN) infusion 50 mcg/kg/min (05/05/21 0617)     sodium chloride 20 mL/hr at 05/04/21 2000              Labs:         ROUTINE ICU LABS (Last four results)  CMP  Recent Labs   Lab 05/05/21  0430 05/04/21  1801 05/04/21  0530 05/04/21  0055 05/03/21  0402 05/03/21  0402  05/01/21 0425 05/01/21 0425    142 142 144  --  145*   < > 140   POTASSIUM 3.1* 3.8 3.8 3.2*   < > 3.2*   < > 3.4   CHLORIDE 97 100 99 100  --  103   < > 100   CO2 43* >45* >45* >45*  --  44*   < > 41*   ANIONGAP 1* Not Calculated Not Calculated Not Calculated  --  <1*   < > <1*   GLC 98 134* 90 90  --  88   < > 91   BUN 31* 31* 28 27  --  27   < > 29   CR 0.40* 0.42* 0.44* 0.39*  --  0.41*   < > 0.47*   GFRESTIMATED >90 >90 >90 >90  --  >90   < > >90   GFRESTBLACK >90 >90 >90 >90  --  >90   < > >90   ALICIA 8.2* 8.3* 7.9* 8.0*  --  7.9*   < > 7.8*   MAG 2.5*  --  2.6*  --   --  2.6*  --  2.7*   PHOS 3.2  --  3.0  --   --  2.2*  --   --     < > = values in this interval not displayed.     CBC  Recent Labs   Lab 05/05/21  0430 05/04/21  0530 05/03/21  0402 05/02/21  1757 05/02/21  0410   WBC 10.5 8.6 8.1  --  9.5   RBC 2.80* 2.77* 2.58*  --  2.29*   HGB 8.2* 8.1* 7.7* 8.0* 6.8*   HCT 27.7* 27.6* 25.3*  --  22.8*   MCV 99 100 98  --  100   MCH 29.3 29.2 29.8  --  29.7   MCHC 29.6* 29.3* 30.4*  --  29.8*   RDW 16.7* 16.5* 16.0*  --  15.9*    295 242  --  240     INR  No lab results found in last 7 days.  Arterial Blood Gas  Recent Labs   Lab 05/02/21  1256 05/01/21  0918 05/01/21  0425 04/30/21  2200 04/29/21  0835   PH  --  7.32*  --   --  7.43   PCO2  --  82*  --   --  70*   PO2  --  44*  --   --  64*   HCO3  --  42*  --   --  46*   O2PER 50%  --  50% 50 50%       Other Lab Data:      Cultures:  Recent Labs   Lab 05/01/21  0947 05/01/21  0941 05/01/21  0935   CULT 50,000 to 100,000 colonies/mL  Candida albicans  Susceptibility testing not routinely done  *  <1000 colonies/mL  urogenital dario   No growth after 4 days Light growth  Candida albicans  Susceptibility testing not routinely done  *  Light growth  Pseudomonas aeruginosa  Piperacillin/Tazobactam susceptibilities in progress  5.3.21  *  Light growth  Strain 2  Pseudomonas aeruginosa  Additional susceptibilities in progress  5/4/21  *     Blood  culture:  Invalid input(s): BC   Urine culture:  No results for input(s): URC in the last 168 hours.          Imaging/Other results:     No results found for this or any previous visit (from the past 24 hour(s)).             Assessment and Plan:       Sarahy Dunbra IS a 65 year old female admitted on 4/13/2021 for Covid pneumonia with ARDS, altered mental status, hypoxemic respiratory failure requiring intubation and mechanical ventilation  I have personally reviewed the daily labs, imaging studies, cultures and discussed the case with referring physician and consulting physicians.     My assessment and plan by system for this patient is as follows:    Neurology/Psychiatry:   1.  Sedation:  Propofol and midazolam drips  2. Analgesia:  Dilaudid drip  3.  Starting seroquel for possible delirium and to facilitate weaning sedation.  qtc ok 426.  Plan  - Continue to wean sedation as possible.     Cardiovascular:   1.Hemodynamics- hypotension related to sedation. Vaspopressor needs are stable and appears to have adequate perfusion.  Plan   - Continue low dose vasopressors    Pulmonary/ID:   1. ARDS from COVID.  Compliance has been worsening despite maximal therapies including antibiotics and resuming steroids and continuing lung protective ventilation. CT showing extensive changes, some of which may now be permanent.  2. VAP with Pseudomonas and Enteroccocus.  3. Positive COVID-19 test on 4/13  4. Higher fevers on 5/1. WBC and procalcitonin similar to previous; no positive culture results and fever curve has returned to near normal.  Fluid collection in hematoma was aspirated by radiologist; serosanguinous and not concerning for infection.  Fevers have since come down.  Plan  - continue lung protective settings.  - Continue diuresis (see renal below).   - Continue Meropenem which was first ordered on 4/26.   - If patient is stable to improved she will require a tracheostomy.   - Begin steroid taper today 125->62.5 mg  methylpred daily    GI and Nutrition :   1.  Continue tube feeds as tolerated  2.  PPI for pud prophy    Renal/Fluids/Electrolytes:   1. Creat ok at 0.40  2.  Responding well to diuresis, down 1-2 L per day for the last few days.  Plan  -Continue current diuretic/free water regimen for today. Lasix 60 q8hr.  - Replace electrolytes. High K protocol. Recheck all tomorrow and PM bmp's while diuresing.     Endocrine:   1.  Stress induced hyperglycemia, now improved  Plan  - Monitor sugars  - Starting methylpred taper    Hematology/Oncology/Skin:   1. Large hematoma with acute blood loss anemia. Bleeding has now stopped. Imaging noted no vessel that needs or is amenable to intervention. Lovenox has been restarted and hemoglobin relatively stable now.  Bedside ultrasound showed fluid collection within hematoma that was aspirated and did not appear infected.  2. Critical illness anemia. hgb stable today at 8.2  3. Transfusion reaction on 5/2. Received a different unit and tolerated well.  Plan  -Continue Lovenox as patient does not appear to be bleeding at this time.     IV/Access:   1. Venous access -central line  2. Arterial access - removed    ICU Prophylaxis:   1. DVT: Lovenox  2. VAP: HOB 30 degrees, chlorhexidine rinse  3. Stress Ulcer: PPI  4. Wound care  - local wound care for hematoma  5. Feeding -tube feeds  6. Family Update:  on the phone  7. Disposition -remain in ICU    Having continued goals of care discussions with .  Made DNR yesterday.  Consulted palliative care to help with end-of-life decision-making issues.  Anticipate family meeting with him and her two sisters this week or next.    I spent 45 minutes on the unit providing critical care for this patient.

## 2021-05-05 NOTE — PROGRESS NOTES
Palliative consult received for decisional support, as well as emotional and spiritual support. Chart reviewed extensively and discussed with Dr. Augustine. Pt assessed in the ICU via clear doors, noted to be sleeping, intubated and mechanically ventilated. Calm and comfortable. Per Dr. Augustine, pt's  is currently unavailable. I will check in later today vs tomorrow. I am available to participate in future care conferences as needed. Thanks.    DARRELL Villagomez [facility]  Contact information available via [facility] Paging/Directory

## 2021-05-05 NOTE — PROGRESS NOTES
"SPIRITUAL HEALTH SERVICES: Tele-Encounter  Patient Location: ICU  Spoke with: Spouse (Ruddy)    Referral Source: Follow-up from previous visit.    DATA:  Ruddy indicates that his understanding is that his wife has shown no improvement and that he does not expect his wife will get better, although he is hoping and praying for a miracle. He said that he is \"generally\" in agreement that comfort care is appropriate but indicates that other family members are not entirely in agreement on the matter. He expressed some concern about whether withdrawing ventilator support is \"playing God\" and indicated that he is still thinking through the matter. He states that living in a nursing facility on permament life support is not what Pat would want and that he understands that there is little or no chance that his wife could ever return home. He expressed hope that consult with palliative care can help the family reach consensus on the best course of action.    PLAN:  Continue to follow while PT remains in hospital.      John Mello Resident      ______________________________    Type of service:  Telephone Visit     has received verbal consent for a TelephoneVisit from the patient? Yes    Distance Provider Location: designated [place] office or home office (secure setting)    Mode of Communication: telephone (via [employer] phone or [employer] tele-call-number ([phone]))   "

## 2021-05-05 NOTE — PROGRESS NOTES
[facility] ICU RESPIRATORY NOTE           Date of Admission: 4/13/2021     Date of Intubation (most recent): 4/15/21  Reason for Mechanical Ventilation: Resp failure     Number of Days on Mechanical Ventilation: 20     Met Criteria for Spontaneous Breathing Trial: No  Reason for No Spontaneous Breathing Trial: Per MD     Significant Events Today: None  Recent Labs   Lab 05/02/21  1256 05/01/21  0918 05/01/21  0425 04/30/21  2200 04/29/21  0835 04/28/21  0831 04/28/21  0831   PH  --  7.32*  --   --  7.43  --  7.36   PCO2  --  82*  --   --  70*  --  73*   PO2  --  44*  --   --  64*  --  92   HCO3  --  42*  --   --  46*  --  41*   O2PER 50%  --  50% 50 50%   < > 60    < > = values in this interval not displayed.   Ventilation Mode: CMV/AC  (Continuous Mandatory Ventilation/ Assist Control)  FiO2 (%): 50 %  Rate Set (breaths/minute): 30 breaths/min  Tidal Volume Set (mL): 330 mL  PEEP (cm H2O): 12 cmH2O  Oxygen Concentration (%): 50 %  Resp: 30  RT will follow pt on full vent support.

## 2021-05-05 NOTE — PLAN OF CARE
Problem: Adjustment to Illness (Delirium)  Goal: Optimal Coping  Outcome: No Change     Problem: Attention and Thought Clarity Impairment (Delirium)  Goal: Improved Attention and Thought Clarity  Outcome: No Change   Patient continues to be a RASS of -5 throughout this shift. No sedation changes have been made this shift. Patient had 2 short, self-resolving runs of SVT with stimulation. Patient did have one episode of hypotension after repositioning which needed re-titration of pressors. Patient is now receiving potassium supplementation after lab showed K+ 3.1. No vent changes. Will continue to monitor.

## 2021-05-05 NOTE — PROGRESS NOTES
[facility] ICU RESPIRATORY NOTE        Date of Admission: 4/13/2021    Date of Intubation (most recent): 4/15/21  Reason for Mechanical Ventilation: Resp failure    Number of Days on Mechanical Ventilation: 19    Met Criteria for Spontaneous Breathing Trial: No  Reason for No Spontaneous Breathing Trial: Per MD    Significant Events Today: None    ABG Results:   Recent Labs   Lab 05/02/21  1256 05/01/21  0918 05/01/21  0425 04/30/21  2200 04/29/21  0835 04/28/21  0831 04/28/21  0831   PH  --  7.32*  --   --  7.43  --  7.36   PCO2  --  82*  --   --  70*  --  73*   PO2  --  44*  --   --  64*  --  92   HCO3  --  42*  --   --  46*  --  41*   O2PER 50%  --  50% 50 50%   < > 60    < > = values in this interval not displayed.       Current Vent Settings: Ventilation Mode: CMV/AC  (Continuous Mandatory Ventilation/ Assist Control)  FiO2 (%): 50 %  Rate Set (breaths/minute): 30 breaths/min  Tidal Volume Set (mL): 330 mL  PEEP (cm H2O): 12 cmH2O  Pressure Support (cm H2O): 5 cmH2O  Oxygen Concentration (%): 50 %  Resp: (!) 39      Plan: Will continue with full ventilatory support    Vanessa Perez, RT

## 2021-05-06 NOTE — PROGRESS NOTES
[facility] ICU RESPIRATORY NOTE           Date of Admission: 4/13/2021     Date of Intubation (most recent): 4/15/21     Reason for Mechanical Ventilation: Resp failure     Number of Days on Mechanical Ventilation: 22     Met Criteria for Spontaneous Breathing Trial: No     Reason for No Spontaneous Breathing Trial: per MD     Significant Events Today: none    Recent Labs   Lab 05/02/21  1256 05/01/21  0918 05/01/21  0425 04/30/21  2200 04/29/21  0835   PH  --  7.32*  --   --  7.43   PCO2  --  82*  --   --  70*   PO2  --  44*  --   --  64*   HCO3  --  42*  --   --  46*   O2PER 50%  --  50% 50 50%     Ventilation Mode: CMV/AC  (Continuous Mandatory Ventilation/ Assist Control)  FiO2 (%): 50 %  Rate Set (breaths/minute): 30 breaths/min  Tidal Volume Set (mL): 330 mL  PEEP (cm H2O): 12 cmH2O  Oxygen Concentration (%): 50 %  Resp: 30    NATHALY CASTANEDA, RT

## 2021-05-06 NOTE — PROGRESS NOTES
Comprehensive Daily ICU Note        Sarahy Dunbar MRN# [id number]   Age: 65 year old YOB: 1955     Date of Admission: 4/13/2021    Primary care provider: Rama Pop     CODE STATUS: FULL      Problem List:       Active Problems:    Acute respiratory failure with hypoxemia (H)    ARDS (adult respiratory distress syndrome) (H)    2019 novel coronavirus disease (COVID-19)    Hematoma    Anemia due to blood loss, acute    Shock (H)    Ventilator associated pneumonia (H)        Subjective/ Last 24 hours:   65-year-old female with no significant medical history who was found in bed with low saturations after welfare check.  Discovered to have COVID on 4/13. Maintained on NIV but subsequently required intubation on 4/15. Initially proned but now largely kept supine.  Overall has had worsening lung compliance, worsening ventilatory deficits, and inability to tolerate weaning of sedation. She has also developed VAP, which has been treated. Other complications include a large hematoma related to an arterial line necessitating a number of transfusions.   No events overnight.  Unable to obtain history directly this morning due to intubated/vented status.         Mechanical Ventilation/Vitalsigns/IsandOs:     Temp:  [98.2  F (36.8  C)-100  F (37.8  C)] 98.2  F (36.8  C)  Pulse:  [62-87] 77  Resp:  [17-57] 30  BP: ()/(36-86) 102/54  FiO2 (%):  [50 %] 50 %  SpO2:  [91 %-100 %] 95 %      Ventilation Mode: CMV/AC  (Continuous Mandatory Ventilation/ Assist Control)  FiO2 (%): 50 %  Rate Set (breaths/minute): 30 breaths/min  Tidal Volume Set (mL): 330 mL  PEEP (cm H2O): 12 cmH2O  Oxygen Concentration (%): 50 %  Resp: 30      Vented since 4/15    Peak airway pressure: mid 30s      Intake/Output Summary (Last 24 hours) at 5/6/2021 0807  Last data filed at 5/6/2021 0600  Gross per 24 hour   Intake 2521.18 ml   Output 3975 ml   Net -1453.82 ml              Physical Examination:     General: Stated age,  heavily sedated  HEENT: ET tube, NG tube  Lungs: crackles anteriorly, occasionally is very dyssynchronous  CVS: RRR, S1S2  Abdomen: obese, soft  Extremities/musculoskeletal: ansarca, warm  Neurology: heavily sedated  Skin: scars from previous knee/ankle surgery. Hematoma has regressed but remains an indurated area with tight and blistered skin; this has not changed significantly in the last few days.   Psychiatry: sedated  Exam of Line sites:  Central line site looks clean          Feeding/Glucose:     Orders Placed This Encounter      NPO for Medical/Clinical Reasons Except for: Meds      Recent Labs   Lab 05/06/21  0756 05/06/21  0412 05/06/21  0340 05/06/21  0006 05/05/21  2014 05/05/21  1837 05/05/21  1609 05/05/21  1228 05/05/21  0430 05/05/21  0430 05/04/21  1801 05/04/21  1801 05/04/21  0530 05/04/21  0530 05/04/21  0055 05/04/21  0055   GLC  --  99  --   --   --  122*  --   --   --  98  --  134*  --  90  --  90   *  --  99 100* 116*  --  146* 120*   < >  --    < >  --    < >  --    < >  --     < > = values in this interval not displayed.              Medications:       chlorhexidine  15 mL Mouth/Throat Q12H     enoxaparin ANTICOAGULANT  0.5 mg/kg Subcutaneous BID     furosemide  60 mg Intravenous Q8H     insulin aspart  1-12 Units Subcutaneous Q4H     meropenem  1 g Intravenous Q8H     methylPREDNISolone  62.5 mg Intravenous Q24H     multivitamins w/minerals  15 mL Per Feeding Tube Daily     pantoprazole  40 mg Per Feeding Tube Daily     protein modular  2 packet Per Feeding Tube Q8H     QUEtiapine  50 mg Oral BID     sennosides  5 mL Oral BID          dextrose       HYDROmorphone 1 mg/hr (05/06/21 0326)     midazolam 7 mg/hr (05/05/21 2230)     norepinephrine 0.06 mcg/kg/min (05/06/21 0610)     propofol (DIPRIVAN) infusion 50 mcg/kg/min (05/06/21 0600)     sodium chloride 20 mL/hr at 05/04/21 2000              Labs:         ROUTINE ICU LABS (Last four results)  CMP  Recent Labs   Lab 05/06/21  0411  05/05/21  1837 05/05/21  1441 05/05/21  0920 05/05/21  0430 05/04/21  1801 05/04/21  0530 05/03/21  0402 05/03/21  0402    140  --   --  141 142 142   < > 145*   POTASSIUM 2.9* 4.8 3.7 3.3* 3.1* 3.8 3.8   < > 3.2*   CHLORIDE 97 101  --   --  97 100 99   < > 103   CO2 42* 43*  --   --  43* >45* >45*   < > 44*   ANIONGAP 1* <1*  --   --  1* Not Calculated Not Calculated   < > <1*   GLC 99 122*  --   --  98 134* 90   < > 88   BUN 30 33*  --   --  31* 31* 28   < > 27   CR 0.44* 0.44*  --   --  0.40* 0.42* 0.44*   < > 0.41*   GFRESTIMATED >90 >90  --   --  >90 >90 >90   < > >90   GFRESTBLACK >90 >90  --   --  >90 >90 >90   < > >90   ALICIA 8.4* 8.6  --   --  8.2* 8.3* 7.9*   < > 7.9*   MAG 2.4*  --   --   --  2.5*  --  2.6*  --  2.6*   PHOS 3.4  --   --   --  3.2  --  3.0  --  2.2*    < > = values in this interval not displayed.     CBC  Recent Labs   Lab 05/06/21  0412 05/05/21 0430 05/04/21 0530 05/03/21 0402   WBC 11.5* 10.5 8.6 8.1   RBC 2.88* 2.80* 2.77* 2.58*   HGB 8.6* 8.2* 8.1* 7.7*   HCT 28.7* 27.7* 27.6* 25.3*    99 100 98   MCH 29.9 29.3 29.2 29.8   MCHC 30.0* 29.6* 29.3* 30.4*   RDW 17.2* 16.7* 16.5* 16.0*    306 295 242     INR  No lab results found in last 7 days.  Arterial Blood Gas  Recent Labs   Lab 05/06/21  0411 05/02/21  1256 05/01/21  0918 05/01/21  0425 04/30/21  2200 04/29/21  0835   PH  --   --  7.32*  --   --  7.43   PCO2  --   --  82*  --   --  70*   PO2  --   --  44*  --   --  64*   HCO3  --   --  42*  --   --  46*   O2PER 50% 50%  --  50% 50 50%       Other Lab Data:      Cultures:  Recent Labs   Lab 05/01/21  0947 05/01/21  0941 05/01/21  0935   CULT 50,000 to 100,000 colonies/mL  Candida albicans  Susceptibility testing not routinely done  *  <1000 colonies/mL  urogenital dario   No growth after 5 days Light growth  Candida albicans  Susceptibility testing not routinely done  *  Light growth  Pseudomonas aeruginosa  *  Light growth  Strain 2  Pseudomonas aeruginosa  *      Blood culture:  Invalid input(s): BC   Urine culture:  No results for input(s): URC in the last 168 hours.          Imaging/Other results:     No results found for this or any previous visit (from the past 24 hour(s)).             Assessment and Plan:       Sarahy Dunbar IS a 65 year old female admitted on 4/13/2021 for Covid pneumonia with ARDS, altered mental status, hypoxemic respiratory failure requiring intubation and mechanical ventilation  I have personally reviewed the daily labs, imaging studies, cultures and discussed the case with referring physician and consulting physicians.     My assessment and plan by system for this patient is as follows:    Neurology/Psychiatry:   1.  Sedation:  Propofol and midazolam drips  2. Analgesia:  Dilaudid drip  3.  Starting seroquel for possible delirium and to facilitate weaning sedation.  qtc ok 426.  Plan  - Continue to wean sedation as possible.     Cardiovascular:   1.Hemodynamics- hypotension related to sedation. Vaspopressor needs are stable and appears to have adequate perfusion.  Plan   - Continue low dose vasopressors    Pulmonary/ID:   1. ARDS from COVID.  Compliance has been worsening despite maximal therapies including antibiotics and resuming steroids and continuing lung protective ventilation. CT showing extensive changes, some of which may now be permanent.  2. VAP with Pseudomonas and Enteroccocus.  3. Positive COVID-19 test on 4/13  4. Higher fevers on 5/1. WBC and procalcitonin similar to previous; no positive culture results and fever curve has returned to near normal.  Fluid collection in hematoma was aspirated by radiologist; serosanguinous and not concerning for infection.  Fevers have since come down.  Plan  - continue lung protective settings.  - Continue diuresis (see renal below).   - Continue Meropenem which was first ordered on 4/26; completes today.   - If patient is stable to improved she will require a tracheostomy.   - Steroid  taper started 5/5: 125->62.5 mg methylpred    GI and Nutrition:   1.  Continue tube feeds as tolerated  2.  PPI for pud prophy    Renal/Fluids/Electrolytes:   1. Creat ok at 0.44  2.  Responding well to diuresis, down 1-2 L per day for the last few days.  3.  Now with mild metabolic alk 7.42/69 -- likely contraction.  Decrease vent rate today to allow resp compensation (30->25); consider acetazolamide, but will try vent adjustment first.  Plan  -Continue current diuretic/free water regimen for today. Lasix 60 q8hr.  - Replace electrolytes. High K protocol. Recheck all tomorrow and PM bmp's while diuresing.     Endocrine:   1.  Stress induced hyperglycemia, now improved  Plan  - Monitor sugars  - Starting methylpred taper    Hematology/Oncology/Skin:   1. Large hematoma with acute blood loss anemia. Bleeding has now stopped. Imaging noted no vessel that needs or is amenable to intervention. Lovenox has been restarted and hemoglobin relatively stable now.  Bedside ultrasound showed fluid collection within hematoma that was aspirated and did not appear infected.  2. Critical illness anemia. hgb stable today at 8.6  3. Transfusion reaction on 5/2. Received a different unit and tolerated well.  Plan  -Continue Lovenox as patient does not appear to be bleeding at this time.     IV/Access:   1. Venous access -central line  2. Arterial access - removed    ICU Prophylaxis:   1. DVT: Lovenox  2. VAP: HOB 30 degrees, chlorhexidine rinse  3. Stress Ulcer: PPI  4. Wound care  - local wound care for hematoma  5. Feeding -tube feeds  6. Family Update:  on the phone  7. Disposition -remain in ICU    Having continued goals of care discussions with .  Made DNR yesterday.  Consulted palliative care to help with end-of-life decision-making issues.  Anticipate family meeting with him and her two sisters this week or next.    I spent 40 minutes on the unit providing critical care for this patient.

## 2021-05-06 NOTE — PROGRESS NOTES
CLINICAL NUTRITION SERVICES - REASSESSMENT NOTE      Malnutrition: (4/15)  % Weight Loss:  None noted  % Intake:  Decreased intake does not meet criteria for malnutrition - NPO x 3 days  Subcutaneous Fat Loss: Unable to assess  Muscle Loss: Unable to assess  Fluid Retention:  None noted     Malnutrition Diagnosis: Unable to determine without Nutrition Focused Physical Exam       EVALUATION OF PROGRESS TOWARD GOALS   Diet:  NPO on vent     Nutrition Support:  Patient continue on trophic TF as follows ~    Nutrition Support Enteral:  Type of Feeding Tube: NJ  Enteral Frequency:  Continuous  Enteral Regimen: Vital HP at 10 mL/hr  Total Enteral Provisions: 240 kcal, 21 g protein, 27 g CHO, 200 mL H2O, no fiber   Free Water Flush: 150 mL every 4 hours  ProSource 2 pkts every 8 hours = 240 kcal and 66 g protein   Propofol at 32.1 mL/hr= 847 kcal   Total (TF + ProSource + Propofol)= 1327 kcal (12 kcal/kg), 87 g protein (1.9 g/kg and 97% needs)    Intake/Tolerance:    K 2.9 (L)  Mg 2.4 (H)  Phos normal   BGM < 150 - High sliding scale insulin  Stool 150 mL   I/O 2826/5175, wt 108.2 kg - IV Lasix       ASSESSED NUTRITION NEEDS:  Dosing Weight: 107 kg for energy, 45 kg for protein  Estimated Energy Needs: 5195-9594 kcals (11-14 Kcal/Kg)  Justification: obese and vented  Estimated Protein Needs:  grams protein (2-2.5 g pro/Kg)  Justification: obesity guidelines       NEW FINDINGS:   Continues on Noreip drip  Certavite daily while on low drip TF     Previous Goals (5/3):   EN + Propofol + Prosurce to meet % estimated needs   Evaluation: Met    Previous Nutrition Diagnosis (5/3):   Inadequate energy intake related to NPO, TF held at trickle rate as evidenced by meeting 77% estimated energy needs with Propofol and Prosource    Evaluation: Resolved       CURRENT NUTRITION DIAGNOSIS  No nutrition diagnosis identified at this time     INTERVENTIONS  Recommendations / Nutrition Prescription  Continue Vital HP at 10 mL/hr  + ProSource 2 pkts every 8 hours as above     Implementation  Collaboration and Referral of Nutrition care:  Patient discussed today during interdisciplinary bedside rounds     Goals  TF + ProSource + Propofol will continue to meet % needs     MONITORING AND EVALUATION:  Progress towards goals will be monitored and evaluated per protocol and Practice Guidelines    Leslie Porter RD, LD, CNSC   Clinical Dietitian - [facility]

## 2021-05-06 NOTE — PLAN OF CARE
[facility] Intensive Care Unit   Nursing Note                                                       Neuro:  PERRL.  Sedated to RASS - 5,  Does not follow commands.  Cardiovascular:  RRR.  Hemodynamically stable on levophed, titrating to keep MAP > 65  Pulmonary:  Tolerating ventilator on propofol/versed/dilaudid.  Oxygen saturation > 92, FiO2 remains at 50%, PEEP 12.  GI/:  Adequate UOP, diuresing well. TF at goal. Pt had 150 output to rectal tube.  Endocrine: Glucose well controlled.  Skin:  See flowsheets.  Protective mepilex applied to sacrum.  Restraints:  Not in use or necessary.  AM labs noted; electrolytes replaced as indicated.  Continue to monitor closely.    Recent Labs   Lab 05/06/21 0411 05/02/21  1256 05/01/21  0918 05/01/21  0425 04/30/21  2200 04/29/21  0835   PH  --   --  7.32*  --   --  7.43   PCO2  --   --  82*  --   --  70*   PO2  --   --  44*  --   --  64*   HCO3  --   --  42*  --   --  46*   O2PER 50% 50%  --  50% 50 50%       Lab Results   Component Value Date    TROPI 0.022 04/17/2021    TROPI 0.431 (HH) 04/13/2021       ROUTINE IP LABS (Last four results)  BMP  Recent Labs   Lab 05/06/21 0412 05/05/21  1837 05/05/21  1441 05/05/21  0920 05/05/21  0430 05/04/21  1801    140  --   --  141 142   POTASSIUM 2.9* 4.8 3.7 3.3* 3.1* 3.8   CHLORIDE 97 101  --   --  97 100   ALICIA 8.4* 8.6  --   --  8.2* 8.3*   CO2 42* 43*  --   --  43* >45*   BUN 30 33*  --   --  31* 31*   CR 0.44* 0.44*  --   --  0.40* 0.42*   GLC 99 122*  --   --  98 134*     CBC  Recent Labs   Lab 05/06/21  0412 05/05/21  0430 05/04/21  0530 05/03/21  0402   WBC 11.5* 10.5 8.6 8.1   RBC 2.88* 2.80* 2.77* 2.58*   HGB 8.6* 8.2* 8.1* 7.7*   HCT 28.7* 27.7* 27.6* 25.3*    99 100 98   MCH 29.9 29.3 29.2 29.8   MCHC 30.0* 29.6* 29.3* 30.4*   RDW 17.2* 16.7* 16.5* 16.0*    306 295 242     INRNo lab results found in last 7 days.  PTTNo lab results found in last 7 days.  LACTIC ACID  Lactic Acid (mmol/L)    Date Value   04/30/2021 1.0     Blood Glucose  Glucose (mg/dL)   Date Value   05/06/2021 99   05/06/2021 100 (H)   05/05/2021 116 (H)   05/05/2021 146 (H)   05/05/2021 120 (H)   05/05/2021 108 (H)       Intake/Output Summary (Last 24 hours) at 5/6/2021 0614  Last data filed at 5/6/2021 0600  Gross per 24 hour   Intake 2826.38 ml   Output 5175 ml   Net -2348.62 ml       Olamide Edwards BSN RN   [facility]  Intensive Care Unit

## 2021-05-06 NOTE — PROGRESS NOTES
[facility] WO Nurse Inpatient Wound Assessment   Reason for follow up:  pannus and groin wounds, hematoma on right thigh    Assessment  RN has just finished skin cares and states all skin issues are improving. RN states palliative is being considered  Last photos 4/29/2021    Continue Current Treatment Plan:   Pannus and groin wounds: Daily    Cleanse wounds with wound cleanser  Apply a thick layer of triad paste #[id number] to wounds  If you feel it necessary okay to cover with foam dressings.   Once the wound beds are only pink and not yellow, no longer draining okay to switch to interdry ag     SACRUM  1. Clean wound with saline or MicroKlenz Spray, pat dry    Apply a thick layer of triad paste to wound only  2. Wipe the surrounding periwound tissue with several skin preps to help with dressing sticking  3. Press a Mepilex  Sacral Dressing (PS#[id number])  to the area, making sure to conform nicely to skin curvatures.  4. Time and date dressing change  -REPOSITION ALL PATIENTS SIDE TO SIDE ONLY WHEN IN BED, EVERY 2 HOURS,   -HEELS MUST BE KEPT ELEVATED AT ALL TIMES, USING AT LEAST 2 PILLOWS UNDER EACH CALF, ASSURING HEELS ARE FLOATING  -WHEN UP TO THE CHAIR PT NEEDS TO FULLY OFF LOAD EVERY 2 HOURS     Orders Reviewed  Recommended provider order: None, at this time  WO Nurse follow-up plan: weekly  Nursing to notify the Provider(s) and re-consult the WO Nurse if wound(s) deteriorates or new skin concern    Patient History  According to provider note(s):  65-year-old female with no significant medical history who was found in bed with low saturations after welfare check.  Discovered to have COVID on 4/13. Maintained on NIV but subsequently required intubation on 4/15. Initially proned but now largely kept supine.  Overall has had worsening lung compliance, worsening ventilatory deficits, and inability to tolerate weaning of sedation. She has also developed VAP, which has been treated. Other  complications include a large hematoma related to an arterial line necessitating a number of transfusions. Active Problems: Acute respiratory failure with hypoxemia (H) ARDS (adult respiratory distress syndrome) (H) 2019 novel coronavirus disease (COVID-19) Hematoma Anemia due to blood loss, acute Shock (H) Ventilator associated pneumonia (H) on levophed     Data  Documented Allergies: Lisinopril-hydrochlorothiazide     Recent Labs   Lab Test 21  0412 21  0430 21  0430 21  0427 21  0427 21  1440 21  1440   ALBUMIN  --   --   --   --  1.8*  --   --    HGB 8.6*   < > 7.8*   < > 7.8*   < >  --    INR  --   --   --   --   --   --  1.08   WBC 11.5*   < > 16.7*   < > 14.8*   < >  --    CRP  --   --  257.0*  --   --   --   --     < > = values in this interval not displayed.     Recent Labs   Lab 21  1217 21  0756 21  0412 21  0340 21  0006 21  2014 21  1837 21  1609 21  0430 21  0430 21  1801 21  1801 21  0530 21  0530 21  0055 21  0055   GLC  --   --  99  --   --   --  122*  --   --  98  --  134*  --  90  --  90   * 109*  --  99 100* 116*  --  146*   < >  --    < >  --    < >  --    < >  --     < > = values in this interval not displayed.       Temperature: Temp (24hrs), Av  F (37.2  C), Min:97.9  F (36.6  C), Max:100  F (37.8  C)      Intake/Output Summary (Last 24 hours) at 2021 1350  Last data filed at 2021 1200  Gross per 24 hour   Intake 3026.81 ml   Output 3195 ml   Net -168.19 ml       Orders Placed This Encounter      NPO for Medical/Clinical Reasons Except for: Meds      Containment of urine/stool: Incontinence Protocol   Medical Devices:Yeboah Catheter: in place, indication: Strict 1-2 Hour I&O, Wound Healing;Strict 1-2 Hour I&O, Retention;Strict 1-2 Hour I&O    Catheter securement Yes    Pressure Injury Risk Assessment (Joss Scale):  Sensory Perception:  1-->completely limited    Moisture: 3-->occasionally moist   Activity: 1-->bedfast     Mobility: 1-->completely immobile   Nutrition: 2-->probably inadequate   Friction and Shear: 2-->potential problem  Joss Score: 10    Current support surface: Bariatric Low air loss mattress  Current off-loading measures in bed: reposition side to side with use of Wedge positioning system  Current off-loading heels: Pillows under calves  Current off-loading measures chair: NA    Focused WOC Nurse Skin/Wound Exam:   RN has just finished skin cares and states all skin issues are improving. RN states palliative is being considered    Interventions  Reviewed oders  Discussed plan of care with Nurse    Francisca Bernstein MS RN CWOCN

## 2021-05-06 NOTE — PLAN OF CARE
Problem: Adult Inpatient Plan of Care  Goal: Absence of Hospital-Acquired Illness or Injury  Intervention: Identify and Manage Fall Risk  Recent Flowsheet Documentation  Taken 5/5/2021 1600 by Fredi Glover RN  Safety Promotion/Fall Prevention:   bed alarm on   clutter free environment maintained   fall prevention program maintained  Taken 5/5/2021 1200 by Fredi Glover RN  Safety Promotion/Fall Prevention:   bed alarm on   clutter free environment maintained   fall prevention program maintained  Taken 5/5/2021 0800 by Fredi Glover RN  Safety Promotion/Fall Prevention:   bed alarm on   clutter free environment maintained   fall prevention program maintained  Intervention: Prevent Skin Injury  Recent Flowsheet Documentation  Taken 5/5/2021 1800 by Fredi Glover RN  Body Position:   side-lying   right  Taken 5/5/2021 1600 by Fredi Glover RN  Body Position:   side-lying   left  Taken 5/5/2021 1400 by Fredi Glover RN  Body Position:   side-lying   right  Taken 5/5/2021 1300 by Fredi Glover RN  Body Position:   side-lying   left  Taken 5/5/2021 1200 by Fredi Glover RN  Body Position:   side-lying   right  Taken 5/5/2021 1000 by Fredi Glover RN  Body Position:   side-lying   left  Taken 5/5/2021 0800 by Fredi Glover RN  Body Position:   side-lying   right  Intervention: Prevent and Manage VTE (Venous Thromboembolism) Risk  Recent Flowsheet Documentation  Taken 5/5/2021 1600 by Fredi Glover RN  VTE Prevention/Management:   pneumatic compression device   anticoagulant therapy maintained  Taken 5/5/2021 1200 by Fredi Glover RN  VTE Prevention/Management:   pneumatic compression device   anticoagulant therapy maintained  Taken 5/5/2021 0800 by Fredi Glover RN  VTE Prevention/Management:   pneumatic compression device   anticoagulant therapy maintained  Intervention: Prevent Infection  Recent Flowsheet Documentation  Taken 5/5/2021 1600 by Fredi Glover RN  Infection  Prevention:   environmental surveillance performed   hand hygiene promoted   personal protective equipment utilized   rest/sleep promoted   single patient room provided   visitors restricted/screened   cohorting utilized   equipment surfaces disinfected  Taken 5/5/2021 1200 by Fredi Glover RN  Infection Prevention:   environmental surveillance performed   hand hygiene promoted   personal protective equipment utilized   rest/sleep promoted   single patient room provided   visitors restricted/screened   cohorting utilized   equipment surfaces disinfected  Taken 5/5/2021 0800 by Fredi Glover RN  Infection Prevention:   environmental surveillance performed   hand hygiene promoted   personal protective equipment utilized   rest/sleep promoted   single patient room provided   visitors restricted/screened   cohorting utilized   equipment surfaces disinfected  Goal: Optimal Comfort and Wellbeing  Intervention: Provide Person-Centered Care  Recent Flowsheet Documentation  Taken 5/5/2021 1600 by Fredi Glover RN  Trust Relationship/Rapport:   care explained   reassurance provided  Taken 5/5/2021 1200 by Fredi Glover RN  Trust Relationship/Rapport:   care explained   reassurance provided  Taken 5/5/2021 0800 by Fredi Glover RN  Trust Relationship/Rapport:   care explained   reassurance provided     Problem: Communication Impairment (Mechanical Ventilation, Invasive)  Goal: Effective Communication  Intervention: Ensure Effective Communication  Recent Flowsheet Documentation  Taken 5/5/2021 1600 by Fredi Glover RN  Communication Enhancement Strategies:   extra time allowed for response   one-step directions provided   repetition utilized  Taken 5/5/2021 1200 by Fredi Glover RN  Communication Enhancement Strategies:   extra time allowed for response   one-step directions provided   repetition utilized  Taken 5/5/2021 0800 by Fredi Glover RN  Communication Enhancement Strategies:   extra time allowed  for response   one-step directions provided   repetition utilized     Problem: Device-Related Complication Risk (Mechanical Ventilation, Invasive)  Goal: Optimal Device Function  Intervention: Optimize Device Care and Function  Recent Flowsheet Documentation  Taken 5/5/2021 1600 by Fredi Glover RN  Airway Safety Measures:   all equipment/monitors on and audible   suction regulator   suction equipment   oxygen flowmeter  Taken 5/5/2021 1200 by Fredi Glover RN  Airway Safety Measures:   all equipment/monitors on and audible   suction regulator   suction equipment   oxygen flowmeter  Taken 5/5/2021 0800 by Fredi Glover RN  Airway Safety Measures:   all equipment/monitors on and audible   suction regulator   suction equipment   oxygen flowmeter     Problem: Inability to Wean (Mechanical Ventilation, Invasive)  Goal: Mechanical Ventilation Liberation  Intervention: Promote Extubation and Mechanical Ventilation Liberation  Recent Flowsheet Documentation  Taken 5/5/2021 1600 by Fredi Glover RN  Medication Review/Management: medications reviewed  Taken 5/5/2021 1200 by Fredi Glover RN  Medication Review/Management: medications reviewed  Taken 5/5/2021 0800 by Fredi Glover RN  Medication Review/Management: medications reviewed     Problem: Ventilator-Induced Lung Injury (Mechanical Ventilation, Invasive)  Goal: Absence of Ventilator-Induced Lung Injury  Intervention: Prevent Ventilator-Associated Pneumonia  Recent Flowsheet Documentation  Taken 5/5/2021 1600 by Fredi Glover RN  VAP Prevention Bundle:   HOB elevation maintained   oral care regularly provided  Oral Care: swabbed with antiseptic solution  Head of Bed (HOB) Positioning: HOB at 30 degrees  VAP Prevention Measures: completed  Taken 5/5/2021 1200 by Fredi Glover RN  VAP Prevention Bundle:   HOB elevation maintained   oral care regularly provided  Oral Care: swabbed with antiseptic solution  Head of Bed (HOB) Positioning: HOB at 30  degrees  VAP Prevention Measures: completed  Taken 5/5/2021 1000 by Fredi Glover, RN  Head of Bed (HOB) Positioning: HOB at 30 degrees  Taken 5/5/2021 0800 by Fredi Glover RN  VAP Prevention Bundle:   HOB elevation maintained   oral care regularly provided  Oral Care: swabbed with antiseptic solution  Head of Bed (HOB) Positioning: HOB at 30 degrees  VAP Prevention Measures: completed

## 2021-05-06 NOTE — CONSULTS
[facility]  Palliative Care Consultation Note    Patient: Sarahy Dunbar  Date of Admission:  4/13/2021    Requesting Clinician / Team: Dr. Augustine/ICU  Reason for consult: Decisional support, Patient and family support    Recommendations:    Continue present level of cares    Our team is happy to participate in future goals of care conferences as needed     Pt's  Ruddy is appropriately emotionally and spiritually coping with pt's serious health issues. We will continue to follow for added support      DARRELL Villagomez CNP  [facility]  Contact information available via [facility] Paging/Directory      Thank you for the opportunity to participate in the care of this patient and family. Our team: will continue to follow.     During regular M-F work hours ([id number]) -- if you are not sure who specifically to contact -- please contact us on [facility] Smart Web.     After regular work hours and on weekends/holidays, you can call our answering service at [phone].     Attestation:  Total time on the floor involved in the patient's care: 40 minutes  Total time spent in counseling/care coordination: >50% spent counseling family in setting of COVID-19 ARDS, respiratory failure and vent dependence, inability to wean sedation     Assessments:  Sarahy Dunbar is a 65 year old female with PMH significant for HTN and anxiety who presents with hypoxia and acute respiratory failure. Her hospital course has uncovered COVID-19 ARDS. She remains vent dependent with worsening lung compliance, worsening ventilatory deficits, and inability to wean from sedation. She developed VAP, which has been treated. She is also found to have a large hematoma 2/2 arterial line, requiring repeated transfusions.     Today, the patient was seen for:  Pt and family support, decisional support in the setting of COVID-19 ARDS, respiratory failure and vent dependence, inability to wean sedation      Visited pt in the ICU. She is seen resting in ICU bed, intubated, mechanically ventilated. She appears calm and comfortable.    Called  Ruddy via phone. This was a joint visit with palliative LICSW, Iraida Guzman. Introduced our services to Ruddy; assistance in pain and symptom management, emotional and spiritual support, and complex medical decision making.     Ruddy verbalizes understanding of the seriousness of his wife's condition. He relies heavily on his lou for guidance through this journey. He has excellent support from family and friends.     Ruddy processes the various things he has heard from staff: Pat may never be well enough to live without a ventilator again. Pat may never be able to return home. This may represent Pat's dying process.     A PEG/trach is an option moving forward. Ruddy recognizes that although his wife has lived a sedentary lifestyle, he isn't certain that a PEG/trach will give her the future quality of life she deserves.    Ruddy is trying to balance his thoughts and what is most logically in Pat's best interest moving forward,  his emotions and his family's emotions. She acknowledges the benefit and challenge of working with pt's sisters, as they have varying opinions on how to proceed. He would like assistance in coming up with a good plan of care with his SILs.     Ruddy expresses sadness and frustration over not being able to visit Pat in the hospital. He sees her via video, which is ok, but just not the same as being able to hold her hand. He firmly believes she can hear/feel him via video, which appears to bring him some comfort. He worries that her mind may still be in there and how scary it must be for her that she is alone and likely unaware of the seriousness of her condition.     The conversation was ended early as Ruddy had a prior engagement.     Prognosis, Goals, & Planning:      Functional Status just prior to hospitalization: 1 (Restricted in  physically strenuous activity but ambulatory and able to carry out work of a light or sedentary nature)      Prognosis, Goals, and/or Advance Care Planning were addressed today: Yes      Patient's decision making preferences: unable to assess          Patient has decision-making capacity today for complex decisions: No            I have concerns about the patient/family's health literacy today: No           Patient has a completed Health Care Directive: Yes, and on file.      Code status: No CPR; pre-arrest intubation OK    Coping, Meaning, & Spirituality:   Mood, coping, and/or meaning in the context of serious illness were addressed today: Yes  Summary/Comments: Ruddy's deep lou is guiding him through this process. He has notable acceptance and peace for the situation he and Pat are in.     Social:     Living situation: Lives with  Ruddy     John family / caregivers:  Ruddy for 37 years + 7.5 years of dating. 2 sisters involved     History of Present Illness:  History gathered today from: family/loved ones, medical chart, health care directive/s    Sarahy Dunbar is a 65 year old female with PMH significant for HTN and anxiety who presents with hypoxia and acute respiratory failure. Her hospital course has uncovered COVID-19 ARDS. She remains vent dependent with worsening lung compliance, worsening ventilatory deficits, and inability to wean from sedation. She developed VAP, which has been treated. She is also found to have a large hematoma 2/2 arterial line, requiring repeated transfusions.     Key Palliative Symptom Data:  We are not helping to manage these symptoms currently in this patient.    Patient is on opioids: bowels not assessed today.    ROS:  Comprehensive ROS is reviewed and is negative except as here & per HPI: N/A     Past Medical History:  Past Medical History:   Diagnosis Date     Arthritis     OA - Knee     Dyshidrotic eczema      Herpes simplex labialis      Hypersensitivity  vasculitides      Hypertension      Obesity      Shingles 2016     Venous insufficiency      Venous stasis         Past Surgical History:  Past Surgical History:   Procedure Laterality Date     ARTHROPLASTY KNEE Left 9/6/2016    Procedure: ARTHROPLASTY KNEE;  Surgeon: Amy Landers MD;  Location:  OR     ARTHROPLASTY KNEE Right 4/3/2018    Procedure: ARTHROPLASTY KNEE;  RIGHT TOTAL KNEE ARTHROPLASTY ;  Surgeon: Amy Landers MD;  Location:  OR     ARTHROSCOPY ANKLE  2/2/2012    Procedure:ARTHROSCOPY ANKLE; LEFT ANKLE ARTHROSCOPIC IRRIGATION AND DEBRIDEMENT, WITH HARDWARE REMOVAL (C-ARM); Surgeon:TONYA CAAL; Location: OR     ORTHOPEDIC SURGERY      Left tib/fib fracture     REMOVE HARDWARE ANKLE  2/2/2012    Procedure:REMOVE HARDWARE ANKLE; Surgeon:TONYA CAAL; Location: OR         Family History:  No family history on file.      Allergies:  Allergies   Allergen Reactions     Lisinopril-Hydrochlorothiazide Other (See Comments)     hyponatremia        Medications:  I have reviewed this patient's medication profile and medications from this hospitalization.   Noted scheduled meds are:  Lovenox BID   Lasix 60mg IV TID   Solumedrol 62.5mg IV daily   Seroquel 50mg via feeding tube BID   Dilaudid gtt 1mg/hr  Versed gtt 7mg/hr   Levophed gtt  Propofol gtt     Noted PRN meds are:  Dilaudid 0.2-0.4mg IV [id number] PRN pain   Versed 2mg IV [id number] PRN anxiety/sedation   Seroquel 25mg via feeding tube Q8hrs PRN anxiety     Physical Exam:  Vital Signs: Temp: 98.2  F (36.8  C) Temp src: Bladder BP: (!) 87/63 Pulse: 77   Resp: 19 SpO2: 95 % O2 Device: Mechanical Ventilator    Weight: 238 lbs 8.6 oz  CONSTITUTIONAL: Critically ill woman seen resting in ICU bed in NAD, somnolent, intubated and mechanically ventilated      Data reviewed:  Recent imaging reviewed, my comments on pertinents:   Results for orders placed or performed during the hospital encounter of 04/13/21   XR Chest Port 1 View    Impression     IMPRESSION: There are hazy abnormal groundglass and interstitial  opacities in both lungs favoring the upper lobes. This could represent  an infectious or inflammatory process. No pneumothorax or pleural  effusion.    YONAS BOONE MD   US Lower Extremity Venous Duplex Bilateral    Impression    IMPRESSION:  1.  No deep venous thrombosis in the bilateral lower extremities.   XR Chest Port 1 View    Impression    IMPRESSION: Endotracheal tube tip 3 cm from the bertrand. Left chest  port tip in the low SVC. Extensive bilateral groundglass infiltrates  appear progressed from previous.    GODWIN NIEVES MD   XR Abdomen Port 1 View    Impression    IMPRESSION: Feeding tube in the stomach.    CHATA LIZARRAGA MD   XR Abdomen Port 1 View    Impression    IMPRESSION: Feeding tube remains in the stomach.    CHATA LIZARRAGA MD   XR Abdomen Port 1 View    Impression    IMPRESSION: Portable supine view of the abdomen. Feeding tube now has  been repositioned with the tip presumably in the proximal jejunum in  good position. A few air-filled loops of small bowel are noted in the  left abdomen. These are not significantly dilated. Degenerative  rightward curvature of the lumbar spine again noted.    MARILYN NARAYAN MD   XR Chest Port 1 View    Impression    IMPRESSION: Similar diffuse groundglass infiltrates bilaterally.  Endotracheal tube not significantly changed. Enteric tube tip below  the margin of study. Left PICC line tip slightly higher in position in  the upper SVC.    GODWIN NIEVES MD   US Post Vascular Access Low Ext Duplex Port    Impression    IMPRESSION:  1. Complex fluid collection in the right groin measuring 16.0 x 16.3 x  5.6 cm, likely hematoma.  2. Small branch vessel adjacent to this hematoma off the right common  femoral artery with normal arterial waveforms and no to and fro flow.  3. If the groin hematoma continues to expand, repeat ultrasound could  be considered.    MYRANDA KELLY,    XR Chest Port 1 View     Impression    IMPRESSION:   1. Endotracheal tube remains in good position above the bertrand. Left  subclavian central venous catheter in the SVC. Feeding tube courses  below the diaphragm.    2. Bilateral pulmonary infiltrates. Increasing consolidation in the  left lower lobe when compared to previous, with new silhouetting of  the left hemidiaphragm. No pneumothorax.    CHATA LIZARRAGA MD   XR Chest Port 1 View    Impression    IMPRESSION: Endotracheal tube in the low trachea and feeding tube  coursing below the left hemidiaphragm, tip outside the field-of-view.  Worsening multifocal patchy and airspace opacities throughout the  lungs concerning for worsening ARDS and/or pneumonia. Left subclavian  central venous catheter tip in the upper SVC. No pleural effusion or  pneumothorax.    PARAM MCCAIN MD   US Lower Extremity Venous Duplex Right    Impression    IMPRESSION:   1. Extremely limited exam due to patient positioning. Where visualized  there is no right DVT.  2. Complex fluid collection in the right upper thigh measuring 12.0 x  7.1 x 7.3 cm, this is measuring smaller when compared to the prior  study, however exam is extremely limited given patient positioning.    MYRANDA KELLY DO   XR Chest Port 1 View    Impression    IMPRESSION: Endotracheal tube is unchanged, with tip 1.3 cm above the  bertrand. Enteric tube, tip not seen, but below the diaphragm. Left  subclavian central venous catheter, with tip in the upper SVC.  Extensive predominantly airspace opacities in the lungs bilaterally  are not significantly changed. No pneumothorax. Heart size appears  stable.    CISCO FONSECA MD   CT Chest w/o Contrast    Impression    IMPRESSION:   Extensive interstitial, groundglass, and airspace opacities throughout  both lungs could all be related to changes of COVID-19 pneumonia,  however some degree of underlying lung fibrosis cannot be excluded.    CISCO FONSECA MD       Recent lab data reviewed, my  comments on pertinents:   Na 140  K 2.9  Creat 0.44  WBC 11.5  Hgb 8.6  Plt 355  Albumin 1.8  INR 1.08

## 2021-05-06 NOTE — PROGRESS NOTES
[facility] ICU RESPIRATORY NOTE        Date of Admission: 4/13/2021    Date of Intubation (most recent): 4/15/21    Reason for Mechanical Ventilation: Resp Failure    Number of Days on Mechanical Ventilation: 22    Met Criteria for Spontaneous Breathing Trial: No    Reason for No Spontaneous Breathing Trial: MD    Significant Events Today: none    ABG Results:   Recent Labs   Lab 05/06/21  0411 05/02/21  1256 05/01/21  0918 05/01/21  0425 04/30/21  2200   PH  --   --  7.32*  --   --    PCO2  --   --  82*  --   --    PO2  --   --  44*  --   --    HCO3  --   --  42*  --   --    O2PER 50% 50%  --  50% 50       Current Vent Settings: Ventilation Mode: CMV/AC  (Continuous Mandatory Ventilation/ Assist Control)  FiO2 (%): 40 %  Rate Set (breaths/minute): 25 breaths/min  Tidal Volume Set (mL): 330 mL  PEEP (cm H2O): 12 cmH2O  Pressure Support (cm H2O): 5 cmH2O  Oxygen Concentration (%): 40 %  Resp: 26        Trinh Belcher, RT  05/06/21

## 2021-05-07 NOTE — PROGRESS NOTES
[facility] hospital ICU RESPIRATORY NOTE        Date of Admission: 4/13/2021    Date of Intubation (most recent): 4/15/21    Reason for Mechanical Ventilation: Resp Failure    Number of Days on Mechanical Ventilation: 23    Met Criteria for Spontaneous Breathing Trial: No    Reason for No Spontaneous Breathing Trial: per MD    Significant Events Today: na    ABG Results:   Recent Labs   Lab 05/06/21  0411 05/02/21  1256 05/01/21  0918 05/01/21  0425 04/30/21  2200   PH  --   --  7.32*  --   --    PCO2  --   --  82*  --   --    PO2  --   --  44*  --   --    HCO3  --   --  42*  --   --    O2PER 50% 50%  --  50% 50       Current Vent Settings: Ventilation Mode: CMV/AC  (Continuous Mandatory Ventilation/ Assist Control)  FiO2 (%): 45 %  Rate Set (breaths/minute): 25 breaths/min  Tidal Volume Set (mL): 330 mL  PEEP (cm H2O): 12 cmH2O  Oxygen Concentration (%): 45 %  Resp: 29        Plan: Maintain on current settings wean as able.    Trinh Belcher, RT

## 2021-05-07 NOTE — PLAN OF CARE
NEURO: RASS of -5. Sedated on dilaudid, propofol, and versed. Titrating.    RESP: Intubated. Minimal secretions. Diminished lung sounds. Oral care q 2hr.     CV: SR. Titrating levophed for MAP > 65.     GI/: [place]. Adequate UO. Rectal tube in place. Bag replaced. TF @ goal.     Skin intact. Hematoma site; scabbing, hard, purple/pink, no changes to size.      updated via Intensivist. Afebrile. Will continue to monitor.

## 2021-05-07 NOTE — PLAN OF CARE
Problem: Adult Inpatient Plan of Care  Goal: Absence of Hospital-Acquired Illness or Injury  Intervention: Identify and Manage Fall Risk  Recent Flowsheet Documentation  Taken 5/6/2021 2000 by Fredi Glover RN  Safety Promotion/Fall Prevention:   bed alarm on   clutter free environment maintained   fall prevention program maintained  Taken 5/6/2021 1600 by Fredi Glover RN  Safety Promotion/Fall Prevention:   bed alarm on   clutter free environment maintained   fall prevention program maintained  Taken 5/6/2021 1200 by Fredi Glover RN  Safety Promotion/Fall Prevention:   bed alarm on   clutter free environment maintained   fall prevention program maintained  Taken 5/6/2021 0800 by Fredi Glover RN  Safety Promotion/Fall Prevention:   bed alarm on   clutter free environment maintained   fall prevention program maintained  Intervention: Prevent Skin Injury  Recent Flowsheet Documentation  Taken 5/6/2021 2000 by Fredi Glover RN  Body Position:   side-lying   left  Taken 5/6/2021 1800 by Fredi Glover RN  Body Position:   side-lying   right  Taken 5/6/2021 1600 by Fredi Glover RN  Body Position:   side-lying   left  Taken 5/6/2021 1400 by Fredi Glover RN  Body Position:   side-lying   right  Taken 5/6/2021 1300 by Fredi Glover RN  Body Position:   side-lying   left  Taken 5/6/2021 1200 by Fredi Glover RN  Body Position:   side-lying   right  Taken 5/6/2021 1000 by Fredi Glover RN  Body Position:   side-lying   left  Taken 5/6/2021 0800 by Fredi Glover RN  Body Position:   side-lying   right  Intervention: Prevent and Manage VTE (Venous Thromboembolism) Risk  Recent Flowsheet Documentation  Taken 5/6/2021 2000 by Fredi Glover RN  VTE Prevention/Management:   pneumatic compression device   anticoagulant therapy maintained  Taken 5/6/2021 1600 by Fredi Glover RN  VTE Prevention/Management:   pneumatic compression device   anticoagulant therapy maintained  Taken 5/6/2021  1200 by Fredi Glover RN  VTE Prevention/Management:   pneumatic compression device   anticoagulant therapy maintained  Taken 5/6/2021 0800 by Fredi Glover RN  VTE Prevention/Management:   pneumatic compression device   anticoagulant therapy maintained  Intervention: Prevent Infection  Recent Flowsheet Documentation  Taken 5/6/2021 2000 by Fredi Glover RN  Infection Prevention:   environmental surveillance performed   hand hygiene promoted   personal protective equipment utilized   rest/sleep promoted   single patient room provided   visitors restricted/screened   cohorting utilized   equipment surfaces disinfected  Taken 5/6/2021 1600 by Fredi Glover RN  Infection Prevention:   environmental surveillance performed   hand hygiene promoted   personal protective equipment utilized   rest/sleep promoted   single patient room provided   visitors restricted/screened   cohorting utilized   equipment surfaces disinfected  Taken 5/6/2021 1200 by Fredi Glover RN  Infection Prevention:   environmental surveillance performed   hand hygiene promoted   personal protective equipment utilized   rest/sleep promoted   single patient room provided   visitors restricted/screened   cohorting utilized   equipment surfaces disinfected  Taken 5/6/2021 0800 by Fredi Glover RN  Infection Prevention:   environmental surveillance performed   hand hygiene promoted   personal protective equipment utilized   rest/sleep promoted   single patient room provided   visitors restricted/screened   cohorting utilized   equipment surfaces disinfected  Goal: Optimal Comfort and Wellbeing  Intervention: Provide Person-Centered Care  Recent Flowsheet Documentation  Taken 5/6/2021 2000 by Fredi Glover RN  Trust Relationship/Rapport:   care explained   reassurance provided  Taken 5/6/2021 1600 by Fredi Glover RN  Trust Relationship/Rapport:   care explained   reassurance provided  Taken 5/6/2021 1200 by Fredi Glover  RN  Trust Relationship/Rapport:   care explained   reassurance provided  Taken 5/6/2021 0800 by Fredi Glover RN  Trust Relationship/Rapport:   care explained   reassurance provided     Problem: Communication Impairment (Mechanical Ventilation, Invasive)  Goal: Effective Communication  Intervention: Ensure Effective Communication  Recent Flowsheet Documentation  Taken 5/6/2021 2000 by Fredi Glover RN  Communication Enhancement Strategies:   extra time allowed for response   one-step directions provided   repetition utilized  Taken 5/6/2021 1600 by Fredi Glover RN  Communication Enhancement Strategies:   extra time allowed for response   one-step directions provided   repetition utilized  Taken 5/6/2021 1200 by Fredi Glover RN  Communication Enhancement Strategies:   extra time allowed for response   one-step directions provided   repetition utilized  Taken 5/6/2021 0800 by Fredi Glover RN  Communication Enhancement Strategies:   extra time allowed for response   one-step directions provided   repetition utilized     Problem: Device-Related Complication Risk (Mechanical Ventilation, Invasive)  Goal: Optimal Device Function  Intervention: Optimize Device Care and Function  Recent Flowsheet Documentation  Taken 5/6/2021 2000 by Fredi Glover RN  Airway Safety Measures:   all equipment/monitors on and audible   suction regulator   suction equipment   oxygen flowmeter  Taken 5/6/2021 1600 by Fredi Glover RN  Airway Safety Measures:   all equipment/monitors on and audible   suction regulator   suction equipment   oxygen flowmeter  Taken 5/6/2021 1200 by Fredi Glover RN  Airway Safety Measures:   all equipment/monitors on and audible   suction regulator   suction equipment   oxygen flowmeter  Taken 5/6/2021 0800 by Fredi Glover RN  Airway Safety Measures:   all equipment/monitors on and audible   suction regulator   suction equipment   oxygen flowmeter     Problem: Inability to Wean  (Mechanical Ventilation, Invasive)  Goal: Mechanical Ventilation Liberation  Intervention: Promote Extubation and Mechanical Ventilation Liberation  Recent Flowsheet Documentation  Taken 5/6/2021 2000 by Fredi Glover RN  Medication Review/Management: medications reviewed  Taken 5/6/2021 1600 by Fredi Glover RN  Medication Review/Management: medications reviewed  Taken 5/6/2021 1200 by Fredi Glover RN  Medication Review/Management: medications reviewed  Taken 5/6/2021 0800 by Fredi Glover RN  Medication Review/Management: medications reviewed     Problem: Ventilator-Induced Lung Injury (Mechanical Ventilation, Invasive)  Goal: Absence of Ventilator-Induced Lung Injury  Intervention: Prevent Ventilator-Associated Pneumonia  Recent Flowsheet Documentation  Taken 5/6/2021 2000 by Fredi Glover RN  VAP Prevention Bundle:   HOB elevation maintained   oral care regularly provided  Oral Care: swabbed with antiseptic solution  Head of Bed (HOB) Positioning: HOB at 30 degrees  VAP Prevention Measures: completed  Taken 5/6/2021 1600 by Fredi Glover RN  VAP Prevention Bundle:   HOB elevation maintained   oral care regularly provided  Oral Care: swabbed with antiseptic solution  Head of Bed (HOB) Positioning: HOB at 30 degrees  VAP Prevention Measures: completed  Taken 5/6/2021 1200 by Fredi Glover RN  VAP Prevention Bundle:   HOB elevation maintained   oral care regularly provided  Oral Care: swabbed with antiseptic solution  Head of Bed (HOB) Positioning: HOB at 30 degrees  VAP Prevention Measures: completed  Taken 5/6/2021 0800 by Fredi Glover RN  VAP Prevention Bundle:   HOB elevation maintained   oral care regularly provided  Oral Care: swabbed with antiseptic solution  Head of Bed (HOB) Positioning: HOB at 30 degrees  VAP Prevention Measures: completed

## 2021-05-07 NOTE — PROGRESS NOTES
Comprehensive Daily ICU Note        Sarahy Dunbar MRN# [id number]   Age: 65 year old YOB: 1955     Date of Admission: 4/13/2021    Primary care provider: Rama Pop     CODE STATUS: FULL      Problem List:       Active Problems:    Acute respiratory failure with hypoxemia (H)    ARDS (adult respiratory distress syndrome) (H)    2019 novel coronavirus disease (COVID-19)    Hematoma    Anemia due to blood loss, acute    Shock (H)    Ventilator associated pneumonia (H)        Subjective/ Last 24 hours:   65-year-old female with no significant medical history who was found in bed with low saturations after welfare check.  Discovered to have COVID on 4/13. Maintained on NIV but subsequently required intubation on 4/15. Initially proned but now largely kept supine.  Overall has had worsening lung compliance, worsening ventilatory deficits, and inability to tolerate weaning of sedation. She has also developed VAP, which has been treated. Other complications include a large hematoma related to an arterial line necessitating a number of transfusions.   No events overnight.  Unable to obtain history directly this morning due to intubated/vented status.         Mechanical Ventilation/Vitalsigns/IsandOs:     Temp:  [97.5  F (36.4  C)-98.4  F (36.9  C)] 98.1  F (36.7  C)  Pulse:  [62-93] 71  Resp:  [11-50] 26  BP: ()/(42-70) 132/65  FiO2 (%):  [40 %-60 %] 50 %  SpO2:  [92 %-100 %] 93 %      Ventilation Mode: CMV/AC  (Continuous Mandatory Ventilation/ Assist Control)  FiO2 (%): 50 %  Rate Set (breaths/minute): 25 breaths/min  Tidal Volume Set (mL): 330 mL  PEEP (cm H2O): 12 cmH2O  Oxygen Concentration (%): 50 %  Resp: 26    Vented since 4/15    Peak airway pressure: mid 30s    Intake/Output Summary (Last 24 hours) at 5/7/2021 0841  Last data filed at 5/7/2021 0600  Gross per 24 hour   Intake 2383.17 ml   Output 2460 ml   Net -76.83 ml                  Physical Examination:     General: Stated age,  heavily sedated  HEENT: ET tube, NG tube  Lungs: crackles anteriorly, occasionally is very dyssynchronous  CVS: RRR, S1S2  Abdomen: obese, soft  Extremities/musculoskeletal: ansarca, warm  Neurology: heavily sedated  Skin: scars from previous knee/ankle surgery. Hematoma has regressed but remains an indurated area with tight and blistered skin; this has not changed significantly in the last few days.   Psychiatry: sedated  Exam of Line sites:  Central line site looks clean          Feeding/Glucose:     Orders Placed This Encounter      NPO for Medical/Clinical Reasons Except for: Meds      Recent Labs   Lab 05/07/21  0816 05/07/21  0426 05/06/21  2354 05/06/21  2103 05/06/21  1715 05/06/21  1616 05/06/21  1217 05/06/21  0412 05/06/21  0412 05/05/21  1837 05/05/21  1837 05/05/21  0430 05/05/21  0430 05/04/21  1801 05/04/21  1801   GLC  --  101*  --   --  146*  --   --   --  99  --  122*  --  98  --  134*   * 99 95 111*  --  152* 119*   < >  --    < >  --    < >  --    < >  --     < > = values in this interval not displayed.              Medications:       chlorhexidine  15 mL Mouth/Throat Q12H     enoxaparin ANTICOAGULANT  0.5 mg/kg Subcutaneous BID     furosemide  60 mg Intravenous Q8H     insulin aspart  1-12 Units Subcutaneous Q4H     methylPREDNISolone  62.5 mg Intravenous Q24H     multivitamins w/minerals  15 mL Per Feeding Tube Daily     pantoprazole  40 mg Per Feeding Tube Daily     protein modular  2 packet Per Feeding Tube Q8H     QUEtiapine  50 mg Oral or Feeding Tube BID     sennosides  5 mL Oral or Feeding Tube BID          dextrose       HYDROmorphone 1 mg/hr (05/07/21 0802)     midazolam 12 mg/hr (05/07/21 0802)     norepinephrine 0.06 mcg/kg/min (05/07/21 0802)     propofol (DIPRIVAN) infusion 60 mcg/kg/min (05/07/21 0800)     sodium chloride 20 mL/hr at 05/06/21 2347              Labs:         ROUTINE ICU LABS (Last four results)  CMP  Recent Labs   Lab 05/07/21  0426 05/06/21  1276  05/06/21  1715 05/06/21  0412 05/05/21  1837 05/05/21  0430 05/05/21  0430 05/04/21  0530 05/04/21  0530     --  141 140 140  --  141   < > 142   POTASSIUM 3.0* 3.9 3.3* 2.9* 4.8   < > 3.1*   < > 3.8   CHLORIDE 102  --  101 97 101  --  97   < > 99   CO2 43*  --  44* 42* 43*  --  43*   < > >45*   ANIONGAP <1*  --  <1* 1* <1*  --  1*   < > Not Calculated   *  --  146* 99 122*  --  98   < > 90   BUN 32*  --  30 30 33*  --  31*   < > 28   CR 0.42*  --  0.44* 0.44* 0.44*  --  0.40*   < > 0.44*   GFRESTIMATED >90  --  >90 >90 >90  --  >90   < > >90   GFRESTBLACK >90  --  >90 >90 >90  --  >90   < > >90   ALICIA 8.6  --  8.6 8.4* 8.6  --  8.2*   < > 7.9*   MAG 2.6*  --   --  2.4*  --   --  2.5*  --  2.6*   PHOS 3.5  --   --  3.4  --   --  3.2  --  3.0    < > = values in this interval not displayed.     CBC  Recent Labs   Lab 05/07/21  0426 05/06/21 0412 05/05/21  0430 05/04/21  0530   WBC 11.3* 11.5* 10.5 8.6   RBC 3.03* 2.88* 2.80* 2.77*   HGB 9.1* 8.6* 8.2* 8.1*   HCT 30.4* 28.7* 27.7* 27.6*    100 99 100   MCH 30.0 29.9 29.3 29.2   MCHC 29.9* 30.0* 29.6* 29.3*   RDW 17.4* 17.2* 16.7* 16.5*    355 306 295     INR  No lab results found in last 7 days.  Arterial Blood Gas  Recent Labs   Lab 05/06/21  0411 05/02/21  1256 05/01/21  0918 05/01/21  0425 04/30/21  2200   PH  --   --  7.32*  --   --    PCO2  --   --  82*  --   --    PO2  --   --  44*  --   --    HCO3  --   --  42*  --   --    O2PER 50% 50%  --  50% 50       Other Lab Data:      Cultures:  Recent Labs   Lab 05/01/21  0947 05/01/21  0941 05/01/21  0935   CULT 50,000 to 100,000 colonies/mL  Candida albicans  Susceptibility testing not routinely done  *  <1000 colonies/mL  urogenital dario   No growth Light growth  Candida albicans  Susceptibility testing not routinely done  *  Light growth  Pseudomonas aeruginosa  *  Light growth  Strain 2  Pseudomonas aeruginosa  *     Blood culture:  Invalid input(s): BC   Urine culture:  No results for  input(s): URC in the last 168 hours.          Imaging/Other results:     No results found for this or any previous visit (from the past 24 hour(s)).         Assessment and Plan:     Sarahy Dunbar IS a 65 year old female admitted on 4/13/2021 for Covid pneumonia with ARDS, altered mental status, hypoxemic respiratory failure requiring intubation and mechanical ventilation.  I have personally reviewed the daily labs, imaging studies, cultures and discussed the case with referring physician and consulting physicians.   My assessment and plan by system for this patient is as follows:    Neurology/Psychiatry:   1.  Sedation:  Propofol and midazolam drips  2. Analgesia:  Dilaudid drip  3.  Starting seroquel for possible delirium and to facilitate weaning sedation.  qtc ok 426.  Plan  - Continue to wean sedation as possible.     Cardiovascular:   1.Hemodynamics- hypotension related to sedation. Vaspopressor needs are stable and appears to have adequate perfusion.  Plan   - Continue low dose vasopressors    Pulmonary/ID:   1. ARDS from COVID.  Compliance has been worsening despite maximal therapies including antibiotics and resuming steroids and continuing lung protective ventilation. CT showing extensive changes, some of which may now be permanent.  2. VAP with Pseudomonas and Enteroccocus.  3. Positive COVID-19 test on 4/13  4. Higher fevers on 5/1. WBC and procalcitonin similar to previous; no positive culture results and fever curve has returned to near normal.  Fluid collection in hematoma was aspirated by radiologist; serosanguinous and not concerning for infection.  Fevers have since come down.  Plan  - continue lung protective settings.  - Continue diuresis (see renal below).   - Meropenem completes today.  - If patient is stable to improved she will require a tracheostomy.   - Steroid taper started    5/5: 125->62.5 mg methylpred   5/7: 62.5->40 mg methylpred    GI and Nutrition:   1.  Continue tube feeds as  tolerated  2.  PPI for pud prophy    Renal/Fluids/Electrolytes:   1. Creat ok at 0.42  2.  Responding well to diuresis, down ~10L over last few days.  3.  Compensated respiratory acidosis 7.36/80/39 following vent adjustments yesterday.  Plan  -Continue current diuretic/free water regimen for today. Lasix 60 q8hr.  - Replace electrolytes. High K protocol. Recheck all tomorrow and PM bmp's while diuresing.     Endocrine:   1.  Stress induced hyperglycemia, now improved  Plan  - Monitor sugars  - Starting methylpred taper    Hematology/Oncology/Skin:   1. Large hematoma with acute blood loss anemia. Bleeding has now stopped. Imaging noted no vessel that needs or is amenable to intervention. Lovenox has been restarted and hemoglobin relatively stable now.  Bedside ultrasound showed fluid collection within hematoma that was aspirated and did not appear infected.  2. Critical illness anemia. hgb stable today at 9.1  3. Transfusion reaction on 5/2. Received a different unit and tolerated well.  Plan  -Continue Lovenox as patient does not appear to be bleeding at this time.     IV/Access:   1. Venous access -central line  2. Arterial access - removed    ICU Prophylaxis:   1. DVT: Lovenox  2. VAP: HOB 30 degrees, chlorhexidine rinse  3. Stress Ulcer: PPI  4. Wound care  - local wound care for hematoma  5. Feeding -tube feeds  6. Family Update:  on the phone  7. Disposition -remain in ICU    Having continued goals of care discussions with .  Made DNR yesterday.  Consulted palliative care to help with end-of-life decision-making issues.  Anticipate family meeting with him and her two sisters this week or next.    I spent 40 minutes on the unit providing critical care for this patient.

## 2021-05-07 NOTE — PROGRESS NOTES
[facility] ICU RESPIRATORY NOTE           Date of Admission: 4/13/2021     Date of Intubation (most recent): 4/15/21     Reason for Mechanical Ventilation: Resp failure     Number of Days on Mechanical Ventilation: 22     Met Criteria for Spontaneous Breathing Trial: No     Reason for No Spontaneous Breathing Trial: per MD     Significant Events Today: asynchrony with vent, paradoxical movement, desaturation FiO2 increased to 60%.     Recent Labs   Lab 05/06/21 0411 05/02/21 1256 05/01/21  0918 05/01/21 0425 04/30/21  2200   PH  --   --  7.32*  --   --    PCO2  --   --  82*  --   --    PO2  --   --  44*  --   --    HCO3  --   --  42*  --   --    O2PER 50% 50%  --  50% 50     Venous Blood Gas  Recent Labs   Lab 05/06/21 0411 05/02/21 1256 05/01/21 0425 04/30/21  2200   PHV 7.42 7.39 7.35 7.28*   PCO2V 69* 74* 78* 89*   PO2V 44 42 38 47   HCO3V 45* 45* 42* 41*   TERRY 17.9 17.3 14.7 12.7   O2PER 50% 50% 50% 50     Ventilation Mode: CMV/AC  (Continuous Mandatory Ventilation/ Assist Control)  FiO2 (%): 60 %  Rate Set (breaths/minute): 25 breaths/min  Tidal Volume Set (mL): 330 mL  PEEP (cm H2O): 12 cmH2O  Oxygen Concentration (%): 60 %  Resp: 20      NATHALY CASTANEDA, RT

## 2021-05-07 NOTE — PLAN OF CARE
Shift: 2339-3186      Neuro: Pupils are equal and reactive; pt is unresponsive, does cough with stimulation     CV: Heart rhythm is sinus, pt on levo to keep MAP>65    Resp: Lungs are coarse, small amount of inline secretions; FiO2 titrated up to 60% this shift      GI: rectal tube in place, loose output, some leakage around the tube     : Yeboah in place, adequate output, pt did not put out a significant amount of urine after the lasix this morning     Skin: see skin assessment in flow sheets     Mobility/Activity: turns q2hrs    Pain: Dilaudid gtt for pain    Sedation titrated up overnight to help with vent synchrony.

## 2021-05-08 NOTE — PLAN OF CARE
Pt tolerating vent settings. K replaced this shift. Remains on levo, fentanyl, propofol and dilaudid. Yeboah in place, 1 dose lasix received. Rectal tube in place with large output.  updated. Awaiting sisters arrival for family care conference next week.

## 2021-05-08 NOTE — PROGRESS NOTES
[facility]   CRITICAL CARE NOTE     Sarahy Dunbar MRN: [id number]  1955  Date of Admission:4/13/2021          Problem List:   1. covid pna  2. Hypoxemic respiratory failure, ARDS   3. Septic shock      24-Hour Goals   1. Wean fio2 to keep spo2>88  2. Wean vasopressor to keep MAP>65  3. Vent mode changed to PRVC for desynchrony       Overnight Events   No acute issues overnight        Lines/Tubes/Draines/Devices   .  CVC TRIPLE LUMEN Left Subclavian (Active)   Site Assessment WDL 05/08/21 0800   Dressing Type Chlorhexidine sponge;Transparent 05/08/21 0800   Dressing Status reinforced;clean;dry 05/08/21 0800   Dressing Intervention new dressing;dressing changed 05/03/21 2000   Dressing Change Due 05/10/21 05/05/21 0600   Line Necessity yes, meets criteria 05/08/21 0800   Blue - Status infusing 05/08/21 0800   Blue - Cap Change Due 05/05/21 04/28/21 0400   Brown - Status infusing 05/08/21 0800   Brown - Cap Change Due 04/22/21 04/19/21 0800   Izquierdo - Status infusing 04/16/21 0400   Red - Status infusing 04/16/21 0400   White - Status infusing 05/08/21 0800   White - Cap Change Due 04/22/21 04/19/21 0800   Phlebitis Scale 0-->no symptoms 04/16/21 0400   Infiltration? no 05/08/21 0800   Infiltration Scale 0 05/08/21 0800   Infiltration Site Treatment Method  None 05/08/21 0800   Number of days: 23       ETT Cuffed Single 7 mm (Active)   Secured at (cm) 24 cm 05/08/21 0842   Measured from Lips 05/08/21 0842   Position Center 05/08/21 1000   Secured by Commercial tube estrella 05/08/21 0842   Bite Block None Present 05/08/21 0842   Site Appearance Clean 05/08/21 0842   Tube Care Site care done 05/07/21 0225   Cuff Assessment Minimal leak technique 05/08/21 0842   Cuff Pressure - cm H2O 30 cmH20 05/07/21 0724   Safety Measures Manual resuscitator at bedside 05/08/21 0842   Number of days: 23       NG/OG/NJ Tube Nasogastric Left nostril (Active)   Site Description WDL 05/08/21 0800   Status  Enteral Feedings 05/08/21 0800   Drainage Appearance WDL 05/08/21 0800   Placement Confirmation Corinna unchanged 05/08/21 0800   Corinna (cm marking) at nare/mouth 89 cm 05/08/21 0800   Intake (ml) 100 ml 05/08/21 0800   Flush/Free Water (mL) 150 mL 05/08/21 0800   Residual (mL) 0 mL 05/08/21 0800   Number of days: 23       Rectal Tube With balloon (Active)   Stool Leakage None 05/08/21 0800   Rectal Tube Container Volume 500 ml 05/08/21 1000   Rectal Tube Output 300 ml 05/08/21 1000   Number of days: 6       Urethral Catheter Coude 16 fr (Active)   Tube Description UTV 05/08/21 0400   Catheter Care Catheter wipes 05/08/21 0800   Collection Container Standard;Patent 05/08/21 0800   Securement Method Securing device (Describe) 05/08/21 0800   Rationale for Continued Use Strict 1-2 Hour I&O;Deep Sedation/Paralysis 05/08/21 0800   Urine Output 150 mL 05/08/21 1000   Number of days: 7       Wound 02/02/12 Left;Lower Ankle Surgical;Puncture (Active)   Number of days: 3383       Wound Buttocks (Active)   Wound Bed Pink;Red 05/08/21 0800   Мария-wound Assessment Excoriated 05/08/21 0800   Drainage Amount None 05/08/21 0800   Drainage Color/Characteristics Yellow 05/03/21 1600   Wound Care/Cleansing Wound cleanser 05/07/21 0000   Dressing Per Plan of Care 05/08/21 0800   Dressing Status Clean, dry, intact 05/08/21 0800   Dressing Change Due 04/29/21 04/28/21 0800   Number of days: 22       Wound Groin (Active)   Wound Bed Pink;Red;Moist 05/08/21 0800   Мария-wound Assessment Erythema 05/08/21 0800   Drainage Amount Small 05/08/21 0800   Drainage Color/Characteristics Serosanguineous 05/08/21 0800   Wound Care/Cleansing Wound cleanser 05/08/21 0800   Dressing Per Plan of Care 05/08/21 0800   Dressing Status Changed 05/08/21 0800   Dressing Change Due 04/21/21 04/28/21 0000   Number of days: 22       Wound Groin Other (comment) (Active)   Wound Bed Red 05/08/21 0800   Мария-wound Assessment Ecchymosis;Erythema 05/08/21 0800    Estimated Circumference ( if not measured Other (Comment) 05/08/21 0800   Drainage Amount Moderate 05/08/21 0800   Drainage Color/Characteristics Serosanguineous 05/08/21 0800   Wound Care/Cleansing Wound cleanser 05/08/21 0800   Dressing Per Plan of Care 05/08/21 0800   Dressing Status Changed 05/08/21 0800   Dressing Change Due 04/28/21 04/28/21 0000   Number of days: 19       Wound Arm (Active)   Wound Bed Other (Comment) 05/08/21 0800   Мария-wound Assessment Ecchymosis 05/08/21 0800   Drainage Amount None 05/08/21 0800   Dressing Open to air 05/08/21 0800   Number of days: 11       Rash Bilateral lower leg (Active)   Number of days:        Incision/Surgical Site 02/02/12 Left;Medial;Lateral Ankle (Active)   Number of days: 3383       Incision/Surgical Site 09/06/16 Left Knee (Active)   Number of days: 1705       Incision/Surgical Site 04/03/18 Right Knee (Active)   Number of days: 1131          ICU Prophylaxis:   1. DVT: LMWH/mechanical  2. VAP: HOB 30 degrees, chlorhexidine rinse  3. Stress Ulcer: H2 blocker  4. Restraints: Nonviolent soft two point restraints required and necessary for patient safety and continued cares and good effect as patient continues to pull at necessary lines, tubes despite education and distraction. Will readdress daily.   5. IV Access - central access required and necessary for continued patient cares  6. Feeding - enteral      Medications:       chlorhexidine  15 mL Mouth/Throat Q12H     enoxaparin ANTICOAGULANT  0.5 mg/kg Subcutaneous BID     furosemide  60 mg Intravenous Q8H     insulin aspart  1-12 Units Subcutaneous Q4H     methylPREDNISolone  40 mg Intravenous Q24H     multivitamins w/minerals  15 mL Per Feeding Tube Daily     pantoprazole  40 mg Per Feeding Tube Daily     protein modular  2 packet Per Feeding Tube Q8H     QUEtiapine  50 mg Oral or Feeding Tube BID     sennosides  5 mL Oral or Feeding Tube BID         Review of Systems:   Unable to obtain due to critical  illness            Physical Exam:   Temp:  [97  F (36.1  C)-98.8  F (37.1  C)] 97.9  F (36.6  C)  Pulse:  [57-97] 74  Resp:  [0-74] 40  BP: ()/(37-75) 99/53  FiO2 (%):  [45 %] 45 %  SpO2:  [91 %-99 %] 99 %    Intake/Output Summary (Last 24 hours) at 5/8/2021 1147  Last data filed at 5/8/2021 1000  Gross per 24 hour   Intake 3234.86 ml   Output 4300 ml   Net -1065.14 ml     Wt Readings from Last 4 Encounters:   05/08/21 105.4 kg (232 lb 5.8 oz)   04/03/18 104.3 kg (230 lb)   09/06/16 100.2 kg (221 lb)   02/06/12 108.1 kg (238 lb 5.1 oz)     BP - Mean:  [47-98] 67  Ventilation Mode: CMV/AC  (Continuous Mandatory Ventilation/ Assist Control)  FiO2 (%): 45 %  Rate Set (breaths/minute): 25 breaths/min  Tidal Volume Set (mL): 330 mL  PEEP (cm H2O): 12 cmH2O  Pressure Support (cm H2O): 5 cmH2O  Oxygen Concentration (%): 45 %  Resp: (!) 40    Recent Labs   Lab 05/06/21  0411 05/02/21  1256   O2PER 50% 50%       GEN: no acute distress   HEENT: head ncat, sclera anicteric, OP patent, trachea midline   PULM: unlabored synchronous with vent, clear anteriorly    CV/COR: RRR S1S2 no gallop,  No rub, no murmur  ABD: soft nontender, hypoactive bowel sounds, no mass  EXT:  Edema   warm  NEURO: grossly intact  SKIN: no obvious rash      Assessment and plan :     Neurology/Psychiatry/Pain/Sedation:   1. Sedation for vent synchrony. RASS -5 on propofol+versed+dilaudid infusion. Difficult to wean given desynchrony and high vent requirements    Cardiovascular/Hemodynamics/Pulm/Vent/ID/Renal/Hematology/Endocrine:   1. Acute respiratory failure/ARDs 2/2 COVID-19. DP~15 on PEEP 14. FiO2 is 45% however tends to desaturate with desynchrony and reduction in PEEP. Will change to PRVC mode to increase mean airway pressure and flow dynamics to improve desynchrony. Currently on solumedrol IV, LMWH and completed remdesivir. Maintaining neg daily fluid balance with scheduled lasix.   2. ICU glucose protocl    GI/Nutrition:   1. Enteral  nutrition     Disposition/Code Status/Other  1. Critically ill with ARDS  2. Code: DNR    I have personally reviewed the daily labs, imaging studies, cultures and discussed the case with referring physician and consulting physicians.     This patient is critically ill and I have provided 30 minutes of critical care time (excluding procedures) on May 8, 2021.     Ihsan Smith MD   of Medicine  Interventional Pulmonary  Department of Pulmonary, Allergy, Critical Care and Sleep Medicine   [facility], [place]  Pager: [phone]   Office: [phone]  Email: [email or web address]    ROUTINE ICU LABS (Last four results)  CMP  Recent Labs   Lab 05/08/21  0335 05/07/21  1830 05/07/21  1023 05/07/21  0426 05/06/21  1715 05/06/21  1715 05/06/21  0412 05/05/21  0430 05/05/21  0430    143  --  144  --  141 140   < > 141   POTASSIUM 3.0* 4.1 3.4 3.0*   < > 3.3* 2.9*   < > 3.1*   CHLORIDE 102 102  --  102  --  101 97   < > 97   CO2 40* 40*  --  43*  --  44* 42*   < > 43*   ANIONGAP <1* 1*  --  <1*  --  <1* 1*   < > 1*   * 124*  --  101*  --  146* 99   < > 98   BUN 45* 40*  --  32*  --  30 30   < > 31*   CR 0.57 0.62  --  0.42*  --  0.44* 0.44*   < > 0.40*   GFRESTIMATED >90 >90  --  >90  --  >90 >90   < > >90   GFRESTBLACK >90 >90  --  >90  --  >90 >90   < > >90   ALICIA 8.6 8.6  --  8.6  --  8.6 8.4*   < > 8.2*   MAG 2.5*  --   --  2.6*  --   --  2.4*  --  2.5*   PHOS 4.8*  --   --  3.5  --   --  3.4  --  3.2    < > = values in this interval not displayed.     CBC  Recent Labs   Lab 05/08/21 0335 05/07/21  0426 05/06/21  0412 05/05/21  0430   WBC 13.2* 11.3* 11.5* 10.5   RBC 3.09* 3.03* 2.88* 2.80*   HGB 9.4* 9.1* 8.6* 8.2*   HCT 31.3* 30.4* 28.7* 27.7*   * 100 100 99   MCH 30.4 30.0 29.9 29.3   MCHC 30.0* 29.9* 30.0* 29.6*   RDW 17.8* 17.4* 17.2* 16.7*   * 388 355 306     INRNo lab results found in last 7 days.  Arterial Blood Gas  Recent Labs   Lab 05/06/21  0410  05/02/21  1256   O2PER 50% 50%

## 2021-05-08 NOTE — PROGRESS NOTES
[facility] ICU RESPIRATORY NOTE        Date of Admission: 4/13/2021    Date of Intubation (most recent): 04/15/21     Reason for Mechanical Ventilation: Resp failure    Number of Days on Mechanical Ventilation: 24    Met Criteria for Spontaneous Breathing Trial: No    Reason for No Spontaneous Breathing Trial: Per MD    Significant Events Today: None    ABG Results:   Recent Labs   Lab 05/06/21  0411 05/02/21  1256 05/01/21  0918 05/01/21  0425   PH  --   --  7.32*  --    PCO2  --   --  82*  --    PO2  --   --  44*  --    HCO3  --   --  42*  --    O2PER 50% 50%  --  50%       Current Vent Settings: Ventilation Mode: CMV/AC  (Continuous Mandatory Ventilation/ Assist Control)  FiO2 (%): 45 %  Rate Set (breaths/minute): 25 breaths/min  Tidal Volume Set (mL): 330 mL  PEEP (cm H2O): 12 cmH2O  Pressure Support (cm H2O): 5 cmH2O  Oxygen Concentration (%): 45 %  Resp: 29      Skin Assessment: Clean and dry    Plan: Continue to follow    Ashley Mendoza

## 2021-05-08 NOTE — PLAN OF CARE
Neuro: PERRL, unresponsive to pain  CV: SR w/ PACs/PVCs. Had one episode of HR dropping in the 20s with suctioning.   Resp: Overbreathing vent at times about 27-29 breaths  GI/: Minimal output, brown flushed and output increased. Rectal tube in place, small leakage.  Skin: R groin hematoma unchanged.  Pain/Gtts: Versed, propofol, dilaudid, levo    K+ replacing.

## 2021-05-09 NOTE — PROGRESS NOTES
Levo titrated  to keep mean greater than 95.Attempt to decrease propofol, but pt started to stack breaths. Sedation remains the same.  Good diuresis from Lasix --k checked at 1730 and returned 4.0.

## 2021-05-09 NOTE — PLAN OF CARE
Neuro: sedated with versed and dilaudid drip, goal RASS -2 to -3, off propofol. Open eyes to voice and blinks, no spontaneous movement all four extremities    CV: SR, to occ ST with Occ PVC, goal MAP > 65 maintained with levophed drip, pedal pulses palpable, mild dependent edema.    Pulm: tolerating vent settings PRVC 45% vt 330 RR-25 and Peep 10 ( decreased this am by MD from 12), small to mod creamy secretions, LS clear to coarse.    GI/: TF at 10ml/hr with 150every 4 hour water flush, rectal tube in place with runny stool. Yeboah in place, diuresing with scheduled lasix. Potassium replaced and rechecked    Skin: right groin hematoma unchanged, b/l groin and sacral dsg change done per plan of care.    Lines: left TLC Subclavian line    Restraints: NA     Plan: Rest overnight on vent. See MD notes for detail.    Problem: Adult Inpatient Plan of Care  Goal: Plan of Care Review  Outcome: No Change  Goal: Patient-Specific Goal (Individualized)  Outcome: No Change  Goal: Absence of Hospital-Acquired Illness or Injury  Outcome: No Change  Intervention: Identify and Manage Fall Risk  Recent Flowsheet Documentation  Taken 5/9/2021 1800 by Oscar Ye RN  Safety Promotion/Fall Prevention: fall prevention program maintained  Taken 5/9/2021 1600 by Oscar Ye RN  Safety Promotion/Fall Prevention: fall prevention program maintained  Taken 5/9/2021 1400 by Oscar Ye RN  Safety Promotion/Fall Prevention: fall prevention program maintained  Taken 5/9/2021 1200 by Oscar Ye RN  Safety Promotion/Fall Prevention: fall prevention program maintained  Taken 5/9/2021 1000 by Oscar Ye RN  Safety Promotion/Fall Prevention: fall prevention program maintained  Taken 5/9/2021 0800 by Oscar Ye RN  Safety Promotion/Fall Prevention: fall prevention program maintained  Intervention: Prevent Skin Injury  Recent Flowsheet Documentation  Taken 5/9/2021 1800 by Oscar Ye RN  Body Position:   lower extremity  elevated   upper extremity elevated   turned   right   head repositioned, right  Taken 5/9/2021 1600 by Oscar Ye RN  Body Position:   lower extremity elevated   upper extremity elevated   turned   left   head repositioned, left  Taken 5/9/2021 1400 by Oscar Ye RN  Body Position:   lower extremity elevated   upper extremity elevated   supine, head elevated  Taken 5/9/2021 1200 by Oscar Ye RN  Body Position:   lower extremity elevated   upper extremity elevated   turned   head repositioned, right   right  Taken 5/9/2021 1100 by Oscar Ye RN  Body Position:   turned   right   head repositioned, right   upper extremity elevated   lower extremity elevated  Taken 5/9/2021 1000 by Oscar Ye RN  Body Position:   lower extremity elevated   upper extremity elevated   head repositioned, left   supine, head elevated  Taken 5/9/2021 0800 by Oscar Ye RN  Body Position:   turned   log-rolled   lower extremity elevated   upper extremity elevated   left   head repositioned, left  Intervention: Prevent and Manage VTE (Venous Thromboembolism) Risk  Recent Flowsheet Documentation  Taken 5/9/2021 1800 by Oscar Ye RN  VTE Prevention/Management:   pneumatic compression device   PROM (passive range of motion) performed   dorsiflexion/plantar flexion performed  Taken 5/9/2021 1600 by Oscar Ye RN  VTE Prevention/Management:   pneumatic compression device   PROM (passive range of motion) performed   dorsiflexion/plantar flexion performed  Taken 5/9/2021 1400 by Oscar Ye RN  VTE Prevention/Management:   pneumatic compression device   PROM (passive range of motion) performed   dorsiflexion/plantar flexion performed  Taken 5/9/2021 1200 by Oscar Ye RN  VTE Prevention/Management:   pneumatic compression device   PROM (passive range of motion) performed   dorsiflexion/plantar flexion performed  Taken 5/9/2021 1000 by Oscar Ye RN  VTE Prevention/Management:   pneumatic  compression device   PROM (passive range of motion) performed   dorsiflexion/plantar flexion performed  Taken 5/9/2021 0800 by Oscar Ye RN  VTE Prevention/Management:   pneumatic compression device   PROM (passive range of motion) performed   dorsiflexion/plantar flexion performed  Intervention: Prevent Infection  Recent Flowsheet Documentation  Taken 5/9/2021 1800 by Oscar Ye RN  Infection Prevention: personal protective equipment utilized  Taken 5/9/2021 1600 by Oscar Ye RN  Infection Prevention: personal protective equipment utilized  Taken 5/9/2021 1400 by Oscar Ye RN  Infection Prevention: personal protective equipment utilized  Taken 5/9/2021 1200 by Oscar Ye RN  Infection Prevention: personal protective equipment utilized  Taken 5/9/2021 1000 by Oscar Ye RN  Infection Prevention: personal protective equipment utilized  Taken 5/9/2021 0800 by Oscar Ye RN  Infection Prevention: personal protective equipment utilized  Goal: Optimal Comfort and Wellbeing  Outcome: No Change  Intervention: Provide Person-Centered Care  Recent Flowsheet Documentation  Taken 5/9/2021 1800 by Oscar Ye RN  Trust Relationship/Rapport: care explained  Taken 5/9/2021 1600 by Oscar Ye RN  Trust Relationship/Rapport: care explained  Taken 5/9/2021 1400 by Oscar Ye RN  Trust Relationship/Rapport: care explained  Taken 5/9/2021 1200 by Oscar Ye RN  Trust Relationship/Rapport: care explained  Taken 5/9/2021 1000 by Oscar Ye RN  Trust Relationship/Rapport: care explained  Taken 5/9/2021 0800 by Oscar Ye RN  Trust Relationship/Rapport: care explained  Goal: Readiness for Transition of Care  Outcome: No Change     Problem: Adjustment to Illness (Delirium)  Goal: Optimal Coping  Outcome: No Change     Problem: Altered Behavior (Delirium)  Goal: Improved Behavioral Control  Outcome: No Change     Problem: Attention and Thought Clarity Impairment  (Delirium)  Goal: Improved Attention and Thought Clarity  Outcome: No Change     Problem: Sleep Disturbance (Delirium)  Goal: Improved Sleep  Outcome: No Change     Problem: Communication Impairment (Mechanical Ventilation, Invasive)  Goal: Effective Communication  Outcome: No Change     Problem: Device-Related Complication Risk (Mechanical Ventilation, Invasive)  Goal: Optimal Device Function  Outcome: No Change  Intervention: Optimize Device Care and Function  Recent Flowsheet Documentation  Taken 5/9/2021 1800 by Oscar Ye RN  Airway Safety Measures: all equipment/monitors on and audible  Taken 5/9/2021 1600 by Oscar Ye RN  Airway Safety Measures: all equipment/monitors on and audible  Taken 5/9/2021 1400 by Oscar Ye RN  Airway Safety Measures: all equipment/monitors on and audible  Taken 5/9/2021 1200 by Oscar Ye RN  Airway Safety Measures: all equipment/monitors on and audible  Taken 5/9/2021 1000 by Oscar Ye RN  Airway Safety Measures: all equipment/monitors on and audible  Taken 5/9/2021 0800 by Oscar Ye RN  Airway Safety Measures: all equipment/monitors on and audible     Problem: Inability to Wean (Mechanical Ventilation, Invasive)  Goal: Mechanical Ventilation Liberation  Outcome: No Change  Intervention: Promote Extubation and Mechanical Ventilation Liberation  Recent Flowsheet Documentation  Taken 5/9/2021 1800 by Oscar Ye RN  Medication Review/Management: medications reviewed  Taken 5/9/2021 1600 by Oscar Ye RN  Medication Review/Management: medications reviewed  Taken 5/9/2021 1400 by Oscar Ye RN  Medication Review/Management: medications reviewed  Taken 5/9/2021 1200 by Oscar Ye RN  Medication Review/Management: medications reviewed  Taken 5/9/2021 1000 by Oscar Ye RN  Medication Review/Management: medications reviewed  Taken 5/9/2021 0800 by Oscar Ye RN  Medication Review/Management: medications reviewed     Problem:  Nutrition Impairment (Mechanical Ventilation, Invasive)  Goal: Optimal Nutrition Delivery  Outcome: No Change     Problem: Skin and Tissue Injury (Mechanical Ventilation, Invasive)  Goal: Absence of Device-Related Skin and Tissue Injury  Outcome: No Change     Problem: Ventilator-Induced Lung Injury (Mechanical Ventilation, Invasive)  Goal: Absence of Ventilator-Induced Lung Injury  Outcome: No Change  Intervention: Prevent Ventilator-Associated Pneumonia  Recent Flowsheet Documentation  Taken 5/9/2021 1800 by Oscar Ye RN  VAP Prevention Bundle:   HOB elevation maintained   oral care regularly provided   stress ulcer prophylaxis provided   vent circuit breaks minimized   VTE prophylaxis provided  Head of Bed (HOB) Positioning: HOB at 30 degrees  VAP Prevention Contraindications: condition unstable  VAP Prevention Measures: completed  Taken 5/9/2021 1600 by Oscar Ye RN  VAP Prevention Bundle:   HOB elevation maintained   oral care regularly provided   stress ulcer prophylaxis provided   vent circuit breaks minimized   VTE prophylaxis provided  Head of Bed (HOB) Positioning: HOB at 30 degrees  VAP Prevention Contraindications: condition unstable  VAP Prevention Measures: completed  Taken 5/9/2021 1400 by Oscar Ye RN  VAP Prevention Bundle:   HOB elevation maintained   oral care regularly provided   stress ulcer prophylaxis provided   vent circuit breaks minimized   VTE prophylaxis provided  Head of Bed (HOB) Positioning: HOB at 30 degrees  VAP Prevention Contraindications: condition unstable  VAP Prevention Measures: completed  Taken 5/9/2021 1200 by Oscar Ye RN  VAP Prevention Bundle:   HOB elevation maintained   oral care regularly provided   stress ulcer prophylaxis provided   vent circuit breaks minimized   VTE prophylaxis provided  Head of Bed (HOB) Positioning: HOB at 30 degrees  VAP Prevention Contraindications: condition unstable  VAP Prevention Measures: completed  Taken 5/9/2021  1100 by Oscar Ye RN  Head of Bed (HOB) Positioning: HOB at 30 degrees  Taken 5/9/2021 1000 by Oscar Ye RN  VAP Prevention Bundle:   HOB elevation maintained   oral care regularly provided   stress ulcer prophylaxis provided   vent circuit breaks minimized   VTE prophylaxis provided  Head of Bed (HOB) Positioning: HOB at 30 degrees  VAP Prevention Contraindications: condition unstable  VAP Prevention Measures: completed  Taken 5/9/2021 0800 by Oscar Ye RN  VAP Prevention Bundle:   HOB elevation maintained   oral care regularly provided   stress ulcer prophylaxis provided   vent circuit breaks minimized   VTE prophylaxis provided  Oral Care: swabbed with antiseptic solution  Head of Bed (HOB) Positioning: HOB at 30 degrees  VAP Prevention Contraindications: condition unstable  VAP Prevention Measures: completed     Problem: ARDS (Acute Respiratory Distress Syndrome)  Goal: Effective Oxygenation  Outcome: No Change

## 2021-05-09 NOTE — PROGRESS NOTES
[facility]   CRITICAL CARE NOTE     Sarahy Dunbar MRN: [id number]  1955  Date of Admission:4/13/2021          Problem List:   1. covid pna  2. Hypoxemic respiratory failure, ARDS   3. Septic shock       24-Hour Goals   1. Wean fio2 to keep spo2>88  2. Wean vasopressor to keep MAP>65       Overnight Events   No acute issues overnight        Lines/Tubes/Draines/Devices   .  CVC TRIPLE LUMEN Left Subclavian (Active)   Site Assessment WDL 05/09/21 0400   Dressing Type Chlorhexidine sponge;Transparent 05/09/21 0400   Dressing Status reinforced;clean;dry 05/08/21 0800   Dressing Intervention new dressing;dressing changed 05/03/21 2000   Dressing Change Due 05/10/21 05/05/21 0600   Line Necessity yes, meets criteria 05/09/21 0400   Blue - Status infusing;blood return noted;cap changed 05/09/21 0400   Blue - Cap Change Due 05/05/21 04/28/21 0400   Brown - Status infusing 05/09/21 0400   Brown - Cap Change Due 04/22/21 04/19/21 0800   Izquierdo - Status infusing 04/16/21 0400   Red - Status infusing 04/16/21 0400   White - Status infusing 05/09/21 0400   White - Cap Change Due 04/22/21 04/19/21 0800   Phlebitis Scale 0-->no symptoms 04/16/21 0400   Infiltration? no 05/09/21 0400   Infiltration Scale 0 05/09/21 0400   Infiltration Site Treatment Method  None 05/09/21 0400   Number of days: 24       ETT Cuffed Single 7 mm (Active)   Secured at (cm) 24 cm 05/09/21 0726   Measured from Lips 05/09/21 0726   Position Right 05/09/21 0600   Secured by Commercial tube estrella 05/09/21 0315   Bite Block None Present 05/09/21 0315   Site Appearance Clean 05/09/21 0315   Tube Care Site care done 05/07/21 0225   Cuff Assessment Minimal occluding volume 05/09/21 0726   Cuff Pressure - cm H2O 30 cmH20 05/09/21 0726   Safety Measures Manual resuscitator at bedside 05/09/21 0726   Number of days: 24       NG/OG/NJ Tube Nasogastric Left nostril (Active)   Site Description [facility] 05/09/21 0400   Status Enteral Feedings  05/09/21 0400   Drainage Appearance WDL 05/08/21 1200   Placement Confirmation Finzel unchanged 05/09/21 0400   Finzel (cm marking) at nare/mouth 89 cm 05/09/21 0400   Intake (ml) 60 ml 05/09/21 0600   Flush/Free Water (mL) 150 mL 05/09/21 0400   Residual (mL) 0 mL 05/08/21 0800   Number of days: 24       Rectal Tube With balloon (Active)   Stool Leakage Small 05/08/21 1000   Rectal Tube Container Volume 200 ml 05/09/21 0600   Rectal Tube Output 200 ml 05/09/21 0600   Number of days: 7       Urethral Catheter Coude 16 fr (Active)   Tube Description UTV 05/08/21 0400   Catheter Care Done;Catheter wipes 05/09/21 0400   Collection Container Standard 05/09/21 0400   Securement Method Securing device (Describe) 05/09/21 0400   Rationale for Continued Use Strict 1-2 Hour I&O 05/09/21 0400   Urine Output 700 mL 05/09/21 0600   Number of days: 8       Wound 02/02/12 Left;Lower Ankle Surgical;Puncture (Active)   Number of days: 3384       Wound Buttocks (Active)   Wound Bed Pink;Red 05/09/21 0000   Мария-wound Assessment Excoriated 05/09/21 0000   Drainage Amount None 05/09/21 0000   Drainage Color/Characteristics Yellow 05/03/21 1600   Wound Care/Cleansing Barrier applied ;Wound cleanser 05/08/21 2000   Dressing Foam 05/09/21 0000   Dressing Status Changed;Clean, dry, intact 05/09/21 0000   Dressing Change Due 04/29/21 04/28/21 0800   Number of days: 23       Wound Groin (Active)   Wound Bed Other (Comment) 05/09/21 0000   Мария-wound Assessment Erythema 05/09/21 0000   Drainage Amount None 05/09/21 0000   Drainage Color/Characteristics Serosanguineous 05/08/21 0800   Wound Care/Cleansing Other (Comment) 05/08/21 1600   Dressing Foam 05/09/21 0000   Dressing Status Changed 05/08/21 0800   Dressing Change Due 04/21/21 04/28/21 0000   Number of days: 23       Wound Groin Other (comment) (Active)   Wound Bed Other (Comment) 05/09/21 0000   Мария-wound Assessment Ecchymosis;Edema;Hematoma 05/09/21 0000   Estimated  Circumference ( if not measured Other (Comment) 05/08/21 0800   Drainage Amount None 05/09/21 0000   Drainage Color/Characteristics Serosanguineous 05/08/21 0800   Wound Care/Cleansing Other (Comment) 05/08/21 1600   Dressing Foam 05/09/21 0000   Dressing Status Changed 05/08/21 0800   Dressing Change Due 04/28/21 04/28/21 0000   Number of days: 20       Wound Arm (Active)   Wound Bed Other (Comment) 05/08/21 1600   Мария-wound Assessment Ecchymosis 05/09/21 0000   Drainage Amount None 05/09/21 0000   Dressing Open to air 05/09/21 0000   Number of days: 12       Rash Bilateral lower leg (Active)   Number of days:        Incision/Surgical Site 02/02/12 Left;Medial;Lateral Ankle (Active)   Number of days: 3384       Incision/Surgical Site 09/06/16 Left Knee (Active)   Number of days: 1706       Incision/Surgical Site 04/03/18 Right Knee (Active)   Number of days: 1132          ICU Prophylaxis:   1. DVT: LMWH/mechanical  2. VAP: HOB 30 degrees, chlorhexidine rinse  3. Stress Ulcer: H2 blocker  4. Restraints: Nonviolent soft two point restraints required and necessary for patient safety and continued cares and good effect as patient continues to pull at necessary lines, tubes despite education and distraction. Will readdress daily.   5. IV Access - central access required and necessary for continued patient cares  6. Feeding - enteral      Medications:       chlorhexidine  15 mL Mouth/Throat Q12H     enoxaparin ANTICOAGULANT  0.5 mg/kg Subcutaneous BID     furosemide  60 mg Intravenous Q8H     insulin aspart  1-12 Units Subcutaneous Q4H     methylPREDNISolone  40 mg Intravenous Q24H     multivitamins w/minerals  15 mL Per Feeding Tube Daily     pantoprazole  40 mg Per Feeding Tube Daily     protein modular  2 packet Per Feeding Tube Q8H     QUEtiapine  50 mg Oral or Feeding Tube BID     sennosides  5 mL Oral or Feeding Tube BID         Review of Systems:   Unable to obtain due to critical illness          Physical Exam:    Temp:  [97.3  F (36.3  C)-98.4  F (36.9  C)] 97.9  F (36.6  C)  Pulse:  [56-97] 71  Resp:  [17-65] 26  BP: ()/() 102/57  FiO2 (%):  [45 %] 45 %  SpO2:  [63 %-100 %] 97 %    Intake/Output Summary (Last 24 hours) at 5/9/2021 0740  Last data filed at 5/9/2021 0600  Gross per 24 hour   Intake 3171.22 ml   Output 5490 ml   Net -2318.78 ml     Wt Readings from Last 4 Encounters:   05/09/21 103.1 kg (227 lb 4.7 oz)   04/03/18 104.3 kg (230 lb)   09/06/16 100.2 kg (221 lb)   02/06/12 108.1 kg (238 lb 5.1 oz)     BP - Mean:  [] 69  Ventilation Mode: Other (see comments) (PRVC)  FiO2 (%): 45 %  Rate Set (breaths/minute): 25 breaths/min  Tidal Volume Set (mL): 330 mL  PEEP (cm H2O): 10 cmH2O  Pressure Support (cm H2O): 5 cmH2O  Oxygen Concentration (%): 45 %  Resp: 26    Recent Labs   Lab 05/06/21  0411 05/02/21  1256   O2PER 50% 50%       GEN: no acute distress   HEENT: head ncat, sclera anicteric, OP patent, trachea midline   PULM: unlabored synchronous with vent, clear anteriorly    CV/COR: RRR S1S2 no gallop,  No rub, no murmur  ABD: soft nontender, hypoactive bowel sounds, no mass  EXT:  Edema   warm  NEURO: grossly intact  SKIN: no obvious rash      Assessment and plan :     Neurology/Psychiatry/Pain/Sedation:   1. Sedation for vent synchrony. RASS -5 on propofol+versed+dilaudid infusion. Better vent synchrony overnight. Will try to wean off either propofol or versed today.      Cardiovascular/Hemodynamics/Pulm/Vent/ID/Renal/Hematology/Endocrine:   1. Acute respiratory failure/ARDs 2/2 COVID-19. DP~18 on PEEP 12. FiO2 is 45%. Better synchrony with PRVC mode overnight. Will decrease PEEP to 10. Currently on solumedrol IV, LMWH and completed remdesivir. Maintaining neg daily fluid balance with scheduled lasix.   2. ICU glucose protocl     GI/Nutrition:   1. Enteral nutrition      Disposition/Code Status/Other  1. Critically ill with ARDS  2. Code: DNR  I have personally reviewed the daily labs, imaging  studies, cultures and discussed the case with referring physician and consulting physicians.     This patient is critically ill and I have provided 30 minutes of critical care time (excluding procedures) on May 9, 2021.     Ihsan Smith MD   of Medicine  Interventional Pulmonary  Department of Pulmonary, Allergy, Critical Care and Sleep Medicine   [email or web address]  Pager: [phone]   Office: [phone]  Email: [email or web address]    ROUTINE ICU LABS (Last four results)  CMP  Recent Labs   Lab 05/09/21  0400 05/08/21  1700 05/08/21  1220 05/08/21  0335 05/07/21  1830 05/07/21  0426 05/07/21  0426 05/06/21  0412 05/06/21 0412    143  --  142 143  --  144   < > 140   POTASSIUM 2.9* 4.0 3.3* 3.0* 4.1   < > 3.0*   < > 2.9*   CHLORIDE 102 103  --  102 102  --  102   < > 97   CO2 41* 39*  --  40* 40*  --  43*   < > 42*   ANIONGAP <1* 1*  --  <1* 1*  --  <1*   < > 1*   GLC 96 137*  --  105* 124*  --  101*   < > 99   BUN 43* 44*  --  45* 40*  --  32*   < > 30   CR 0.51* 0.55  --  0.57 0.62  --  0.42*   < > 0.44*   GFRESTIMATED >90 >90  --  >90 >90  --  >90   < > >90   GFRESTBLACK >90 >90  --  >90 >90  --  >90   < > >90   ALICIA 8.6 8.6  --  8.6 8.6  --  8.6   < > 8.4*   MAG 2.4*  --   --  2.5*  --   --  2.6*  --  2.4*   PHOS 4.0  --   --  4.8*  --   --  3.5  --  3.4    < > = values in this interval not displayed.     CBC  Recent Labs   Lab 05/09/21  0400 05/08/21  0335 05/07/21  0426 05/06/21  0412   WBC 12.6* 13.2* 11.3* 11.5*   RBC 3.02* 3.09* 3.03* 2.88*   HGB 9.3* 9.4* 9.1* 8.6*   HCT 30.5* 31.3* 30.4* 28.7*   * 101* 100 100   MCH 30.8 30.4 30.0 29.9   MCHC 30.5* 30.0* 29.9* 30.0*   RDW 18.1* 17.8* 17.4* 17.2*   * 480* 388 355     INRNo lab results found in last 7 days.  Arterial Blood Gas  Recent Labs   Lab 05/06/21  0411 05/02/21  1256   O2PER 50% 50%       All cultures:  No results for input(s): CULT in the last 168 hours.  No results found for this or any  previous visit (from the past 24 hour(s)).

## 2021-05-09 NOTE — PROGRESS NOTES
[facility] ICU RESPIRATORY NOTE        Date of Admission: 4/13/2021    Date of Intubation (most recent): 4/13/21    Reason for Mechanical Ventilation: Res Failure    Number of Days on Mechanical Ventilation: 24    Met Criteria for Spontaneous Breathing Trial: No    Reason for No Spontaneous Breathing Trial: PEEP>8    Significant Events Today: none    ABG Results:   Recent Labs   Lab 05/06/21  0411   O2PER 50%       Current Vent Settings: Ventilation Mode: Other (see comments) ([facility])  FiO2 (%): 45 %  Rate Set (breaths/minute): 25 breaths/min  Tidal Volume Set (mL): 330 mL  PEEP (cm H2O): 10 cmH2O  Pressure Support (cm H2O): 5 cmH2O  Oxygen Concentration (%): 45 %  Resp: 25      Skin Assessment: done    Plan: cont monitoring    Luis M Casas RT

## 2021-05-09 NOTE — PROGRESS NOTES
[facility] ICU RESPIRATORY NOTE        Date of Admission: 4/13/2021    Date of Intubation (most recent): 4/15/21    Reason for Mechanical Ventilation: Resp failure    Number of Days on Mechanical Ventilation: 23    Met Criteria for Spontaneous Breathing Trial: No    Reason for No Spontaneous Breathing Trial: Per MD    Significant Events Today: None    ABG Results:   Recent Labs   Lab 05/06/21  0411 05/02/21  1256   O2PER 50% 50%       Current Vent Settings: Ventilation Mode: Other (see comments) ([facility])  FiO2 (%): 45 %  Rate Set (breaths/minute): 25 breaths/min  Tidal Volume Set (mL): 330 mL  PEEP (cm H2O): 12 cmH2O  Pressure Support (cm H2O): 5 cmH2O  Oxygen Concentration (%): 45 %  Resp: 25      Skin Assessment: intact    Plan:  Will continue to monitor the patient on full ventilatory support    Vanessa Perez, RT

## 2021-05-10 NOTE — PROGRESS NOTES
Received a request for a care conference with ICU team tomorrow, Tuesday 5/11 at 9AM. I will plan to attend. Thanks.    DARRELL Villagomez [facility]  Contact information available via [facility] Paging/Directory

## 2021-05-10 NOTE — PROGRESS NOTES
CLINICAL NUTRITION SERVICES - REASSESSMENT NOTE      Recommendations Ordered by Registered Dietitian (RD):   Increase TF Vital High Protein now to 30 mL/hr;  After 12 hrs increase to goal 50 mL/hr = 1200 kcals (12 kcal/kg), 104 gm pro (2.3 gm/kg), 1000 mL H20, 133 gm CHO, no fiber  Cancel Prosource as will no longer be needed   Continue Certavite   Malnutrition:  (4/15)  % Weight Loss:  None noted  % Intake:  Decreased intake does not meet criteria for malnutrition - NPO x 3 days  Subcutaneous Fat Loss: Unable to assess  Muscle Loss: Unable to assess  Fluid Retention:  None noted     Malnutrition Diagnosis: Unable to determine without Nutrition Focused Physical Exam       EVALUATION OF PROGRESS TOWARD GOALS   Diet:  NPO on vent    Nutrition Support:  TF continues as below:    Nutrition Support Enteral:  Type of Feeding Tube: NJ  Enteral Frequency:  Continuous  Enteral Regimen: Vital HP at 10 mL/hr  Total Enteral Provisions: 240 kcal, 21 g protein, 27 g CHO, 200 mL H2O, no fiber   Free Water Flush: 150 mL every 4 hours  Certavite daily - to meet vitamin/mineral needs while on low volume TF    ProSource 2 pkts every 8 hours = 240 kcal and 66 g protein   Propofol was discontinued on 5/9.   Total (TF + ProSource) = 480 kcal (5 kcal/kg), 87 g protein (1.9 g/kg and 97% needs)     Intake/Tolerance:    Stool Pattern (Senokot for bowel program --> held today):  5/6:  x6  5/7:  x4  5/8:  x1  5/9:  x3 (400 mL)  5/10:  x3  K 3.0 (L)  BUN 38 (H)  BGM acceptable.  I/O:  2373/3308.  Wt:  102.2 kg (down 4.8 kg from admission).  BMI: 44.  Pt with 2+ generalized edema.  IV Lasix for diuresis.    ASSESSED NUTRITION NEEDS: (Modified energy needs using low DW)  Dosing Weight: 102.2 kg for energy, 45 kg for protein  Estimated Energy Needs: 1125-17297 kcals (11-14 Kcal/Kg)  Justification: obese and vented  Estimated Protein Needs:  grams protein (2-2.5 g pro/Kg)  Justification: obesity guidelines     NEW FINDINGS:   5/6:  RN states  all skin issues are improving  Labile BPs, remains on Norepinephrine low drip.  May possibly need trach consideration in future per rounds discussion.  Currently underfeeding kcals with Propofol off.    Previous Goals (10/6):   TF + ProSource + Propofol will continue to meet % needs   Evaluation: Not met    Previous Nutrition Diagnosis (10/6):   No nutrition diagnosis identified at this time   Evaluation: Declining      CURRENT NUTRITION DIAGNOSIS  Inadequate energy intake related to Propofol off as evidenced by TF + Prosource meeting only 43% energy needs    INTERVENTIONS  Recommendations / Nutrition Prescription  Increase TF Vital High Protein now to 30 mL/hr;  After 12 hrs increase to goal 50 mL/hr = 1200 kcals (12 kcal/kg), 104 gm pro (2.3 gm/kg), 1000 mL H20, 133 gm CHO, no fiber  Cancel Prosource as will no longer be needed   Continue Certavite    Implementation  Collaboration and Referral of Nutrition care - Pt was discussed during ICU interdisciplinary rounds this morning  EN Schedule and  Medical Food Supplement - Ordered changes in Epic as above    Goals  TF Vital High Protein at 50 mL/hr will meet % estimated needs    MONITORING AND EVALUATION:  Progress towards goals will be monitored and evaluated per protocol and Practice Guidelines    Wanda Porter, RD, LD, CNSC

## 2021-05-10 NOTE — PROGRESS NOTES
[facility] ICU RESPIRATORY NOTE        Date of Admission: 4/13/2021    Date of Intubation (most recent): 4/13/21    Reason for Mechanical Ventilation: Resp failure    Number of Days on Mechanical Ventilation: 25    Met Criteria for Spontaneous Breathing Trial: No    Reason for No Spontaneous Breathing Trial: per MD    Significant Events Today: none    ABG Results:   Recent Labs   Lab 05/06/21  0411   O2PER 50%       Current Vent Settings: Ventilation Mode: Other (see comments) ([facility])  FiO2 (%): 45 %  Rate Set (breaths/minute): 25 breaths/min  Tidal Volume Set (mL): 330 mL  PEEP (cm H2O): 10 cmH2O  Pressure Support (cm H2O): 5 cmH2O  Oxygen Concentration (%): 45 %  Resp: 25          Plan: continue full vent support.    Sami Miranda, RT

## 2021-05-10 NOTE — PROGRESS NOTES
Care Coordination:    Left message for spouse, Ruddy to call writer for phone number to conference phone in ICU.    Georgette Hill RN  BSN, Care Coordinator    [facility]/ Care Transitions          [email or web address]                Phone [phone]

## 2021-05-10 NOTE — PROGRESS NOTES
[facility] ICU RESPIRATORY NOTE        Date of Admission: 4/13/2021    Date of Intubation (most recent): 4/13/21    Reason for Mechanical Ventilation: Resp failure    Number of Days on Mechanical Ventilation: 25    Met Criteria for Spontaneous Breathing Trial: NO    Reason for No Spontaneous Breathing Trial: Peep >8    Significant Events Today: None    ABG Results:   Recent Labs   Lab 05/06/21  0411   O2PER 50%       Current Vent Settings: Ventilation Mode: Other (see comments) ([facility])  FiO2 (%): 45 %  Rate Set (breaths/minute): 25 breaths/min  Tidal Volume Set (mL): 330 mL  PEEP (cm H2O): 10 cmH2O  Pressure Support (cm H2O): 5 cmH2O  Oxygen Concentration (%): 45 %  Resp: 24      Skin Assessment: Clean and dry    Plan: Will continue to follow    Ashley Mendoza

## 2021-05-10 NOTE — PLAN OF CARE
The patient remains intubated and sedated with versed. Dilaudid infusion for pain management.   The patient will open her eyes to voice/touch. Does not follow commands.   Sinus rhythm/Sinus tachycardia on monitor.   Levophed to keep MAP>65.   Lung sounds clear/diminished. PRVC mode on ventilator.   Yeboah in place. Good output with IV lasix doses. Yeboah is positional. Yeboah cares done with bath.   Rectal tube in place. Loose liquid output.   Primitivo Raymond RN 7:30 AM 05/10/21

## 2021-05-10 NOTE — PROGRESS NOTES
Comprehensive Daily ICU Note        Sarahy Dunbar MRN# [id number]   Age: 65 year old YOB: 1955     Date of Admission: 4/13/2021    Primary care provider: Rama Pop     CODE STATUS: FULL      Problem List:       Active Problems:    Acute respiratory failure with hypoxemia (H)    ARDS (adult respiratory distress syndrome) (H)    2019 novel coronavirus disease (COVID-19)    Hematoma    Anemia due to blood loss, acute    Shock (H)    Ventilator associated pneumonia (H)        Subjective/ Last 24 hours:   65-year-old female with no significant medical history who was found in bed with low saturations after welfare check.  Discovered to have COVID on 4/13. Maintained on NIV but subsequently required intubation on 4/15. Initially proned but now largely kept supine.  Overall has had worsening lung compliance, worsening ventilatory deficits, and inability to tolerate weaning of sedation. She has also developed VAP, which has been treated. Other complications include a large hematoma related to an arterial line necessitating a number of transfusions.   No events overnight.  Unable to obtain history directly this morning due to intubated/vented status.         Mechanical Ventilation/Vitalsigns/IsandOs:     Temp:  [97.7  F (36.5  C)-98.8  F (37.1  C)] 98.6  F (37  C)  Pulse:  [] 77  Resp:  [12-54] 20  BP: ()/(35-83) 110/49  FiO2 (%):  [45 %] 45 %  SpO2:  [91 %-100 %] 91 %      Ventilation Mode: Other (see comments) ([facility])  FiO2 (%): 45 %  Rate Set (breaths/minute): (S) 25 breaths/min  Tidal Volume Set (mL): (S) 330 mL  PEEP (cm H2O): (S) 10 cmH2O  Pressure Support (cm H2O): 5 cmH2O  Oxygen Concentration (%): 45 %  Resp: 20    Vented since 4/15    Peak airway pressure: mid 30s      Intake/Output Summary (Last 24 hours) at 5/10/2021 0816  Last data filed at 5/10/2021 0800  Gross per 24 hour   Intake 2243.43 ml   Output 3458 ml   Net -1214.57 ml              Physical Examination:     General:  Stated age, heavily sedated  HEENT: ET tube, NG tube  Lungs: crackles anteriorly, occasionally is very dyssynchronous  CVS: RRR, S1S2  Abdomen: obese, soft  Extremities/musculoskeletal:  Warm, edema improved  Neurology: heavily sedated  Skin: scars from previous knee/ankle surgery. Hematoma has regressed but remains an indurated area with tight and blistered skin; this has not changed significantly in the last few days.   Psychiatry: sedated  Exam of Line sites:  Central line site looks clean          Feeding/Glucose:     Orders Placed This Encounter      NPO for Medical/Clinical Reasons Except for: Meds      Recent Labs   Lab 05/10/21  0402 05/10/21  0400 05/10/21  0038 05/09/21  2053 05/09/21  1815 05/09/21  1616 05/09/21  1113 05/09/21  0810 05/09/21  0400 05/08/21  1700 05/08/21  1700 05/08/21  0335 05/08/21  0335 05/07/21  1830 05/07/21  1830   GLC  --  98  --   --  134*  --   --   --  96  --  137*  --  105*  --  124*   *  --  87 100*  --  132* 111* 94  --    < >  --    < >  --    < >  --     < > = values in this interval not displayed.              Medications:       chlorhexidine  15 mL Mouth/Throat Q12H     enoxaparin ANTICOAGULANT  0.5 mg/kg Subcutaneous BID     furosemide  60 mg Intravenous Q8H     insulin aspart  1-12 Units Subcutaneous Q4H     methylPREDNISolone  40 mg Intravenous Q24H     multivitamins w/minerals  15 mL Per Feeding Tube Daily     pantoprazole  40 mg Per Feeding Tube Daily     protein modular  2 packet Per Feeding Tube Q8H     QUEtiapine  50 mg Oral or Feeding Tube BID     sennosides  5 mL Oral or Feeding Tube BID          dextrose       HYDROmorphone 1 mg/hr (05/10/21 0621)     midazolam 10 mg/hr (05/10/21 0319)     norepinephrine 0.1 mcg/kg/min (05/09/21 2000)     propofol (DIPRIVAN) infusion       sodium chloride 20 mL/hr at 05/09/21 0002              Labs:         ROUTINE ICU LABS (Last four results)  CMP  Recent Labs   Lab 05/10/21  0400 05/09/21  1815 05/09/21  1621  05/09/21  1113 05/09/21  0400 05/08/21  1700 05/08/21 0335 05/08/21 0335 05/07/21 0426 05/07/21 0426    141  --   --  143 143  --  142   < > 144   POTASSIUM 3.0* 4.2 4.5 3.1* 2.9* 4.0   < > 3.0*   < > 3.0*   CHLORIDE 103 102  --   --  102 103  --  102   < > 102   CO2 40* 39*  --   --  41* 39*  --  40*   < > 43*   ANIONGAP <1* <1*  --   --  <1* 1*  --  <1*   < > <1*   GLC 98 134*  --   --  96 137*  --  105*   < > 101*   BUN 38* 38*  --   --  43* 44*  --  45*   < > 32*   CR 0.46* 0.45*  --   --  0.51* 0.55  --  0.57   < > 0.42*   GFRESTIMATED >90 >90  --   --  >90 >90  --  >90   < > >90   GFRESTBLACK >90 >90  --   --  >90 >90  --  >90   < > >90   ALICIA 9.1 8.6  --   --  8.6 8.6  --  8.6   < > 8.6   MAG 2.2  --   --   --  2.4*  --   --  2.5*  --  2.6*   PHOS 3.7  --   --   --  4.0  --   --  4.8*  --  3.5    < > = values in this interval not displayed.     CBC  Recent Labs   Lab 05/10/21  0400 05/09/21  0400 05/08/21 0335 05/07/21 0426   WBC 12.0* 12.6* 13.2* 11.3*   RBC 3.20* 3.02* 3.09* 3.03*   HGB 9.6* 9.3* 9.4* 9.1*   HCT 32.3* 30.5* 31.3* 30.4*   * 101* 101* 100   MCH 30.0 30.8 30.4 30.0   MCHC 29.7* 30.5* 30.0* 29.9*   RDW 18.6* 18.1* 17.8* 17.4*   * 514* 480* 388     INR  No lab results found in last 7 days.  Arterial Blood Gas  Recent Labs   Lab 05/06/21  0411   O2PER 50%       Other Lab Data:      Cultures:  No results for input(s): CULT in the last 168 hours.  Blood culture:  Invalid input(s): BC   Urine culture:  No results for input(s): URC in the last 168 hours.          Imaging/Other results:     No results found for this or any previous visit (from the past 24 hour(s)).         Assessment and Plan:     Sarahy Dunbar IS a 65 year old female admitted on 4/13/2021 for Covid pneumonia with ARDS, altered mental status, hypoxemic respiratory failure requiring intubation and mechanical ventilation.  I have personally reviewed the daily labs, imaging studies, cultures and discussed the  case with referring physician and consulting physicians.   My assessment and plan by system for this patient is as follows:    Neurology/Psychiatry:   1.  Sedation:  midazolam drip, now off propofol  2. Analgesia:  Dilaudid drip  3.  Seroquel 50 bid for possible delirium and to facilitate weaning sedation.  qtc ok 426.  Plan  - Continue to wean sedation as possible.     Cardiovascular:   1.Hemodynamics- hypotension related to sedation. Vaspopressor needs are stable and appears to have adequate perfusion.  Plan   - Continue low dose vasopressors    Pulmonary/ID:   1. ARDS from COVID.  Compliance has been worsening despite maximal therapies including antibiotics and resuming steroids and continuing lung protective ventilation. CT showing extensive changes, some of which may now be permanent.  2. VAP with Pseudomonas and Enteroccocus--resolved.  3. Positive COVID-19 test on 4/13  Plan  - continue lung protective settings.  - Continue diuresis (see renal below).   - Meropenem completes today.  - If patient is stable to improved she will require a tracheostomy.   - Steroid taper started    5/5: 125->62.5 mg methylpred   5/7: 62.5->40 mg methylpred   5/10:  methylpred stopped    GI and Nutrition:   1.  Continue tube feeds as tolerated  2.  PPI for pud prophy    Renal/Fluids/Electrolytes:   1. Creat ok at 0.46  2.  Responding well to diuresis, down ~14L over last few days.  Plan  -Continue current diuretic/free water regimen for today. Lasix 60 q8hr.  - Replace electrolytes. High K protocol. Recheck all tomorrow and PM bmp's while diuresing.     Endocrine:   1.  Stress induced hyperglycemia, now improved  Plan  - Monitor sugars  - Starting methylpred taper    Hematology/Oncology/Skin:   1. Large hematoma with acute blood loss anemia. Bleeding has now stopped. Imaging noted no vessel that needs or is amenable to intervention. Lovenox has been restarted and hemoglobin relatively stable now.  Bedside ultrasound showed fluid  collection within hematoma that was aspirated and did not appear infected.  2. Critical illness anemia. hgb stable today at 9.6  3. Transfusion reaction on 5/2. Received a different unit and tolerated well.  Plan  -Continue Lovenox as patient does not appear to be bleeding at this time.     IV/Access:   1. Venous access -central line  2. Arterial access - removed    ICU Prophylaxis:   1. DVT: Lovenox  2. VAP: HOB 30 degrees, chlorhexidine rinse  3. Stress Ulcer: PPI  4. Wound care  - local wound care for hematoma  5. Feeding -tube feeds  6. Family Update:  on the phone  7. Disposition -remain in ICU    Code status:  DNR    I spent 40 minutes on the unit providing critical care for this patient.

## 2021-05-10 NOTE — PLAN OF CARE
VSS. Tele SR with [facility] PACs. Yeboah patent, good UOP this AM, now minimal. Yeboah patent. Rectal tube in place, skin intact. Continues on vent, weaning sedation as tolerated. New TF orders initiated, goal now 50cc/hr and started at 30cc/hr. Replacing potassium. Plan for care conference tomorrow AM around 0900.  updated.

## 2021-05-11 NOTE — PROGRESS NOTES
[facility]  Palliative Care Daily Progress Note       Recommendations & Counseling       Family will continue to discuss options; trach/PEG with restorative measures vs compassionate extubation/end of life cares      DARRELL Villagomez CNP  [facility]  Contact information available via [facility] Paging/Directory      Thank you for the opportunity to participate in the care of this patient and family. Our team: will continue to follow.     During regular M-F work hours ([id number]) -- if you are not sure who specifically to contact -- please contact us in [facility] Smart Web.     After regular work hours and on weekends/holidays, you can call our answering service at [phone].     Attestation:  Total time on the floor involved in the patient's care: 80 minutes (counseling of family via phone due to covid-19 visitor restrictions [id number])  Total time spent in counseling/care coordination: >50% spent counseling family in setting of COVID-19 ARDS, respiratory failure and vent dependence, inability to wean sedation, PEG/trach with restorative measures vs compassionate extubation/end of life cares      Assessments          Sarahy Dunbar is a 65 year old female with PMH significant for HTN and anxiety who presents with hypoxia and acute respiratory failure 2/2 COVID-19 ARDS. She remains vent dependent with worsening lung compliance, worsening ventilatory deficits, and inability to wean from sedation. She developed VAP, which has been treated. She is also found to have a large hematoma 2/2 arterial line, requiring repeated transfusions and now resolved.     Today, the patient was seen for:  Goals of care, decisional support in setting of COVID-19 ARDS, respiratory failure and vent dependence, inability to wean sedation, PEG/trach with restorative measures vs compassionate extubation/end of life cares     Visited pt in the ICU. She is seen resting in ICU bed, intubated,  mechanically ventilated. Somnolent. She appears calm and comfortable.    Care conference held in private room. Due to covid-19 visitor restrictions, family present via phone. Present in person was Dr. Augustine, care coordinator Georgette, palliative LICSW Iraida Guzman, and myself. Via phone was pt's  Ruddy, sisters Madison and aCrmenGABBY.     Dr. Augustine provided a medical overview. Pat presented with breathing difficulty/hypoxia 2/2 COVID. She required intubation and remains vent and sedation dependent. Complications included bacterial PNA and hematoma, both now resolved. 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 Dr. Augustine anticipates that Kylah will require vent support for the remainder of her life. He went into detail about a tracheostomy, PEG placement for nutrition, LTACH placement, and the likely lifestyle implications (bed/chair bound, PEG fed, maybe able to watch TV, will be dependent on nursing support in a facility).     Family asked many questions about lab values and current vent settings.     Dr. Augustine did offer an alternative approach to Kylah's care, which includes stepping away from aggressive life prolonging treatments and allowing and preparing for the dying process. A compassionate extubation would be offered, and Kylah would be made comfortable with opioids and anxiolytics to alleviate pain, air hunger, anxiety. We would anticipate she would die within minutes to hours. Family would be allowed to visit 24hrs prior to planned extubation.     Family deliberated on meaning of quality of life for Kylah, and encouraged to think about this in terms of what Pat would want for herself. There is the challenge of weaning sedation, and the hope, although no guarantee, that Kylah would wake up and be able to participate in the complexity of this discussion. Dr. Augustine expressed concern that Kylah is dying, and  "it's a matter of time, not if, and how that time will look.  Rudyd did mention that in the past, Pat has stated that she would never want heroic measures to be taken to prolong her life. Ruddy ponders if we have reached the threshold where the vent/ICU treatments are no longer aiming for recovery, yet now considered \"life support\" and merely delaying the dying process.     Family encouraged to take the time they need to think through this discussion and decision making.     Prognosis, Goals, or Advance Care Planning was addressed today with: Yes.  Mood, coping, and/or meaning in the context of serious illness were addressed today: Yes.  Summary/Comments: 's lou has been at the center of his coping with pt's critical illness.             Interval History:     Chart review/discussion with unit or clinical team members:   Pt remains critically ill, intubated, mechanically ventilated. Slight reduction in PEEP and O2 needs yesterday, now Fio2 up to 80% again today.     Per patient or family/caregivers today:  Pt sedated, appears comfortable.     Key Palliative Symptoms:  We are not helping to manage these symptoms currently in this patient.    Patient is on opioids: bowels not assessed today.           Review of Systems:     Besides above, an additional N/A system ROS was reviewed and is unremarkable          Medications:     I have reviewed this patient's medication profile and medications during this hospitalization.    Noted meds:    Lovenox BID   Lasix 60mg IV TID    Seroquel 50mg via feeding tube BID   Dilaudid gtt 1mg/hr  Versed gtt 7mg/hr   Levophed gtt   Dilaudid 0.2-0.4mg IV [id number] PRN pain   Versed 2mg IV [id number] PRN anxiety/sedation   Seroquel 25mg via feeding tube Q8hrs PRN anxiety            Physical Exam:   Temp: 99.9  F (37.7  C) Temp src: Bladder BP: 97/59 Pulse: 86   Resp: 21 SpO2: 99 % O2 Device: Mechanical Ventilator    CONSTITUTIONAL: Critically ill woman seen resting in ICU bed in NAD, " somnolent, intubated and mechanically ventilated             Data Reviewed:     Recent imaging reviewed, my comments on pertinents:   Results for orders placed or performed during the hospital encounter of 04/13/21   XR Chest Port 1 View    Impression    IMPRESSION: There are hazy abnormal groundglass and interstitial  opacities in both lungs favoring the upper lobes. This could represent  an infectious or inflammatory process. No pneumothorax or pleural  effusion.    YONAS BOONE MD   US Lower Extremity Venous Duplex Bilateral    Impression    IMPRESSION:  1.  No deep venous thrombosis in the bilateral lower extremities.   XR Chest Port 1 View    Impression    IMPRESSION: Endotracheal tube tip 3 cm from the bertrand. Left chest  port tip in the low SVC. Extensive bilateral groundglass infiltrates  appear progressed from previous.    GODWIN NIEVES MD   XR Abdomen Port 1 View    Impression    IMPRESSION: Feeding tube in the stomach.    CHATA LIZARRAGA MD   XR Abdomen Port 1 View    Impression    IMPRESSION: Feeding tube remains in the stomach.    CHATA LIZARRAGA MD   XR Abdomen Port 1 View    Impression    IMPRESSION: Portable supine view of the abdomen. Feeding tube now has  been repositioned with the tip presumably in the proximal jejunum in  good position. A few air-filled loops of small bowel are noted in the  left abdomen. These are not significantly dilated. Degenerative  rightward curvature of the lumbar spine again noted.    MARILYN NARAYAN MD   XR Chest Port 1 View    Impression    IMPRESSION: Similar diffuse groundglass infiltrates bilaterally.  Endotracheal tube not significantly changed. Enteric tube tip below  the margin of study. Left PICC line tip slightly higher in position in  the upper SVC.    GODWIN NIEVES MD   US Post Vascular Access Low Ext Duplex Port    Impression    IMPRESSION:  1. Complex fluid collection in the right groin measuring 16.0 x 16.3 x  5.6 cm, likely hematoma.  2. Small branch  vessel adjacent to this hematoma off the right common  femoral artery with normal arterial waveforms and no to and fro flow.  3. If the groin hematoma continues to expand, repeat ultrasound could  be considered.    MYRANDA KELLY DO   XR Chest Port 1 View    Impression    IMPRESSION:   1. Endotracheal tube remains in good position above the bertrand. Left  subclavian central venous catheter in the SVC. Feeding tube courses  below the diaphragm.    2. Bilateral pulmonary infiltrates. Increasing consolidation in the  left lower lobe when compared to previous, with new silhouetting of  the left hemidiaphragm. No pneumothorax.    CHATA LIZARRAGA MD   XR Chest Port 1 View    Impression    IMPRESSION: Endotracheal tube in the low trachea and feeding tube  coursing below the left hemidiaphragm, tip outside the field-of-view.  Worsening multifocal patchy and airspace opacities throughout the  lungs concerning for worsening ARDS and/or pneumonia. Left subclavian  central venous catheter tip in the upper SVC. No pleural effusion or  pneumothorax.    PARAM MCCAIN MD   US Lower Extremity Venous Duplex Right    Impression    IMPRESSION:   1. Extremely limited exam due to patient positioning. Where visualized  there is no right DVT.  2. Complex fluid collection in the right upper thigh measuring 12.0 x  7.1 x 7.3 cm, this is measuring smaller when compared to the prior  study, however exam is extremely limited given patient positioning.    MYRANDA KELLY DO   XR Chest Port 1 View    Impression    IMPRESSION: Endotracheal tube is unchanged, with tip 1.3 cm above the  bertrand. Enteric tube, tip not seen, but below the diaphragm. Left  subclavian central venous catheter, with tip in the upper SVC.  Extensive predominantly airspace opacities in the lungs bilaterally  are not significantly changed. No pneumothorax. Heart size appears  stable.    CISCO FONSECA MD   US Drainage Seroma/Hematoma Abscess/Cyst    Impression     IMPRESSION: Successful ultrasound-guided fluid aspiration from right  inguinal hematoma.    KOBE REY MD   CT Chest w/o Contrast    Impression    IMPRESSION:   Extensive interstitial, groundglass, and airspace opacities throughout  both lungs could all be related to changes of COVID-19 pneumonia,  however some degree of underlying lung fibrosis cannot be excluded.    CISCO FONSECA MD       Recent lab data reviewed, my comments on pertinents:   Na 144  K 3.2  Creat 0.49  WBC 10.8  Hgb 9.9  Plt 549

## 2021-05-11 NOTE — PROGRESS NOTES
[facility]  Palliative Care Social Work Note:    Patient Info:  Sarahy Dunbar is a 65 year old female with COVID-19 ARDS which caused acute respiratory failure.  She has had a lengthy and difficult hospital course, remains intubated and sedated.      Brief summary of visit: participated in care conference with Dr. Augustine, Shira Soni, NP; Georgette Hill and family: uRddy() Madison (sister) and brother in law Ruben, and Carmen (sister).  Dr. Augustine provided a medical update about Sarahy's condition.  Despite some moments of progress, Sarahy's respiratory status is not showing signs of significantly improving.  Discussed two paths for Kylah's care at this time:  It is looking more and more like Kylah will spend the rest of her life on vent support until another event such as an infection or blood clot end her life; this route would require the placement of a trach / PEG with understanding that she would be fully dependent on these supports for the rest of her life.  Discussed alternative option in which focus could shift to comfort in the hospital.  Ruddy has been in regular contact with Dr. Augustine. He expresses hope that placing a trach might allow the ICU team to lessen Kylah's sedation medications and allow her to participate in conversations.  However,yadira would still need to be very involved in decision making and she would be very deconditioned.  We discussed quality of life factors for Sarahy.  Prior to this she valued traveling, and enjoyed sitting and watching television.      Ruddy and family agreed to continue processing.  The medical team will continue to provide updates.     Date of Admission: 4/13/2021    Reason for consult: Goals of care  Patient and family support    Sources of information: Family member see above    Recommendations & Plan:  Will continue to be available for goals of care support / conversations with family     These recommendations have been  discussed with care team, family.    Symptoms & Concerns Addressed Today:  End of life shared that Sarahy is likely to need some level of intense support for breathing for the rest of her life.  Talked about shifting to comfort and allowing dying process to occur    Strengths Identified:    Family involved    Relationships & Support:  Aspects of relationships and support assessed today:    Identified family members: , sisters    Professional supports:     Family coping: some possible stressors were noted between family members, but the extent of this was not made clear to us     Bereavement Risk concerns: long complicated illness, family unable to see patient due to covid19.     Coping, Mental Health & Adjustment to Illness:       Goals, Decision Making & Advance Care Planning:   Prognosis, Goals, and/or Advance Care Planning were assessed today: Yes  If yes, brief summary of discussion: see above  Preferred language:   Patient's decision making preferences: unable to assess  I have concerns about the patient/family's health literacy today: Unable to assess today  Patient has a completed Health Care Directive: No.   Code status per chart review: No CPR; pre-arrest intubation OK    Key Palliative Symptom Data:  We are not helping to manage these symptoms currently in this patient.      Clinical Social Work Interventions:   Assessment of palliative specific issues    Introduction of Palliative clinical social work interventions  Goals of care discussion/facilitation      WINNIE Corado, [facility]   Palliative Care Clinical   Ph: [phone]

## 2021-05-11 NOTE — PROGRESS NOTES
Comprehensive Daily ICU Note        Sarahy Dunbar MRN# [id number]   Age: 65 year old YOB: 1955     Date of Admission: 4/13/2021    Primary care provider: Rama Pop     CODE STATUS: FULL      Problem List:       Active Problems:    Acute respiratory failure with hypoxemia (H)    ARDS (adult respiratory distress syndrome) (H)    2019 novel coronavirus disease (COVID-19)    Hematoma    Anemia due to blood loss, acute    Shock (H)    Ventilator associated pneumonia (H)        Subjective/ Last 24 hours:   65-year-old female with no significant medical history who was found in bed with low saturations after welfare check.  Discovered to have COVID on 4/13. Maintained on NIV but subsequently required intubation on 4/15. Initially proned but now largely kept supine.  Overall has had worsening lung compliance, worsening ventilatory deficits, and inability to tolerate weaning of sedation. She has also developed VAP, which has been treated. Other complications include a large hematoma related to an arterial line necessitating a number of transfusions.   No events overnight.  Unable to obtain history directly this morning due to intubated/vented status.         Mechanical Ventilation/Vitalsigns/IsandOs:     Temp:  [99.1  F (37.3  C)-100.2  F (37.9  C)] 99.9  F (37.7  C)  Pulse:  [] 86  Resp:  [11-60] 21  BP: ()/(31-80) 97/59  FiO2 (%):  [45 %-80 %] 70 %  SpO2:  [88 %-99 %] 99 %      Ventilation Mode: Other (see comments) ([facility])  FiO2 (%): 70 %  Rate Set (breaths/minute): (S) 25 breaths/min  Tidal Volume Set (mL): (S) 330 mL  PEEP (cm H2O): (S) 10 cmH2O  Oxygen Concentration (%): 70 %  Resp: 21    Vented since 4/15    Peak airway pressure: mid 30s        Intake/Output Summary (Last 24 hours) at 5/11/2021 1029  Last data filed at 5/11/2021 0800  Gross per 24 hour   Intake 2433.77 ml   Output 3200 ml   Net -766.23 ml                Physical Examination:     General: Stated age, heavily  sedated  HEENT: ET tube, NG tube  Lungs: crackles anteriorly, occasionally is very dyssynchronous  CVS: RRR, S1S2  Abdomen: obese, soft  Extremities/musculoskeletal:  Warm, edema improved  Neurology: heavily sedated  Skin: scars from previous knee/ankle surgery. Hematoma has regressed but remains an indurated area with tight and blistered skin; this has not changed significantly in the last few days.   Psychiatry: sedated  Exam of Line sites:  Central line site looks clean          Feeding/Glucose:     Orders Placed This Encounter      NPO for Medical/Clinical Reasons Except for: Meds      Recent Labs   Lab 05/11/21  0807 05/11/21  0455 05/11/21  0454 05/11/21  0015 05/10/21  2020 05/10/21  2019 05/10/21  1244 05/10/21  0816 05/10/21  0400 05/10/21  0400 05/09/21  1815 05/09/21  1815 05/09/21  0400 05/09/21  0400 05/08/21  1700 05/08/21  1700   GLC  --  121*  --   --  114*  --   --   --   --  98  --  134*  --  96  --  137*   *  --  114* 114*  --  119* 128* 124*   < >  --    < >  --    < >  --    < >  --     < > = values in this interval not displayed.              Medications:       chlorhexidine  15 mL Mouth/Throat Q12H     enoxaparin ANTICOAGULANT  0.5 mg/kg Subcutaneous BID     furosemide  60 mg Intravenous Q8H     insulin aspart  1-12 Units Subcutaneous Q4H     multivitamins w/minerals  15 mL Per Feeding Tube Daily     pantoprazole  40 mg Per Feeding Tube Daily     QUEtiapine  50 mg Oral or Feeding Tube BID     sennosides  5 mL Oral or Feeding Tube BID          dextrose       HYDROmorphone 1 mg/hr (05/11/21 0809)     midazolam 9 mg/hr (05/11/21 0809)     norepinephrine 0.1 mcg/kg/min (05/11/21 0809)     sodium chloride 20 mL/hr at 05/09/21 0002              Labs:         ROUTINE ICU LABS (Last four results)  CMP  Recent Labs   Lab 05/11/21  0455 05/10/21  2020 05/10/21  1235 05/10/21  0400 05/09/21  1815 05/09/21  0400 05/09/21  0400 05/08/21  0335 05/08/21  0335    146*  --  143 141  --  143   < >  142   POTASSIUM 3.2* 4.1 3.2* 3.0* 4.2   < > 2.9*   < > 3.0*   CHLORIDE 103 106  --  103 102  --  102   < > 102   CO2 44* 39*  --  40* 39*  --  41*   < > 40*   ANIONGAP <1* 1*  --  <1* <1*  --  <1*   < > <1*   * 114*  --  98 134*  --  96   < > 105*   BUN 37* 33*  --  38* 38*  --  43*   < > 45*   CR 0.49* 0.48*  --  0.46* 0.45*  --  0.51*   < > 0.57   GFRESTIMATED >90 >90  --  >90 >90  --  >90   < > >90   GFRESTBLACK >90 >90  --  >90 >90  --  >90   < > >90   ALICIA 9.1 8.7  --  9.1 8.6  --  8.6   < > 8.6   MAG 2.1  --   --  2.2  --   --  2.4*  --  2.5*   PHOS 3.9  --   --  3.7  --   --  4.0  --  4.8*    < > = values in this interval not displayed.     CBC  Recent Labs   Lab 05/11/21  0455 05/10/21  0400 05/09/21  0400 05/08/21  0335   WBC 10.8 12.0* 12.6* 13.2*   RBC 3.30* 3.20* 3.02* 3.09*   HGB 9.9* 9.6* 9.3* 9.4*   HCT 33.4* 32.3* 30.5* 31.3*   * 101* 101* 101*   MCH 30.0 30.0 30.8 30.4   MCHC 29.6* 29.7* 30.5* 30.0*   RDW 18.8* 18.6* 18.1* 17.8*   * 527* 514* 480*     INR  No lab results found in last 7 days.  Arterial Blood Gas  Recent Labs   Lab 05/11/21  0544 05/06/21  0411   PH 7.40  --    PCO2 69*  --    PO2 61*  --    HCO3 43*  --    O2PER Canceled, Test credited 50%       Other Lab Data:      Cultures:  No results for input(s): CULT in the last 168 hours.  Blood culture:  Invalid input(s): BC   Urine culture:  No results for input(s): URC in the last 168 hours.          Imaging/Other results:     No results found for this or any previous visit (from the past 24 hour(s)).         Assessment and Plan:     Sarahy Dunbar IS a 65 year old female admitted on 4/13/2021 for Covid pneumonia with ARDS, altered mental status, hypoxemic respiratory failure requiring intubation and mechanical ventilation.  I have personally reviewed the daily labs, imaging studies, cultures and discussed the case with referring physician and consulting physicians.   My assessment and plan by system for this  patient is as follows:    Neurology/Psychiatry:   1.  Sedation:  midazolam drip, now off propofol  2. Analgesia:  Dilaudid drip  3.  Seroquel 50 bid for possible delirium and to facilitate weaning sedation.  qtc ok 426.  Plan  - Continue to wean sedation as possible.     Cardiovascular:   1.Hemodynamics- hypotension related to sedation. Vaspopressor needs are stable and appears to have adequate perfusion.  Plan   - Continue low dose vasopressors    Pulmonary/ID:   1. ARDS from COVID.  Compliance has been worsening despite maximal therapies including antibiotics and resuming steroids and continuing lung protective ventilation. CT showing extensive changes, some of which may now be permanent.  2. VAP with Pseudomonas and Enteroccocus--resolved.  3. Positive COVID-19 test on 5/7  Plan  - continue lung protective settings.  - Continue diuresis (see renal below).   - Meropenem completes today.  - If patient is stable to improved she will require a tracheostomy.   - Steroid taper started    5/5: 125->62.5 mg methylpred   5/7: 62.5->40 mg methylpred   5/10:  methylpred stopped    GI and Nutrition:   1.  Continue tube feeds as tolerated  2.  PPI for pud prophy    Renal/Fluids/Electrolytes:   1. Creat ok at 0.49  2.  Responding well to diuresis, down ~15L over last few days.  Plan  -Continue current diuretic/free water regimen for today. Lasix 60 q8hr.  - Replace electrolytes. High K protocol. Recheck all tomorrow and PM bmp's while diuresing.     Endocrine:   1.  Stress induced hyperglycemia, now improved  Plan  - Monitor sugars  - Starting methylpred taper    Hematology/Oncology/Skin:   1. Large hematoma with acute blood loss anemia. Bleeding has now stopped. Imaging noted no vessel that needs or is amenable to intervention. Lovenox has been restarted and hemoglobin relatively stable now.  Bedside ultrasound showed fluid collection within hematoma that was aspirated and did not appear infected.  2. Critical illness anemia.  hgb stable today at 9.9  3. Transfusion reaction on 5/2. Received a different unit and tolerated well.  Plan  -Continue Lovenox as patient does not appear to be bleeding at this time.     IV/Access:   1. Venous access -central line  2. Arterial access - removed    ICU Prophylaxis:   1. DVT: Lovenox  2. VAP: HOB 30 degrees, chlorhexidine rinse  3. Stress Ulcer: PPI  4. Wound care  - local wound care for hematoma  5. Feeding -tube feeds  6. Family Update:  on the phone  7. Disposition -remain in ICU    Code status:  DNR    Family meeting held 5/11 with patient's , two sisters and their spouses.  We discussed Sarahy's critical condition, and also my and other physician's medical opinions that she likely never will improve to the degree that she can be weaned from the ventilator.  We discussed options including continued aggressive cares with tracheostomy/peg tube and LTACH placement, and also transition to less aggressive cares such as comfort measures only.  Although no decisions were made today, it was expressed that Sarahy would likely not be happy with the quallity of life she would attain at LTACH on a ventilator.  All questions asked and answered.    I spent 40 minutes on the unit providing critical care for this patient.

## 2021-05-11 NOTE — PROGRESS NOTES
[facility] ICU RESPIRATORY NOTE           Date of Admission: 4/13/2021     Date of Intubation (most recent): 4/13/21     Reason for Mechanical Ventilation: Resp failure     Number of Days on Mechanical Ventilation: 27     Met Criteria for Spontaneous Breathing Trial: No     Reason for No Spontaneous Breathing Trial: per MD     Significant Events Today: none    Venous Blood Gas  Recent Labs   Lab 05/07/21  0426 05/06/21  0411   PHV 7.36 7.42   PCO2V 80* 69*   PO2V 39 44   HCO3V 46* 45*   TERRY 17.6 17.9   O2PER  --  50%     Ventilation Mode: Other (see comments) ([facility])  FiO2 (%): 45 %  Rate Set (breaths/minute): 25 breaths/min  Tidal Volume Set (mL): 330 mL  PEEP (cm H2O): 10 cmH2O  Oxygen Concentration (%): 80 %  Resp: 30    NATHALY CASTANEDA, RT

## 2021-05-11 NOTE — PLAN OF CARE
Neuro: Sedated with versed and dilaudid drip, goal RASS -2 to -3,  Open eyes to voice and blinks, no spontaneous movement all four extremities  CV: SR, to occ ST with Occ PVC, goal MAP > 65 maintained with levophed drip continuous, pedal pulses palpable, mild dependent edema.  Pulm: Tolerating vent settings with Peep 10, small to moderate  creamy secretions, LS clear to coarse with nonproductive cough, slowly during the shift patient O 2 needs increased, and this morning she is on FiO 2 80%  GI/: TF at 30ml/hr with 150 ml Q x 4 hour water flush, rectal tube in place with liquidy stool. Yeboah was replaced at the start of the shift due to dislodge, diuresing with scheduled lasix.   Skin: Right groin hematoma unchanged, b/l groin and sacral dsg change done per plan of care.  Lines: left TLC Subclavian line  Restraints: NA  Plan: 5/11 at 0900 family conference.

## 2021-05-11 NOTE — PLAN OF CARE
No changes this shift. Able to wean fio2 back down to 45%    N:Unresponsive. Requiring sedation for ventilator compliance. Opens eyes with turns-no eye contact. PEERL  CV: SR. Levo at 0.1 to keep MAP>65. Tmax 38.  LS:LS clear/coarse. PEEP 10. FIO2 back to 45%  GI/:Yeboah with adequate output. TF at goal with FWF  IV: Levo, dilaudid, and versed through CVC.

## 2021-05-12 NOTE — PLAN OF CARE
No changes this shift. Able to wean fio2 back down to 45%     N:Unresponsive. Requiring sedation for ventilator compliance. Opens eyes with turns-no eye contact. PEERL  CV: SR. Levo at 0.1 to keep MAP>65. Tmax 37.  LS:LS clear/coarse. PEEP 10. FIO2 45%  GI/:Yeboah with adequate output. TF at goal with FWF  IV: Levo, dilaudid, and versed through CVC.   updated over phone today

## 2021-05-12 NOTE — PROGRESS NOTES
[facility] ICU RESPIRATORY NOTE           Date of Admission: 4/13/2021     Date of Intubation (most recent): 4/13/21     Reason for Mechanical Ventilation: Resp failure     Number of Days on Mechanical Ventilation: 28     Met Criteria for Spontaneous Breathing Trial: No     Reason for No Spontaneous Breathing Trial: per MD     Significant Events Today: none    Recent Labs   Lab 05/11/21  0544 05/06/21  0411   PH 7.40  --    PCO2 69*  --    PO2 61*  --    HCO3 43*  --    O2PER Canceled, Test credited 50%     Venous Blood Gas  Recent Labs   Lab 05/11/21  0544 05/07/21  0426 05/06/21  0411   PHV Canceled, Test credited 7.36 7.42   PCO2V Canceled, Test credited 80* 69*   PO2V Canceled, Test credited 39 44   HCO3V Canceled, Test credited 46* 45*   TERRY Canceled, Test credited 17.6 17.9   O2PER Canceled, Test credited  --  50%     Ventilation Mode: Other (see comments) ([facility])  FiO2 (%): 45 %  Rate Set (breaths/minute): 25 breaths/min  Tidal Volume Set (mL): 330 mL  PEEP (cm H2O): 10 cmH2O  Oxygen Concentration (%): 45 %  Resp: 17    NATHALY CASTANEDA, RT

## 2021-05-12 NOTE — PLAN OF CARE
Neuro: PERRL, no response to stimuli. Requiring sedation for ventilator compliance.  CV: SR/ST c PVCs/PACs  Resp: PRVC, LS diminished  GI: [place] feed running at goal, flexiseal patent  : brown, good UO  Skin: R groin hematoma, R groin/pannus wound, coccyx wound  Lines: na  Access: L subclavian  Gtts: versed, dilaudid, levo, tko  Other: care conference yesterday

## 2021-05-12 NOTE — PROGRESS NOTES
Comprehensive Daily ICU Note        Sarahy Dunbar MRN# [id number]   Age: 65 year old YOB: 1955     Date of Admission: 4/13/2021    Primary care provider: Rama Pop     CODE STATUS: FULL      Problem List:       Active Problems:    Acute respiratory failure with hypoxemia (H)    ARDS (adult respiratory distress syndrome) (H)    2019 novel coronavirus disease (COVID-19)    Hematoma    Anemia due to blood loss, acute    Shock (H)    Ventilator associated pneumonia (H)        Subjective/ Last 24 hours:   65-year-old female with no significant medical history who was found in bed with low saturations after welfare check.  Discovered to have COVID on 4/13. Maintained on NIV but subsequently required intubation on 4/15. Initially proned but now largely kept supine.  Overall has had worsening lung compliance, worsening ventilatory deficits, and inability to tolerate weaning of sedation. She has also developed VAP, which has been treated. Other complications include a large hematoma related to an arterial line necessitating a number of transfusions.   No events overnight.  Unable to obtain history directly this morning due to intubated/vented status.         Mechanical Ventilation/Vitalsigns/IsandOs:     Temp:  [99.3  F (37.4  C)-101.5  F (38.6  C)] 99.5  F (37.5  C)  Pulse:  [] 84  Resp:  [0-65] 17  BP: ()/(38-88) 117/65  FiO2 (%):  [45 %-70 %] 70 %  SpO2:  [87 %-99 %] 94 %      Ventilation Mode: Other (see comments) ([facility])  FiO2 (%): 70 %  Rate Set (breaths/minute): 25 breaths/min  Tidal Volume Set (mL): 330 mL  PEEP (cm H2O): 10 cmH2O  Oxygen Concentration (%): 45 %  Resp: 17    Vented since 4/15    Peak airway pressure: mid 30s      Intake/Output Summary (Last 24 hours) at 5/12/2021 0801  Last data filed at 5/12/2021 0600  Gross per 24 hour   Intake 2942.85 ml   Output 3400 ml   Net -457.15 ml              Physical Examination:     General: Stated age, heavily sedated  HEENT: ET  tube, NG tube  Lungs: crackles anteriorly, occasionally is very dyssynchronous  CVS: RRR, S1S2  Abdomen: obese, soft  Extremities/musculoskeletal:  Warm, edema improved  Neurology: heavily sedated  Skin: scars from previous knee/ankle surgery. Hematoma has regressed but remains an indurated area with tight and blistered skin; this has not changed significantly in the last few days.   Psychiatry: sedated  Exam of Line sites:  Central line site looks clean          Feeding/Glucose:     Orders Placed This Encounter      NPO for Medical/Clinical Reasons Except for: Meds      Recent Labs   Lab 05/12/21  0748 05/12/21  0414 05/12/21  0410 05/11/21  2342 05/11/21  2006 05/11/21  1740 05/11/21  1554 05/11/21  1159 05/11/21  0455 05/11/21  0455 05/10/21  2020 05/10/21  2020 05/10/21  0400 05/10/21  0400 05/09/21  1815 05/09/21 1815   GLC  --   --  146*  --   --  136*  --   --   --  121*  --  114*  --  98  --  134*   * 143*  --  142* 112*  --  133* 112*   < >  --    < >  --    < >  --    < >  --     < > = values in this interval not displayed.              Medications:       chlorhexidine  15 mL Mouth/Throat Q12H     enoxaparin ANTICOAGULANT  0.5 mg/kg Subcutaneous BID     furosemide  60 mg Intravenous Q8H     insulin aspart  1-12 Units Subcutaneous Q4H     multivitamins w/minerals  15 mL Per Feeding Tube Daily     pantoprazole  40 mg Per Feeding Tube Daily     potassium chloride  20 mEq Oral or Feeding Tube Once     QUEtiapine  50 mg Oral or Feeding Tube BID     sennosides  5 mL Oral or Feeding Tube BID          dextrose       HYDROmorphone 1 mg/hr (05/11/21 2041)     midazolam 9 mg/hr (05/11/21 2338)     norepinephrine 0.1 mcg/kg/min (05/12/21 0651)     sodium chloride 10 mL/hr at 05/11/21 2002              Labs:         ROUTINE ICU LABS (Last four results)  CMP  Recent Labs   Lab 05/12/21  0410 05/11/21  1740 05/11/21  1200 05/11/21  0455 05/10/21  2020 05/10/21  0400 05/10/21  0400 05/09/21  0400 05/09/21  0400    * 144  --  144 146*  --  143   < > 143   POTASSIUM 3.0* 3.9 3.8 3.2* 4.1   < > 3.0*   < > 2.9*   CHLORIDE 103 104  --  103 106  --  103   < > 102   CO2 42* 40*  --  44* 39*  --  40*   < > 41*   ANIONGAP <1* <1*  --  <1* 1*  --  <1*   < > <1*   * 136*  --  121* 114*  --  98   < > 96   BUN 37* 37*  --  37* 33*  --  38*   < > 43*   CR 0.51* 0.52  --  0.49* 0.48*  --  0.46*   < > 0.51*   GFRESTIMATED >90 >90  --  >90 >90  --  >90   < > >90   GFRESTBLACK >90 >90  --  >90 >90  --  >90   < > >90   ALICIA 8.7 8.9  --  9.1 8.7  --  9.1   < > 8.6   MAG 2.3  --   --  2.1  --   --  2.2  --  2.4*   PHOS 3.5  --   --  3.9  --   --  3.7  --  4.0    < > = values in this interval not displayed.     CBC  Recent Labs   Lab 05/12/21  0410 05/11/21  0455 05/10/21  0400 05/09/21  0400   WBC 12.9* 10.8 12.0* 12.6*   RBC 3.11* 3.30* 3.20* 3.02*   HGB 9.4* 9.9* 9.6* 9.3*   HCT 31.6* 33.4* 32.3* 30.5*   * 101* 101* 101*   MCH 30.2 30.0 30.0 30.8   MCHC 29.7* 29.6* 29.7* 30.5*   RDW 19.0* 18.8* 18.6* 18.1*   * 549* 527* 514*     INR  No lab results found in last 7 days.  Arterial Blood Gas  Recent Labs   Lab 05/11/21  0544 05/06/21  0411   PH 7.40  --    PCO2 69*  --    PO2 61*  --    HCO3 43*  --    O2PER Canceled, Test credited 50%       Other Lab Data:      Cultures:  No results for input(s): CULT in the last 168 hours.  Blood culture:  Invalid input(s): BC   Urine culture:  No results for input(s): URC in the last 168 hours.          Imaging/Other results:     No results found for this or any previous visit (from the past 24 hour(s)).         Assessment and Plan:     Sarahy Dunbar IS a 65 year old female admitted on 4/13/2021 for Covid pneumonia with ARDS, altered mental status, hypoxemic respiratory failure requiring intubation and mechanical ventilation.  I have personally reviewed the daily labs, imaging studies, cultures and discussed the case with referring physician and consulting physicians.   My  assessment and plan by system for this patient is as follows:    Neurology/Psychiatry:   1.  Sedation:  midazolam drip, now off propofol  2. Analgesia:  Dilaudid drip  3.  Seroquel 50 bid for possible delirium and to facilitate weaning sedation.  qtc ok 426.  Plan  - Continue to wean sedation as possible.     Cardiovascular:   1.Hemodynamics- hypotension related to sedation. Vaspopressor needs are stable and appears to have adequate perfusion.  Plan   - Continue low dose vasopressors    Pulmonary/ID:   1. ARDS from COVID.  Compliance has been worsening despite maximal therapies including antibiotics and resuming steroids and continuing lung protective ventilation. CT showing extensive changes, some of which may now be permanent.  2. VAP with Pseudomonas and Enteroccocus--resolved.  3. Positive COVID-19 test on 5/7  Plan  - continue lung protective settings.  - Continue diuresis (see renal below).   - Meropenem completes today.  - If patient is stable to improved she will require a tracheostomy.   - Steroids completed 5/10    GI and Nutrition:   1.  Continue tube feeds as tolerated  2.  PPI for pud prophy    Renal/Fluids/Electrolytes:   1. Creat ok at 0.51  2.  Responding well to diuresis, down ~16L over last few days. Will continue diuretic for today, but seems to be approaching dry weight.  Plan  -Continue current diuretic/free water regimen for today. Lasix 60 q8hr.  - Replace electrolytes. High K protocol. Recheck all tomorrow and PM bmp's while diuresing.     Endocrine:   1.  Stress induced hyperglycemia, now improved  Plan  - Monitor sugars  - Methylpred course completed 5/10    Hematology/Oncology/Skin:   1. Large hematoma with acute blood loss anemia. Bleeding has now stopped. Imaging noted no vessel that needs or is amenable to intervention. Lovenox has been restarted and hemoglobin relatively stable now.  Bedside ultrasound showed fluid collection within hematoma that was aspirated and did not appear  infected.  2. Critical illness anemia. hgb stable today at 9.4  3. Transfusion reaction on 5/2. Received a different unit and tolerated well.  4. Mild leukocytosis:  12.9; no other signs of new infection.  Trend for now.  Plan  -Continue Lovenox as patient does not appear to be bleeding at this time.     IV/Access:   1. Venous access -central line  2. Arterial access - removed    ICU Prophylaxis:   1. DVT: Lovenox  2. VAP: HOB 30 degrees, chlorhexidine rinse  3. Stress Ulcer: PPI  4. Wound care  - local wound care for hematoma  5. Feeding -tube feeds  6. Family Update:  on the phone  7. Disposition -remain in ICU    Code status:  DNR    Family meeting held 5/11 with patient's , two sisters and their spouses.  Awaiting decisions on next steps in care.    I spent 35 minutes on the unit providing critical care for this patient.

## 2021-05-13 NOTE — PROGRESS NOTES
[facility] WO Nurse Inpatient Wound Assessment   Reason for follow up:  pannus and groin wounds, hematoma on right thigh    Assessment  Assessed bilateral groin wounds today and Rt thigh hematoma . Overall goals of care being considered   Last photos 5-13-21 or Rt groin folds and hematoma site   Sacrum not assessed on 5-13-21    Continue Current Treatment Plan:   Pannus and groin wounds: Daily    Cleanse wounds with wound cleanser  Apply a thick layer of triad paste #[id number] to wounds  If you feel it necessary okay to cover with foam dressings.   Once the wound beds are only pink and not yellow, no longer draining okay to switch to interdry ag     SACRUM  1. Clean wound with saline or MicroKlenz Spray, pat dry    Apply a thick layer of triad paste to wound only  2. Wipe the surrounding periwound tissue with several skin preps to help with dressing sticking  3. Press a Mepilex  Sacral Dressing (PS#[id number])  to the area, making sure to conform nicely to skin curvatures.  4. Time and date dressing change  -REPOSITION ALL PATIENTS SIDE TO SIDE ONLY WHEN IN BED, EVERY 2 HOURS,   -HEELS MUST BE KEPT ELEVATED AT ALL TIMES, USING AT LEAST 2 PILLOWS UNDER EACH CALF, ASSURING HEELS ARE FLOATING  -WHEN UP TO THE CHAIR PT NEEDS TO FULLY OFF LOAD EVERY 2 HOURS     Orders Reviewed  Recommended provider order: None, at this time  WO Nurse follow-up plan: weekly  Nursing to notify the Provider(s) and re-consult the [facility] Nurse if wound(s) deteriorates or new skin concern    Patient History  According to provider note(s):  65-year-old female with no significant medical history who was found in bed with low saturations after welfare check.  Discovered to have COVID on 4/13. Maintained on NIV but subsequently required intubation on 4/15. Initially proned but now largely kept supine.  Overall has had worsening lung compliance, worsening ventilatory deficits, and inability to tolerate weaning of sedation. She has  also developed VAP, which has been treated. Other complications include a large hematoma related to an arterial line necessitating a number of transfusions. Active Problems: Acute respiratory failure with hypoxemia (H) ARDS (adult respiratory distress syndrome) (H) 2019 novel coronavirus disease (COVID-19) Hematoma Anemia due to blood loss, acute Shock (H) Ventilator associated pneumonia (H) on levophed     Data  Documented Allergies: Lisinopril-hydrochlorothiazide     Recent Labs   Lab Test 21  0512 21  0430 21  0430 21  0427 21  0427 21  1440 21  1440   ALBUMIN  --   --   --   --  1.8*  --   --    HGB 9.8*   < > 7.8*   < > 7.8*   < >  --    INR  --   --   --   --   --   --  1.08   WBC 15.2*   < > 16.7*   < > 14.8*   < >  --    CRP  --   --  257.0*  --   --   --   --     < > = values in this interval not displayed.         Recent Labs   Lab 21  1800 21  1635 21  1153 21  0828 21  0512 21  0502 21  2344 21  2032 21  1745 21  0410 21  0410 21  1740 21  1740 21  0455 21  0455   GLC PENDING  --   --   --  118*  --   --   --  132*  --  146*  --  136*  --  121*   BGM  --  157* 138* 144*  --  116* 100* 130*  --    < >  --    < >  --    < >  --     < > = values in this interval not displayed.       Temperature: Temp (24hrs), Av  F (37.2  C), Min:97.9  F (36.6  C), Max:100  F (37.8  C)      Intake/Output Summary (Last 24 hours) at 2021 1459  Last data filed at 2021 1200  Gross per 24 hour   Intake 3026.81 ml   Output 3195 ml   Net -168.19 ml       Orders Placed This Encounter: TF via NJ      NPO for Medical/Clinical Reasons Except for: Meds      Containment of urine/stool: Incontinence Protocol   Medical Devices:Yeboah Catheter: in place, indication: Strict 1-2 Hour I&O, Strict 1-2 Hour I&O, Strict 1-2 Hour I&O, Retention;Strict 1-2 Hour I&O    Catheter securement Yes    Pressure  Injury Risk Assessment (Joss Scale):  Sensory Perception: 1-->completely limited    Moisture: 3-->occasionally moist   Activity: 1-->bedfast     Mobility: 1-->completely immobile   Nutrition: 2-->probably inadequate   Friction and Shear: 2-->potential problem  Joss Score: 10    Current support surface: Bariatric Low air loss mattress  Current off-loading measures in bed: reposition side to side with use of Wedge positioning system  Current off-loading heels: Pillows under calves  Current off-loading measures chair: NA    Focused [facility] Nurse Skin/Wound Exam:    [facility] Photo 5-13-21 Rt groin and hematoma site    Size:  3.5cm x 1.5cm x 40% white slough / 60% granular base  Мария-wound skin: peeling, no blisters, receding area of hematoma, softer on palpation   Drainage: scant sero-sang, no purulence  Odor: None  Pain: unable to determine      Interventions  Reviewed orders, no change to current plan   Will attempt to see sacral/coccyx intertrigo on Friday 5-14     Oma Marsh RN CWOCN

## 2021-05-13 NOTE — PROGRESS NOTES
CLINICAL NUTRITION SERVICES - REASSESSMENT NOTE      Malnutrition: (4/15)  % Weight Loss:  None noted  % Intake:  Decreased intake does not meet criteria for malnutrition - NPO x 3 days  Subcutaneous Fat Loss: Unable to assess  Muscle Loss: Unable to assess  Fluid Retention:  None noted     Malnutrition Diagnosis: Unable to determine without Nutrition Focused Physical Exam       EVALUATION OF PROGRESS TOWARD GOALS   Diet:  NPO on vent     Nutrition Support:  Patient continues on goal TF regimen as follows (achieved 5/11) ~    Nutrition Support Enteral:  Type of Feeding Tube: NJ  Enteral Frequency:  Continuous  Enteral Regimen: Vital HP at 50 mL/hr  Total Enteral Provisions: 1200 kcal (12 kcal/kg), 104 g protein (2.3 g/kg), 1000 mL H2O, 133 g CHO, no fiber   Free Water Flush: 150 mL every 4 hours      Intake/Tolerance:    Na 145 (H)  K and Phos normal, Mg 2.4 (H)  Stool 80 mL (Senokot on hold)  BGM < 150  I/O 3313/3485, wt 102 kg - IV Lasix       ASSESSED NUTRITION NEEDS:  Dosing Weight: 102.2 kg for energy, 45 kg for protein  Estimated Energy Needs: 1125-53099 kcals (11-14 Kcal/Kg)  Justification: obese and vented  Estimated Protein Needs:  grams protein (2-2.5 g pro/Kg)  Justification: obesity guidelines       NEW FINDINGS:   Patient receiving Certavite daily per WOCN Protocol     Continues on Norepi drip     Previous Goals (5/10):   TF Vital High Protein at 50 mL/hr will meet % estimated needs  Evaluation: Met    Previous Nutrition Diagnosis (5/10):   Inadequate energy intake related to Propofol off as evidenced by TF + Prosource meeting only 43% energy needs  Evaluation: Resolved       CURRENT NUTRITION DIAGNOSIS  No nutrition diagnosis identified at this time    INTERVENTIONS  Recommendations / Nutrition Prescription  Continue Vital HP at 50 mL/hr as above     Implementation  Collaboration and Referral of Nutrition care:  Patient discussed today during interdisciplinary bedside rounds     Goals  TF  will continue to meet % needs     MONITORING AND EVALUATION:  Progress towards goals will be monitored and evaluated per protocol and Practice Guidelines    Leslie Porter RD, LD, CNSC   Clinical Dietitian - [facility]

## 2021-05-13 NOTE — PROGRESS NOTES
ICU cross-cover  DOS: 5/13/2021     Self-limited run of SVT this afternoon with rates to the 150s-180s. Hemodynamics without change. Lytes look OK. Got a new PICC line today, so will get CXR to confirm tip isn't in the RA.    Bucky Cavazos MD, PhD  Surgical critical care  5/13/2021, 5:40 PM    Addendum 6:35 PM  PICC is a little deep -- radiology recommends withdrawing 2 cm. Discussed with RN, will communicate this to IV team.  RB

## 2021-05-13 NOTE — PLAN OF CARE
PICC placement today. Subclavian CVC removed. Able to wean down sedation a little today with PRN bumps of versed and dilaudid.     N:Intermittently wiggles toes and flutter eyes open to command. Otherwise unresponsive. PEERLA.   CV: SR. Levo to keep MAP>65. Tmax 37.4. Cultures drawn today. One run of SVT   LS:LS clear/coarse. PEEP 10. FIO2 45%  GI/:Yeboah with adequate output. TF at goal with FWF  IV: Levo, dilaudid, and versed through PICC   updated over phone today

## 2021-05-13 NOTE — PROGRESS NOTES
Comprehensive Daily ICU Note        Sarahy Dunbar MRN# [id number]   Age: 65 year old YOB: 1955     Date of Admission: 4/13/2021    Primary care provider: Rama Pop     CODE STATUS: FULL      Problem List:       Active Problems:    Acute respiratory failure with hypoxemia (H)    ARDS (adult respiratory distress syndrome) (H)    2019 novel coronavirus disease (COVID-19)    Hematoma    Anemia due to blood loss, acute    Shock (H)    Ventilator associated pneumonia (H)        Subjective/ Last 24 hours:   65-year-old female with no significant medical history who was found in bed with low saturations after welfare check.  Discovered to have COVID on 4/13. Maintained on NIV but subsequently required intubation on 4/15. Initially proned but now largely kept supine.  Overall has had worsening lung compliance, worsening ventilatory deficits, and inability to tolerate weaning of sedation. She has also developed VAP, which has been treated. Other complications include a large hematoma related to an arterial line necessitating a number of transfusions.   No events overnight.  Unable to obtain history directly this morning due to intubated/vented status.  Switched to pressure control mode this morning for improved synchrony.         Mechanical Ventilation/Vitalsigns/IsandOs:     Temp:  [98.4  F (36.9  C)-99.9  F (37.7  C)] 99.3  F (37.4  C)  Pulse:  [] 74  Resp:  [0-64] 24  BP: ()/(33-81) 116/57  FiO2 (%):  [45 %-70 %] 45 %  SpO2:  [89 %-99 %] 93 %      FiO2 (%): 45 %  Rate Set (breaths/minute): 25 breaths/min  Tidal Volume Set (mL): 330 mL  PEEP (cm H2O): 10 cmH2O  Oxygen Concentration (%): 45 %  Resp: 24    Vented since 4/15    Peak airway pressure: mid 30s    Intake/Output Summary (Last 24 hours) at 5/13/2021 0757  Last data filed at 5/13/2021 0600  Gross per 24 hour   Intake 3313.32 ml   Output 3485 ml   Net -171.68 ml          Physical Examination:     General: Stated age, heavily  sedated  HEENT: ET tube, NG tube  Lungs: crackles anteriorly, occasionally is very dyssynchronous  CVS: RRR, S1S2  Abdomen: obese, soft  Extremities/musculoskeletal:  Warm, edema improved  Neurology: more wakeful this morning.  Opens eyes to voice.  Seems to follow commands though she's profoundly weak which limits testing of this.  Skin: scars from previous knee/ankle surgery. Hematoma has regressed but remains an indurated area with tight and blistered skin; this has not changed significantly in the last few days.   Psychiatry: sedated  Exam of Line sites:  Central line site looks clean          Feeding/Glucose:     Orders Placed This Encounter      NPO for Medical/Clinical Reasons Except for: Meds      Recent Labs   Lab 05/13/21  0512 05/13/21  0502 05/12/21  2344 05/12/21  2032 05/12/21  1745 05/12/21  1556 05/12/21  1138 05/12/21  0748 05/12/21  0410 05/12/21  0410 05/11/21  1740 05/11/21  1740 05/11/21  0455 05/11/21  0455 05/10/21  2020 05/10/21  2020   *  --   --   --  132*  --   --   --   --  146*  --  136*  --  121*  --  114*   BGM  --  116* 100* 130*  --  138* 135* 126*   < >  --    < >  --    < >  --    < >  --     < > = values in this interval not displayed.              Medications:       chlorhexidine  15 mL Mouth/Throat Q12H     enoxaparin ANTICOAGULANT  0.5 mg/kg Subcutaneous BID     furosemide  60 mg Intravenous Q8H     insulin aspart  1-12 Units Subcutaneous Q4H     multivitamins w/minerals  15 mL Per Feeding Tube Daily     pantoprazole  40 mg Per Feeding Tube Daily     potassium chloride  40 mEq Oral or Feeding Tube Once     QUEtiapine  75 mg Oral or Feeding Tube BID     sennosides  5 mL Oral or Feeding Tube BID          dextrose       HYDROmorphone 1 mg/hr (05/12/21 2007)     midazolam 9 mg/hr (05/12/21 2246)     norepinephrine 0.15 mcg/kg/min (05/13/21 0503)     sodium chloride 20 mL/hr at 05/13/21 0443              Labs:         ROUTINE ICU LABS (Last four results)  CMP  Recent Labs    Lab 05/13/21 0512 05/12/21 1745 05/12/21  1135 05/12/21 0410 05/11/21  1740 05/11/21  0455 05/11/21  0455 05/10/21  0400 05/10/21  0400   * 145*  --  145* 144  --  144   < > 143   POTASSIUM 3.4 3.6 3.7 3.0* 3.9   < > 3.2*   < > 3.0*   CHLORIDE 103 102  --  103 104  --  103   < > 103   CO2 41* 42*  --  42* 40*  --  44*   < > 40*   ANIONGAP 1* 1*  --  <1* <1*  --  <1*   < > <1*   * 132*  --  146* 136*  --  121*   < > 98   BUN 33* 35*  --  37* 37*  --  37*   < > 38*   CR 0.49* 0.50*  --  0.51* 0.52  --  0.49*   < > 0.46*   GFRESTIMATED >90 >90  --  >90 >90  --  >90   < > >90   GFRESTBLACK >90 >90  --  >90 >90  --  >90   < > >90   ALICIA 8.7 8.9  --  8.7 8.9  --  9.1   < > 9.1   MAG 2.4*  --   --  2.3  --   --  2.1  --  2.2   PHOS 3.3  --   --  3.5  --   --  3.9  --  3.7    < > = values in this interval not displayed.     CBC  Recent Labs   Lab 05/13/21  0512 05/12/21  0410 05/11/21  0455 05/10/21  0400   WBC 15.2* 12.9* 10.8 12.0*   RBC 3.25* 3.11* 3.30* 3.20*   HGB 9.8* 9.4* 9.9* 9.6*   HCT 32.9* 31.6* 33.4* 32.3*   * 102* 101* 101*   MCH 30.2 30.2 30.0 30.0   MCHC 29.8* 29.7* 29.6* 29.7*   RDW 19.0* 19.0* 18.8* 18.6*   * 487* 549* 527*     INR  No lab results found in last 7 days.  Arterial Blood Gas  Recent Labs   Lab 05/11/21  0544   PH 7.40   PCO2 69*   PO2 61*   HCO3 43*   O2PER Canceled, Test credited       Other Lab Data:      Cultures:  No results for input(s): CULT in the last 168 hours.  Blood culture:  Invalid input(s): BC   Urine culture:  No results for input(s): URC in the last 168 hours.          Imaging/Other results:     No results found for this or any previous visit (from the past 24 hour(s)).         Assessment and Plan:     Sarahy Dunbar IS a 65 year old female admitted on 4/13/2021 for Covid pneumonia with ARDS, altered mental status, hypoxemic respiratory failure requiring intubation and mechanical ventilation.  I have personally reviewed the daily labs, imaging  studies, cultures and discussed the case with referring physician and consulting physicians.   My assessment and plan by system for this patient is as follows:    Neurology/Psychiatry:   1.  Sedation:  midazolam drip, now off propofol  2. Analgesia:  Dilaudid drip  3.  Seroquel 75 bid for possible delirium and to facilitate weaning sedation.  qtc ok 426.  Plan  - Continue to wean sedation as possible.     Cardiovascular:   1.Hemodynamics- hypotension related to sedation. Vaspopressor needs are stable and appears to have adequate perfusion.  Plan   - Continue low dose vasopressors    Pulmonary/ID:   1. ARDS from COVID.  Compliance has been worsening despite maximal therapies including antibiotics and resuming steroids and continuing lung protective ventilation. CT showing extensive changes, some of which may now be permanent.  2. VAP with Pseudomonas and Enteroccocus--resolved.  3. Positive COVID-19 test on 5/7  Plan  - continue lung protective settings.  - Continue diuresis (see renal below).   - Meropenem completed.  - If patient is stable to improved she will require a tracheostomy.   - Steroids completed 5/10    GI and Nutrition:   1.  Continue tube feeds as tolerated  2.  PPI for pud prophy    Renal/Fluids/Electrolytes:   1. Creat ok at 0.49  2.  Responding well to diuresis, down ~16L over last few days. Will continue diuretic for today, but seems to be approaching dry weight.  Plan  -Continue current diuretic/free water regimen for today. Lasix 60 q8hr.  - Replace electrolytes. High K protocol. Recheck all tomorrow and PM bmp's while diuresing.     Endocrine:   1.  Stress induced hyperglycemia, now improved  Plan  - Monitor sugars  - Methylpred course completed 5/10    Hematology/Oncology/Skin:   1. Large hematoma with acute blood loss anemia. Bleeding has now stopped. Imaging noted no vessel that needs or is amenable to intervention. Lovenox has been restarted and hemoglobin relatively stable now.  Bedside  ultrasound showed fluid collection within hematoma that was aspirated and did not appear infected.  2. Critical illness anemia. hgb stable today at 9.8  3. Transfusion reaction on 5/2. Received a different unit and tolerated well.  4. Mild leukocytosis:  15.2; no other signs of new infection.  Re-send surveillance cultures.  Plan  -Continue Lovenox as patient does not appear to be bleeding at this time.     IV/Access:   1. Venous access -central line  2. Arterial access - removed    ICU Prophylaxis:   1. DVT: Lovenox  2. VAP: HOB 30 degrees, chlorhexidine rinse  3. Stress Ulcer: PPI  4. Wound care  - local wound care for hematoma  5. Feeding -tube feeds  6. Family Update:  on the phone  7. Disposition -remain in ICU    Code status:  DNR    Family meeting held 5/11 with patient's , two sisters and their spouses.  Awaiting decisions on next steps in care.    I spent 35 minutes on the unit providing critical care for this patient.

## 2021-05-13 NOTE — PROGRESS NOTES
[facility] ICU RESPIRATORY NOTE        Date of Admission: 4/13/2021    Date of Intubation (most recent): 4/13/21    Reason for Mechanical Ventilation: Resp  failure    Number of Days on Mechanical Ventilation: 27    Met Criteria for Spontaneous Breathing Trial: No    Reason for No Spontaneous Breathing Trial:Per MD    Significant Events Today: None      ABG Results:   Recent Labs   Lab 05/11/21  0544 05/06/21  0411   PH 7.40  --    PCO2 69*  --    PO2 61*  --    HCO3 43*  --    O2PER Canceled, Test credited 50%       Current Vent Settings: Ventilation Mode: Other (see comments) ([facility] ac)  FiO2 (%): 45 %  Rate Set (breaths/minute): 25 breaths/min  Tidal Volume Set (mL): 330 mL  PEEP (cm H2O): 10 cmH2O  Oxygen Concentration (%): 45 %  Resp: 26      Skin Assessment: intact    Plan: Will continue to monitor the pt on full ventilatory support      Vanessa Perez, RT

## 2021-05-13 NOTE — PLAN OF CARE
No significant changes overnight. Remains sedated on dilaudid and versed. No coughing with stimulation. Opens eyes with big turns. Sinus rhythm with stable blood pressures supported with levo. Low grade fevers treated with tylenol. Yeboah with good output. Will continue to monitor.

## 2021-05-13 NOTE — PROGRESS NOTES
[facility] ICU RESPIRATORY NOTE        Date of Admission: 4/13/2021    Date of Intubation (most recent): 4/13/21    Reason for Mechanical Ventilation: Resp failure    Number of Days on Mechanical Ventilation: 28    Met Criteria for Spontaneous Breathing Trial: No    Reason for No Spontaneous Breathing Trial: Per MD    Significant Events Today: None    ABG Results:   Recent Labs   Lab 05/11/21  0544   PH 7.40   PCO2 69*   PO2 61*   HCO3 43*   O2PER Canceled, Test credited       Current Vent Settings: Ventilation Mode: PCV Plus assist  (Pressure Control Ventilation/ Assist Control)  FiO2 (%): 45 %  Rate Set (breaths/minute): 25 breaths/min  Tidal Volume Set (mL): 330 mL  PEEP (cm H2O): 10 cmH2O  Oxygen Concentration (%): 45 %  Peak Inspiratory Pressure (cm H2O) (Drager Delmy): 15  Resp: (!) 35      Plan: Will continue full ventilatory support    Surya Crowell, RT

## 2021-05-14 NOTE — PROGRESS NOTES
Comprehensive Daily ICU Note        Sarahy Dunbar MRN# [id number]   Age: 65 year old YOB: 1955     Date of Admission: 4/13/2021    Primary care provider: Rama Pop     CODE STATUS: FULL      Problem List:       Active Problems:    Acute respiratory failure with hypoxemia (H)    ARDS (adult respiratory distress syndrome) (H)    2019 novel coronavirus disease (COVID-19)    Hematoma    Anemia due to blood loss, acute    Shock (H)    Ventilator associated pneumonia (H)        Subjective/ Last 24 hours:   65-year-old female with no significant medical history who was found in bed with low saturations after welfare check.  Discovered to have COVID on 4/13. Maintained on NIV but subsequently required intubation on 4/15. Initially proned but now largely kept supine.  Overall has had worsening lung compliance, worsening ventilatory deficits, and inability to tolerate weaning of sedation. She has also developed VAP, which has been treated. Other complications include a large hematoma related to an arterial line necessitating a number of transfusions.   Brief run of svt yesterday afternoon; picc line slightly deep--pulled back.  Unable to obtain history directly this morning due to intubated/vented status.           Mechanical Ventilation/Vitalsigns/IsandOs:     Temp:  [99.1  F (37.3  C)-100.4  F (38  C)] 99.3  F (37.4  C)  Pulse:  [] 94  Resp:  [12-76] 23  BP: ()/() 100/58  FiO2 (%):  [45 %-60 %] 60 %  SpO2:  [86 %-99 %] 95 %      Ventilation Mode: PCV Plus assist  (Pressure Control Ventilation/ Assist Control)  FiO2 (%): 60 %  Rate Set (breaths/minute): 25 breaths/min  Tidal Volume Set (mL): 330 mL  PEEP (cm H2O): 10 cmH2O  Oxygen Concentration (%): 50 %  Peak Inspiratory Pressure (cm H2O) (Drager Demly): 15  Resp: 23    Vented since 4/15      Intake/Output Summary (Last 24 hours) at 5/14/2021 0736  Last data filed at 5/14/2021 0600  Gross per 24 hour   Intake 3361.36 ml   Output  2265 ml   Net 1096.36 ml            Physical Examination:     General: Stated age, heavily sedated  HEENT: ET tube, NG tube  Lungs: crackles anteriorly, occasionally is very dyssynchronous  CVS: RRR, S1S2  Abdomen: obese, soft  Extremities/musculoskeletal:  Warm, edema improved  Neurology: more wakeful this morning.  Opens eyes to voice.  Seems to follow commands though she's profoundly weak which limits testing of this.  Skin: scars from previous knee/ankle surgery. Hematoma has regressed   Psychiatry: sedated  Exam of Line sites:  Central line site looks clean          Feeding/Glucose:     Orders Placed This Encounter      NPO for Medical/Clinical Reasons Except for: Meds      Recent Labs   Lab 05/14/21  0449 05/14/21  0425 05/13/21  2358 05/13/21  2047 05/13/21  1800 05/13/21  1635 05/13/21  1153 05/13/21  0828 05/13/21  0512 05/12/21  1745 05/12/21  1745 05/12/21  0410 05/12/21  0410 05/11/21  1740 05/11/21  1740   GLC  --  124*  --   --  136*  --   --   --  118*  --  132*  --  146*  --  136*   *  --  139* 124*  --  157* 138* 144*  --    < >  --    < >  --    < >  --     < > = values in this interval not displayed.              Medications:       chlorhexidine  15 mL Mouth/Throat Q12H     enoxaparin ANTICOAGULANT  0.5 mg/kg Subcutaneous BID     furosemide  60 mg Intravenous Q8H     insulin aspart  1-12 Units Subcutaneous Q4H     multivitamins w/minerals  15 mL Per Feeding Tube Daily     pantoprazole  40 mg Per Feeding Tube Daily     potassium chloride  20 mEq Oral or Feeding Tube Once     QUEtiapine  75 mg Oral or Feeding Tube BID     sennosides  5 mL Oral or Feeding Tube BID     sodium chloride (PF)  10 mL Intracatheter Q8H     sodium chloride (PF)  10 mL Intracatheter Q8H          dextrose       HYDROmorphone 2 mg/hr (05/14/21 0621)     midazolam 12 mg/hr (05/14/21 0247)     norepinephrine 0.08 mcg/kg/min (05/14/21 0623)     propofol (DIPRIVAN) infusion       sodium chloride 20 mL/hr at 05/13/21 2018               Labs:         ROUTINE ICU LABS (Last four results)  CMP  Recent Labs   Lab 05/14/21 0425 05/13/21  1800 05/13/21  1153 05/13/21 0512 05/12/21  1745 05/12/21 0410 05/12/21 0410 05/11/21 0455 05/11/21 0455   * 147*  --  145* 145*  --  145*   < > 144   POTASSIUM 2.9* 3.8 4.1 3.4 3.6   < > 3.0*   < > 3.2*   CHLORIDE 102 105  --  103 102  --  103   < > 103   CO2 43* 41*  --  41* 42*  --  42*   < > 44*   ANIONGAP <1* 1*  --  1* 1*  --  <1*   < > <1*   * 136*  --  118* 132*  --  146*   < > 121*   BUN 37* 35*  --  33* 35*  --  37*   < > 37*   CR 0.49* 0.46*  --  0.49* 0.50*  --  0.51*   < > 0.49*   GFRESTIMATED >90 >90  --  >90 >90  --  >90   < > >90   GFRESTBLACK >90 >90  --  >90 >90  --  >90   < > >90   ALICIA 8.6 8.9  --  8.7 8.9  --  8.7   < > 9.1   MAG 2.5*  --   --  2.4*  --   --  2.3  --  2.1   PHOS 4.4  --   --  3.3  --   --  3.5  --  3.9    < > = values in this interval not displayed.     CBC  Recent Labs   Lab 05/14/21 0425 05/13/21 0512 05/12/21 0410 05/11/21 0455   WBC 12.7* 15.2* 12.9* 10.8   RBC 3.04* 3.25* 3.11* 3.30*   HGB 9.3* 9.8* 9.4* 9.9*   HCT 31.1* 32.9* 31.6* 33.4*   * 101* 102* 101*   MCH 30.6 30.2 30.2 30.0   MCHC 29.9* 29.8* 29.7* 29.6*   RDW 18.8* 19.0* 19.0* 18.8*    481* 487* 549*     INR  No lab results found in last 7 days.  Arterial Blood Gas  Recent Labs   Lab 05/11/21  0544   PH 7.40   PCO2 69*   PO2 61*   HCO3 43*   O2PER Canceled, Test credited       Other Lab Data:      Cultures:  Recent Labs   Lab 05/13/21  1200 05/13/21  0921 05/13/21  0830   CULT PENDING No growth after 17 hours No growth after 17 hours     Blood culture:  Invalid input(s): BC   Urine culture:  No results for input(s): URC in the last 168 hours.          Imaging/Other results:     Recent Results (from the past 24 hour(s))   XR Chest Port 1 View    Narrative    CHEST ONE VIEW PORTABLE    5/13/2021 6:04 PM     HISTORY: Evaluate PICC line position    COMPARISON: Chest x-ray  5/1/2021.      Impression    IMPRESSION: Portable view of the chest for PICC line placement  demonstrates a right-sided PICC line terminating in the region of the  right atrium. This could be withdrawn approximately 2 cm for ideal  placement at the SVC right atrium junction. Diffuse bilateral  pulmonary infiltrates again noted. Feeding tube extends above the  level of the left hemidiaphragm likely in the proximal jejunum.  Endotracheal tube is present and is approximately 4 cm above the  bertrand. No definite pneumothorax or pleural effusions.    MARILYN NARAYAN MD            Assessment and Plan:     Sarahy Dunbar IS a 65 year old female admitted on 4/13/2021 for Covid pneumonia with ARDS, altered mental status, hypoxemic respiratory failure requiring intubation and mechanical ventilation.  I have personally reviewed the daily labs, imaging studies, cultures and discussed the case with referring physician and consulting physicians.   My assessment and plan by system for this patient is as follows:    Neurology/Psychiatry:   1.  Sedation:  midazolam drip, now off propofol  2. Analgesia:  Dilaudid drip  3.  Seroquel 75 bid for possible delirium and to facilitate weaning sedation.  qtc ok 426.  Plan  - Continue to wean sedation as possible.     Cardiovascular:   1.Hemodynamics- hypotension related to sedation. Vaspopressor needs are stable and appears to have adequate perfusion.  Plan   - Continue low dose vasopressors    Pulmonary/ID:   1. Acute hypoxemic respiratory failure, vent dependent: ARDS from COVID.  Very slow if any progress toward recovery. CT showing extensive changes, some of which may now be permanent.  2. VAP with Pseudomonas and Enteroccocus--resolved.  3. Positive COVID-19 test on 5/7  Plan  - continue lung protective settings.  - Continue diuresis (see renal below).   - Meropenem completed.  - If patient is stable to improved she will require a tracheostomy.   - Steroids completed 5/10    GI and  Nutrition:   1.  Continue tube feeds as tolerated  2.  PPI for pud prophy    Renal/Fluids/Electrolytes:   1. Creat ok at 0.49  2.  Responding well to diuresis, down ~15L over last few days. Will continue diuretic for today, but seems to be approaching dry weight.  3.  Hypokalemia:  2.9 today; in setting of diuretic  Plan  -Continue current diuretic/free water regimen for today. Lasix 60 q8hr.  - Replace electrolytes. High K protocol. Recheck all tomorrow and PM bmp's while diuresing.     Endocrine:   1.  Stress induced hyperglycemia, now improved  Plan  - Monitor sugars  - Methylpred course completed 5/10    Hematology/Oncology/Skin:   1. Large hematoma with acute blood loss anemia. Bleeding has now stopped. Imaging noted no vessel that needs or is amenable to intervention. Lovenox has been restarted and hemoglobin relatively stable now.  Bedside ultrasound showed fluid collection within hematoma that was aspirated and did not appear infected.  2. Critical illness anemia. hgb stable today at 9.3  3. Transfusion reaction on 5/2. Received a different unit and tolerated well.  4. Mild leukocytosis:  12.7; no other signs of new infection.  Re-send surveillance cultures.  Plan  -Continue Lovenox as patient does not appear to be bleeding at this time.     IV/Access:   1. Venous access -central line  2. Arterial access - removed    ICU Prophylaxis:   1. DVT: Lovenox  2. VAP: HOB 30 degrees, chlorhexidine rinse  3. Stress Ulcer: PPI  4. Wound care  - local wound care for hematoma  5. Feeding -tube feeds  6. Family Update:  on the phone  7. Disposition -remain in ICU    Code status:  DNR    Family meeting held 5/11 with patient's , two sisters and their spouses.  Awaiting decisions on next steps in care.    I spent 35 minutes on the unit providing critical care for this patient.

## 2021-05-14 NOTE — PROGRESS NOTES
Patient remains sedated on versed and dilaudid. Remains vented, no wean this shift. Remains on Levophed for blood pressure management. Patient spouse updated per phone, had I Pat chat with Sarahy.

## 2021-05-14 NOTE — PROGRESS NOTES
[facility] ICU RESPIRATORY NOTE        Date of Admission: 4/13/2021    Date of Intubation (most recent): 4/13/21    Reason for Mechanical Ventilation: Resp failure    Number of Days on Mechanical Ventilation: 29    Met Criteria for Spontaneous Breathing Trial: No    Reason for No Spontaneous Breathing Trial: Per MD    Significant Events Today: None    ABG Results:   Recent Labs   Lab 05/11/21  0544   PH 7.40   PCO2 69*   PO2 61*   HCO3 43*   O2PER Canceled, Test credited       Current Vent Settings: Ventilation Mode: PCV Plus assist  (Pressure Control Ventilation/ Assist Control)  FiO2 (%): 60 %  Rate Set (breaths/minute): 25 breaths/min  Tidal Volume Set (mL): 330 mL  PEEP (cm H2O): 10 cmH2O  Oxygen Concentration (%): 60 %  Peak Inspiratory Pressure (cm H2O) (Drager Delmy): 15  Resp: 28        Plan:Continue full ventilatory support and wean as tolerated    Surya Crowell, RT

## 2021-05-14 NOTE — PROVIDER NOTIFICATION
Pt having labored breathing using abdominal and accessory muscles, tachypneic with respiratory rates in the mid 30's, continually coughing and fighting ventilator and stacking breaths.  FiO2 was increased to 60% earlier in shift.  Pt having runs of SVT with HR in 150-160's and intermittent bigeminal PVC's during episode.  PRN boluses of versed and dilaudid given (see MAR) with minimal effect.  Versed infusion increased to max dose.  SEAN Arellano MD notified.  MD increased Dilaudid infusion dose and ordered 1 mg bolus (see MAR).  MD also ordered propofol infusion but stated try not to start if possible and ok with Pt stacking breaths as long as Pt did not desaturate on current oxygen settings.  Will continue to monitor.

## 2021-05-15 NOTE — PROGRESS NOTES
[facility] ICU RESPIRATORY NOTE        Date of Admission: 4/13/2021    Date of Intubation (most recent): 4/13/21    Reason for Mechanical Ventilation: Resp failure    Number of Days on Mechanical Ventilation: 30    Met Criteria for Spontaneous Breathing Trial: No    Reason for No Spontaneous Breathing Trial: Per MD    Significant Events Today: None overnight    ABG Results:   Recent Labs   Lab 05/11/21  0544   PH 7.40   PCO2 69*   PO2 61*   HCO3 43*   O2PER Canceled, Test credited       Current Vent Settings: Ventilation Mode: PCV Plus assist  (Pressure Control Ventilation/ Assist Control)  FiO2 (%): 60 %  Rate Set (breaths/minute): 25 breaths/min  PEEP (cm H2O): 10 cmH2O  Oxygen Concentration (%): 60 %  Peak Inspiratory Pressure (cm H2O) (Drager Delmy): 15  Resp: (!) 0      Skin Assessment: no skin issues    Plan: Continue  Vent support and daily wean assessments     Anahi Brewer, RT

## 2021-05-15 NOTE — PROGRESS NOTES
Patient remains vented, unable to wean. Remains in Versed, Dilaudid and Levophed drips, unable to wean. Spouse had I Pad chat with Sarahy, given an update.

## 2021-05-15 NOTE — PROGRESS NOTES
Intensivist note  Sedated on vent     Comfortable appearing on vent;   /59   Pulse 115   Temp 100.2  F (37.9  C)   Resp 17   Ht 1.524 m (5')   Wt 101.7 kg (224 lb 3.3 oz)   SpO2 97%   BMI 43.79 kg/m    Lungs mostly clear with distant breath sounds; heart RRR; abdomen soft and non-tender; extremities are warm with mild edema skin shows no cyanosis or mottling    Labs reviewed    Assessment and Plan   1. Acute respiratory failure/ards intubated 4/15 - currently on 60%, +10 PEEP, with slight deterioration in oxygenation; continue to provide full support.   2. 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 Shock - continues on Levophed at 0.06; monitor only   4. Pseudomonas in sputum, cultured previously. With large growth and deterioration in oxygenation, will treat for 7 days. Given prior sensitivity pattern will treat with 3 days of Tobra and 7 days of Zosyn. Case discussed with ID and appreciate Dr. Morejon's formal consult tomorrow.  5. S/p episode hemorrhagic shock, resolved  6. History HTN  7. Hyperglycemia   Overall, she remains acute and critical. I discucssed case with family by phone today ().     I spent 35 minutes of critical care time with her today.    drew mendez  May 15, 2021

## 2021-05-15 NOTE — PHARMACY-AMINOGLYCOSIDE DOSING SERVICE
Pharmacy Aminoglycoside Initial Note  Date of Service May 15, 2021  Patient's  1955  65 year old, female    Weight (Adjusted):  68 kg    Indication: Healthcare-Associated Pneumonia    Current estimated CrCl = Estimated Creatinine Clearance: 128.1 mL/min (A) (based on SCr of 0.47 mg/dL (L)).    Creatinine for last 3 days  2021:  5:45 PM Creatinine 0.50 mg/dL  2021:  5:12 AM Creatinine 0.49 mg/dL;  6:00 PM Creatinine 0.46 mg/dL  2021:  4:25 AM Creatinine 0.49 mg/dL;  3:43 PM Creatinine 0.48 mg/dL  5/15/2021:  4:45 AM Creatinine 0.47 mg/dL     Nephrotoxins and other renal medications (From now, onward)    Start     Dose/Rate Route Frequency Ordered Stop    05/15/21 1600  tobramycin (NEBCIN) 480 mg in sodium chloride 0.9 % intermittent infusion      480 mg  over 60 Minutes Intravenous EVERY 24 HOURS 05/15/21 1322 21 1559    05/15/21 1300  piperacillin-tazobactam (ZOSYN) 4.5 g vial to attach to  mL bag      4.5 g  over 30 Minutes Intravenous EVERY 6 HOURS 05/15/21 1217 21 1259    21 1838  furosemide (LASIX) injection 60 mg      60 mg  over 1-3 Minutes Intravenous EVERY 8 HOURS 21 1703      04/15/21 1200  norepinephrine (LEVOPHED) 16 mg in  mL infusion CENTRAL LINE      0.03-0.4 mcg/kg/min × 107 kg (Dosing Weight)  3-40.1 mL/hr  Intravenous CONTINUOUS 04/15/21 1146            Contrast Orders - past 72 hours (72h ago, onward)    None          Aminoglycoside Levels - past 2 days  No results found for requested labs within last 48 hours.    Aminoglycosides IV Administrations (past 72 hours)      No aminoglycosides orders with administrations in past 72 hours.                    Plan:  1.  Start Tobramycin 480 mg (7 mg/kg) IV q24h.   2.  Target goals based on extended interval dosing  3.  Goal peak level: 17-24 mg/L  4.  Goal trough level: <0.5mg/L  5.  Pharmacy will continue to follow and check levels as appropriate in 1-3 Days      Juany Jackson RPH

## 2021-05-16 NOTE — CONSULTS
[facility]    Infectious Disease Consultation     Date of Admission:  4/13/2021  Date of Consult (When I saw the patient): 05/16/21    Assessment & Plan   Sarahy Dunbar is a 65 year old female who was admitted on 4/13/2021.     Impression:  1. 65 y.o female who was found in bed with low saturations after welfare check.  The patient's  was out of town and was unable to contact his wife and the patient was found in bed with a saturation of approximately 20. This was more than a month ago, she is intubated and sedated in the icu.   2. History of HTN   3. COVID positive s/p remdesivir. Steroids.   4. Off and on on various antibiotics since admission.   5. Most recent cultures with pseudomonas in the sputum, multiple strains all S to zosyn.   6. Did get a dose of tobramycin.   7. Now on zosyn     Recommendations:   Agree with zosyn   Discussed with Dr. Mata.         Salome Morejon MD    Reason for Consult   Reason for consult: I was asked to evaluate this patient for pseudomonas in the sputum cultures.     Primary Care Physician   MANDY OLIVAS    Chief Complaint   Cannot complain intubated and sedated     History is obtained from Dr. Mata and medical records     History of Present Illness   Sarahy Dunbar is a 65 year old female who was found in bed with low saturations after welfare check.  Discovered to have COVID on 4/13. Maintained on NIV but subsequently required intubation on 4/15. Overall has had worsening lung compliance, worsening ventilatory deficits, and inability to tolerate weaning of sedation. She has also developed VAP, which has been treated.     Past Medical History   I have reviewed this patient's medical history and updated it with pertinent information if needed.   Past Medical History:   Diagnosis Date     Arthritis     OA - Knee     Dyshidrotic eczema      Herpes simplex labialis      Hypersensitivity vasculitides      Hypertension      Obesity       Shingles 2016     Venous insufficiency      Venous stasis        Past Surgical History   I have reviewed this patient's surgical history and updated it with pertinent information if needed.  Past Surgical History:   Procedure Laterality Date     ARTHROPLASTY KNEE Left 9/6/2016    Procedure: ARTHROPLASTY KNEE;  Surgeon: Amy Landers MD;  Location:  OR     ARTHROPLASTY KNEE Right 4/3/2018    Procedure: ARTHROPLASTY KNEE;  RIGHT TOTAL KNEE ARTHROPLASTY ;  Surgeon: Amy Landers MD;  Location:  OR     ARTHROSCOPY ANKLE  2/2/2012    Procedure:ARTHROSCOPY ANKLE; LEFT ANKLE ARTHROSCOPIC IRRIGATION AND DEBRIDEMENT, WITH HARDWARE REMOVAL (C-ARM); Surgeon:TONYA CAAL; Location: OR     ORTHOPEDIC SURGERY      Left tib/fib fracture     REMOVE HARDWARE ANKLE  2/2/2012    Procedure:REMOVE HARDWARE ANKLE; Surgeon:TONYA CAAL; Location: OR       Prior to Admission Medications   Prior to Admission Medications   Prescriptions Last Dose Informant Patient Reported? Taking?   amLODIPine (NORVASC) 5 MG tablet unknown  Yes Yes   Sig: Take 5 mg by mouth daily   metoprolol succinate ER (TOPROL-XL) 50 MG 24 hr tablet unknown  Yes Yes   Sig: Take 75 mg by mouth daily   multivitamin, therapeutic (THERA-VIT) TABS tablet unknown  Yes Yes   Sig: Take 1 tablet by mouth daily   naproxen sodium (ANAPROX) 220 MG tablet prn  Yes Yes   Sig: Take 220 mg by mouth 2 times daily as needed for moderate pain      Facility-Administered Medications: None     Allergies   Allergies   Allergen Reactions     Lisinopril-Hydrochlorothiazide Other (See Comments)     hyponatremia       Immunization History     There is no immunization history on file for this patient.    Social History   I have reviewed this patient's social history and updated it with pertinent information if needed. Sarahy VILLAVICENCIO Dunbar  reports that she has never smoked. She has never used smokeless tobacco. She reports current alcohol use. She reports that she does not use  drugs.    Family History   I have reviewed this patient's family history and updated it with pertinent information if needed.   No family history on file.    Review of Systems   The 10 point Review of Systems could not be obtained, patient is intubated and sedated .     Physical Exam   Temp: 98.6  F (37  C) Temp src: Bladder BP: 104/42 Pulse: 69   Resp: (!) 33 SpO2: 94 % O2 Device: Mechanical Ventilator    Vital Signs with Ranges  Temp:  [98.6  F (37  C)-100.2  F (37.9  C)] 98.6  F (37  C)  Pulse:  [] 69  Resp:  [16-34] 33  BP: ()/(27-79) 104/42  FiO2 (%):  [50 %-100 %] 50 %  SpO2:  [85 %-100 %] 94 %  225 lbs 1.43 oz  Body mass index is 43.96 kg/m .    GENERAL APPEARANCE:  Intubated and sedated   EYES: Eyes grossly normal to inspection, PERRL and conjunctivae and sclerae normal  HENT: ear canals and TM's normal and nose and mouth without ulcers or lesions  NECK: no adenopathy, no asymmetry, masses, or scars and thyroid normal to palpation  RESP: lungs clear to auscultation - no rales, rhonchi or wheezes  CV: regular rates and rhythm, normal S1 S2, no S3 or S4 and no murmur, click or rub  LYMPHATICS: normal ant/post cervical and supraclavicular nodes  ABDOMEN: soft, nontender, without hepatosplenomegaly or masses and bowel sounds normal  MS: extremities normal- no gross deformities noted  SKIN: no suspicious lesions or rashes      Data   Lab Results   Component Value Date    WBC 11.3 (H) 05/16/2021    HGB 9.4 (L) 05/16/2021    HCT 32.7 (L) 05/16/2021     05/16/2021     (H) 05/16/2021    POTASSIUM 3.1 (L) 05/16/2021    CHLORIDE 103 05/16/2021    CO2 43 (H) 05/16/2021    BUN 37 (H) 05/16/2021    CR 0.53 05/16/2021     (H) 05/16/2021    SED 96 (H) 02/01/2012    DD 2.8 (H) 04/17/2021    NTBNPI 9,375 (H) 04/13/2021    TROPI 0.022 04/17/2021    AST 42 04/23/2021    ALT 46 04/23/2021    ALKPHOS 75 04/23/2021    BILITOTAL 0.4 04/23/2021    INR 1.08 04/20/2021     Recent Labs   Lab  05/13/21  1200 05/13/21  0921 05/13/21  0830   CULT Moderate growth  Pseudomonas aeruginosa  *  Light growth  Strain 2  Pseudomonas aeruginosa  Meropenem susceptibilities in progress.  5.16.21  *  Light growth  Strain 3  Pseudomonas aeruginosa  *  Light growth  Candida albicans  Susceptibility testing not routinely done  * No growth after 3 days 10,000 to 50,000 colonies/mL  Pseudomonas aeruginosa  *  10,000 to 50,000 colonies/mL  Enterococcus faecalis  *  10,000 to 50,000 colonies/mL  Candida albicans  Susceptibility testing not routinely done  *  No growth after 3 days     Recent Labs   Lab Test 05/13/21  1200 05/13/21  0921 05/13/21  0830 05/01/21  0947 05/01/21  0941 05/01/21  0935 04/26/21  1850   CULT Moderate growth  Pseudomonas aeruginosa  *  Light growth  Strain 2  Pseudomonas aeruginosa  Meropenem susceptibilities in progress.  5.16.21  *  Light growth  Strain 3  Pseudomonas aeruginosa  *  Light growth  Candida albicans  Susceptibility testing not routinely done  * No growth after 3 days 10,000 to 50,000 colonies/mL  Pseudomonas aeruginosa  *  10,000 to 50,000 colonies/mL  Enterococcus faecalis  *  10,000 to 50,000 colonies/mL  Candida albicans  Susceptibility testing not routinely done  *  No growth after 3 days 50,000 to 100,000 colonies/mL  Candida albicans  Susceptibility testing not routinely done  *  <1000 colonies/mL  urogenital dario   No growth Light growth  Candida albicans  Susceptibility testing not routinely done  *  Light growth  Pseudomonas aeruginosa  *  Light growth  Strain 2  Pseudomonas aeruginosa  * Moderate growth  Pseudomonas aeruginosa  *  Moderate growth  Enterococcus faecalis  *  Plus  Light growth  Normal dario

## 2021-05-16 NOTE — PROGRESS NOTES
Patient remains vented, no wean this shift. Remains on Dilaudid and Versed for comfort and sedation. Remains on Levophed for blood pressure management. Vital signs stable. Had video I Pad with spouse. Patient unresponsive. Dr Mata spoke with patient spouse with update.

## 2021-05-16 NOTE — PROGRESS NOTES
[facility] ICU RESPIRATORY NOTE           Date of Admission: 4/13/2021     Date of Intubation (most recent): 4/13/21     Reason for Mechanical Ventilation: Resp failure     Number of Days on Mechanical Ventilation: 30     Met Criteria for Spontaneous Breathing Trial: No     Reason for No Spontaneous Breathing Trial: Per MD     Significant Events Today: None this shift.      ABG Results:       Recent Labs   Lab 05/11/21  0544   PH 7.40   PCO2 69*   PO2 61*   HCO3 43*   O2PER Canceled, Test credited       Current Vent Settings: Ventilation Mode: PCV Plus assist  (Pressure Control Ventilation/ Assist Control)  FiO2 (%): 60 %  Rate Set (breaths/minute): 25 breaths/min  PEEP (cm H2O): 10 cmH2O  Oxygen Concentration (%): 60 %  Peak Inspiratory Pressure (cm H2O) (Drager Delmy): 15  Resp: (!) 0        Skin Assessment: no skin issues     Plan: Continue  full vent support overnight.     Felix Singleton, RT

## 2021-05-16 NOTE — PROGRESS NOTES
Pt remains on heavy sedation, versed 12mg/hr, and dilaudid 2mg/hr, unable to wean. Pt remains on Levo 0.06 to maintain MAP> 65. Pt remains on vent, dyssynchronous at times, unable to wean. K replaced x2, recheck at 0800.

## 2021-05-16 NOTE — PROGRESS NOTES
Intensivist note   Comfortable on vent     Assessment and Plan   1. Acute respiratory failure/ards intubated 4/15 - currently on 50-60%, +10 PEEP, with slight deterioration in oxygenation; continue to provide full support.   2. 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 Shock - she continues on Levophed at 0.06; monitor only   4. Pseudomonas in sputum, cultured previously, and remarkably a sensitive to most antibiotics. I am grateful to Dr. Morejon's input and will complete a 7 day course of antibiotics.   5. S/p episode hemorrhagic shock, resolved  6. History HTN  7. Hyperglycemia - glucoses ok  Overall, she remains critical. I had a long discussion with bryson\lupis via phone. \    Addendum: I spent at least 30 minutes of critical care time with this patient on this calendar date    Physical exam   Well developed female; on vent  BP (!) 115/36   Pulse 84   Temp 98.1  F (36.7  C)   Resp 23   Ht 1.524 m (5')   Wt 102.1 kg (225 lb 1.4 oz)   SpO2 91%   BMI 43.96 kg/m    Lungs distant breath sounds  Heart RRR  Abdomen is soft and non-tender  Extremities are warm with mild edema  Skin no cyanosis or mottling  CNS sedated    Labs reviewed    drew mendez  May 16, 2021

## 2021-05-16 NOTE — PROGRESS NOTES
[facility] ICU RESPIRATORY NOTE        Date of Admission: 4/13/2021    Date of Intubation (most recent): 4/13/21    Reason for Mechanical Ventilation: Resp Failure    Number of Days on Mechanical Ventilation: 31    Met Criteria for Spontaneous Breathing Trial: No    Reason for No Spontaneous Breathing Trial: Per MD    Significant Events Today: None overnight    ABG Results:   Recent Labs   Lab 05/16/21  0410 05/11/21  0544   PH 7.40 7.40   PCO2 72* 69*   PO2 75* 61*   HCO3 45* 43*   O2PER 60% Canceled, Test credited       Current Vent Settings: Ventilation Mode: PCV Plus assist  (Pressure Control Ventilation/ Assist Control)  FiO2 (%): 60 %  Rate Set (breaths/minute): 25 breaths/min  PEEP (cm H2O): 10 cmH2O  Oxygen Concentration (%): 60 %  Peak Inspiratory Pressure (cm H2O) (Drager Delmy): 15  Resp: 23      Skin Assessment: No skin issues    Plan: Continue vent support    Anahi Brewer, RT

## 2021-05-17 NOTE — CONSULTS
Gen Surg  Case reviewed.  Will try to proceed with PEG placement tomorrow in conjunction with trach.  Will discuss with .  Saul Canela MD  General Surgery, Office [phone]

## 2021-05-17 NOTE — PROGRESS NOTES
[facility] ICU RESPIRATORY NOTE           Date of Admission: 4/13/2021     Date of Intubation (most recent): 4/13/21     Reason for Mechanical Ventilation: Resp Failure     Number of Days on Mechanical Ventilation: 32     Met Criteria for Spontaneous Breathing Trial: No     Reason for No Spontaneous Breathing Trial: Per MD     Significant Events Today: None overnight     ABG Results:   Recent Labs   Lab 05/17/21  0505 05/16/21  0410 05/11/21  0544   PH 7.40 7.40 7.40   PCO2 72* 72* 69*   PO2 111* 75* 61*   HCO3 44* 45* 43*   O2PER  --  60% Canceled, Test credited     Ventilation Mode: PCV Plus assist  (Pressure Control Ventilation/ Assist Control)  FiO2 (%): 50 %  Rate Set (breaths/minute): 25 breaths/min  PEEP (cm H2O): 10 cmH2O  Oxygen Concentration (%): 50 %  Peak Inspiratory Pressure (cm H2O) (Drager Delmy): 15  Resp: 22    CHAPITO Fajardo, RRT

## 2021-05-17 NOTE — PLAN OF CARE
No major events today. In attempts to wean sedation, patient became tachycardic up to 130's, tachypneic, and continually was coughing causing her to desat as low as 80%.     N: Patient opens eyes to voice and follows some commands. PEERL. Seems to make eye contact but does not track.   CV: SR/ST. Levophed to keep MAP>65. Currently at 0.08  LS: FIO2 40%, PEEP 10. LS dim. Desatting with turns  GI/: Yeboah with adequate output. TF at goal through NJ. Increased FWF today.   IV: Versed, dilaudid, and levo through PICC

## 2021-05-17 NOTE — PROGRESS NOTES
[facility]    Infectious Disease Progress Note    Date of Service (when I saw the patient): 05/17/2021     Assessment & Plan   Sarahy Dunbar is a 65 year old female who was admitted on 4/13/2021.     Impression:  1. 65 y.o female who was found in bed with low saturations after welfare check.  The patient's  was out of town and was unable to contact his wife and the patient was found in bed with a saturation of approximately 20. This was more than a month ago, she is intubated and sedated in the icu.   2. History of HTN   3. COVID positive s/p remdesivir. Steroids.   4. Off and on on various antibiotics since admission.   5. Most recent cultures with pseudomonas in the sputum, multiple strains all S to zosyn.   6. Did get a dose of tobramycin.   7. Now on zosyn      Recommendations:   Continue on zosyn 10 day course for VAP.       Salome Morejon MD    Interval History   No changes   Afebrile       Physical Exam   Temp: 98.8  F (37.1  C) Temp src: Bladder BP: (!) 76/48 Pulse: 105   Resp: (!) 38 SpO2: 92 % O2 Device: Mechanical Ventilator    Vitals:    05/15/21 0530 05/16/21 0600 05/17/21 0229   Weight: 101.7 kg (224 lb 3.3 oz) 102.1 kg (225 lb 1.4 oz) 101.8 kg (224 lb 6.9 oz)     Vital Signs with Ranges  Temp:  [97.7  F (36.5  C)-98.8  F (37.1  C)] 98.8  F (37.1  C)  Pulse:  [] 105  Resp:  [14-38] 38  BP: ()/() 76/48  FiO2 (%):  [45 %-60 %] 45 %  SpO2:  [87 %-100 %] 92 %    Constitutional: intubated   Lungs: Clear to auscultation bilaterally, no crackles or wheezing  Cardiovascular: Regular rate and rhythm, normal S1 and S2, and no murmur noted  Abdomen: Normal bowel sounds, soft, non-distended, non-tender  Skin: No rashes, no cyanosis, no edema  Other:    Medications     dextrose       HYDROmorphone 2 mg/hr (05/17/21 0800)     midazolam 11 mg/hr (05/17/21 0820)     norepinephrine 0.06 mcg/kg/min (05/17/21 0845)     sodium chloride 20 mL/hr at 05/17/21 0800        chlorhexidine  15 mL Mouth/Throat Q12H     enoxaparin ANTICOAGULANT  0.5 mg/kg Subcutaneous BID     furosemide  60 mg Intravenous Q12H     insulin aspart  1-12 Units Subcutaneous Q4H     multivitamins w/minerals  15 mL Per Feeding Tube Daily     pantoprazole  40 mg Per Feeding Tube Daily     piperacillin-tazobactam  4.5 g Intravenous Q6H     potassium chloride  20 mEq Intravenous Q1H     QUEtiapine  75 mg Oral or Feeding Tube BID     sennosides  5 mL Oral or Feeding Tube BID     sodium chloride (PF)  10 mL Intracatheter Q8H     sodium chloride (PF)  10 mL Intracatheter Q8H       Data   All microbiology laboratory data reviewed.  Recent Labs   Lab Test 05/17/21  0530 05/16/21  0510 05/15/21  0445   WBC 10.2 11.3* 13.2*   HGB 8.3* 9.4* 8.8*   HCT 28.5* 32.7* 30.2*   * 104* 104*    356 374     Recent Labs   Lab Test 05/17/21  0530 05/16/21  1902 05/16/21  0510   CR 0.46* 0.50* 0.53     No lab results found.  Recent Labs   Lab Test 05/13/21  1200 05/13/21  0921 05/13/21  0830 05/01/21  0947 05/01/21  0941 05/01/21  0935 04/26/21  1850   CULT Moderate growth  Pseudomonas aeruginosa  *  Light growth  Strain 2  Pseudomonas aeruginosa  *  Light growth  Strain 3  Pseudomonas aeruginosa  *  Light growth  Candida albicans  Susceptibility testing not routinely done  * No growth after 4 days No growth after 4 days  10,000 to 50,000 colonies/mL  Pseudomonas aeruginosa  *  10,000 to 50,000 colonies/mL  Enterococcus faecalis  *  10,000 to 50,000 colonies/mL  Candida albicans  Susceptibility testing not routinely done  * 50,000 to 100,000 colonies/mL  Candida albicans  Susceptibility testing not routinely done  *  <1000 colonies/mL  urogenital dario   No growth Light growth  Candida albicans  Susceptibility testing not routinely done  *  Light growth  Pseudomonas aeruginosa  *  Light growth  Strain 2  Pseudomonas aeruginosa  * Moderate growth  Pseudomonas aeruginosa  *  Moderate growth  Enterococcus  faecalis  *  Plus  Light growth  Normal dario         Attestation:  Total time on the floor involved in the patient's care: 35 minutes. Total time spent in counseling/care coordination: >50%

## 2021-05-17 NOTE — PROGRESS NOTES
[facility] ICU RESPIRATORY NOTE        Date of Admission: 4/13/2021    Date of Intubation (most recent): 4/13/21    Reason for Mechanical Ventilation: Resp failure    Number of Days on Mechanical Ventilation: 32    Met Criteria for Spontaneous Breathing Trial: No    Reason for No Spontaneous Breathing Trial: Per MD    Significant Events Today: None    ABG Results:   Recent Labs   Lab 05/17/21  0505 05/16/21  0410 05/11/21  0544   PH 7.40 7.40 7.40   PCO2 72* 72* 69*   PO2 111* 75* 61*   HCO3 44* 45* 43*   O2PER  --  60% Canceled, Test credited       Current Vent Settings: Ventilation Mode: PCV Plus assist  (Pressure Control Ventilation/ Assist Control)  FiO2 (%): 40 %  Rate Set (breaths/minute): 15 breaths/min  PEEP (cm H2O): 10 cmH2O  Oxygen Concentration (%): 40 %  Peak Inspiratory Pressure (cm H2O) (Drager Delmy): 25  Resp: 19        Plan:  Pt to remain on full vent support overnight    Murray Valentine, RT

## 2021-05-17 NOTE — PROGRESS NOTES
Intensivist note  Clinically unchanged.     She still appears volume depleted and diuretics stopped     I spoke with  today who has decided to move forward with trach and PEG and these have both been requested.    Assessment and Plan   1. Acute respiratory failure/ards intubated 4/15 - currently on 40%, +10 PEEP, with slight deterioration in oxygenation; continue to provide full support.   2. 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 Shock - she continues on Levophed at 0.04 to 0.06; monitor only   4. Pseudomonas in sputum, cultured previously, and remarkably a sensitive to most antibiotics. I am grateful to Dr. Morejon's input and we are completing a 10 day course of antibiotics.   5. S/p episode hemorrhagic shock, resolved  6. History HTN  7. Hyperglycemia - glucoses ok  Overall, she remains acute and critical. I spent 40 minutes of critical care time with her today.    Physical exam  Well developed female NAD  /55   Pulse 128   Temp 98.6  F (37  C)   Resp 16   Ht 1.524 m (5')   Wt 101.8 kg (224 lb 6.9 oz)   SpO2 (!) 88%   BMI 43.83 kg/m    Lungs with mild rhonchi  Heart RRR  Abdomen is soft and non-tender  Extremities are warm with mild edema  Skin shows no cyanosis or mottling  CNS heavily sedated    Labs reviewed    drew mendez  May 17, 2021

## 2021-05-17 NOTE — PLAN OF CARE
VSS, NSR, Vented PCV Plus mode 40%, rate 25 PEEP 10, levo gtt down to 0.04 mcg/kg/min, Versed gtt down to 11 mg/hr, urine output adequate, pt can open her eyes to voice and move hands very weakly.

## 2021-05-18 NOTE — PROGRESS NOTES
[facility] ICU RESPIRATORY NOTE        Date of Admission: 4/13/2021    Date of Intubation (most recent): 4/13/21    Reason for Mechanical Ventilation: Resp failure    Number of Days on Mechanical Ventilation: 33    Met Criteria for Spontaneous Breathing Trial: No    Reason for No Spontaneous Breathing Trial: per MD    Significant Events Today: none    ABG Results:   Recent Labs   Lab 05/18/21  0313 05/17/21  0505 05/16/21  0410   PH 7.43 7.40 7.40   PCO2 57* 72* 72*   PO2 49* 111* 75*   HCO3 38* 44* 45*   O2PER  --   --  60%       Current Vent Settings: Ventilation Mode: PCV Plus assist  (Pressure Control Ventilation/ Assist Control)  FiO2 (%): 55 %  Rate Set (breaths/minute): 25 breaths/min  Tidal Volume Set (mL): 330 mL  PEEP (cm H2O): 10 cmH2O  Oxygen Concentration (%): 55 %  Peak Inspiratory Pressure (cm H2O) (Drager Delmy): 15  Resp: (!) 32        Plan: Continue on full support.  Plan for trach/peg cici Belcher, RT

## 2021-05-18 NOTE — PROGRESS NOTES
Palliative chart check. Touched base with Estelle FLORES. It appears that a restorative pathway with PEG/trach is anticipated, yet procedure is delayed today 2/2 fever. Estelle FLORES aware that our team remains available for support, as needed. Thanks.    DARRELL Villagomez [facility]  Contact information available via [facility] Paging/Directory

## 2021-05-18 NOTE — PROGRESS NOTES
Intensivist note  Fever to 101 overnight. I appreciate input from ID- no changes today.     Procedures postponed.     Case discussed with patients spouse via phone.       Assessment and Plan   1. Acute respiratory failure/ards intubated 4/15 - currently on 50%, +10 PEEP, with slight deterioration in oxygenation but her course still waxes/wanes.   2. 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 Shock - she continues on Levophed at 0.04 to 0.06; monitor only   4. Pseudomonas in sputum, cultured previously, and remarkably a sensitive to most antibiotics. I am grateful to Dr. Morejon's input again, and with one fever noted we are still completing a 10 day course of antibiotics.   5. S/p episode hemorrhagic shock, resolved  6. History HTN  7. Hyperglycemia - glucoses ok  Overall, compensated. We will plan to proceed with trach/peg tomorrow or soon. I spent 35 minutes of critical care time with her today.     Physical exam  Well developed patient NAD  /73   Pulse 93   Temp 99.5  F (37.5  C)   Resp 24   Ht 1.524 m (5')   Wt 95.5 kg (210 lb 8.6 oz)   SpO2 100%   BMI 41.12 kg/m    Lungs mainly clear bilaterally  Heart RRR  Abdomen is soft and non-tender  Extremities are warm wth mild edema  Skin shows no cyanosis or mottling  CNS sedated     Labs reviewed   CXR reviewed     drew mendez  May 18, 2021

## 2021-05-18 NOTE — PROGRESS NOTES
[facility]    Infectious Disease Progress Note    Date of Service (when I saw the patient): 05/18/2021     Assessment & Plan   Sarahy Dunbar is a 65 year old female who was admitted on 4/13/2021.     Impression:  1. 65 y.o female who was found in bed with low saturations after welfare check.  The patient's  was out of town and was unable to contact his wife and the patient was found in bed with a saturation of approximately 20. This was more than a month ago, she is intubated and sedated in the icu.   2. History of HTN   3. COVID positive s/p remdesivir. Steroids.   4. Off and on on various antibiotics since admission.   5. Most recent cultures with pseudomonas in the sputum, multiple strains all S to zosyn.   6. Did get a dose of tobramycin.   7. Now on zosyn   8. Given severe resp compromise from COVID still in isolation per institution policy, discussed with IP       Recommendations:   Fever noted, repeat cultures done, will follow   Continue on zosyn 10 day course for VAP.       Salome Morejon MD    Interval History     T max of 101.3       Physical Exam   Temp: 100.4  F (38  C) Temp src: Bladder BP: 109/64 Pulse: 107   Resp: 20 SpO2: 97 % O2 Device: Mechanical Ventilator    Vitals:    05/16/21 0600 05/17/21 0229 05/18/21 0500   Weight: 102.1 kg (225 lb 1.4 oz) 101.8 kg (224 lb 6.9 oz) 95.5 kg (210 lb 8.6 oz)     Vital Signs with Ranges  Temp:  [98.2  F (36.8  C)-101.3  F (38.5  C)] 100.4  F (38  C)  Pulse:  [] 107  Resp:  [0-108] 20  BP: ()/(45-79) 109/64  FiO2 (%):  [40 %-55 %] 55 %  SpO2:  [85 %-98 %] 97 %    Constitutional: intubated   Lungs: Clear to auscultation bilaterally, no crackles or wheezing  Cardiovascular: Regular rate and rhythm, normal S1 and S2, and no murmur noted  Abdomen: Normal bowel sounds, soft, non-distended, non-tender  Skin: No rashes, no cyanosis, no edema  Other:    Medications     dextrose       HYDROmorphone 2 mg/hr (05/18/21 1281)      midazolam 12 mg/hr (05/18/21 0750)     norepinephrine 0.1 mcg/kg/min (05/18/21 0802)     sodium chloride 20 mL/hr at 05/18/21 0803       chlorhexidine  15 mL Mouth/Throat Q12H     [Held by provider] enoxaparin ANTICOAGULANT  0.5 mg/kg Subcutaneous BID     insulin aspart  1-12 Units Subcutaneous Q4H     multivitamins w/minerals  15 mL Per Feeding Tube Daily     pantoprazole  40 mg Per Feeding Tube Daily     piperacillin-tazobactam  4.5 g Intravenous Q6H     QUEtiapine  75 mg Oral or Feeding Tube BID     sennosides  5 mL Oral or Feeding Tube BID     sodium chloride (PF)  10 mL Intracatheter Q8H     sodium chloride (PF)  10 mL Intracatheter Q8H       Data   All microbiology laboratory data reviewed.  Recent Labs   Lab Test 05/18/21  0359 05/17/21  0530 05/16/21  0510   WBC 19.3* 10.2 11.3*   HGB 8.7* 8.3* 9.4*   HCT 29.1* 28.5* 32.7*   * 105* 104*    302 356     Recent Labs   Lab Test 05/18/21  0359 05/17/21  0530 05/16/21  1902   CR 0.43* 0.46* 0.50*     No lab results found.  Recent Labs   Lab Test 05/13/21  1200 05/13/21  0921 05/13/21  0830 05/01/21  0947 05/01/21  0941 05/01/21  0935 04/26/21  1850   CULT Moderate growth  Pseudomonas aeruginosa  *  Light growth  Strain 2  Pseudomonas aeruginosa  *  Light growth  Strain 3  Pseudomonas aeruginosa  *  Light growth  Candida albicans  Susceptibility testing not routinely done  * No growth after 5 days No growth after 5 days  10,000 to 50,000 colonies/mL  Pseudomonas aeruginosa  *  10,000 to 50,000 colonies/mL  Enterococcus faecalis  *  10,000 to 50,000 colonies/mL  Candida albicans  Susceptibility testing not routinely done  * 50,000 to 100,000 colonies/mL  Candida albicans  Susceptibility testing not routinely done  *  <1000 colonies/mL  urogenital dario   No growth Light growth  Candida albicans  Susceptibility testing not routinely done  *  Light growth  Pseudomonas aeruginosa  *  Light growth  Strain 2  Pseudomonas aeruginosa  * Moderate  growth  Pseudomonas aeruginosa  *  Moderate growth  Enterococcus faecalis  *  Plus  Light growth  Normal dario         Attestation:  Total time on the floor involved in the patient's care: 35 minutes. Total time spent in counseling/care coordination: >50%

## 2021-05-18 NOTE — PLAN OF CARE
Patient had fever which prevented trach and peg today. Fighting vent and sedation increased: Versed from 12-15ml/hr and Dilaudid from 2 to 4mg/hr. MD is aware of UA, chest Xray, LA, Procal labs.

## 2021-05-18 NOTE — PLAN OF CARE
Neuro: Sedated with versed and dilaudid drip, goal RASS -3 to -2 , does not follow commands, open eyes, spontaneous movement very weak.  CV: SR, to ST with  PVC, goal MAP > 65 maintained with levophed drip continuous, pedal pulses palpable, mild dependent edema. T max 101.3 this morning cold therapy applied. HR 90's to 135.  Pulm: Tolerating vent settings with Peep 10, small to moderate  creamy secretions, LS clear to coarse with nonproductive cough,  FiO 2 40%, PEEP 10, large amount oral secretions, and small inline.   GI/: TF at goal, was stopped at midnight for OR today, rectal tube in place with liquidy stool. Yeboah in place and patent with adequate UOP  Skin: Right groin hematoma unchanged, b/l groin and sacral dsg change done per plan of care.  Lines: R PICC x3  Restraints: NA  Plan:  NPO at midnight for OR on 5/18 at 0950  for trache,  family conference sometime today.

## 2021-05-18 NOTE — PROGRESS NOTES
CLINICAL NUTRITION SERVICES - REASSESSMENT NOTE      Future/Additional Recommendations: Once able to resume TF post Trach/PEG, recommend resume previous goal TF regimen of Vital HP at 50 mL/hr as above    Malnutrition: % Weight Loss:  Weight loss does not meet criteria for malnutrition (diuresing)  % Intake:  Decreased intake does not meet criteria for malnutrition (has been receiving goal TF until today)  Subcutaneous Fat Loss:  Unable to observe - in COVID isolation   Muscle Loss:  Unable to observe - in COVID isolation   Fluid Retention:  Mild as above    Malnutrition Diagnosis: Unable to determine due to inability to perform a Nutrition Focused Physical Exam due to COVID status       EVALUATION OF PROGRESS TOWARD GOALS   Diet:  NPO on vent     Nutrition Support:  TF has been running at goal up until MN this am.  TF currently on hold for planned Trach/PEG today.  [facility] TF has been running as follows ~    Nutrition Support Enteral:  Type of Feeding Tube: NJ  Enteral Frequency:  Continuous  Enteral Regimen: Vital HP at 50 mL/hr  Total Enteral Provisions: 1200 kcal (13 kcal/kg), 104 g protein (2.3 g/kg), 1000 mL H2O, 133 g CHO, no fiber   Free Water Flush: 250 mL every 4 hours       Intake/Tolerance:    K/Mg/Phos NL  Na 142 (NL) <-- 148 on 5/17 (flushes increased yesterday)  BGM < 150 with Medium sliding scale insulin  Stool 450 mL - Senokot on hold   I/O 4312/2275, wt 95.5 kg (down 11.5 kg from admit) - Rakesh been diuresing on Lasix (d/c'd yesterday), also had Diamox earlier in stay   Noted 1-2+ generalized edema         ASSESSED NUTRITION NEEDS: (Energy needs adjusted 5/18 based on dry weight of 95.5 kg)  Dosing Weight: 95.5 kg for energy (5/18), 45 kg for protein  Estimated Energy Needs: 2176-4618 kcals (11-14 Kcal/Kg)  Justification: obese and vented  Estimated Protein Needs:  grams protein (2-2.5 g pro/Kg)  Justification: obesity guidelines       NEW FINDINGS:   Continues on Norepi drip  Certavite daily per  Pressure Injury Protocol     5/13:  WOCN = Assessed bilateral groin wounds & Rt thigh hematoma   - Size:  3.5cm x 1.5cm x 40% white slough / 60% granular base   - Мария-wound skin: peeling, no blisters, receding area of hematoma, softer on palpation.     - Drainage: scant sero-sang, no purulence    Plan for Trach and PEG today     Previous Goals (5/13):   TF will continue to meet % needs   Evaluation: Not met, TF currently off for Trach and PEG     Previous Nutrition Diagnosis (5/13):   No nutrition diagnosis identified at this time  Evaluation: Changed       MALNUTRITION  % Weight Loss:  Weight loss does not meet criteria for malnutrition (diuresing)  % Intake:  Decreased intake does not meet criteria for malnutrition (has been receiving goal TF until today)  Subcutaneous Fat Loss:  Unable to observe - in COVID isolation   Muscle Loss:  Unable to observe - in COVID isolation   Fluid Retention:  Mild as above    Malnutrition Diagnosis: Unable to determine due to inability to perform a Nutrition Focused Physical Exam due to COVID status     CURRENT NUTRITION DIAGNOSIS  Inadequate protein-energy intake related to TF on hold for Trach and PEG today as evidenced by meeting 0% needs     INTERVENTIONS  Recommendations / Nutrition Prescription  Once able to resume TF post Trach/PEG, recommend resume previous goal TF regimen of Vital HP at 50 mL/hr as above     Implementation  None     Goals  Patient will resume TF within the next 24 hours     MONITORING AND EVALUATION:  Progress towards goals will be monitored and evaluated per protocol and Practice Guidelines    Leslie Porter RD, LD, CNSC   Clinical Dietitian - [facility]

## 2021-05-19 NOTE — PLAN OF CARE
No changes. Remains vented and sedated. Plan for trach and PEG tomorrow. Restarted TF and gave a dose of Lovenox. Plan to hold both after midnight.

## 2021-05-19 NOTE — PLAN OF CARE
The patient remains intubated and sedated with versed. Dilaudid for pain control. Patient opens eyes to voice. No movement of extremities. Pupils equal, sluggish.   Sinus rhythm/sinus tachycardia on monitor. Levophed to keep MAP>65.   Lung sounds are diminished throughout.   Yeboah in place. Adequate urine output. Yeboah cares done with bath.  Rectal tube in place. Draining loose brown stool. Tube feeding remains on hold.  Patient was a-febrile until around 05:00. Tylenol 650mg given via NG tube.   Primitivo Raymond RN 8:05 AM 05/19/21

## 2021-05-19 NOTE — PROGRESS NOTES
[facility]    Infectious Disease Progress Note    Date of Service (when I saw the patient): 05/19/2021     Assessment & Plan   Sarahy Dunbar is a 65 year old female who was admitted on 4/13/2021.     Impression:  1. 65 y.o female who was found in bed with low saturations after welfare check.  The patient's  was out of town and was unable to contact his wife and the patient was found in bed with a saturation of approximately 20. This was more than a month ago, she is intubated and sedated in the icu.   2. History of HTN   3. COVID positive s/p remdesivir. Steroids.   4. Off and on on various antibiotics since admission.   5. Most recent cultures with pseudomonas in the sputum, multiple strains all S to zosyn.   6. Did get a dose of tobramycin.   7. Now on zosyn   8. Given severe resp compromise from COVID still in isolation per institution policy, discussed with IP       Recommendations:   Fever noted, repeat cultures done, will follow   Continue on zosyn 10 day course for VAP.       Salome Morejon MD    Interval History   Fever improved   No new micro   Intubated       Physical Exam   Temp: 99.9  F (37.7  C) Temp src: Bladder BP: 115/60 Pulse: 99   Resp: (!) 33 SpO2: 95 % O2 Device: Mechanical Ventilator    Vitals:    05/17/21 0229 05/18/21 0500 05/19/21 0530   Weight: 101.8 kg (224 lb 6.9 oz) 95.5 kg (210 lb 8.6 oz) 104.9 kg (231 lb 4.2 oz)     Vital Signs with Ranges  Temp:  [97.9  F (36.6  C)-100.4  F (38  C)] 99.9  F (37.7  C)  Pulse:  [] 99  Resp:  [0-69] 33  BP: ()/(29-80) 115/60  FiO2 (%):  [45 %-60 %] 55 %  SpO2:  [84 %-100 %] 95 %    Constitutional: intubated   Lungs: Clear to auscultation bilaterally, no crackles or wheezing  Cardiovascular: Regular rate and rhythm, normal S1 and S2, and no murmur noted  Abdomen: Normal bowel sounds, soft, non-distended, non-tender  Skin: No rashes, no cyanosis, no edema  Other:    Medications     dextrose       HYDROmorphone  3 mg/hr (05/19/21 1131)     midazolam 15 mg/hr (05/19/21 1131)     norepinephrine 0.15 mcg/kg/min (05/19/21 1215)     sodium chloride 20 mL/hr at 05/19/21 0800       chlorhexidine  15 mL Mouth/Throat Q12H     enoxaparin ANTICOAGULANT  50 mg Subcutaneous Q12H     insulin aspart  1-12 Units Subcutaneous Q4H     multivitamins w/minerals  15 mL Per Feeding Tube Daily     pantoprazole  40 mg Per Feeding Tube Daily     piperacillin-tazobactam  4.5 g Intravenous Q6H     potassium chloride  40 mEq Oral BID     QUEtiapine  75 mg Oral or Feeding Tube BID     sennosides  5 mL Oral or Feeding Tube BID     sodium chloride (PF)  10 mL Intracatheter Q8H     sodium chloride (PF)  10 mL Intracatheter Q8H       Data   All microbiology laboratory data reviewed.  Recent Labs   Lab Test 05/19/21  0515 05/18/21  0359 05/17/21  0530   WBC 16.2* 19.3* 10.2   HGB 8.2* 8.7* 8.3*   HCT 27.7* 29.1* 28.5*   * 102* 105*    321 302     Recent Labs   Lab Test 05/19/21  0515 05/18/21  0359 05/17/21  0530   CR 0.58 0.43* 0.46*     No lab results found.  Recent Labs   Lab Test 05/18/21  1518 05/18/21  0900 05/13/21  1200 05/13/21  0921 05/13/21  0830 05/01/21  0947 05/01/21  0941 05/01/21  0935 04/26/21  1850   CULT No growth after 7 hours No growth after 15 hours  PENDING Moderate growth  Pseudomonas aeruginosa  *  Light growth  Strain 2  Pseudomonas aeruginosa  *  Light growth  Strain 3  Pseudomonas aeruginosa  *  Light growth  Candida albicans  Susceptibility testing not routinely done  * No growth No growth  10,000 to 50,000 colonies/mL  Pseudomonas aeruginosa  *  10,000 to 50,000 colonies/mL  Enterococcus faecalis  *  10,000 to 50,000 colonies/mL  Candida albicans  Susceptibility testing not routinely done  * 50,000 to 100,000 colonies/mL  Candida albicans  Susceptibility testing not routinely done  *  <1000 colonies/mL  urogenital dario   No growth Light growth  Candida albicans  Susceptibility testing not routinely  done  *  Light growth  Pseudomonas aeruginosa  *  Light growth  Strain 2  Pseudomonas aeruginosa  * Moderate growth  Pseudomonas aeruginosa  *  Moderate growth  Enterococcus faecalis  *  Plus  Light growth  Normal dario         Attestation:  Total time on the floor involved in the patient's care: 35 minutes. Total time spent in counseling/care coordination: >50%

## 2021-05-19 NOTE — PROGRESS NOTES
Plan for trach and PEG tomorrow afternoon (no official time set). Lovenox held. Plan to hold TF at midnight.

## 2021-05-19 NOTE — PROGRESS NOTES
Intensivist note  Clinically unchanged today. She remains on 50-55% FIO2 and +10 PEEP.     Fever nearly resolved and I appreciate Aureliano Morejon's input, and we will complete a 10 day course of Zosyn.     I spoke with Dr. Huynh and appreciate plans for trach and peg on 5/21.     Assessment and Plan   1. Acute respiratory failure/ards intubated 4/15 - currently on 50%, +10 PEEP, with little change in oxygenation; her course continues to wax/wane.   2. 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 Shock - she continues on Levophed   4. Pseudomonas in sputum, cultured previously, and remarkably a sensitive to most antibiotics. I am grateful to Dr. Morejon's input, and we are still completing a 10 day course of antibiotics.   5. S/p episode hemorrhagic shock, resolved  6. History HTN  7. Hyperglycemia - glucoses ok  Overall, she remains acute and critical. I spent 35 minutes of critical care time with her.     Physical exam  Well developed female sedated on vent   /75   Pulse 86   Temp 99.7  F (37.6  C)   Resp 22   Ht 1.524 m (5')   Wt 104.9 kg (231 lb 4.2 oz)   SpO2 91%   BMI 45.17 kg/m    Lungs with mild rhonchi  Heart is RRR  Abdomen is soft and non-tender  Extremities are warm with minimal edema  Skin shows no cyanosis or mottling    Labs reviewed    drew mendez  May 19, 2021

## 2021-05-19 NOTE — PROGRESS NOTES
THORACIC SURGERY PROGRESS NOTE    Plan for tracheostomy placement tomorrow, 5/20, @ 4:00 PM    Consent obtained from patient's spouse, Ruddy, via phone. Reviewed risks/benefits of the procedure. He is in agreement to proceed. Signed consent placed in the chart.     Hold tube feeds tonight at midnight. Hold Lovenox now. No further fevers today, will plan to proceed tomorrow regardless of temperature given clearance from ID and ICU.     Anne Marie Cotto PA-C with Dr. Ernie Huynh  [facility]  Cell [phone]

## 2021-05-19 NOTE — PROGRESS NOTES
[facility] ICU RESPIRATORY NOTE        Date of Admission: 4/13/2021    Date of Intubation (most recent):  4/13/21    Reason for Mechanical Ventilation: *34    Number of Days on Mechanical Ventilation: Resp failure    Met Criteria for Spontaneous Breathing Trial: No    Reason for No Spontaneous Breathing Trial: Per MD    Significant Events Today: Plan for trach tomorrow    ABG Results:   Recent Labs   Lab 05/18/21  0313 05/17/21  0505 05/16/21  0410   PH 7.43 7.40 7.40   PCO2 57* 72* 72*   PO2 49* 111* 75*   HCO3 38* 44* 45*   O2PER  --   --  60%       Current Vent Settings: Ventilation Mode: PCV Plus assist  (Pressure Control Ventilation/ Assist Control)  FiO2 (%): 55 %  Rate Set (breaths/minute): 25 breaths/min  Tidal Volume Set (mL): 330 mL  PEEP (cm H2O): 10 cmH2O  Oxygen Concentration (%): 55 %  Peak Inspiratory Pressure (cm H2O) (Drager Delmy): 15  Resp: 26      Plan:  Pt to remain on full vent support overnight    Murray Valentine, RT

## 2021-05-19 NOTE — PROGRESS NOTES
[facility] ICU RESPIRATORY NOTE           Date of Admission: 4/13/2021     Date of Intubation (most recent): 4/13/21     Reason for Mechanical Ventilation: Resp failure     Number of Days on Mechanical Ventilation: 34     Met Criteria for Spontaneous Breathing Trial: No     Reason for No Spontaneous Breathing Trial: per MD     Significant Events Today: none     ABG Results:   Recent Labs   Lab 05/18/21  0313 05/17/21  0505 05/16/21  0410   PH 7.43 7.40 7.40   PCO2 57* 72* 72*   PO2 49* 111* 75*   HCO3 38* 44* 45*   O2PER  --   --  60%     Ventilation Mode: PCV Plus assist  (Pressure Control Ventilation/ Assist Control)  FiO2 (%): 55 %  Rate Set (breaths/minute): 25 breaths/min  Tidal Volume Set (mL): 330 mL  PEEP (cm H2O): 10 cmH2O  Oxygen Concentration (%): 55 %  Peak Inspiratory Pressure (cm H2O) (Drager Delmy): 15  Resp: 29    CHAPITO Fajardo, RRT

## 2021-05-20 NOTE — PROGRESS NOTES
Intensivist note  Patient sedated; for trach today    Her fever is resolving and wbc is slowly declining     Case discussed with  via phone     Assessment and Plan   1. Acute respiratory failure/ards intubated 4/15 - currently on 50%, +10 PEEP, with little change in oxygenation and her course continues to wax/wane.   2. 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   3. Shock - she continues on Levophed at 0.12  4. Pseudomonas in sputum, cultured previously, and remarkably a sensitive to most antibiotics. I am grateful to Dr. Morejon's input, and will complete a 10 day course of antibiotics.   5. S/p episode hemorrhagic shock, resolved  6. History HTN  7. Hyperglycemia - glucoses ok  Overall, acute and critical. I spent 35 minutes of critical care time with her.     Physical exam  Well developed female NAD  /62   Pulse 93   Temp 98.6  F (37  C)   Resp 23   Ht 1.524 m (5')   Wt 106.5 kg (234 lb 12.6 oz)   SpO2 98%   BMI 45.85 kg/m    Lungs show mod rhonchi  Heart is RRR   Abdomen is soft and non-tender   Extremities warm with mild edema  Skin shows no cyanosis or mottling  CNS sedated    Labs reviewed    drew mendez  May 20, 2021    Addendum 1730  She was somewhat more hypoxemic after trach   CXR consistent with basilar atelectasis  We have increased PEEP modestly to + 13 and increased pulmonary hygiene with duonebs, mucomyst, and RCAT/metanebs.     drew mendez

## 2021-05-20 NOTE — PROGRESS NOTES
Shift: 7pm-7am     No changes this shift. Remains sedated (see MAR), on vent with plans to trach later today, TF turned off at midnight. Vitals remained stable throughout the night with the exception of soft BP, levo increased from 0.15 to 0.16. Will continue to monitor.

## 2021-05-20 NOTE — PROGRESS NOTES
[facility] Nurse Inpatient Wound Assessment   Reason for follow up:  Pannus, groin wounds, hematoma on right thigh, glut cleft    Assessment  All skin issues improving or resolved:  Pannus ITD resolved, Left groin intact, Right groin wound resurfacing much improved, right thigh hematoma remains palpable with dark discoloration but decreased in size in the last 2 weeks    Revised (simplified)Treatment Plan:   Bilateral groin and pannus (Interdry Ag #[id number]) BID and PRN   1.  Remove fabric from skin fold for cleansing. Discard if soiled. Note: If cloth adheres to open skin, moisten cloth before removal.   2.   Cleanse skin with francisco cleaning cloths of soft cloths and water (important not to use products that contain emollients with this fabric). Pat dry  3.   Cut fabric to size adding 2 extra inches to hang outside of the skin fold (for maximum moisture wicking)  4.   Place one edge of a single layer of fabric into the base of the fold allowing 2 inches of overhang       SACRUM/Gluteal Cleft Monday, Thursday,  and PRN  1. Clean wound with saline or MicroKlenz Spray #[id number]   2. Dry and protect surrounding skin with no sting barrier film wipe #[id number]   3. Apply triad paste to wound bed  4. Apply Mepilex  Sacral Dressing (PS#[id number])  to the area, making sure to conform nicely to skin curvatures.  5. Continue pressure injury prevention Pressure Injury prevention     Turn every 2 hours, side to side avoid supine/backside on pressure redistribution support surface     Float heels off bed with use of pillows under legs, or offloading boots: (e.g. Prevalon Heel Lift boots)    Incontinent Cleanser followed by moisture barrier cream/paste  (e.g. Critic Aid paste) 2X/day and after each incontinent episode    Prevent sliding by limiting head of bed elevation to 30 degrees or less unless contraindicated, use knee gatch first    Chair cushion as needed if patient can't reposition in chair, limit  sitting to 2 hours at a time    Optimize nutrition     Orders Reviewed  Recommended provider order: None, at this time  WO Nurse follow-up plan: weekly  Nursing to notify the Provider(s) and re-consult the WOC Nurse if wound(s) deteriorates or new skin concern    Patient History  According to provider note(s):  65-year-old female with no significant medical history who was found in bed with low saturations after welfare check.  Discovered to have COVID on 4/13. Maintained on NIV but subsequently required intubation on 4/15. Initially proned but now largely kept supine.  Overall has had worsening lung compliance, worsening ventilatory deficits, and inability to tolerate weaning of sedation. She has also developed VAP, which has been treated. Other complications include a large hematoma related to an arterial line necessitating a number of transfusions. Active Problems: Acute respiratory failure with hypoxemia (H) ARDS (adult respiratory distress syndrome) (H) 2019 novel coronavirus disease (COVID-19) Hematoma Anemia due to blood loss, acute Shock (H) Ventilator associated pneumonia (H) on levophed    5/20/2021 update pending trach placement in OR this afternoon     Data  Allergies: Lisinopril-hydrochlorothiazide   Recent Labs   Lab Test 05/20/21  0420 05/18/21  0359 05/18/21  0359 04/26/21  0430 04/26/21  0430 04/20/21  1440 04/20/21  1440   ALBUMIN  --   --  1.9*  --   --    < >  --    HGB 7.5*   < > 8.7*   < > 7.8*   < >  --    INR  --   --   --   --   --   --  1.08   WBC 11.7*   < > 19.3*   < > 16.7*   < >  --    CRP  --   --   --   --  257.0*  --   --     < > = values in this interval not displayed.     Recent Labs   Lab 05/20/21  1226 05/20/21  0855 05/20/21  0420 05/20/21  0419 05/19/21  2351 05/19/21  2057 05/19/21  1629 05/19/21  0515 05/19/21  0515 05/18/21  0359 05/18/21  0359 05/17/21  0530 05/17/21  0530 05/16/21 1902 05/16/21 1902 05/16/21 0510 05/16/21 0510   GLC  --   --  123*  --   --   --   --    --  91  --  96  --  115*  --  119*  --  132*   * 123*  --  123* 122* 134* 138*   < >  --    < >  --    < >  --    < >  --    < >  --     < > = values in this interval not displayed.     Temperature: Temp (24hrs), Av.5  F (37.5  C), Min:98.6  F (37  C), Max:100.8  F (38.2  C)    Intake/Output Summary (Last 24 hours) at 2021 1356  Last data filed at 2021 1215  Gross per 24 hour   Intake 3590.08 ml   Output 4675 ml   Net -1084.92 ml     Orders Placed This Encounter      NPO for Medical/Clinical Reasons Except for: Meds    Body mass index is 45.85 kg/m .  Containment of urine/stool: Incontinence Protocol and Internal fecal management   Medical Devices:Yeboah Catheter: in place, indication: Strict 1-2 Hour I&O, Strict 1-2 Hour I&O, Strict 1-2 Hour I&O, Strict 1-2 Hour I&O, Retention;Strict 1-2 Hour I&O    Catheter securement Yes    Pressure Injury Risk Assessment (Joss Scale):  Sensory Perception: 1-->completely limited    Moisture: 2-->frequently moist   Activity: 1-->bedfast     Mobility: 1-->completely immobile   Nutrition: 3-->adequate   Friction and Shear: 2-->potential problem  Joss Score: 1o    Current support surface: Bariatric Low air loss mattress  Current off-loading measures in bed: reposition side to side with use of Wedge positioning system  Current off-loading heels: Pillows under calves  Current off-loading measures chair: NA    Focused [facility] Nurse Skin/Wound Exam:        21 Rt groin and hematoma site    21 Rt groin and hematoma site    Size: pale white tissue decreased to  1 cm x 0.5cm located within wound predominately resurfacing and scant superficial erosion  Мария-wound skin: intact   Previously noted dark discoloration and induration inferior to wound improved greatly but remains palpable   Drainage: scant sero-sang, no purulence  Odor: None  Pain: unable to determine, deeply sedated      2021`gluteal cleft    Wound History: documented on the flowsheet on   @1200, by the staff's reaction and the previous woc note it sounds as if it has improved significantly during the time she was proned.  Measurements (length x width x depth, in cm): 2 x 0.5 cm  (previously 8.0 x 1.0 cm)   Wound Base: 100% red (slough previously noted resolved)  Tunneling: N/A  Undermining: N/A  Palpation of the wound bed: normal  Periwound skin: intact  Color: normal and consistent with surrounding tissue  Temperature: cool  Drainage: minimal  Description of drainage: serosang  Odor: none  Pain: patient sedated      Interventions  Simplified orders to reflect ongoing progress  Discussed with SANDRA Bernstein MS RN CWOCN

## 2021-05-20 NOTE — OP NOTE
DATE OF PROCEDURE: May 20, 2021      SURGEON:  Ernie Huynh MD   FIRST ASSIST: Yessenia Crocker PA-C      PREOPERATIVE DIAGNOSIS:  Respiratory failure.       POSTOPERATIVE DIAGNOSIS:  Respiratory failure.       PROCEDURE:  Tracheostomy with Shiley #XLT 6 cuffed nonfenestrated tube.       ANESTHESIA:  General.       INDICATIONS:  Patient has respiratory failure and will require prolonged mechanical ventilation.       DESCRIPTION OF PROCEDURE:  The patient was brought to the operating room on the ICU bed.  Under general anesthesia, the patient's neck was extended.  Neck and upper chest were prepared and draped in the usual fashion using ChloraPrep.  A transverse incision was made approximately 2 cm above the sternal notch.  Dissection was carried down to the midline of the strap muscle.  The inferior aspect of the isthmus of the thyroid was divided.  Hemostasis was excellent.  The second ring of the trachea was clearly identified, and some sutures of 2-0 Prolene were placed around the second ring laterally on each side.  A longitudinal tracheotomy was made and dilated.  The endotracheal tube was pulled just above the tracheotomy, and a Shiley #XLT 6 cuffed nonfenestrated tube was advanced without any difficulty.  The cuff was inflated.  Ventilation was excellent.  Hemostasis was again verified and was excellent.  Incision was closed with interrupted 3-0 nylon suture on the skin along the tracheostomy.  A dressing was applied, and the tracheostomy was secured around the patient's neck.       Then Dr. Staples performed the PEG.          ERNIE HUYNH MD

## 2021-05-20 NOTE — BRIEF OP NOTE
[facility]    Brief Operative Note    Pre-operative diagnosis: Hx of respiratory failure [Z87.09]  Post-operative diagnosis respiratory failure ,failure to thrive    Procedure: Procedure(s):  TRACHEOSTOMY  PERCUTANEOUS ENDOSCOPIC GASTROSTOMY TUBE PLACEMENT  Surgeon: Surgeon(s) and Role:  Panel 1:     * Ernie Huynh MD - Primary     * Yessenia Crocker PA-C - Assisting  Panel 2:     * Bay Staples MD - Primary     * Marybel Tabor PA-C  Anesthesia: General   Estimated blood loss: 1 ml  Drains:  None, PEG tube placed  Specimens: * No specimens in log *  Findings:   Obese abdomen. PEG placed without complication..  Complications: None.  Implants: * No implants in log *

## 2021-05-20 NOTE — BRIEF OP NOTE
[facility]    Brief Operative Note    Pre-operative diagnosis: Respiratory failure [Z87.09]  Post-operative diagnosis respiratory failure ,failure to thrive    Procedure: Procedure(s):  TRACHEOSTOMY, 6 XLT-Proximal, nonfenestrated, cuffed  PERCUTANEOUS ENDOSCOPIC GASTROSTOMY TUBE PLACEMENT  Surgeon: Surgeon(s) and Role:  Panel 1:     * Ernie Huynh MD - Primary     * Yessenia Crocker PA-C - Assisting  Panel 2:     * Bay Staples MD - Primary     * Marybel Tabor PA-C  Anesthesia: General   Estimated blood loss:  5 cc  Drains:  None  Specimens: * No specimens in log *  Findings:   None.  Complications: None.  Implants: * No implants in log *

## 2021-05-20 NOTE — PROGRESS NOTES
No new recommendations see my note from yesterday, discussed with ICU   I will sign off please call if ques.   Salome Morejon MD  Infectious Disease

## 2021-05-20 NOTE — ANESTHESIA POSTPROCEDURE EVALUATION
Patient: Sarahy Dunbar    Procedure(s):  TRACHEOSTOMY  PERCUTANEOUS ENDOSCOPIC GASTROSTOMY TUBE PLACEMENT    Diagnosis:Hx of respiratory failure [Z87.09]  Diagnosis Additional Information: No value filed.    Anesthesia Type:  General    Note:  Disposition: ICU            ICU Sign Out: Anesthesiologist/ICU physician sign out WAS performed   Postop Pain Control:            Sign Out: Well controlled pain   PONV: No   Neuro/Psych: Uneventful            Sign Out: Acceptable/Baseline neuro status   Airway/Respiratory: Uneventful            Sign Out: AIRWAY IN SITU/Resp. Support               Airway in situ/Resp. Support: Tracheostomy   CV/Hemodynamics: Uneventful            Sign Out: Acceptable CV status   Other NRE: NONE   DID A NON-ROUTINE EVENT OCCUR? No    Event details/Postop Comments:  Report to ICU MD and nurse.           Last vitals:  Vitals:    05/20/21 1515 05/20/21 1645 05/20/21 1700   BP: 123/72 136/72 119/63   Pulse: 76 70 73   Resp: (!) 38 30 24   Temp: 37.2  C (99  F) 36.6  C (97.9  F) 36.6  C (97.9  F)   SpO2: 97% 100% 99%       Last vitals prior to Anesthesia Care Transfer:  CRNA VITALS  5/20/2021 1604 - 5/20/2021 1704      5/20/2021             Resp Rate (observed):  25    Resp Rate (set):  25          Electronically Signed By: Carol Dhaliwal MD  May 20, 2021  5:13 PM

## 2021-05-20 NOTE — ANESTHESIA PREPROCEDURE EVALUATION
Anesthesia Pre-Procedure Evaluation    Patient: Sarahy Dunbar   MRN: [id number] : 1955        Preoperative Diagnosis: Hx of respiratory failure [Z87.09]   Procedure : Procedure(s):  TRACHEOSTOMY  PERCUTANEOUS ENDOSCOPIC GASTROSTOMY TUBE PLACEMENT     Past Medical History:   Diagnosis Date     Arthritis     OA - Knee     Dyshidrotic eczema      Herpes simplex labialis      Hypersensitivity vasculitides      Hypertension      Obesity      Shingles 2016     Venous insufficiency      Venous stasis       Past Surgical History:   Procedure Laterality Date     ARTHROPLASTY KNEE Left 2016    Procedure: ARTHROPLASTY KNEE;  Surgeon: Amy Landers MD;  Location:  OR     ARTHROPLASTY KNEE Right 4/3/2018    Procedure: ARTHROPLASTY KNEE;  RIGHT TOTAL KNEE ARTHROPLASTY ;  Surgeon: Amy Landers MD;  Location:  OR     ARTHROSCOPY ANKLE  2012    Procedure:ARTHROSCOPY ANKLE; LEFT ANKLE ARTHROSCOPIC IRRIGATION AND DEBRIDEMENT, WITH HARDWARE REMOVAL (C-ARM); Surgeon:TONYA CAAL; Location: OR     ORTHOPEDIC SURGERY      Left tib/fib fracture     REMOVE HARDWARE ANKLE  2012    Procedure:REMOVE HARDWARE ANKLE; Surgeon:TONYA CAAL; Location: OR      Allergies   Allergen Reactions     Lisinopril-Hydrochlorothiazide Other (See Comments)     hyponatremia      Social History     Tobacco Use     Smoking status: Never Smoker     Smokeless tobacco: Never Used   Substance Use Topics     Alcohol use: Yes     Comment: glass a wine every two weeks      Wt Readings from Last 1 Encounters:   21 106.5 kg (234 lb 12.6 oz)        Anesthesia Evaluation            ROS/MED HX  ENT/Pulmonary: Comment: resp failure, ARDS,     (+) recent URI,  (-) sleep apnea   Neurologic:       Cardiovascular: Comment: Shock- NE infusion     Name: SARAHY DUNBAR  MRN: [id number]  : 1955  Study Date: 2021 10:36 AM  Age: 65 yrs  Gender: Female  Patient Location: [facility]  Reason For Study: Dyspnea  Ordering  Physician: YAJAIRA MICHAEL  Referring Physician: Rama Pop  Performed By: Jas Rucker RDCS     BSA: 2.0 m2  Height: 60 in  Weight: 235 lb  HR: 97  BP: 102/70 mmHg  ______________________________________________________________________________  Procedure  Complete Portable Echo Adult. Optison (NDC #[id number]) given intravenously.  ______________________________________________________________________________  Interpretation Summary     1. Left ventricular systolic function is normal. The visual ejection fraction  is estimated at 55-60%.  2. No regional wall motion abnormalities noted.  3. The right ventricle is normal in structure, function and size.  4. The aortic valve is trileaflet with aortic valve sclerosis.    (+) hypertension-----    METS/Exercise Tolerance:     Hematologic:       Musculoskeletal:   (+) arthritis,     GI/Hepatic:    (-) GERD   Renal/Genitourinary:       Endo:     (+) Obesity,     Psychiatric/Substance Use:       Infectious Disease: Comment: COVID 19      Malignancy:       Other: Comment: Venous stasis            Physical Exam    Airway             Respiratory Devices and Support    ETT:      Dental           Cardiovascular             Pulmonary           breath sounds clear to auscultation           OUTSIDE LABS:  CBC:   Lab Results   Component Value Date    WBC 11.7 (H) 05/20/2021    WBC 16.2 (H) 05/19/2021    HGB 7.5 (L) 05/20/2021    HGB 8.2 (L) 05/19/2021    HCT 24.8 (L) 05/20/2021    HCT 27.7 (L) 05/19/2021     05/20/2021     05/19/2021     BMP:   Lab Results   Component Value Date     05/20/2021     05/19/2021    POTASSIUM 3.4 05/20/2021    POTASSIUM 3.2 (L) 05/19/2021    CHLORIDE 98 05/20/2021    CHLORIDE 104 05/19/2021    CO2 38 (H) 05/20/2021    CO2 36 (H) 05/19/2021    BUN 12 05/20/2021    BUN 14 05/19/2021    CR 0.52 05/20/2021    CR 0.58 05/19/2021     (H) 05/20/2021    GLC 91 05/19/2021     COAGS:   Lab Results   Component Value Date     PTT 35 04/19/2021    INR 1.08 04/20/2021    FIBR 532 (H) 04/17/2021     POC:   Lab Results   Component Value Date     (H) 05/20/2021     HEPATIC:   Lab Results   Component Value Date    ALBUMIN 1.9 (L) 05/18/2021    PROTTOTAL 6.5 (L) 05/18/2021    ALT 64 (H) 05/18/2021    AST 70 (H) 05/18/2021    ALKPHOS 269 (H) 05/18/2021    BILITOTAL 1.0 05/18/2021     OTHER:   Lab Results   Component Value Date    PH 7.43 05/18/2021    LACT 0.7 05/18/2021    ALICIA 8.4 (L) 05/20/2021    PHOS 3.2 05/20/2021    MAG 2.2 05/20/2021    LIPASE 60 (L) 05/18/2021    AMYLASE 28 (L) 05/18/2021    .0 (H) 04/26/2021    SED 96 (H) 02/01/2012       Anesthesia Plan    ASA Status:  4      Anesthesia Type: General.              Consents            Postoperative Care            Comments:                Carol Dhaliwal MD

## 2021-05-20 NOTE — PROGRESS NOTES
[facility]    General Surgery  Short Progress Note    Sarahy Dunbar underwent PEG tube placement without complications. Place PEG to gravity brown bag until tomorrow morning (0700 5/21). Ok to use for meds after 8 hours (0030 5/21). Start trickle tube feeds tomorrow morning per nutrition. Flush per protocol.    Marybel Tabor PA-C

## 2021-05-20 NOTE — ANESTHESIA CARE TRANSFER NOTE
Patient: Sarahy Dunbar    Procedure(s):  TRACHEOSTOMY  PERCUTANEOUS ENDOSCOPIC GASTROSTOMY TUBE PLACEMENT    Diagnosis: Hx of respiratory failure [Z87.09]  Diagnosis Additional Information: No value filed.    Anesthesia Type:   General     Note:    Oropharynx: endotracheal tube in place  Level of Consciousness: iatrogenic sedation  Patient oxygen source: ambu bag asssit to ICU 15L.    Independent Airway: airway patency not satisfactory and stable  Dentition: dentition unchanged  Vital Signs Stable: post-procedure vital signs reviewed and stable  Report to RN Given: handoff report given  Patient transferred to: ICU    ICU Handoff: Call for PAUSE to initiate/utilize ICU HANDOFF, Identified Patient, Identified Responsible Provider, Reviewed the Pertinent Medical History, Discussed Surgical Course, Reviewed Intra-OP Anesthesia Management and Issues during Anesthesia, Set Expectations for Post Procedure Period and Allowed Opportunity for Questions and Acknowledgement of Understanding      Vitals: (Last set prior to Anesthesia Care Transfer)  CRNA VITALS  5/20/2021 1604 - 5/20/2021 1637      5/20/2021             Resp Rate (observed):  25    Resp Rate (set):  25        Electronically Signed By: DARRELL Del Rio CRNA  May 20, 2021  4:37 PM

## 2021-05-20 NOTE — PLAN OF CARE
Pt. Sedated, grimaces to suctioning, good urine output, WOC recommends new RX, wounds healing,  face timed pt.  Plan for OR this pm, NPO.  Mod. Thick yellow/white secretions.

## 2021-05-21 NOTE — PROGRESS NOTES
General Surgery Note    PEG tube checked.  Not bleeding.  Abdomen soft.  Bilious drainage.    Ok to start TF and advance per RD recommendations.  Flush per unit protocol.    General Surgery will sign off.  Please contact us if any concerns.

## 2021-05-21 NOTE — PROGRESS NOTES
[facility] ICU RESPIRATORY NOTE    Date of Admission: 4/13/2021    Date of Intubation (most recent): 4/13/21    Reason for Mechanical Ventilation: Resp failure    Number of Days on Mechanical Ventilation: 36    Met Criteria for Pressure Support Trial: No     Reason for No Pressure Support Trial: Per MD    Significant Events Today: trached yesterday, started on metaneb, mucomyst and duo.     ABG Results:     Current Vent Settings: Ventilation Mode: PCV Plus assist  (Pressure Control Ventilation/ Assist Control)  FiO2 (%): 50 %  Rate Set (breaths/minute): 25 breaths/min  Tidal Volume Set (mL): 0  PEEP (cm H2O): 13 cmH2O  Oxygen Concentration (%): 50%  Plan:  Continue on current settings. Rt will continue to follow.

## 2021-05-21 NOTE — PROGRESS NOTES
CLINICAL NUTRITION SERVICES - REASSESSMENT NOTE      Recommendations Ordered by Registered Dietitian (RD):   Resume TF at half rate then advance to goal rate after 8 hrs per MD   Malnutrition:   % Weight Loss:  Weight loss does not meet criteria for malnutrition (diuresing)  % Intake:  Decreased intake does not meet criteria for malnutrition  Subcutaneous Fat Loss:  Unable to observe - in COVID isolation   Muscle Loss:  Unable to observe - in COVID isolation   Fluid Retention:  Mild as above     Malnutrition Diagnosis: Unable to determine due to inability to perform a Nutrition Focused Physical Exam due to COVID status       EVALUATION OF PROGRESS TOWARD GOALS   Diet:  NPO    Nutrition Support:  TF was off  and resumed to goal rate on  when it was held again for Trach/PEG the same day. Goal rate as outlined below ~  Nutrition Support Enteral:  Type of Feeding Tube: NJ  Enteral Frequency:  Continuous  Enteral Regimen: Vital HP at 50 mL/hr  Total Enteral Provisions: 1200 kcal (13 kcal/kg), 104 g protein (2.3 g/kg), 1000 mL H2O, 133 g CHO, no fiber   Free Water Flush: 250 mL every 4 hours     Intake/Tolerance:    Labs: K/Mg/Phos WNL  Recent Labs   Lab 21  1145 21  0850 21  0413 21  0410 21  0058 21  2004 21  1659 21  0420 21  0420 21  0515 21  0515 21  0359 21  0359 21  0530 21  0530 21  1902 21  1902   GLC  --   --   --  82  --   --   --   --  123*  --  91  --  96  --  115*  --  119*   * 85 87  --  84 104* 99   < >  --    < >  --    < >  --    < >  --    < >  --     < > = values in this interval not displayed.     Meds: Norepi   Stoolin mL RT  Wt: 103.5 kg  Wt Readings from Last 10 Encounters:   21 103.5 kg (228 lb 2.8 oz)   18 104.3 kg (230 lb)   16 100.2 kg (221 lb)   12 108.1 kg (238 lb 5.1 oz)         ASSESSED NUTRITION NEEDS:  Dosing Weight: 95.5 kg for energy (),  45 kg for protein  Estimated Energy Needs: 5810-3769 kcals (11-14 Kcal/Kg)  Justification: obese and vented  Estimated Protein Needs:  grams protein (2-2.5 g pro/Kg)  Justification: obesity guidelines       NEW FINDINGS:   5/20: Trach/PEG done    Previous Goals:   Patient will resume TF within the next 24 hours   Evaluation: Not met initially     Previous Nutrition Diagnosis:   Inadequate protein-energy intake related to TF on hold for Trach and PEG today as evidenced by meeting 0% needs   Evaluation: no change       CURRENT NUTRITION DIAGNOSIS  Inadequate protein-energy intake related to TF held for Trach and PEG today as evidenced by currently receiving <100% needs at half rate TF while advancing back to goal     INTERVENTIONS  Recommendations / Nutrition Prescription  Advance TF back to goal rate as able     Implementation  EN Schedule: as ordered   Collaboration and Referral of Nutrition care: patient was discussed during ICU rounds     Goals  EN to meet % estimated needs      MONITORING AND EVALUATION:  Progress towards goals will be monitored and evaluated per protocol and Practice Guidelines      Rachel Adam RD, LD  Clinical Dietitian

## 2021-05-21 NOTE — PROGRESS NOTES
Intensivist note    She had her tracheostomy yesterday (5/20) and atelectasis afterward; oxygenation now improving. On vent since 4/15.     She has COVID-19 lung disease originally admitted 4/13 and with relatively little progress the past few weeks. Her  had felt he was planning on comfort care but has decided to pursue aggressive care now. She had a trach and peg yesterday.     I spoke with her  by phone today for update    Assessment and Plan   1. Acute respiratory failure/ards intubated 4/15 - currently on 40-50%, +11 PEEP, with little change in oxygenation and her course continues to wax/wane. She will continue on current settings and titrate support down as able.  I have reinstituted IV Lasix today at 20 mgms BID and will track response over time.   2. 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 Positive on 5/20  3. Shock - she continues on Levophed at about 0.10; no significant change   4. Pseudomonas in sputum, cultured previously, and remarkably a sensitive to most antibiotics. I am grateful to Dr. Morejon's input, and will complete a 10 day course of antibiotics on 5/24.   5. S/p episode hemorrhagic shock, resolved  6. History HTN  7. Hyperglycemia - glucoses ok with minimal coverage.   Overall, she remains acute and critical. I spent 35 minutes of critical care time with her today.     Physical exam  Well developed male NAD  BP 96/64   Pulse 98   Temp 98.8  F (37.1  C)   Resp 24   Ht 1.524 m (5')   Wt 103.5 kg (228 lb 2.8 oz)   SpO2 96%   BMI 44.56 kg/m    Lungs mild rhonchi bilaterally  Heart is RRR  Abdomen is soft and non-tender  Extremities are warm with mild edema  Skin shows no cyanosis or mottling  CNS sedated.     Labs and CXR reviewed    drew mendez  May 21, 2021

## 2021-05-21 NOTE — PROGRESS NOTES
[facility] ICU RESPIRATORY NOTE        Date of Admission: 4/13/2021    Date of Intubation (most recent): 4/13/21, 5/20/21-trach    Reason for Mechanical Ventilation: Resp Failure    Number of Days on Mechanical Ventilation: 36    Met Criteria for Spontaneous Breathing Trial: No    Reason for No Spontaneous Breathing Trial: per MD    Significant Events Today: none    ABG Results:   Recent Labs   Lab 05/18/21  0313 05/17/21  0505 05/16/21  0410   PH 7.43 7.40 7.40   PCO2 57* 72* 72*   PO2 49* 111* 75*   HCO3 38* 44* 45*   O2PER  --   --  60%       Current Vent Settings: Ventilation Mode: PCV Plus assist  (Pressure Control Ventilation/ Assist Control)  FiO2 (%): 50 %  Rate Set (breaths/minute): 25 breaths/min  PEEP (cm H2O): 11 cmH2O  Oxygen Concentration (%): 50 %  Peak Inspiratory Pressure (cm H2O) (Drager Delmy): 20  Resp: 24      Plan: Continue full vent support, Marlo Belcher, RT

## 2021-05-21 NOTE — PROGRESS NOTES
THORACIC SURGERY    Tracheostomy ok  No bleeding    KB EMNDEZ MD [facility] ONCOLOGY THORACIC SURGERY  CELL:  [phone]  OFFICE: [phone]

## 2021-05-21 NOTE — PLAN OF CARE
Patient remains ventilated and sedated in ICU. Vent settings unchanged, trach dressing intact with small amount of old dried drainage, moderate secretions via trach overnight. Versed and dilaudid drips unchanged. HR SR. Levophed drip at 0.09 mcg/kg/min to maintain MAP >65.  called and updated per nursing overnight.

## 2021-05-22 NOTE — PLAN OF CARE
Patient remains ventilated and sedated in ICU. Vent settings unchanged, trach care done, dressing changed, , moderate secretions via trach overnight. VSS, Versed and dilaudid drips unchanged. HR SR. Levophed drip at 0.09 mcg/kg/min to maintain MAP >65, pt was repositioning q 2 h.

## 2021-05-22 NOTE — PROGRESS NOTES
Intensivist note      She has COVID-19 lung disease originally admitted 4/13, intubated 4/15 with relatively little progress the past few weeks. Her  had felt he was planning on comfort care but has decided to pursue aggressive care now. She had a trach and peg 5/20    Plan to hold sedation today to allow her a chance to wake up.    Assessment and Plan   ## Acute respiratory failure/ards intubated 4/15  Currently on 40-50%, +11 PEEP, with little change in oxygenation and her course continues to wax/wane. She will continue on current settings and titrate support down as able.  I have reinstituted IV Lasix today at 20 mgms BID and will track response over time.     ## Covid-19 lung disease. Repeat test positive on 5/20    ## Shock - she continues to require low dose levophed  - Start midodrine 5/22 in an effort to liberate from IV pressors    ## Pseudomonas in sputum, cultured previously, and remarkably a sensitive to most antibiotics. Will complete a 10 day course of Zosyn on 5/24.     ## S/p episode hemorrhagic shock, resolved    ## History HTN    ## Hyperglycemia - glucoses ok with minimal sliding scale insulin coverage.     ## Hypokalemia  Likely 2/2 lasix  - On replacement protocol      Physical exam  Well developed male NAD  /60 (BP Location: Left arm)   Pulse 87   Temp 97.3  F (36.3  C) (Bladder)   Resp (!) 33   Ht 1.524 m (5')   Wt 104.9 kg (231 lb 4.2 oz)   SpO2 95%   BMI 45.17 kg/m    Lungs mild rhonchi bilaterally, R > L  Heart is RRR  Abdomen is soft and non-tender  Extremities are warm with mild edema  Skin shows no cyanosis or mottling  CNS sedated.     Labs and CXR reviewed    Billing: This patient is critically ill: yes. Total critical care time today 40 min,excluding procedures.       Gee Coyne M.D.  Pulmonary & Critical Care  Pager: Click Here to page

## 2021-05-22 NOTE — PROGRESS NOTES
[facility] ICU RESPIRATORY NOTE           Date of Admission: 4/13/2021     Date of Intubation (most recent): 4/13/21, 5/20/21-trach     Reason for Mechanical Ventilation: Resp Failure     Number of Days on Mechanical Ventilation: 38     Met Criteria for Spontaneous Breathing Trial: No     Reason for No Spontaneous Breathing Trial: per MD     Significant Events Today: none    Recent Labs   Lab 05/18/21  0313 05/17/21  0505 05/16/21  0410   PH 7.43 7.40 7.40   PCO2 57* 72* 72*   PO2 49* 111* 75*   HCO3 38* 44* 45*   O2PER  --   --  60%     Venous Blood Gas  Recent Labs   Lab 05/16/21  0410   O2PER 60%     Ventilation Mode: PCV Plus assist  (Pressure Control Ventilation/ Assist Control)  FiO2 (%): 50 %  Rate Set (breaths/minute): 25 breaths/min  PEEP (cm H2O): 11 cmH2O  Oxygen Concentration (%): 45 %  Peak Inspiratory Pressure (cm H2O) (Drager Delmy): 20  Resp: 12    NATHALY CASTANEDA, RT

## 2021-05-22 NOTE — PLAN OF CARE
Neuro: Versed turned off. Does not withdraw. Opens eyes during turns.  Cardio: Levo to maintain MAP >65. Short bouts of SVT occasionally with suctioning.   Resp: PEEP decreased to 8 without issue. LS course. Moderate amount of thick tan secretions.   GI: 100 output from rectal tube. BS hypoactive.  : Adequate UOP  Family updated.

## 2021-05-23 NOTE — PROGRESS NOTES
[facility] ICU RESPIRATORY NOTE           Date of Admission: 4/13/2021     Date of Intubation (most recent): 4/13/21, 5/20/21-trach     Reason for Mechanical Ventilation: Resp Failure     Number of Days on Mechanical Ventilation: 39     Met Criteria for Spontaneous Breathing Trial: No     Reason for No Spontaneous Breathing Trial: per MD     Significant Events Today: none    Recent Labs   Lab 05/18/21  0313 05/17/21  0505 05/16/21  0410   PH 7.43 7.40 7.40   PCO2 57* 72* 72*   PO2 49* 111* 75*   HCO3 38* 44* 45*   O2PER  --   --  60%     Ventilation Mode: PCV Plus assist  (Pressure Control Ventilation/ Assist Control)  FiO2 (%): 50 %  Rate Set (breaths/minute): 25 breaths/min  PEEP (cm H2O): 8 cmH2O  Oxygen Concentration (%): 50 %  Peak Inspiratory Pressure (cm H2O) (Drager Delmy): 20  Resp: (!) 34    NATHALY CASTANEDA, RT

## 2021-05-23 NOTE — PROGRESS NOTES
Patient becoming agitated this am. Versed had been stopped yesterday. Will start propofol now.    Gregoria Jones MD 5/23/2021 6:13 AM   Surgical Critical Care  [phone]

## 2021-05-23 NOTE — PROGRESS NOTES
Intensivist note      She has COVID-19 lung disease originally admitted 4/13, intubated 4/15 with relatively little progress the past few weeks. Her  had felt he was planning on comfort care but has decided to pursue aggressive care now. She had a trach and peg 5/20    Plan to continue holding sedation today to allow her a chance to wake up.      Assessment and Plan   ## Acute respiratory failure/ards intubated 4/15  Currently on 40-50%, Tolerated PEEP drop 11->8, with no change in oxygenation.     ## Weaning sedation in an attempt to wake her up    ## Hypervolemia  - IV Lasix today at 20 mgms BID  - Additional one time dose of 20mg this am    ## Covid-19 lung disease. Repeat test positive on 5/20    ## Shock - she continues to require low dose levophed  - Midodrine started 5/22 in an effort to liberate from IV pressors    ## Pseudomonas in sputum, cultured previously, and remarkably a sensitive to most antibiotics. Will complete a 10 day course of Zosyn on 5/24.     ## S/p episode hemorrhagic shock, resolved    ## History HTN    ## Hyperglycemia - glucoses ok with minimal sliding scale insulin coverage.     ## Hypokalemia  Likely 2/2 lasix  - On replacement protocol        Physical exam  Well developed male NAD  BP (!) 148/79   Pulse 80   Temp 97.3  F (36.3  C)   Resp (!) 41   Ht 1.524 m (5')   Wt 103.8 kg (228 lb 13.4 oz)   SpO2 98%   BMI 44.69 kg/m    Lungs mild rhonchi bilaterally, R > L  Heart is RRR  Abdomen is soft and non-tender  Extremities are warm with mild edema  Skin shows no cyanosis or mottling  CNS sedated.       Labs and CXR reviewed    Billing: This patient is critically ill: yes. Total critical care time today 30 min,excluding procedures.       Gee Coyne M.D.  Pulmonary & Critical Care  Pager: Click Here to page

## 2021-05-23 NOTE — PLAN OF CARE
Patient remains ventilated, not sedated, Dilaudid and Levo gttg continuous. Pt agitated, HR up to 134, continuous cough, MD notified and Propofol Gtt started at 0600. Vent settings unchanged, trach care done, large amount oral and in line trach secretion overnight, dressing changed, VSS, pt was repositioning q 2 h.

## 2021-05-23 NOTE — OP NOTE
Surgeon: Ifeoma Rowe MD  1st Assistant: Marybel Tabor PA-C, The physicians assistant was medically necessary for their expertise in hemostasis, suturing, and retraction.    PREOPERATIVE DIAGNOSIS: Failure to thrive.   POSTOPERATIVE DIAGNOSIS: Same   PROCEDURES:   1. Percutaneous endoscopic gastrostomy tube placement. (20 Fr)  2. Esophagogastroduodenoscopy.     ANESTHESIA: MAC plus local.     OPERATIVE PROCEDURE: With Sarahy Dunbar on the procedure table and under general anesthesia, upper endoscopy was performed without complication. The mucosa of the esophagus, stomach and proximal duodenum appeared unremarkable \with the exception of a few petechial spot likely secondary to trauma from the nasogastric tube. The nasogastric tube had been removed. Once the stomach was distended with air, we used a percutaneous pressure to locate a good position in the left upper quadrant where to place the gastrostomy tube.  The area was prepped and draped in the usual sterile fashion. Using palpation we corroborated good location for the tube and then utilizing a long needle/angiocath , the stomach was entered and wire advanced percutaneously. The percutaneous wire was snared with the endoscope and then the endoscope and the wire were retrieved through the patient's mouth. We then utilized this percutaneous wire to guide the gastrostomy tube through the mouth into the stomach. This was done without difficulty. The endoscope was advanced into the stomach behind the mushroom of the gastrostomy tube and repeat endoscopy showed good position of the tube without evidence of bleeding. No injuries noted.  The stomach was deflated, the endoscope withdrawn and attachments to the gastrostomy tube were placed. The disc of the gastrostomy tube was set at about 7.5 cm at the skin level. The gastrostomy tube was immediately placed to drainage. No immediate complications noted.   IFEOMA ROWE MD

## 2021-05-23 NOTE — PROGRESS NOTES
[facility] ICU RESPIRATORY NOTE        Date of Admission: 4/13/2021    Date of Intubation (most recent): 4/13/21, 5/20/21 trach    Reason for Mechanical Ventilation: Resp Failure    Number of Days on Mechanical Ventilation: 39    Met Criteria for Spontaneous Breathing Trial: No    Reason for No Spontaneous Breathing Trial: per MD    Significant Events Today: none    ABG Results:   Recent Labs   Lab 05/18/21  0313 05/17/21  0505   PH 7.43 7.40   PCO2 57* 72*   PO2 49* 111*   HCO3 38* 44*       Current Vent Settings: Ventilation Mode: (S) PCV Plus assist  (Pressure Control Ventilation/ Assist Control)  FiO2 (%): 50 %  Rate Set (breaths/minute): (S) 25 breaths/min  PEEP (cm H2O): (S) 8 cmH2O  Oxygen Concentration (%): (S) 50 %  Peak Inspiratory Pressure (cm H2O) (Drager Delmy): (S) 20  Resp: 17        Plan: continue full vent support.    Sami Miranda, RT

## 2021-05-24 NOTE — PLAN OF CARE
No significant change throughout the day. Patient unable to be weaned off Levophed, drops pressures quickly with weaning. Tolerates mechanical ventilation with occasional episodes of prolonged weak coughing. Urine output excellent after Lasix administration.     Problem: Adult Inpatient Plan of Care  Goal: Plan of Care Review  Outcome: No Change  Goal: Patient-Specific Goal (Individualized)  Outcome: No Change  Goal: Absence of Hospital-Acquired Illness or Injury  Outcome: No Change  Intervention: Identify and Manage Fall Risk  Recent Flowsheet Documentation  Taken 5/23/2021 1600 by Kareem Vuong, RN  Safety Promotion/Fall Prevention:   activity supervised   bed alarm on   lighting adjusted   room organization consistent   safety round/check completed   clutter free environment maintained   fall prevention program maintained   increased rounding and observation   increase visualization of patient   treat reversible contributory factors   treat underlying cause  Taken 5/23/2021 1200 by Kareem Vuong, RN  Safety Promotion/Fall Prevention:   activity supervised   bed alarm on   lighting adjusted   room organization consistent   safety round/check completed   clutter free environment maintained   fall prevention program maintained   increased rounding and observation   increase visualization of patient   treat reversible contributory factors   treat underlying cause  Taken 5/23/2021 0800 by Kareem Vuong, RN  Safety Promotion/Fall Prevention:   activity supervised   bed alarm on   lighting adjusted   room organization consistent   safety round/check completed   clutter free environment maintained   fall prevention program maintained   increased rounding and observation   increase visualization of patient   treat reversible contributory factors   treat underlying cause  Intervention: Prevent Skin Injury  Recent Flowsheet Documentation  Taken 5/23/2021 1800 by Kareem Vuong, RN  Body Position: turned  Taken  5/23/2021 1600 by Kareem Vuong RN  Body Position: turned  Taken 5/23/2021 1400 by Kareem Vuong RN  Body Position: turned  Taken 5/23/2021 1200 by Kareem Vuong RN  Body Position: turned  Taken 5/23/2021 1000 by Kareem Vuong RN  Body Position: turned  Taken 5/23/2021 0800 by Kareem Vuong RN  Body Position: turned  Intervention: Prevent and Manage VTE (Venous Thromboembolism) Risk  Recent Flowsheet Documentation  Taken 5/23/2021 1600 by Kareem Vuong RN  VTE Prevention/Management:   pneumatic compression device   anticoagulant therapy maintained   bleeding precautions maintained   bleeding risk assessed   bleeding risk factor(s) identified, physician notified   dorsiflexion/plantar flexion performed   PROM (passive range of motion) performed  Taken 5/23/2021 1200 by Kareem Vuong RN  VTE Prevention/Management:   pneumatic compression device   anticoagulant therapy maintained   bleeding precautions maintained   bleeding risk assessed   bleeding risk factor(s) identified, physician notified   dorsiflexion/plantar flexion performed   PROM (passive range of motion) performed  Taken 5/23/2021 0800 by Kareem Vuong RN  VTE Prevention/Management:   pneumatic compression device   anticoagulant therapy maintained   bleeding precautions maintained   bleeding risk assessed   bleeding risk factor(s) identified, physician notified   dorsiflexion/plantar flexion performed   PROM (passive range of motion) performed  Intervention: Prevent Infection  Recent Flowsheet Documentation  Taken 5/23/2021 1600 by Kareem Vuong RN  Infection Prevention:   environmental surveillance performed   equipment surfaces disinfected   hand hygiene promoted   personal protective equipment utilized   single patient room provided   visitors restricted/screened  Taken 5/23/2021 1200 by Kareem Vuong RN  Infection Prevention:   environmental surveillance performed   equipment surfaces disinfected   hand hygiene promoted    personal protective equipment utilized   single patient room provided   visitors restricted/screened  Taken 5/23/2021 0800 by Kareem Vuong, RN  Infection Prevention:   environmental surveillance performed   equipment surfaces disinfected   hand hygiene promoted   personal protective equipment utilized   single patient room provided   visitors restricted/screened  Goal: Optimal Comfort and Wellbeing  Outcome: No Change  Intervention: Provide Person-Centered Care  Recent Flowsheet Documentation  Taken 5/23/2021 1600 by Kareem Vuong, RN  Trust Relationship/Rapport:   care explained   emotional support provided   reassurance provided   thoughts/feelings acknowledged  Taken 5/23/2021 1200 by Kareem Vuong, RN  Trust Relationship/Rapport:   care explained   emotional support provided   reassurance provided   thoughts/feelings acknowledged  Taken 5/23/2021 0800 by Kareem Vuong, RN  Trust Relationship/Rapport:   care explained   emotional support provided   reassurance provided   thoughts/feelings acknowledged  Goal: Readiness for Transition of Care  Outcome: No Change

## 2021-05-24 NOTE — PROGRESS NOTES
[facility] ICU RESPIRATORY NOTE           Date of Admission: 4/13/2021     Date of Intubation (most recent): 4/13/21, 5/20/21-trach     Reason for Mechanical Ventilation: Resp Failure     Number of Days on Mechanical Ventilation: 40     Met Criteria for Spontaneous Breathing Trial: No     Reason for No Spontaneous Breathing Trial: per MD     Significant Events Today: none     Recent Labs   Lab 05/18/21  0313   PH 7.43   PCO2 57*   PO2 49*   HCO3 38*     Ventilation Mode: PCV Plus assist  (Pressure Control Ventilation/ Assist Control)  FiO2 (%): 50 %  Rate Set (breaths/minute): 25 breaths/min  PEEP (cm H2O): 8 cmH2O  Oxygen Concentration (%): 50 %  Peak Inspiratory Pressure (cm H2O) (Drager Delmy): 20  Resp: 28    NATHALY CASTANEDA, RT

## 2021-05-24 NOTE — PROGRESS NOTES
Critical Care Progress Note                          05/24/2021    Name: Sarahy Dunbar MRN#: [id number]   Age: 66 year old YOB: 1955     [facility] [place] Day# 41                 Problem List:   Active Problems:    Acute respiratory failure with hypoxemia (H)    ARDS (adult respiratory distress syndrome) (H)    2019 novel coronavirus disease (COVID-19)    Hematoma    Anemia due to blood loss, acute    Shock (H)    Ventilator associated pneumonia (H)           Summary/Hospital Course:     66 year old female initially admitted with COVID-19 pneumonia on 4/13 with subsequent initiation of MV on 4/15. Now with very little progress over the past few weeks and underwent trach/PEG placement on 5/20.       Assessment and plan :     Sarahy Dunbar IS a 66 year old female admitted on 4/13/2021 for COVID-19 pneumonia.   I have personally reviewed the daily labs, imaging studies, cultures and discussed the case with referring physician and consulting physicians.      My assessment and plan by system for this patient is as follows:    Neurology:   Pain/sedation: Remains on propofol.  Now on Dilaudid drip as well.  Will follow some commands when sedation is lowered per reports.  This a.m. heavily sedated and unarousable.  Sedation remains on to assist in vent synchrony.      Continue with daily sedation holiday    Cardiovascular:   Shock: Suspect a combination of sedation and critical illness.      Continue midodrine (started 5/22)    Remains on Levophed    Pulmonary/Ventilator Management:   COVID-19 ARDS: Mechanical ventilation day 41.  Trached on 5/20.  Remains on 50% FiO2 and a PEEP of 8 which has been unchanged for a number of days.  Continues to have quite a bit of ventilator dyssynchrony.      Switch to pressure support (16/8) to see if this facilitates ventilator comfort and synchrony    Continue Lasix twice daily with goal of net even    GI/Nutrition:  Nutrition      Tube feeds at  goal    Renal/fluids/electrolytes:   No active issues      monitor function and electrolytes as needed with replacement per ICU protocols.    generally avoid nephrotoxic agents such as NSAID, IV contrast unless specifically required    adjust medications as needed for renal clearance    follow I/O's as appropriate    Infectious Disease:   COVID-19: Remains positive on 5/20.  Has completed treatment a number of weeks ago.    Pseudomonas VAP: Pansensitive.  Started on Zosyn will complete a 10-day course on today.    Endocrine:   Hyperglycemia      Blood sugar control per ICU protocol    Hematology/Oncology:   Leukocytosis/anemia: Stable, on the setting of infection and critical illness.      Continue to monitor    IV/Access:   PICC      central access required and necessary    ICU Prophylaxis:   1. DVT: Lovenox  2. VAP: HOB 30 degrees, chlorhexidine rinse  3. Stress Ulcer: PPI  4. Restraints: Nonviolent soft two point restraints required and necessary for patient safety and continued cares and good effect as patient continues to pull at necessary lines, tubes despite education and distraction. Will readdress daily.   5. Wound care - per unit routine   6. Feeding -tube feeds at goal  7. Family Update: Yes,  Ruddy over the phone  8. Disposition -ICU    Key goals for next 24 hours:       Switch to pressure support (16/8) to facilitate ventilator synchrony          Interim History/Overnight Events:     No major events overnight.         Key Medications:       acetylcysteine  2 mL Nebulization 4x Daily     chlorhexidine  15 mL Mouth/Throat Q12H     enoxaparin ANTICOAGULANT  50 mg Subcutaneous Q12H     furosemide  20 mg Intravenous Once     furosemide  20 mg Intravenous Q12H     insulin aspart  1-12 Units Subcutaneous Q4H     ipratropium - albuterol 0.5 mg/2.5 mg/3 mL  3 mL Nebulization 4x daily     midodrine  10 mg Oral Q8H     multivitamins w/minerals  15 mL Per Feeding Tube Daily     pantoprazole  40 mg Per Feeding  Tube Daily     piperacillin-tazobactam  4.5 g Intravenous Q6H     potassium chloride  40 mEq Oral BID     QUEtiapine  75 mg Oral or Feeding Tube BID     sennosides  5 mL Oral or Feeding Tube BID     sodium chloride (PF)  10 mL Intracatheter Q8H     sodium chloride (PF)  10 mL Intracatheter Q8H       dextrose       HYDROmorphone 2 mg/hr (05/24/21 0800)     midazolam Stopped (05/22/21 1200)     norepinephrine 0.1 mcg/kg/min (05/24/21 0800)     Patient RECEIVING antibiotic to treat a different condition and it provides ADEQUATE COVERAGE for this surgical procedure. Stopped (05/21/21 0853)     propofol (DIPRIVAN) infusion 50 mcg/kg/min (05/24/21 0815)     sodium chloride 20 mL/hr at 05/24/21 0800               Physical Examination:   Temp:  [95.9  F (35.5  C)-98.2  F (36.8  C)] 98.1  F (36.7  C)  Pulse:  [] 83  Resp:  [0-41] 19  BP: ()/() 109/60  FiO2 (%):  [50 %] 50 %  SpO2:  [90 %-100 %] 97 %    Intake/Output Summary (Last 24 hours) at 5/24/2021 1027  Last data filed at 5/24/2021 1000  Gross per 24 hour   Intake 5592.75 ml   Output 5130 ml   Net 462.75 ml     Wt Readings from Last 4 Encounters:   05/24/21 109.2 kg (240 lb 11.9 oz)   04/03/18 104.3 kg (230 lb)   09/06/16 100.2 kg (221 lb)   02/06/12 108.1 kg (238 lb 5.1 oz)     BP - Mean:  [] 83  Ventilation Mode: PCV Plus assist  (Pressure Control Ventilation/ Assist Control)  FiO2 (%): 50 %  Rate Set (breaths/minute): (S) 25 breaths/min  PEEP (cm H2O): (S) 8 cmH2O  Oxygen Concentration (%): (S) 50 %  Peak Inspiratory Pressure (cm H2O) (Drager Delmy): (S) 20  Resp: 19    Recent Labs   Lab 05/18/21  0313   PH 7.43   PCO2 57*   PO2 49*   HCO3 38*       General:   Comfortable, no pain or respiratory distress   Neurologic:   Sedation: lightly sedated and awakable   HEENT:   Head is atraumatic  Tracheostomy is present and the stoma appears benign      Lungs:   Symmetrical and normal breath sounds, no wheeze, ronchi, crackles, rub or bronchial breath  sounds   Cardiovascular:   Regular rate and rhythm  Normal S1,S2, and no gallop, rub or murmur   Abdomen:   Distended:  No.   Bowel sound present and normal: Yes .   Soft: Yes . Tender: No.   Rebound:tendeness or guarding present No   Extremities:   Clubbing present: No,   Edema present: Yes ,   Acrocyanosis present: No,   Mottling present: No,   Normal capillary refill: No   Skin:   Warm, dry.  No rash on limited exam.    Lines/Drain:    Central line present: Yes   Arterial line present: No  External ventricular Drain present: No  Chest tube present: No  RANI present: No  PEG present: No          Data:   All data and imaging reviewed.     ROUTINE ICU LABS (Last four results)  CMP  Recent Labs   Lab 05/24/21  0450 05/23/21  0501 05/22/21  0500 05/21/21  0410 05/18/21  0359 05/18/21  0359    135 137 139   < > 142   POTASSIUM 3.3* 3.3* 3.2* 3.4   < > 3.8   CHLORIDE 94 91* 93* 96   < > 103   CO2 45* 44* 43* 45*   < > 37*   ANIONGAP <1* <1* 1* <1*   < > 2*   * 122* 122* 82   < > 96   BUN 9 8 10 8   < > 21   CR 0.38* 0.40* 0.47* 0.46*   < > 0.43*   GFRESTIMATED >90 >90 >90 >90   < > >90   GFRESTBLACK >90 >90 >90 >90   < > >90   ALICIA 8.6 8.6 8.5 8.5   < > 8.7   MAG 2.1 1.9 1.9 2.1   < > 2.2   PHOS 2.8 2.2* 2.6 3.2   < > 4.5   PROTTOTAL  --   --   --   --   --  6.5*   ALBUMIN  --   --   --   --   --  1.9*   BILITOTAL  --   --   --   --   --  1.0   ALKPHOS  --   --   --   --   --  269*   AST  --   --   --   --   --  70*   ALT  --   --   --   --   --  64*    < > = values in this interval not displayed.     CBC  Recent Labs   Lab 05/24/21  0450 05/23/21  0501 05/22/21  0500 05/21/21  0410   WBC 11.5* 10.7 11.2* 11.8*   RBC 2.67* 2.47* 2.50* 2.58*   HGB 8.1* 7.6* 7.7* 7.7*   HCT 27.2* 25.2* 25.8* 26.5*   * 102* 103* 103*   MCH 30.3 30.8 30.8 29.8   MCHC 29.8* 30.2* 29.8* 29.1*   RDW 17.7* 17.2* 17.4* 17.4*    287 298 274     INRNo lab results found in last 7 days.  Arterial Blood Gas  Recent Labs   Lab  05/18/21  0313   PH 7.43   PCO2 57*   PO2 49*   HCO3 38*       All cultures:  Recent Labs   Lab 05/18/21  1518 05/18/21  0900   CULT No growth No growth  10,000 to 50,000 colonies/mL  Candida albicans  Susceptibility testing not routinely done  *     No results found for this or any previous visit (from the past 24 hour(s)).              Billing: This patient is critically ill: Yes. Total critical care time today 35 min. This does not include time spent on procedures or teaching.     Leonard Waters MD  Pulmonary & Critical Care Medicine  [facility]   Pager: [phone]

## 2021-05-24 NOTE — PROGRESS NOTES
CLINICAL NUTRITION SERVICES - REASSESSMENT NOTE      Recommendations Ordered by Registered Dietitian (RD):   Decrease TF Vital HP to 10 mL/hr = 240 kcals, 21 gm pro, 200 mL H20, 27 gm CHO, no fiber  Add Prosource 2 packets every 6 hrs = 320 kcals, 88 gm pro  Total (TF + Prosource + Propofol):  1407 kcals (15 kcal/kg), 109 gm pro (2.4 gm/kg)   Malnutrition:   % Weight Loss:  Weight loss does not meet criteria for malnutrition (diuresing)  % Intake:  Decreased intake does not meet criteria for malnutrition  Subcutaneous Fat Loss:  Unable to observe - in COVID isolation   Muscle Loss:  Unable to observe - in COVID isolation   Fluid Retention:  Mild as above     Malnutrition Diagnosis: Unable to determine due to inability to perform a Nutrition Focused Physical Exam due to COVID status       EVALUATION OF PROGRESS TOWARD GOALS   Diet:  NPO with trach    Nutrition Support:  TF continues as below:    Nutrition Support Enteral:  Type of Feeding Tube:  PEG  Enteral Frequency:  Continuous  Enteral Regimen: Vital HP at 50 mL/hr  Total Enteral Provisions: 1200 kcal, 104 g protein (2.3 g/kg), 1000 mL H2O, 133 g CHO, no fiber   Free Water Flush: 250 mL every 2 hours   Certavite daily - per PI protocol    Propofol restarted 5/23 and running at 32.1 mL/hr = 847 kcals (lipid).  Total (TF + Propofol):  2047 kcals (21 kcal/kg and 154% energy needs    Intake/Tolerance:    Stool x6 yesterday (500 mL) and x3 (200 mL) thus far today.  Senokot for bowel program.  K 3.3 (L) --> replacing.  BGM:  143, 116, 133, 144, 123, 128 - High Resistant sliding scale insulin for glycemic control.  I/O:  5576/5065.  Wt:  109.2 kg (up 2.2 kg since admit).  Pt with mild 2+ generalized edema.  Lasix BID for diuresis.    ASSESSED NUTRITION NEEDS:  Dosing Weight: 95.5 kg for energy (5/18), 45 kg for protein  Estimated Energy Needs: 7705-0440 kcals (11-14 Kcal/Kg)  Justification: obese and vented  Estimated Protein Needs:  grams protein (2-2.5 g  pro/Kg)  Justification: obesity guidelines     NEW FINDINGS:   5/20:  Trach/PEG    PST underway.    Previous Goals (5/21):   EN to meet % estimated needs   Evaluation: Not met    Previous Nutrition Diagnosis (5/21):   Inadequate protein-energy intake related to TF held for Trach and PEG today as evidenced by currently receiving <100% needs at half rate TF while advancing back to goal    Evaluation:  Resolved      CURRENT NUTRITION DIAGNOSIS  Excessive energy intake related to high Propofol rate as evidenced by combination TF + Propofol meeting 154% energy needs.    INTERVENTIONS  Recommendations / Nutrition Prescription  Decrease TF Vital HP to 10 mL/hr = 240 kcals, 21 gm pro, 200 mL H20, 27 gm CHO, no fiber  Add Prosource 2 packets every 6 hrs = 320 kcals, 88 gm pro  Total (TF + Prosource + Propofol):  1407 kcals (15 kcal/kg), 109 gm pro (2.4 gm/kg)    Implementation  Collaboration and Referral of Nutrition care - Pt was discussed during ICU interdisciplinary rounds this morning  EN Schedule and Medical Food Supplement - Ordered above in Epic to better meet estimated needs on higher Propofol.    Goals  TF + Prosource + Propofol will meet % estimated needs      MONITORING AND EVALUATION:  Progress towards goals will be monitored and evaluated per protocol and Practice Guidelines    Wanda Porter, RD, LD, CNSC

## 2021-05-24 NOTE — PLAN OF CARE
[id number]    No acute changes this shift, patient remains intubated/trached and sedated. Rectal tube continues to have output, brown with good UOP. Skin care per WOC instruction completed.    Potassium 3.3 this AM, pt on potassium replacement and also 40meq BID, spoke with Dr. Augustine and he decided to defer the replacement to the scheduled dose rather than adding an extra dose for replacement

## 2021-05-24 NOTE — PROGRESS NOTES
SPIRITUAL HEALTH SERVICES Progress Note  [facility] ICU    Referral Source: Follow up/Length of Stay    Unable to reach spouse by telephone on two attempts.    Left voicemail message. Follow-up in 1 - 2 days and while PT remains in hospital.      John Hong  Chaplain Resident

## 2021-05-24 NOTE — PROGRESS NOTES
[facility] ICU RESPIRATORY NOTE        Date of Admission: 4/13/2021    Date of Intubation (most recent): 4/13/21,5/20/21 Trach    Reason for Mechanical Ventilation: Resp Failure    Number of Days on Mechanical Ventilation: 40    Met Criteria for Spontaneous Breathing Trial: No    Reason for No Spontaneous Breathing Trial: per MD    Significant Events Today: PS trial 16/8 initiated per MD    ABG Results:   Recent Labs   Lab 05/18/21  0313   PH 7.43   PCO2 57*   PO2 49*   HCO3 38*       Current Vent Settings: Ventilation Mode: (S) CPAP/PS  (Continuous positive airway pressure with Pressure Support) (found pt on PS)  FiO2 (%): 50 %  Rate Set (breaths/minute): 25 breaths/min  PEEP (cm H2O): (S) 8 cmH2O  Pressure Support (cm H2O): (S) 16 cmH2O  Oxygen Concentration (%): (S) 50 %  Peak Inspiratory Pressure (cm H2O) (Drager Delmy): 20  Resp: 29          Plan: continue full vent support.     Sami Miranda, RT

## 2021-05-25 NOTE — PROGRESS NOTES
[facility] ICU RESPIRATORY NOTE           Date of Admission: 4/13/2021     Date of Intubation (most recent): 4/13/21,5/20/21 Trach     Reason for Mechanical Ventilation: Resp Failure     Number of Days on Mechanical Ventilation: 41     Met Criteria for Spontaneous Breathing Trial: No     Reason for No Spontaneous Breathing Trial: per MD     Significant Events Today: PS trial 16/8 initiated per MD  No lab results found in last 7 days.   Ventilation Mode: CPAP/PS  (Continuous positive airway pressure with Pressure Support)  FiO2 (%): 50 %  Rate Set (breaths/minute): 25 breaths/min  PEEP (cm H2O): 8 cmH2O  Pressure Support (cm H2O): 16 cmH2O  Oxygen Concentration (%): 50 %  Peak Inspiratory Pressure (cm H2O) (Drager Delmy): 20  Resp: 21    Plan continue full vent support.

## 2021-05-25 NOTE — PROGRESS NOTES
SEVERE-TO-CRITICAL ILLNESS OR MILDLY IMMUNOCOMPROMISED:    Following criteria for mildly immunocompromised patients OR those with severe-to-critical COVID-19 illness,  patient meets criteria for discontinuation of Special Precautions:    FOR SYMPTOMATIC - 20 days following symptom onset, >24 hr fever free without fever-reducing meds, AND substantial improvement in COVID-19 symptoms.    FOR ASYMPTOMATIC - 20 days from positive test AND no COVID-19 symptom development in 20-day timeframe.    Special Precautions discontinued May 25, 2021. Patient is considered recovered for 90 days from symptom onset.     Leonard Waters MD  Pulmonary & Critical Care Medicine  [facility]   Pager: [phone]

## 2021-05-25 NOTE — PROGRESS NOTES
Critical Care Progress Note                          05/25/2021    Name: Sarahy Dunbar MRN#: [id number]   Age: 66 year old YOB: 1955     hospitals Day# 42                 Problem List:   Active Problems:    Acute respiratory failure with hypoxemia (H)    ARDS (adult respiratory distress syndrome) (H)    2019 novel coronavirus disease (COVID-19)    Hematoma    Anemia due to blood loss, acute    Shock (H)    Ventilator associated pneumonia (H)           Summary/Hospital Course:     66 year old female initially admitted with COVID-19 pneumonia on 4/13 with subsequent initiation of MV on 4/15. Now with very little progress over the past few weeks and underwent trach/PEG placement on 5/20.       Assessment and plan :     Sarahy Dunbar IS a 66 year old female admitted on 4/13/2021 for COVID-19 pneumonia.   I have personally reviewed the daily labs, imaging studies, cultures and discussed the case with referring physician and consulting physicians.      My assessment and plan by system for this patient is as follows:    Neurology:   Pain/sedation: Remains on propofol.  Now on Dilaudid drip as well.  Was able to open eyes to voice this AM.  Sedation remains on to assist in vent synchrony.      Continue with daily sedation holiday    Cardiovascular:   Shock: Suspect a combination of sedation and critical illness.  Levophed needs have come down over the past 24 hours.      Continue midodrine (started 5/22)    Remains on Levophed, wean as able    Pulmonary/Ventilator Management:   COVID-19 ARDS: Mechanical ventilation day 42.  Trached on 5/20.  Now on 40% FiO2 and a PEEP of 8.  Ventilator synchrony improved on pressure support.      Continue pressuresupport 16/8 (16/8) as this appears to have improved ventilator synchrony    PEEP remains at 8, FiO2 titrated down to 40%    Continue Lasix twice daily with goal of net even    GI/Nutrition:  Nutrition      Tube feeds at goal    Renal/fluids/electrolytes:   No  active issues      monitor function and electrolytes as needed with replacement per ICU protocols.    generally avoid nephrotoxic agents such as NSAID, IV contrast unless specifically required    adjust medications as needed for renal clearance    follow I/O's as appropriate    Infectious Disease:   COVID-19: Remains positive on 5/20.  Has completed treatment a number of weeks ago.       Will speak to infection prevention about removing precautions.     Pseudomonas VAP: Pansensitive.  Completed 10 day course of Zosyn on 5/25.    Endocrine:   Hyperglycemia      Blood sugar control per ICU protocol    Hematology/Oncology:   Leukocytosis/anemia: Stable, on the setting of infection and critical illness.      Continue to monitor    IV/Access:   PICC      central access required and necessary    ICU Prophylaxis:   1. DVT: Lovenox  2. VAP: HOB 30 degrees, chlorhexidine rinse  3. Stress Ulcer: PPI  4. Restraints: Nonviolent soft two point restraints required and necessary for patient safety and continued cares and good effect as patient continues to pull at necessary lines, tubes despite education and distraction. Will readdress daily.   5. Wound care - per unit routine   6. Feeding -tube feeds at goal  7. Family Update: Yes,  Ruddy over the phone  8. Disposition -ICU    Key goals for next 24 hours:       Keep on pressure support 16/8 for ventilator synchrony    Will speak with infection prevention about removing COVID precautions        Interim History/Overnight Events:     No major events overnight.  Switched to pressure support yesterday and appears to have better ventilator synchrony.         Key Medications:       acetylcysteine  2 mL Nebulization 4x Daily     chlorhexidine  15 mL Mouth/Throat Q12H     enoxaparin ANTICOAGULANT  50 mg Subcutaneous Q12H     furosemide  20 mg Intravenous Once     furosemide  20 mg Intravenous Q12H     insulin aspart  1-12 Units Subcutaneous Q4H     ipratropium - albuterol 0.5 mg/2.5  mg/3 mL  3 mL Nebulization 4x daily     midodrine  10 mg Oral Q8H     multivitamins w/minerals  15 mL Per Feeding Tube Daily     pantoprazole  40 mg Per Feeding Tube Daily     potassium chloride  40 mEq Oral BID     protein modular  2 packet Per Feeding Tube Q6H     QUEtiapine  75 mg Oral or Feeding Tube BID     sennosides  5 mL Oral or Feeding Tube BID     sodium chloride (PF)  10 mL Intracatheter Q8H     sodium chloride (PF)  10 mL Intracatheter Q8H       dextrose       HYDROmorphone 2 mg/hr (05/25/21 0827)     midazolam Stopped (05/22/21 1200)     norepinephrine 0.03 mcg/kg/min (05/25/21 0829)     Patient RECEIVING antibiotic to treat a different condition and it provides ADEQUATE COVERAGE for this surgical procedure. Stopped (05/21/21 0853)     propofol (DIPRIVAN) infusion 40 mcg/kg/min (05/25/21 0829)     sodium chloride 20 mL/hr at 05/25/21 0830               Physical Examination:   Temp:  [94.6  F (34.8  C)-98.8  F (37.1  C)] 98.4  F (36.9  C)  Pulse:  [] 113  Resp:  [14-41] 16  BP: ()/(48-87) 113/69  FiO2 (%):  [40 %-50 %] 50 %  SpO2:  [89 %-100 %] 89 %    Intake/Output Summary (Last 24 hours) at 5/24/2021 1027  Last data filed at 5/24/2021 1000  Gross per 24 hour   Intake 5592.75 ml   Output 5130 ml   Net 462.75 ml     Wt Readings from Last 4 Encounters:   05/25/21 105.4 kg (232 lb 5.8 oz)   04/03/18 104.3 kg (230 lb)   09/06/16 100.2 kg (221 lb)   02/06/12 108.1 kg (238 lb 5.1 oz)     BP - Mean:  [] 76  Ventilation Mode: (S) CPAP/PS  (Continuous positive airway pressure with Pressure Support)  FiO2 (%): 50 %  Rate Set (breaths/minute): 25 breaths/min  PEEP (cm H2O): (S) 8 cmH2O  Pressure Support (cm H2O): (S) 16 cmH2O  Oxygen Concentration (%): (S) 50 %  Peak Inspiratory Pressure (cm H2O) (Drager Delmy): 20  Resp: 16    No lab results found in last 7 days.    General:   Comfortable, no pain or respiratory distress   Neurologic:   Sedation: lightly sedated and awakable   HEENT:   Head is  atraumatic  Tracheostomy is present and the stoma appears benign      Lungs:   Symmetrical and normal breath sounds, no wheeze, ronchi, crackles, rub or bronchial breath sounds   Cardiovascular:   Regular rate and rhythm  Normal S1,S2, and no gallop, rub or murmur   Abdomen:   Distended:  No.   Bowel sound present and normal: Yes .   Soft: Yes . Tender: No.   Rebound:tendeness or guarding present No   Extremities:   Clubbing present: No,   Edema present: Yes ,   Acrocyanosis present: No,   Mottling present: No,   Normal capillary refill: No   Skin:   Warm, dry.  No rash on limited exam.    Lines/Drain:    Central line present: Yes   Arterial line present: No  External ventricular Drain present: No  Chest tube present: No  RANI present: No  PEG present: No          Data:   All data and imaging reviewed.     ROUTINE ICU LABS (Last four results)  CMP  Recent Labs   Lab 05/25/21 0412 05/24/21  1240 05/24/21  0450 05/23/21  0501 05/22/21  0500 05/21/21  0410   NA  --   --  139 135 137 139   POTASSIUM  --  4.0 3.3* 3.3* 3.2* 3.4   CHLORIDE  --   --  94 91* 93* 96   CO2  --   --  45* 44* 43* 45*   ANIONGAP  --   --  <1* <1* 1* <1*   GLC  --   --  120* 122* 122* 82   BUN  --   --  9 8 10 8   CR  --   --  0.38* 0.40* 0.47* 0.46*   GFRESTIMATED  --   --  >90 >90 >90 >90   GFRESTBLACK  --   --  >90 >90 >90 >90   ALICIA  --   --  8.6 8.6 8.5 8.5   MAG 2.0  --  2.1 1.9 1.9 2.1   PHOS 4.1  --  2.8 2.2* 2.6 3.2     CBC  Recent Labs   Lab 05/25/21  0412 05/24/21  0450 05/23/21  0501 05/22/21  0500   WBC 12.9* 11.5* 10.7 11.2*   RBC 2.56* 2.67* 2.47* 2.50*   HGB 7.8* 8.1* 7.6* 7.7*   HCT 26.1* 27.2* 25.2* 25.8*   * 102* 102* 103*   MCH 30.5 30.3 30.8 30.8   MCHC 29.9* 29.8* 30.2* 29.8*   RDW 18.4* 17.7* 17.2* 17.4*    288 287 298     INRNo lab results found in last 7 days.  Arterial Blood Gas  No lab results found in last 7 days.    All cultures:  Recent Labs   Lab 05/18/21  1518   CULT No growth     No results found for  this or any previous visit (from the past 24 hour(s)).              Billing: This patient is critically ill: Yes. Total critical care time today 35 min. This does not include time spent on procedures or teaching.     Leonard Waters MD  Pulmonary & Critical Care Medicine  [facility]   Pager: [phone]

## 2021-05-26 NOTE — PROGRESS NOTES
Critical Care Progress Note                          05/26/2021    Name: Sarahy Dunbar MRN#: [id number]   Age: 66 year old YOB: 1955     [facility] Day# 43                 Problem List:   Active Problems:    Acute respiratory failure with hypoxemia (H)    ARDS (adult respiratory distress syndrome) (H)    2019 novel coronavirus disease (COVID-19)    Hematoma    Anemia due to blood loss, acute    Shock (H)    Ventilator associated pneumonia (H)           Summary/Hospital Course:     66 year old female initially admitted with COVID-19 pneumonia on 4/13 with subsequent initiation of MV on 4/15. Now with very little progress over the past few weeks and underwent trach/PEG placement on 5/20.       Assessment and plan :     Sarahy Dunbar IS a 66 year old female admitted on 4/13/2021 for COVID-19 pneumonia.   I have personally reviewed the daily labs, imaging studies, cultures and discussed the case with referring physician and consulting physicians.      My assessment and plan by system for this patient is as follows:    Neurology:   Pain/sedation: Remains on propofol.  Now on Dilaudid drip as well.  Relatively unresponsive this AM.  Sedation remains on to assist in vent synchrony.      Continue with daily sedation holiday    Cardiovascular:   Shock: Suspect a combination of sedation and critical illness.  Levophed needs have come down over the past 24 hours.      Continue midodrine (started 5/22)    Remains on low dose Levophed, wean as able    Pulmonary/Ventilator Management:   COVID-19 ARDS: Mechanical ventilation day 42.  Trached on 5/20.  Has been on 40-60%  FiO2 for weeks at this point. PEEP remains stable at 8. Ventilator synchrony improved on pressure support.      Reduce pressure support to 14/8 as this appears to have improved ventilator synchrony    PEEP remains at 8, FiO2 has been between 40-60%    Continue Lasix twice daily with goal of net even    GI/Nutrition:  Nutrition      Tube feeds at  goal    Renal/fluids/electrolytes:   No active issues      monitor function and electrolytes as needed with replacement per ICU protocols.    generally avoid nephrotoxic agents such as NSAID, IV contrast unless specifically required    adjust medications as needed for renal clearance    follow I/O's as appropriate    Infectious Disease:   COVID-19: Remains positive on 5/20.  Has completed treatment a number of weeks ago.       COVID precautions removed 5/25.     Pseudomonas VAP: Pansensitive.  Completed 10 day course of Zosyn on 5/25.    Endocrine:   Hyperglycemia      Blood sugar control per ICU protocol    Hematology/Oncology:   Leukocytosis/anemia: Stable, on the setting of infection and critical illness.      Continue to monitor    IV/Access:   PICC      central access required and necessary    ICU Prophylaxis:   1. DVT: Lovenox  2. VAP: HOB 30 degrees, chlorhexidine rinse  3. Stress Ulcer: PPI  4. Restraints: Nonviolent soft two point restraints required and necessary for patient safety and continued cares and good effect as patient continues to pull at necessary lines, tubes despite education and distraction. Will readdress daily.   5. Wound care - per unit routine   6. Feeding -tube feeds at goal  7. Family Update: Yes,  Ruddy over the phone  8. Disposition -ICU    Key goals for next 24 hours:       Reduce pressure support to 14/8 for ventilator synchrony    Ongoing discussions with family re: LTACH vs comfort cares         Interim History/Overnight Events:     No major events overnight.  Removed from COVID precautions.          Key Medications:       chlorhexidine  15 mL Mouth/Throat Q12H     enoxaparin ANTICOAGULANT  50 mg Subcutaneous Q12H     furosemide  20 mg Intravenous Once     furosemide  20 mg Intravenous Q12H     insulin aspart  1-12 Units Subcutaneous Q4H     ipratropium - albuterol 0.5 mg/2.5 mg/3 mL  3 mL Nebulization 4x daily     midodrine  10 mg Oral Q8H     multivitamins w/minerals  15  mL Per Feeding Tube Daily     pantoprazole  40 mg Per Feeding Tube Daily     potassium chloride  40 mEq Oral BID     protein modular  2 packet Per Feeding Tube Q6H     QUEtiapine  75 mg Oral or Feeding Tube BID     sennosides  5 mL Oral or Feeding Tube BID     sodium chloride (PF)  10 mL Intracatheter Q8H     sodium chloride (PF)  10 mL Intracatheter Q8H       dextrose       HYDROmorphone 2 mg/hr (05/26/21 0321)     midazolam Stopped (05/22/21 1200)     norepinephrine 0.05 mcg/kg/min (05/25/21 2000)     Patient RECEIVING antibiotic to treat a different condition and it provides ADEQUATE COVERAGE for this surgical procedure. Stopped (05/21/21 0853)     propofol (DIPRIVAN) infusion 50 mcg/kg/min (05/26/21 0904)     sodium chloride 20 mL/hr at 05/25/21 0830               Physical Examination:   Temp:  [95.7  F (35.4  C)-99.3  F (37.4  C)] 98.2  F (36.8  C)  Pulse:  [] 90  Resp:  [16-32] 17  BP: ()/(39-86) 98/59  FiO2 (%):  [40 %-50 %] 50 %  SpO2:  [86 %-100 %] 99 %    Intake/Output Summary (Last 24 hours) at 5/24/2021 1027  Last data filed at 5/24/2021 1000  Gross per 24 hour   Intake 5592.75 ml   Output 5130 ml   Net 462.75 ml     Wt Readings from Last 4 Encounters:   05/26/21 106.5 kg (234 lb 12.6 oz)   04/03/18 104.3 kg (230 lb)   09/06/16 100.2 kg (221 lb)   02/06/12 108.1 kg (238 lb 5.1 oz)     BP - Mean:  [] 70  Ventilation Mode: (S) CPAP/PS  (Continuous positive airway pressure with Pressure Support)  FiO2 (%): 50 %  PEEP (cm H2O): (S) 8 cmH2O  Pressure Support (cm H2O): (S) 16 cmH2O  Oxygen Concentration (%): (S) 40 %  Resp: 17    No lab results found in last 7 days.    General:   Comfortable, no pain or respiratory distress   Neurologic:   Sedation: not responsive   HEENT:   Head is atraumatic  Tracheostomy is present and the stoma appears benign      Lungs:   Symmetrical and normal breath sounds, no wheeze, ronchi, crackles, rub or bronchial breath sounds   Cardiovascular:   Regular rate and  rhythm  Normal S1,S2, and no gallop, rub or murmur   Abdomen:   Distended:  No.   Bowel sound present and normal: Yes .   Soft: Yes . Tender: No.   Rebound:tendeness or guarding present No   Extremities:   Clubbing present: No,   Edema present: Yes ,   Acrocyanosis present: No,   Mottling present: No,   Normal capillary refill: No   Skin:   Warm, dry.  No rash on limited exam.    Lines/Drain:    Central line present: Yes   Arterial line present: No  External ventricular Drain present: No  Chest tube present: No  RANI present: No  PEG present: No          Data:   All data and imaging reviewed.     ROUTINE ICU LABS (Last four results)  CMP  Recent Labs   Lab 05/26/21 0413 05/25/21 0412 05/24/21  1240 05/24/21  0450 05/23/21  0501 05/22/21  0500 05/21/21  0410   NA  --   --   --  139 135 137 139   POTASSIUM  --   --  4.0 3.3* 3.3* 3.2* 3.4   CHLORIDE  --   --   --  94 91* 93* 96   CO2  --   --   --  45* 44* 43* 45*   ANIONGAP  --   --   --  <1* <1* 1* <1*   GLC  --   --   --  120* 122* 122* 82   BUN  --   --   --  9 8 10 8   CR  --   --   --  0.38* 0.40* 0.47* 0.46*   GFRESTIMATED  --   --   --  >90 >90 >90 >90   GFRESTBLACK  --   --   --  >90 >90 >90 >90   ALICIA  --   --   --  8.6 8.6 8.5 8.5   MAG 2.0 2.0  --  2.1 1.9 1.9 2.1   PHOS 4.5 4.1  --  2.8 2.2* 2.6 3.2     CBC  Recent Labs   Lab 05/26/21 0413 05/25/21  0412 05/24/21  0450 05/23/21  0501   WBC 16.4* 12.9* 11.5* 10.7   RBC 2.64* 2.56* 2.67* 2.47*   HGB 8.2* 7.8* 8.1* 7.6*   HCT 26.9* 26.1* 27.2* 25.2*   * 102* 102* 102*   MCH 31.1 30.5 30.3 30.8   MCHC 30.5* 29.9* 29.8* 30.2*   RDW 18.8* 18.4* 17.7* 17.2*    278 288 287     INRNo lab results found in last 7 days.  Arterial Blood Gas  No lab results found in last 7 days.    All cultures:  No results for input(s): CULT in the last 168 hours.  No results found for this or any previous visit (from the past 24 hour(s)).              Billing: This patient is critically ill: Yes. Total critical care  time today 35 min. This does not include time spent on procedures or teaching.     Leonard Waters MD  Pulmonary & Critical Care Medicine  [facility]   Pager: [phone]

## 2021-05-26 NOTE — PROGRESS NOTES
[facility] Nurse Inpatient Wound Assessment   Reason for follow up:  Pannus, groin wounds, hematoma on right thigh, glut cleft    Assessment  All skin issues improving or resolved:  Pannus ITD resolved, Left groin intact but denuded, Right groin wound slowly resurfacing showing improvment, right thigh hematoma remains palpable with dark discoloration but decreased in size in the last 2 weeks    Revised (simplified)Treatment Plan:   Bilateral groin and pannus (Interdry Ag #[id number]) BID and PRN   1.  Remove fabric from skin fold for cleansing. Discard if soiled. Note: If cloth adheres to open skin, moisten cloth before removal.   2.   Cleanse skin with francisco cleaning cloths of soft cloths and water (important not to use products that contain emollients with this fabric). Pat dry  3.   Cut fabric to size adding 2 extra inches to hang outside of the skin fold (for maximum moisture wicking)  4.   Place one edge of a single layer of fabric into the base of the fold allowing 2 inches of overhang       SACRUM/Gluteal Cleft Monday, Thursday,  and PRN  1. Clean wound with saline or MicroKlenz Spray #804352   2. Dry and protect surrounding skin with no sting barrier film wipe #[id number]   3. Apply triad paste to wound bed  4. Apply Mepilex  Sacral Dressing (PS#[id number])  to the area, making sure to conform nicely to skin curvatures.  5. Continue pressure injury prevention Pressure Injury prevention     Turn every 2 hours, side to side avoid supine/backside on pressure redistribution support surface     Float heels off bed with use of pillows under legs, or offloading boots: (e.g. Prevalon Heel Lift boots)    Incontinent Cleanser followed by moisture barrier cream/paste  (e.g. Critic Aid paste) 2X/day and after each incontinent episode    Prevent sliding by limiting head of bed elevation to 30 degrees or less unless contraindicated, use knee gatch first    Chair cushion as needed if patient can't  reposition in chair, limit sitting to 2 hours at a time    Optimize nutrition     Orders Reviewed  Recommended provider order: None, at this time  [facility] Nurse follow-up plan: weekly  Nursing to notify the Provider(s) and re-consult the [facility] Nurse if wound(s) deteriorates or new skin concern    Patient History  According to provider note(s):  65-year-old female with no significant medical history who was found in bed with low saturations after welfare check.  Discovered to have COVID on 4/13. Maintained on NIV but subsequently required intubation on 4/15. Initially proned but now largely kept supine.  Overall has had worsening lung compliance, worsening ventilatory deficits, and inability to tolerate weaning of sedation. She has also developed VAP, which has been treated. Other complications include a large hematoma related to an arterial line necessitating a number of transfusions. Active Problems: Acute respiratory failure with hypoxemia (H) ARDS (adult respiratory distress syndrome) (H) 2019 novel coronavirus disease (COVID-19) Hematoma Anemia due to blood loss, acute Shock (H) Ventilator associated pneumonia (H) on levophed    5/20/2021 update pending trach placement in OR this afternoon     Data  Allergies: Lisinopril-hydrochlorothiazide   Recent Labs   Lab Test 05/20/21  0420 05/18/21  0359 05/18/21  0359 04/26/21  0430 04/26/21  0430 04/20/21  1440 04/20/21  1440   ALBUMIN  --   --  1.9*  --   --    < >  --    HGB 7.5*   < > 8.7*   < > 7.8*   < >  --    INR  --   --   --   --   --   --  1.08   WBC 11.7*   < > 19.3*   < > 16.7*   < >  --    CRP  --   --   --   --  257.0*  --   --     < > = values in this interval not displayed.     Recent Labs   Lab 05/26/21  0826 05/26/21  0324 05/25/21  2357 05/25/21  1943 05/25/21  1817 05/25/21  1335 05/24/21  0450 05/24/21  0450 05/23/21  0501 05/23/21  0501 05/22/21  0500 05/22/21  0500 05/21/21  0410 05/21/21  0410 05/20/21  0420 05/20/21 0420   GLC  --   --   --   --   --    --   --  120*  --  122*  --  122*  --  82  --  123*   * 107* 104* 107* 107* 86   < >  --    < >  --    < >  --    < >  --    < >  --     < > = values in this interval not displayed.     Temperature: Temp (24hrs), Av.5  F (37.5  C), Min:98.6  F (37  C), Max:100.8  F (38.2  C)    Intake/Output Summary (Last 24 hours) at 2021 1356  Last data filed at 2021 1215  Gross per 24 hour   Intake 3590.08 ml   Output 4675 ml   Net -1084.92 ml     Orders Placed This Encounter      NPO for Medical/Clinical Reasons Except for: Meds    Body mass index is 45.85 kg/m .  Containment of urine/stool: Incontinence Protocol and Internal fecal management   Medical Devices:Yeboah Catheter: in place, indication: Strict 1-2 Hour I&O, Strict 1-2 Hour I&O, Strict 1-2 Hour I&O, Strict 1-2 Hour I&O, Strict 1-2 Hour I&O, Retention;Strict 1-2 Hour I&O    Catheter securement Yes    Pressure Injury Risk Assessment (Joss Scale):  Sensory Perception: 1-->completely limited    Moisture: 2-->frequently moist   Activity: 1-->bedfast     Mobility: 1-->completely immobile   Nutrition: 3-->adequate   Friction and Shear: 2-->potential problem  Joss Score: 1o    Current support surface: Bariatric Low air loss mattress  Current off-loading measures in bed: reposition side to side with use of Wedge positioning system  Current off-loading heels: Pillows under calves  Current off-loading measures chair: NATANAEL    Focused WOC Nurse Skin/Wound Exam:        21 Rt groin and hematoma site    21 Rt groin and hematoma site    Size: pale white tissue decreased to  0.5 cm x 5cm located within wound predominately resurfacing and scant superficial erosion  Мария-wound skin: intact   Previously noted dark discoloration and induration inferior to wound improved greatly but remains palpable   Drainage: scant sero-sang, no purulence  Odor: None  Pain: unable to determine, deeply sedated      2021`gluteal cleft    Wound History: documented on  the flowsheet on 4/16 @1200, by the staff's reaction and the previous woc note it sounds as if it has improved significantly during the time she was proned.  Measurements (length x width x depth, in cm): 0.8x 0.3 cm   Wound Base: 100% pink superficial erosion  Tunneling: N/A  Undermining: N/A  Palpation of the wound bed: normal  Periwound skin: intact  Color: normal and consistent with surrounding tissue  Temperature: cool  Drainage: minimal  Description of drainage: none noted on bandage  Odor: none  Pain: patient sedated      Interventions    Visual inspection and assessment completed   Wound Care Rationale Provide protection   Wound Care: done per plan of care  Supplies: at bedside  Current off-loading measures: Pillows, Zflow, TAPS  Current support surface: Standard  Low air loss mattress  Education provided to: importance of repositioning, plan of care, Moisture management and Off-loading pressure  Discussed plan of care with Nurse    Alireza Salomon RN CWOCN

## 2021-05-26 NOTE — PROGRESS NOTES
[facility] ICU RESPIRATORY NOTE    Date of Admission: 4/13/2021     Date of Intubation (most recent): 4/13/21,5/20/21 Trach    Reason for Mechanical Ventilation:  Resp Failure    Number of Days on Mechanical Ventilation: 42    Met Criteria for Pressure Support Trial: yes    Length of Pressure Support Trial: On 16/8 for vent synchrony      Significant Events Today: None    ABG Results: Results for SYED VAZQUEZ (MRN [id number]) as of 5/26/2021 04:43   Ref. Range 5/18/2021 03:13   pH Arterial Latest Ref Range: 7.35 - 7.45 pH 7.43   pCO2 Arterial Latest Ref Range: 35 - 45 mm Hg 57 (H)   PO2 Arterial Latest Ref Range: 80 - 105 mm Hg 49 (L)   Bicarbonate Arterial Latest Ref Range: 21 - 28 mmol/L 38 (H)   Base Excess Art Latest Units: mmol/L 12.1   Oxyhemoglobin Arterial Latest Ref Range: 92 - 100 % 82 (L)   Ventilation Mode: CPAP/PS  (Continuous positive airway pressure with Pressure Support)  FiO2 (%): 50 %  Rate Set (breaths/minute): 25 breaths/min  PEEP (cm H2O): 8 cmH2O  Pressure Support (cm H2O): 16 cmH2O  Oxygen Concentration (%): 50 %      Plan:  Continue on ps/cpap for vent syncrony

## 2021-05-26 NOTE — PLAN OF CARE
Patient on CPAP 16/8, on 50 % tolerate well, on Propofol  , Dilaudid and Levo gttg continuous.  large amount oral and in line secretion, VSS, pt was repositioning q 2 h. Brown was exchange due to leakage around the catheter, new brown placed and 1700 ml clear yellow urine was drained.    Care hours: [id number]

## 2021-05-27 NOTE — PLAN OF CARE
VSS. Tele SR/ST. Changed trach dressing x4 d/t mucous. Pt tearful at times, continues to not follow. Yeboah patent, good UOP. Rectal tube in place, bag changed. Pt became tachycardic with increased RR and was desating into 80's. MD placed back on full vent support and sedation was increased.  saw pt via iPad at bedside.

## 2021-05-27 NOTE — PROGRESS NOTES
Critical Care Progress Note                          05/27/2021    Name: Sarahy Dunbar MRN#: [id number]   Age: 66 year old YOB: 1955     [facility] Day# 44                 Problem List:   Active Problems:    Acute respiratory failure with hypoxemia (H)    ARDS (adult respiratory distress syndrome) (H)    2019 novel coronavirus disease (COVID-19)    Hematoma    Anemia due to blood loss, acute    Shock (H)    Ventilator associated pneumonia (H)           Summary/Hospital Course:     66 year old female initially admitted with COVID-19 pneumonia on 4/13 with subsequent initiation of MV on 4/15. Now with very little progress over the past few weeks and underwent trach/PEG placement on 5/20.       Assessment and plan :     Sarahy Dunbar IS a 66 year old female admitted on 4/13/2021 for COVID-19 pneumonia.   I have personally reviewed the daily labs, imaging studies, cultures and discussed the case with referring physician and consulting physicians.      My assessment and plan by system for this patient is as follows:    Neurology:   Pain/sedation: Remains on propofol and needed to be increased last evening due to agitation.  Relatively unresponsive this AM.  Sedation remains on to assist in vent synchrony.      Continue with daily sedation holiday    Cardiovascular:   Shock: Suspect a combination of sedation and critical illness.  Levophed needs have come down over the past 24 hours.      Continue midodrine (started 5/22)    Remains on low dose Levophed, wean as able    Pulmonary/Ventilator Management:   COVID-19 ARDS: Mechanical ventilation day 43.  Trached on 5/20.  Has been on 40-60%  FiO2 for weeks at this point. PEEP remains stable at 8.  She required return to full mechanical support last evening due to ventilator dyssynchrony.      Restart pressure support to 16/8 as this appears to have improved ventilator synchrony    PEEP remains at 8, FiO2 has been between 40-60%    Continue Lasix twice daily  with goal of -1L    GI/Nutrition:  Nutrition      Tube feeds at goal    Renal/fluids/electrolytes:   No active issues      monitor function and electrolytes as needed with replacement per ICU protocols.    generally avoid nephrotoxic agents such as NSAID, IV contrast unless specifically required    adjust medications as needed for renal clearance    follow I/O's as appropriate    Infectious Disease:   COVID-19: Remains positive on 5/20.  Has completed treatment a number of weeks ago.       COVID precautions removed 5/25.     Pseudomonas VAP: Pansensitive.  Completed 10 day course of Zosyn on 5/25.    Endocrine:   Hyperglycemia      Blood sugar control per ICU protocol    Hematology/Oncology:   Leukocytosis/anemia: Stable, in the setting of infection and critical illness.      Continue to monitor    IV/Access:   PICC      central access required and necessary    ICU Prophylaxis:   1. DVT: Lovenox  2. VAP: HOB 30 degrees, chlorhexidine rinse  3. Stress Ulcer: PPI  4. Restraints: Nonviolent soft two point restraints required and necessary for patient safety and continued cares and good effect as patient continues to pull at necessary lines, tubes despite education and distraction. Will readdress daily.   5. Wound care - per unit routine   6. Feeding -tube feeds at goal  7. Family Update: Yes,  Ruddy over the phone  8. Disposition -ICU    Key goals for next 24 hours:       Restart pressure support to 16/8 for ventilator synchrony    Ongoing discussions with family re: LTACH vs comfort cares         Interim History/Overnight Events:     More tachypneic and dyssynchronous with the ventilator last evening which required an increase in sedation and return to full mechanical support.         Key Medications:       chlorhexidine  15 mL Mouth/Throat Q12H     enoxaparin ANTICOAGULANT  50 mg Subcutaneous Q12H     furosemide  20 mg Intravenous Once     furosemide  20 mg Intravenous Q12H     insulin aspart  1-12 Units  Subcutaneous Q4H     ipratropium - albuterol 0.5 mg/2.5 mg/3 mL  3 mL Nebulization 4x daily     midodrine  10 mg Oral Q8H     multivitamins w/minerals  15 mL Per Feeding Tube Daily     pantoprazole  40 mg Per Feeding Tube Daily     potassium chloride  40 mEq Oral BID     protein modular  2 packet Per Feeding Tube Q6H     QUEtiapine  75 mg Oral or Feeding Tube BID     sennosides  5 mL Oral or Feeding Tube BID     sodium chloride (PF)  10 mL Intracatheter Q8H     sodium chloride (PF)  10 mL Intracatheter Q8H       dextrose       HYDROmorphone 3 mg/hr (05/27/21 0456)     midazolam Stopped (05/22/21 1200)     norepinephrine 0.09 mcg/kg/min (05/27/21 0029)     Patient RECEIVING antibiotic to treat a different condition and it provides ADEQUATE COVERAGE for this surgical procedure. Stopped (05/21/21 0853)     propofol (DIPRIVAN) infusion 55 mcg/kg/min (05/27/21 0640)     sodium chloride 20 mL/hr at 05/25/21 0830               Physical Examination:   Temp:  [97.3  F (36.3  C)-99  F (37.2  C)] 98.8  F (37.1  C)  Pulse:  [65-92] 71  Resp:  [9-41] 38  BP: ()/(40-78) 131/77  FiO2 (%):  [50 %] 50 %  SpO2:  [90 %-99 %] 91 %    Intake/Output Summary (Last 24 hours) at 5/24/2021 1027  Last data filed at 5/24/2021 1000  Gross per 24 hour   Intake 5592.75 ml   Output 5130 ml   Net 462.75 ml     Wt Readings from Last 4 Encounters:   05/27/21 108.3 kg (238 lb 12.1 oz)   04/03/18 104.3 kg (230 lb)   09/06/16 100.2 kg (221 lb)   02/06/12 108.1 kg (238 lb 5.1 oz)     BP - Mean:  [] 92  Ventilation Mode: CMV/AC  (Continuous Mandatory Ventilation/ Assist Control)  FiO2 (%): 50 %  Rate Set (breaths/minute): 25 breaths/min  Tidal Volume Set (mL): 350 mL  PEEP (cm H2O): 8 cmH2O  Pressure Support (cm H2O): (S) 14 cmH2O  Oxygen Concentration (%): 50 %  Resp: (!) 38    No lab results found in last 7 days.    General:   Comfortable, no pain or respiratory distress   Neurologic:   Sedation: not responsive   HEENT:   Head is  atraumatic  Tracheostomy is present and the stoma appears benign      Lungs:   Symmetrical and normal breath sounds, no wheeze, ronchi, crackles, rub or bronchial breath sounds   Cardiovascular:   Regular rate and rhythm  Normal S1,S2, and no gallop, rub or murmur   Abdomen:   Distended:  No.   Bowel sound present and normal: Yes .   Soft: Yes . Tender: No.   Rebound:tendeness or guarding present No   Extremities:   Clubbing present: No,   Edema present: Yes ,   Acrocyanosis present: No,   Mottling present: No,   Normal capillary refill: No   Skin:   Warm, dry.  No rash on limited exam.    Lines/Drain:    Central line present: Yes   Arterial line present: No  External ventricular Drain present: No  Chest tube present: No  RANI present: No  PEG present: No          Data:   All data and imaging reviewed.     ROUTINE ICU LABS (Last four results)  CMP  Recent Labs   Lab 05/27/21  0400 05/26/21  0413 05/25/21  0412 05/24/21  1240 05/24/21  0450 05/23/21  0501 05/22/21  0500 05/21/21  0410   NA  --   --   --   --  139 135 137 139   POTASSIUM  --   --   --  4.0 3.3* 3.3* 3.2* 3.4   CHLORIDE  --   --   --   --  94 91* 93* 96   CO2  --   --   --   --  45* 44* 43* 45*   ANIONGAP  --   --   --   --  <1* <1* 1* <1*   GLC  --   --   --   --  120* 122* 122* 82   BUN  --   --   --   --  9 8 10 8   CR  --   --   --   --  0.38* 0.40* 0.47* 0.46*   GFRESTIMATED  --   --   --   --  >90 >90 >90 >90   GFRESTBLACK  --   --   --   --  >90 >90 >90 >90   ALICIA  --   --   --   --  8.6 8.6 8.5 8.5   MAG 2.1 2.0 2.0  --  2.1 1.9 1.9 2.1   PHOS 4.0 4.5 4.1  --  2.8 2.2* 2.6 3.2     CBC  Recent Labs   Lab 05/27/21  0400 05/26/21  0413 05/25/21  0412 05/24/21  0450   WBC 13.6* 16.4* 12.9* 11.5*   RBC 2.65* 2.64* 2.56* 2.67*   HGB 8.2* 8.2* 7.8* 8.1*   HCT 27.0* 26.9* 26.1* 27.2*   * 102* 102* 102*   MCH 30.9 31.1 30.5 30.3   MCHC 30.4* 30.5* 29.9* 29.8*   RDW 19.0* 18.8* 18.4* 17.7*    291 278 288     INRNo lab results found in last 7  days.  Arterial Blood Gas  No lab results found in last 7 days.    All cultures:  No results for input(s): CULT in the last 168 hours.  No results found for this or any previous visit (from the past 24 hour(s)).              Billing: This patient is critically ill: Yes. Total critical care time today 35 min. This does not include time spent on procedures or teaching.     Leonard Waters MD  Pulmonary & Critical Care Medicine  [facility]   Pager: [phone]

## 2021-05-27 NOTE — PLAN OF CARE
[id number]  Pt remains trached and intubated on propofol and dilaudid for sedation. Levophed at 0.09; pt had 2 episodes of bradying down during suctioning. Otherwise no acute changes this shift.

## 2021-05-27 NOTE — PROGRESS NOTES
[facility] ICU RESPIRATORY NOTE        Date of Admission: 4/13/2021    Date of Intubation (most recent): 4/13/21,5/20/21 Trach    Reason for Mechanical Ventilation: Resp failure     Number of Days on Mechanical Ventilation: 43    Met Criteria for Spontaneous Breathing Trial: Yes     Significant Events Today: none overnight     ABG Results: No lab results found in last 7 days.    Current Vent Settings: Ventilation Mode: CMV/AC  (Continuous Mandatory Ventilation/ Assist Control)  FiO2 (%): 50 %  Rate Set (breaths/minute): 25 breaths/min  Tidal Volume Set (mL): 350 mL  PEEP (cm H2O): 8 cmH2O  Pressure Support (cm H2O): (S) 14 cmH2O  Oxygen Concentration (%): 50 %  Resp: 17      Skin Assessment: Good     Plan: Continue on full ventilator support and assess daily for weaning.     Thaddeus Zhang, RT

## 2021-05-28 NOTE — PROGRESS NOTES
"[facility] SERVICES  SPIRITUAL ASSESSMENT Progress Note  [facility] ICU     REFERRAL SOURCE: Follow Up After Family Care Conference    I spoke with Kylah's , Ruddy by phone today as a follow up to a care conference held earlier by ICU and Palliative team.    -Ruddy was quite tearful on the phone. He shares visiting Pat earlier today was very hard for him. He shares that he and his family know \"what the best thing to do is\" and shares he plans to call the Intensivist later today and request Pat's care be transitioned to CMO.     -Ruddy spent time reflecting about the process of this transition. He shares he is unsure if he will be there or is able to be due to how emotional he anticipates it will be for him and asked if he could be present on the ipad. He shares he was wondering if he can't be there if someone could be and we shared his RN or Spiritual Health can likely both be available to hold her hand and be with her as she dies. He shared he will think about it and let us know what he chooses. He shares he does NOT plan to inform the rest of the family about the compassionate exception to visitor policy.    -Ruddy spent time reflecting on spiritual themes related to this decision. He shares he worries he is \"giving up\" on Pat and we spent time reflecting on this. He also shared worry about \"putting a stop watch on God's work.\" We reflected on this and on the faithful and courageous decision it is to honor Kylah's dying process and discontinue what is impeding this. He shares while this is very hard, he has some peace that this is what is best for her. He expressed his gratitude for the medical team and their support and transparency in guiding this discernment.     -Ruddy asked me to visit Pat and pray with her at bedside. He shared he doesn't want her to \"know what is going on\" so asked that my prayer not reference \"this.\" I let staff know of Ruddy's worry around this so that we can honor this request.     I spent " time offering emotional and spiritual support through reflective listening, affirming of emotions/experiences, spiritual re-framing and words of comfort and peace.    PLAN: Ruddy plans to call the unit when he is ready, he is aware [facility] continues to remain available.     Zoraida Rivera  Associate    Phone: [phone]  Pager: [phone]

## 2021-05-28 NOTE — PROGRESS NOTES
"Palliative Care Clinical Social Work Note     D:  Sarahy Dunbar is a 66 year old woman who has been hospitalized due to covid19 / complications related to this for 6 weeks.  Currently on trach support, sedated.   I:  Care conference held today with Shira Soni NP and Dr. Waters.  Kylah's , sister and brother in law participated by phone.  Her  was able to see her this morning, it was the first time he had seen her since she was admitted.  He described the visit as emotional, noting it seemed that Kylah was \"trying to cry\" but couldn't. Dr. Waters explained that despite trach placement, she remains on a vent and is sedation dependent.  There is significant concern that Kylah has plateau ed in her recovery, that she will be vent dependent for the rest of her life, and that there is suffering without much hope for meaningful recovery.  Kylah had shared with Ruddy that she would not want to be kept on life support in a situation of this nature.  Ruddy asked for time to talk with family about next steps.   A:  Family open to visit, asked appropriate questions and asked for time to process about next steps.   P:  Will be available for support.     WINNIE Corado, [facility]   Palliative Care Clinical   Ph: [phone]      "

## 2021-05-28 NOTE — PROGRESS NOTES
[facility]  Palliative Care Daily Progress Note       Recommendations & Counseling       Family will further deliberate regarding medical team's recommendation for compassionate vent withdrawal and comfort measures      DARRELL Villagomez CNP  [facility]  Contact information available via [facility] Paging/Directory      Thank you for the opportunity to participate in the care of this patient and family. Our team: will continue to follow.     During regular M-F work hours ([phone]) -- if you are not sure who specifically to contact -- please contact us in [facility] Smart Web.     After regular work hours and on weekends/holidays, you can call our answering service at [phone].     Attestation:  Total time on the floor involved in the patient's care: 60 minutes   Total time spent in counseling/care coordination: >50% spent counseling family in setting of COVID-19 ARDS, respiratory failure and vent dependence, sedation dependence, compassionate vent withdrawal/end of life cares      Assessments          Sarahy Dunbar is a 65 year old female with PMH significant for HTN and anxiety who presents with hypoxia and acute respiratory failure 2/2 COVID-19 ARDS. She remains vent dependent with worsening lung compliance, worsening ventilatory deficits, and inability to wean from sedation. She developed VAP, which has been treated. She is also found to have a large hematoma 2/2 arterial line, requiring repeated transfusions and now resolved. She is s/p PEG/trach on 5/21. She continues to struggle with sedation weaning and vent desynchrony. There is concern she is developing another VAP given new fever and increased secretions.     Today, the patient was seen for:  Goals of care, decisional support in setting of COVID-19 ARDS, respiratory failure and vent dependence, sedation dependence, compassionate vent withdrawal/end of life cares     Visited Pat in the ICU. She is trached and  vented, sedated, sleeping in ICU bed. Somnolent, did not attempt to wake her up.    Care conference held in private. Present was Dr. Waters, Iraida Wells and myself, with family via phone ( Ruddy, sister Madison, GABBY Miranda).      Ruddy was able to visit this AM. Pt did open her eyes for him and seemingly track him, but did not squeeze his hand. She did start to cry, or appear to be crying, which was hard for Ruddy to see.     Dr. Waters expressed that despite trach placement, pt remains vent and sedation dependent. He expresses worry that most of her lung space is severely and chronically injured, which may lead to life-long vent dependence and ultimately nursing facility placement/dependence for the remainder of her life.    Given the extent of her critical illness, they can prepare for close to 1 full year of aggressive rehabilitation to regain the muscle mass and function she has lost this hospitalization. Further setbacks with rehospitalizations for infection are likely within that recovery.     Sedation weaning has been difficult and unsuccessful. Given her critical nature, it is not likely that she will wake up meaningfully anytime soon, or ever.     Dr. Waters expresses worry that Pat is suffering. The vent is no longer a treatment, but the worry is that we have entered a threshold where the vent is simply keeping her alive, and prolonging her dying process.     Dr. Waters recommends shifting focus toward comfort, stepping away from the ventilator and pressor support, providing medications for comfort, and allowing and preparing for the natural dying process. He suspects that she would likely die within hours in making this change in plan of care.    Family feel they need more time to deliberate. Dr. Waters will reach out to  Ruddy later today.     Prognosis, Goals, or Advance Care Planning was addressed today with: Yes.  Mood, coping, and/or meaning in the context of serious  illness were addressed today: Yes.  Summary/Comments: 's lou has been at the center of his coping with pt's critical illness.             Interval History:     Chart review/discussion with unit or clinical team members:   Pt remains critically ill, trached, vented, sedated. Newly febrile with worsening secretions, concerning for developing VAP.     Per patient or family/caregivers today:  Pt critically ill and not there mentally or emotionally.     Key Palliative Symptoms:  We are not helping to manage these symptoms currently in this patient.    Patient is on opioids: assessed and bowels ok/no needed changes to plan of care today.           Review of Systems:     Besides above, an additional N/A system ROS was reviewed and is unremarkable          Medications:     I have reviewed this patient's medication profile and medications during this hospitalization.    Noted meds:    Lovenox BID   Lasix 20mg IV BID  Midodrine  Seroquel 75mg via feeding tube BID   Senna 5mg via PEG BID   Dilaudid gtt 2-4mg/hr  Versed gtt 1-15mg/hr   Levophed gtt   Propofol gtt   Dilaudid 0.2-0.4mg IV [id number] PRN pain   Versed 2mg IV [id number] PRN anxiety/sedation   Seroquel 25mg via feeding tube Q8hrs PRN anxiety            Physical Exam:   Temp: 100.4  F (38  C) Temp src: Bladder BP: 111/57 Pulse: 93   Resp: 19 SpO2: 93 % O2 Device: Mechanical Ventilator    CONSTITUTIONAL: Critically ill woman seen sleeping in ICU bed in NAD, somnolent and did not attempt to awaken, trached and mechanically ventilated    GI: PEG, rectal tube  : [place]            Data Reviewed:     Recent imaging reviewed, my comments on pertinents:   Results for orders placed or performed during the hospital encounter of 04/13/21   XR Chest Port 1 View    Impression    IMPRESSION: There are hazy abnormal groundglass and interstitial  opacities in both lungs favoring the upper lobes. This could represent  an infectious or inflammatory process. No pneumothorax or  pleural  effusion.    YONAS BOONE MD   US Lower Extremity Venous Duplex Bilateral    Impression    IMPRESSION:  1.  No deep venous thrombosis in the bilateral lower extremities.   XR Chest Port 1 View    Impression    IMPRESSION: Endotracheal tube tip 3 cm from the bertrand. Left chest  port tip in the low SVC. Extensive bilateral groundglass infiltrates  appear progressed from previous.    GODWIN NIEVES MD   XR Abdomen Port 1 View    Impression    IMPRESSION: Feeding tube in the stomach.    CHATA LIZARRAGA MD   XR Abdomen Port 1 View    Impression    IMPRESSION: Feeding tube remains in the stomach.    CHATA LIZARRAGA MD   XR Abdomen Port 1 View    Impression    IMPRESSION: Portable supine view of the abdomen. Feeding tube now has  been repositioned with the tip presumably in the proximal jejunum in  good position. A few air-filled loops of small bowel are noted in the  left abdomen. These are not significantly dilated. Degenerative  rightward curvature of the lumbar spine again noted.    MARILYN NARAYAN MD   XR Chest Port 1 View    Impression    IMPRESSION: Similar diffuse groundglass infiltrates bilaterally.  Endotracheal tube not significantly changed. Enteric tube tip below  the margin of study. Left PICC line tip slightly higher in position in  the upper SVC.    GODWIN NIEVES MD   US Post Vascular Access Low Ext Duplex Port    Impression    IMPRESSION:  1. Complex fluid collection in the right groin measuring 16.0 x 16.3 x  5.6 cm, likely hematoma.  2. Small branch vessel adjacent to this hematoma off the right common  femoral artery with normal arterial waveforms and no to and fro flow.  3. If the groin hematoma continues to expand, repeat ultrasound could  be considered.    MYRANDA KELLY DO   XR Chest Port 1 View    Impression    IMPRESSION:   1. Endotracheal tube remains in good position above the bertrand. Left  subclavian central venous catheter in the SVC. Feeding tube courses  below the  diaphragm.    2. Bilateral pulmonary infiltrates. Increasing consolidation in the  left lower lobe when compared to previous, with new silhouetting of  the left hemidiaphragm. No pneumothorax.    CHATA LIZARRAGA MD   XR Chest Port 1 View    Impression    IMPRESSION: Endotracheal tube in the low trachea and feeding tube  coursing below the left hemidiaphragm, tip outside the field-of-view.  Worsening multifocal patchy and airspace opacities throughout the  lungs concerning for worsening ARDS and/or pneumonia. Left subclavian  central venous catheter tip in the upper SVC. No pleural effusion or  pneumothorax.    PARAM MCCAIN MD   US Lower Extremity Venous Duplex Right    Impression    IMPRESSION:   1. Extremely limited exam due to patient positioning. Where visualized  there is no right DVT.  2. Complex fluid collection in the right upper thigh measuring 12.0 x  7.1 x 7.3 cm, this is measuring smaller when compared to the prior  study, however exam is extremely limited given patient positioning.    MYRANDA KELLY DO   XR Chest Port 1 View    Impression    IMPRESSION: Endotracheal tube is unchanged, with tip 1.3 cm above the  bertrand. Enteric tube, tip not seen, but below the diaphragm. Left  subclavian central venous catheter, with tip in the upper SVC.  Extensive predominantly airspace opacities in the lungs bilaterally  are not significantly changed. No pneumothorax. Heart size appears  stable.    CISCO FONSECA MD   US Drainage Seroma/Hematoma Abscess/Cyst    Impression    IMPRESSION: Successful ultrasound-guided fluid aspiration from right  inguinal hematoma.    KOBE REY MD   CT Chest w/o Contrast    Impression    IMPRESSION:   Extensive interstitial, groundglass, and airspace opacities throughout  both lungs could all be related to changes of COVID-19 pneumonia,  however some degree of underlying lung fibrosis cannot be excluded.    CISCO FONSECA MD       Recent lab data reviewed, my comments on  pertinents:   Na 136  K 3.4  Creat 0.3  WBC 15  Hgb 8  Plt 284

## 2021-05-28 NOTE — PROGRESS NOTES
Critical Care Progress Note                          05/28/2021    Name: Sarahy Dunbar MRN#: [id number]   Age: 66 year old YOB: 1955     [facility] Day# 45                 Problem List:   Active Problems:    Acute respiratory failure with hypoxemia (H)    ARDS (adult respiratory distress syndrome) (H)    2019 novel coronavirus disease (COVID-19)    Hematoma    Anemia due to blood loss, acute    Shock (H)    Ventilator associated pneumonia (H)           Summary/Hospital Course:     66 year old female initially admitted with COVID-19 pneumonia on 4/13 with subsequent initiation of MV on 4/15. Now with very little progress over the past few weeks and underwent trach/PEG placement on 5/20.  Ongoing goals of care discussion.      Assessment and plan :     Sarahy Dunbar IS a 66 year old female admitted on 4/13/2021 for COVID-19 pneumonia.   I have personally reviewed the daily labs, imaging studies, cultures and discussed the case with referring physician and consulting physicians.      My assessment and plan by system for this patient is as follows:    Neurology:   Pain/sedation: Remains on propofol and has been on stable doses for weeks given her ventilator dyssynchrony with lowering of sedation.  Relatively unresponsive this AM.        Continue with daily sedation holiday    Cardiovascular:   Shock: Suspect a combination of sedation and critical illness.        Continue midodrine (started 5/22)    Remains on low dose Levophed, wean as able    Pulmonary/Ventilator Management:   COVID-19 ARDS: Mechanical ventilation day 44.  Trached on 5/20.  Has been on 40-60%  FiO2 for weeks at this point. PEEP remains stable at 8.  She appears to like pressure support better from ventilators dyssynchrony.      Continue pressure support to 16/8 as this appears to have improved ventilator synchrony    PEEP remains at 8, FiO2 has been between 40-60%    Continue Lasix twice daily with goal of  -1L    GI/Nutrition:  Nutrition      Tube feeds at goal    Renal/fluids/electrolytes:   No active issues      monitor function and electrolytes as needed with replacement per ICU protocols.    generally avoid nephrotoxic agents such as NSAID, IV contrast unless specifically required    adjust medications as needed for renal clearance    follow I/O's as appropriate    Infectious Disease:   COVID-19: Remains positive on 5/20.  Has completed treatment a number of weeks ago.       COVID precautions removed 5/25.     Pseudomonas VAP: Pansensitive.  Completed 10 day course of Zosyn on 5/25.  Now with low-grade fevers and increased secretions suggesting possible developing recurrent ventilator associated pneumonia.      Hold on antibiotics unless she spikes a fever    Recheck sputum culture    Endocrine:   Hyperglycemia      Blood sugar control per ICU protocol    Hematology/Oncology:   Leukocytosis/anemia: Stable, in the setting of infection and critical illness.      Continue to monitor    IV/Access:   PICC      central access required and necessary    ICU Prophylaxis:   1. DVT: Lovenox  2. VAP: HOB 30 degrees, chlorhexidine rinse  3. Stress Ulcer: PPI  4. Restraints: Nonviolent soft two point restraints required and necessary for patient safety and continued cares and good effect as patient continues to pull at necessary lines, tubes despite education and distraction. Will readdress daily.   5. Wound care - per unit routine   6. Feeding -tube feeds at goal  7. Family Update: Yes,  Ruddy over the phone  8. Disposition -ICU    Care conference done today via phone with patient's  Ruddy, sister Madison and brother-in-law Ruben.  The palliative care team attended the conference as well.  Had an at length conversation about Sarahy's ongoing decline over the past few weeks.  I reiterated to them that she would likely not get better and if we were to continue aggressive cares would spend the rest of her life in a  long-term care facility and likely would never be able to come off the ventilator.  Family expressed understanding and plan to discuss ongoing plans.  I suspect they may elect to go to comfort cares soon.    Key goals for next 24 hours:       Continue pressure support to 16/8 for ventilator synchrony    Ongoing discussions with family re: LTACH vs comfort cares     Check sputum culture    If fever recurs, low threshold to restart antibiotics        Interim History/Overnight Events:     No major events overnight.         Key Medications:       chlorhexidine  15 mL Mouth/Throat Q12H     enoxaparin ANTICOAGULANT  50 mg Subcutaneous Q12H     furosemide  20 mg Intravenous Q12H     ipratropium - albuterol 0.5 mg/2.5 mg/3 mL  3 mL Nebulization 4x daily     midodrine  10 mg Oral Q8H     multivitamins w/minerals  15 mL Per Feeding Tube Daily     pantoprazole  40 mg Per Feeding Tube Daily     potassium chloride  40 mEq Oral BID     protein modular  2 packet Per Feeding Tube Q6H     QUEtiapine  75 mg Oral or Feeding Tube BID     sennosides  5 mL Oral or Feeding Tube BID     sodium chloride (PF)  10 mL Intracatheter Q8H     sodium chloride (PF)  10 mL Intracatheter Q8H       dextrose       HYDROmorphone 3 mg/hr (05/28/21 0756)     midazolam Stopped (05/22/21 1200)     norepinephrine 0.06 mcg/kg/min (05/28/21 0846)     Patient RECEIVING antibiotic to treat a different condition and it provides ADEQUATE COVERAGE for this surgical procedure. Stopped (05/21/21 0853)     propofol (DIPRIVAN) infusion 30 mcg/kg/min (05/28/21 1325)     sodium chloride 20 mL/hr at 05/27/21 1947               Physical Examination:   Temp:  [99.5  F (37.5  C)-100.4  F (38  C)] 100.4  F (38  C)  Pulse:  [] 93  Resp:  [14-44] 19  BP: ()/(45-86) 111/57  FiO2 (%):  [40 %-60 %] 55 %  SpO2:  [92 %-99 %] 93 %    Intake/Output Summary (Last 24 hours) at 5/24/2021 1027  Last data filed at 5/24/2021 1000  Gross per 24 hour   Intake 5592.75 ml   Output  5130 ml   Net 462.75 ml     Wt Readings from Last 4 Encounters:   05/28/21 108.5 kg (239 lb 3.2 oz)   04/03/18 104.3 kg (230 lb)   09/06/16 100.2 kg (221 lb)   02/06/12 108.1 kg (238 lb 5.1 oz)     BP - Mean:  [] 76  Ventilation Mode: CPAP/PS  (Continuous positive airway pressure with Pressure Support)  FiO2 (%): 55 %  Rate Set (breaths/minute): 25 breaths/min  Tidal Volume Set (mL): 350 mL  PEEP (cm H2O): 8 cmH2O  Pressure Support (cm H2O): 14 cmH2O  Oxygen Concentration (%): 55 %  Resp: 19    No lab results found in last 7 days.    General:   Comfortable, no pain or respiratory distress   Neurologic:   Sedation: not responsive   HEENT:   Head is atraumatic  Tracheostomy is present and the stoma appears benign      Lungs:   Symmetrical and normal breath sounds, no wheeze, ronchi, crackles, rub or bronchial breath sounds   Cardiovascular:   Regular rate and rhythm  Normal S1,S2, and no gallop, rub or murmur   Abdomen:   Distended:  No.   Bowel sound present and normal: Yes .   Soft: Yes . Tender: No.   Rebound:tendeness or guarding present No   Extremities:   Clubbing present: No,   Edema present: Yes ,   Acrocyanosis present: No,   Mottling present: No,   Normal capillary refill: No   Skin:   Warm, dry.  No rash on limited exam.    Lines/Drain:    Central line present: Yes   Arterial line present: No  External ventricular Drain present: No  Chest tube present: No  RANI present: No  PEG present: No          Data:   All data and imaging reviewed.     ROUTINE ICU LABS (Last four results)  CMP  Recent Labs   Lab 05/28/21  0330 05/27/21  0825 05/27/21  0400 05/26/21  0413 05/25/21  0412 05/24/21  1240 05/24/21  0450 05/23/21  0501 05/22/21  0500   NA  --  136  --   --   --   --  139 135 137   POTASSIUM  --  3.4  --   --   --  4.0 3.3* 3.3* 3.2*   CHLORIDE  --  96  --   --   --   --  94 91* 93*   CO2  --  38*  --   --   --   --  45* 44* 43*   ANIONGAP  --  2*  --   --   --   --  <1* <1* 1*   GLC  --  110*  --   --    --   --  120* 122* 122*   BUN  --  17  --   --   --   --  9 8 10   CR  --  0.30*  --   --   --   --  0.38* 0.40* 0.47*   GFRESTIMATED  --  >90  --   --   --   --  >90 >90 >90   GFRESTBLACK  --  >90  --   --   --   --  >90 >90 >90   ALICIA  --  9.0  --   --   --   --  8.6 8.6 8.5   MAG 2.2  --  2.1 2.0 2.0  --  2.1 1.9 1.9   PHOS 4.0  --  4.0 4.5 4.1  --  2.8 2.2* 2.6     CBC  Recent Labs   Lab 05/28/21  0330 05/27/21  0400 05/26/21  0413 05/25/21  0412   WBC 15.0* 13.6* 16.4* 12.9*   RBC 2.54* 2.65* 2.64* 2.56*   HGB 8.0* 8.2* 8.2* 7.8*   HCT 26.1* 27.0* 26.9* 26.1*   * 102* 102* 102*   MCH 31.5 30.9 31.1 30.5   MCHC 30.7* 30.4* 30.5* 29.9*   RDW 19.1* 19.0* 18.8* 18.4*    279 291 278     INRNo lab results found in last 7 days.  Arterial Blood Gas  No lab results found in last 7 days.    All cultures:  No results for input(s): CULT in the last 168 hours.  No results found for this or any previous visit (from the past 24 hour(s)).              Billing: This patient is critically ill: Yes. Total critical care time today 35 min. This does not include time spent on procedures or teaching.     Leonard Waters MD  Pulmonary & Critical Care Medicine  [facility]   Pager: [phone]

## 2021-05-28 NOTE — DEATH PRONOUNCEMENT
MD DEATH PRONOUNCEMENT    Called to pronounce Sarahy Dunbar dead.    Physical Exam: Unresponsive to noxious stimuli, Spontaneous respirations absent, Breath sounds absent, Carotid pulse absent, Heart sounds absent, Pupillary light reflex absent and Corneal blink reflex absent    Patient was pronounced dead at 17:52 PM, May 28, 2021.    Preliminary Cause of Death: COVID-19    Active Problems:    Acute respiratory failure with hypoxemia (H)    ARDS (adult respiratory distress syndrome) (H)    2019 novel coronavirus disease (COVID-19)    Hematoma    Anemia due to blood loss, acute    Shock (H)    Ventilator associated pneumonia (H)       Infectious disease present?: YES    Communicable disease present? (examples: HIV, chicken pox, TB, Ebola, CJD) :  NO    Multi-drug resistant organism present? (example: MRSA): NO    Please consider an autopsy if any of the following exist:  NO Unexpected or unexplained death during or following any dental, medical, or surgical diagnostic treatment procedures.   NO Death of mother at or up to seven days after delivery.     NO All  and pediatric deaths.     NO Death where the cause is sufficiently obscure to delay completion of the death certificate.   NO Deaths in which autopsy would confirm a suspected illness/condition that would affect surviving family members or recipients of transplanted organs.     The following deaths must be reported to the 's Office:  NO A death that may be due entirely or in part to any factors other than natural disease (recent surgery, recent trauma, suspected abuse/neglect).   NO A death that may be an accident, suicide, or homicide.     NO Any sudden, unexpected death in which there is no prior history of significant heart disease or any other condition associated with sudden death.   NO A death under suspicious, unusual, or unexpected circumstances.    NO Any death which is apparently due to natural causes but in which the  does  not have a personal physician familiar with the patient s medical history, social, or environmental situation or the circumstances of the terminal event.   NO Any death apparently due to Sudden Infant Death Syndrome.     NO Deaths that occur during, in association with, or as consequences of a diagnostic, therapeutic, or anesthetic procedure.   NO Any death in which a fracture of a major bone has occurred within the past (6) six months.   NO A death of persons note seen by their physician within 120 days of demise.     NO Any death in which the  was an inmate of a public institution or was in the custody of Law Enforcement personnel.   NO  All unexpected deaths of children   NO Solid organ donors   NO Unidentified bodies   NO Deaths of persons whose bodies are to be cremated or otherwise disposed of so that the bodies will later be unavailable for examination;   NO Deaths unattended by a physician outside of a licensed healthcare facility or licensed residential hospice program   NO Deaths occurring within 24 hours of arrival to a health care facility if death is unexpected.    NO Deaths associated with the decedent s employment.   NO Deaths attributed to acts of terrorism.   NO Any death in which there is uncertainty as to whether it is a medical examiner s care should be discussed with the medical investigator.        Body disposition: Body released to the morgue.    Leonard Waters MD  Pulmonary & Critical Care Medicine  [facility]   Pager: [phone]

## 2021-05-28 NOTE — PROGRESS NOTES
Patient continued on PS overnight.   Propofol decreased briefly and patient tachycardic, but able to blink to command.  Propofol increased and patient rested comfortably for remainder of noc.  Good UOP.  Rectal tube in place with watery stool output.    updated on plan of care by outgoing nurse at the bedside via video.  He was also updated on visitor policy and does plan to come to the bedside today.

## 2021-05-28 NOTE — PROGRESS NOTES
[facility] ICU RESPIRATORY NOTE        Date of Admission: 4/13/2021    Date of Intubation (most recent): 04/13/21, 5/20/21 trach    Reason for Mechanical Ventilation: Resp failure     Number of Days on Mechanical Ventilation: 44    Met Criteria for Spontaneous Breathing Trial: Yes    Significant Events Today: None    ABG Results: No lab results found in last 7 days.    Current Vent Settings: Ventilation Mode: CPAP/PS  (Continuous positive airway pressure with Pressure Support)  FiO2 (%): 40 %  Rate Set (breaths/minute): 25 breaths/min  Tidal Volume Set (mL): 350 mL  PEEP (cm H2O): 8 cmH2O  Pressure Support (cm H2O): 16 cmH2O  Oxygen Concentration (%): 40 %  Resp: 18      Skin Assessment: Good    Plan: Will continue to follow    Ashley Mendoza

## 2021-05-28 NOTE — PROGRESS NOTES
CLINICAL NUTRITION SERVICES - REASSESSMENT NOTE      Malnutrition:    % Weight Loss:  Weight loss does not meet criteria for malnutrition (diuresing)  % Intake:  Decreased intake does not meet criteria for malnutrition  Subcutaneous Fat Loss:  Unable to observe    Muscle Loss:  Unable to observe   Fluid Retention:  Mild - Moderate     Malnutrition Diagnosis: Unable to determine due to inability to perform a Nutrition Focused Physical Exam       EVALUATION OF PROGRESS TOWARD GOALS   Diet:  NPO with trach    Nutrition Support:  TF continues as below:    Nutrition Support Enteral:  Type of Feeding Tube:  PEG  Enteral Frequency:  Continuous  Enteral Regimen:  Vital HP to 10 mL/hr  Total Enteral Provisions:  240 kcals, 21 gm pro, 200 mL H20, 27 gm CHO, no fiber  Free Water Flush: 250 mL every 2 hrs  Certavite daily - Per PI protocol and also to meet vitamin /mineral needs while on low volume TF    Prosource 2 packets every 6 hrs = 320 kcals, 88 gm pro  Propofol running at 19.3 mL/hr = 510 kcals (lipid)  Total (TF + Prosource + Propofol):  1070 kcals (11 kcal/kg), 109 gm pro (2.4 gm/kg)    Intake/Tolerance:    Stool:  x6 yesterday (200 mL via rectal tube) and x2 thus far today.  Labs and BGs acceptable.  I/O:  3307/2610.  Wt:  108.5 kg (up 1.5 kg since admit).  Pt with 3+ moderate generalized edema.  Lasix BID for diuresis.    ASSESSED NUTRITION NEEDS:  Dosing Weight: 95.5 kg for energy (5/18), 45 kg for protein  Estimated Energy Needs: 9842-0810 kcals (11-14 Kcal/Kg)  Justification: obese and vented  Estimated Protein Needs:  grams protein (2-2.5 g pro/Kg)  Justification: obesity guidelines     NEW FINDINGS:   5/26:  WOCN = All skin issues improving or resolved:   Pannus ITD resolved, Left groin intact but denuded, Right groin wound slowly resurfacing showing improvment, right thigh hematoma remains palpable with dark discoloration but decreased in size in the last 2 weeks    Norepinephrine - for  hypotension  Unable to see patient today for Nutrition Focused Physical Assessment.  Plan care conference today.    Previous Goals (5/24)   TF + Prosource + Propofol will meet % estimated needs  Evaluation: Met    Previous Nutrition Diagnosis (5/24):   Excessive energy intake related to high Propofol rate as evidenced by combination TF + Propofol meeting 154% energy needs.  Evaluation:  Resolved      CURRENT NUTRITION DIAGNOSIS  No nutrition diagnosis identified at this time     INTERVENTIONS  Recommendations / Nutrition Prescription  Continue same TF regimen as above    Implementation  Collaboration and Referral of Nutrition care - Pt was discussed during ICU interdisciplinary rounds this morning    Goals  TF + Prosource + Propofol will continue to meet % estimated needs    MONITORING AND EVALUATION:  Progress towards goals will be monitored and evaluated per protocol and Practice Guidelines    Wanda Porter, ROSELYN, LD, CNSC

## 2021-05-28 NOTE — DISCHARGE SUMMARY
[facility] Discharge Summary    Sarahy Dunbar MRN# [id number]   YOB: 1955 Age: 66 year old     Date of Admission:  2021  Date of Discharge:  2021  Admitting Physician:  Bucky Houser MD  Discharge Physician:  Leonard Waters MD  Discharging Service:  Critical Care     Home clinic: Unknown  Primary Provider: Rama Melgar          Admission Diagnoses:   Acute respiratory failure with hypoxemia (H) [J96.01]  COVID-19          Discharge Diagnosis:     COVID-19             Discharge Disposition:     Patient  during this admission           Condition on Discharge:     Discharge condition:    Discharge vitals: n/a   Code status on discharge: Comfort Care           Procedures / Labs / Imaging:   See extensive hospital course for full details          Medications Prior to Admission:     Medications Prior to Admission   Medication Sig Dispense Refill Last Dose     amLODIPine (NORVASC) 5 MG tablet Take 5 mg by mouth daily   unknown     metoprolol succinate ER (TOPROL-XL) 50 MG 24 hr tablet Take 75 mg by mouth daily   unknown     multivitamin, therapeutic (THERA-VIT) TABS tablet Take 1 tablet by mouth daily   unknown     naproxen sodium (ANAPROX) 220 MG tablet Take 220 mg by mouth 2 times daily as needed for moderate pain   prn             Discharge Medications:   No medications were prescribed on discharge          Consultations:     General surgery  Infectious disease  Palliative care             Brief History of Illness:   Please see full admission H&P from 2021.            Hospital Course:     Patient was admitted with COVID-19 pneumonia on  and subsequently needed mechanical ventilation on 4/15.  Initially, required prone ventilation but has been supine for the better part of 3 weeks.  Her hospital course has been complicated by worsening lung compliance, worsening ventilator defects, inability to wean sedation and ventilator  associate pneumonia requiring multiple courses of antibiotics.  Additional complication includes a large groin hematoma related to the articular line the required multiple transfusions multiple weeks ago.  She subsequently underwent tracheostomy and PEG tube placement on .  Over the past week, she continued to require sedation for ventilator synchrony with little improvement in ventilator needs.    Extensive family conversations over the past few weeks have discussed ongoing goals of care.  During a care conference today, discussion with her  Ruddy as well as sister Madison and brother-in-law Ruben regarding lack of improvement over the past few weeks.  After discussion, her  Ruddy has elected to move to comfort cares.    Patient  at 17:52.             Significant Results:     None             Pending Results:     None           Discharge Instructions and Follow-Up:     Discharge diet: None   Discharge activity: None   Discharge follow-up: None   Outpatient therapy: None    Home Care agency: None    Supplies and equipment: None   Lines and drains: None    Wound care: None   Other instructions: None      Leonard Waters MD  Pulmonary & Critical Care Medicine  [facility]   Pager: [phone]

## 2021-05-29 NOTE — PROGRESS NOTES
Pt transitioned to comfort cares at 1651, comfort medication provided. Time of death 1752.  updated by phone. [employer] and [facility] notified. Belongings to be sent with pt to nursing home.

## 2021-06-05 LAB — GLUCOSE BLDC GLUCOMTR-MCNC: 88 MG/DL (ref 70–99)

## 2021-07-17 NOTE — PLAN OF CARE
Pt on PST for most of shift, tolerating well. Prop weaned down as able, appears calm/comfortable. Levo weaned slightly, BPs soft at times. Open eyes to voice but not following commands. Copious mucousy secretions from trach site, dressing changed PRN. Good output per brown. Water stool per rectal tube, senna held.  updated on tablet at bedside. Plan for care conference Fri.        Alert and oriented to person, place and time

## 2021-09-02 NOTE — PLAN OF CARE
Chief Complaint/Referring Provider: shortness of breath       Interval History:   Patient's acute bronchitis better after abx. She is here to discuss restarting cellcept. She tells me she clearly did better when on cellcept and only stopped it when she was worried about immunosuppression during COVID prior to vaccine. She is now vaccinated. She is requesting to resume cellcept. Presenting HPI per Dr. Ceja Frame: 4/17/17 45-pack-year tobacco but excellent exercise tolerance. She ran a 10K less than 1 year ago. However, beginning in November 2016, this patient has been bothered by progressive and now severe dyspnea on exertion associated with cough productive of yellow sputum. Where as previously she could jog several miles, she is now short of breath walking a significant distance and a parking lot. She was treated with several courses of antibiotics and a single course of oral steroids. She tells me she definitely was transiently better when on the oral steroids. She also thinks that sometimes the antibiotics have helped. She was given an inhaler which resulted in no benefit. She had 2 chest x-rays which showed persistent abnormality and that resulted in CT CHEST @ [facility] Last on April 7, 2017 that showed upper lobe predominant groundglass infiltrates; patient is here for evaluation and treatment of the same       Physical Exam:  Blood pressure (!) 122/54, pulse 80, resp. rate 16, height 5' 6\" (1.676 m), weight 142 lb (64.4 kg), SpO2 95 %, not currently breastfeeding.'  Gen: In no acute distress. Comfortable. Eyes: PERRL. No sclera icterus. No conjunctival injection. ENT: No ocular or auricular discharge. Mask in place  Neck: Trachea midline. Resp: No accessory muscle use. Few basilar crackles . No wheezes. No rhonchi. No dullness on percussion. CV: Regular rate. Regular rhythm. No murmur or rub. Normal S1 and S2. No edema  GI: Abdomen non-tender. Non-distended. No masses.    Skin: Warm and Problem: Patient Care Overview  Goal: Plan of Care/Patient Progress Review  Outcome: Improving  Patient A&Ox4, VSS , RA. CMS intact, dressing CDI. Immobilizer on @ HS. Up with 1& walker to BR. Pain managed with tylenol & Po dilaudid x1. With some relief. Pt progressing per plan of care.        dry. No nodules on exposed extremities. No rash on exposed extremities. Lymph: No cervical LAD. No supraclavicular LAD. M/S: No cyanosis. No synovitis or joint deformity. No clubbing. Neuro: Cranial nerves are grossly intact. Moving all extremities. Motor and sensation grossly intact. Data:   Chest CT 11/8/19: There is worsening peribronchovascular  ground-glass opacities noted throughout both lungs.  This is significantly  worsened in the lower lobes compared to prior examination.  This is  associated with bronchiolectasis and bronchiectasis. There is overall  progression compared to prior examination. Chris Ibrahima is chronic subpleural fat  hypertrophy in the left lung base likely from sequela of prior  infectious/inflammatory process, stable.  No pleural effusion or  pneumothorax.  The central airways are clear.  The peribronchovascular  abnormalities persist on prone and expiration imaging.  No evidence of mosaic  ground-glass attenuation to suggest air trapping on inspiration/expiration  imaging.         PFT 5/3/17 FEV1 2.28 L 93% FVC 2.86 L 88% TLC 5.22 L 97% DLCO 12.74 54%  PFTs 1/2/18  FVC 3.03 (95%) FEV1 2.23 (92%) FEV1/FVC ratio    74%   TLC 4.74 (88%) DLCO 12.96 (55%)   PFTs 7/25/18  FVC 2.88 (90%) FEV1 2.23 (92%) FEV1/FVC ratio  77%  TLC 4.29 (80%) DLCO 14.14 (60%)   PFTs 10/23/18  FVC 3.05 (96%) FEV1 2.27 (95%) FEV1/FVC ratio 74%  TLC 4.50 (84%) DLCO 14.37 (62%)   PFTs 4/29/19  FVC 2.76 (87%) FEV1 2.16 (90%) FEV1/FVC ratio  78% TLC 4.04 (75%) DLCO 13.73 (59%)   PFTs 10/30/19 FVC 2.39 (76%) FEV1 1.84 (65%) FEV1/FVC ratio  77%  TLC 3.47 (65%) DLCO 8.47 (36%)   PFTs 2/10/20  FVC 2.94 (94%) FEV1 2.22 (94%) FEV1/FVC ratio  75%  TLC 3.57 (67%) DLCO 11.31 (49%)   PFTs 7/9/21 FVC 2.28 (74%) FEV1 1.60 (69%) FEV1/FVC ratio 70%  TLC 3.36 (63%) DLCO 9.06 (39%)     Fiberoptic bronchoscopy 2017   Viral cx negative by rapid screen   Resp Cx NRF    Cell ct 30N, 21L, 39 M, 3E   Tracheal nodule biopsy: resp mucosa with chronic inflammation, no granuloma or malignant cells   Cytology: BAL/Washings show no malignancy, no fungus, no PCP, + Actinomyces    Serologies are negative with exception of:   SSA 52 (Ro) + @ 140 (0-40 range)   U2Sn RNP antibody - weak positive  CRP 3.8  RF 21    Assessment:  · ILD, I favor CT-ILD, less likely cryptogenic organizing pneumonia  vs follicular bronchiolitis  · 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 Patient requested to restart  · CBC diff and CMP now and in 3 months     .

## 2024-12-26 NOTE — PROGRESS NOTES
Thoracic Surgery:    65-yr-old female admitted 4/13/21 with acute respiratory failure/ARDS, COVID-19 (positive 4/13/21), intubated that same day    PEEP 10  Fi02 50%    Still in isolation despite being over one month out from initial COVID-positive test. Called to discuss with Dr. Morejon. She discussed patient with Infection Control, who are not at this time lifting her isolation status due to her significant vent settings/support needs and to protect other staff and patients. Additionally, they advised that if patient needs a tracheostomy then her Covid status should not preclude our proceeding with it. I will need to discuss with Dr. Huynh and Dr. Mathis/Bobby to plan for OR logistics. Tentatively will do tomorrow midmorning-- will call patient's , Ruddy, for consent tomorrow morning, as her is not answering currently. Hold tube feeds at midnight and Hold Lovenox now.     D/W RN    Patient on Lovenox  INR 1.08 on 4/20/21  Hgb: 8.3 today    Weight 224 lbs    Past Surgical History:   Procedure Laterality Date     ARTHROPLASTY KNEE Left 9/6/2016    Procedure: ARTHROPLASTY KNEE;  Surgeon: Amy Landers MD;  Location:  OR     ARTHROPLASTY KNEE Right 4/3/2018    Procedure: ARTHROPLASTY KNEE;  RIGHT TOTAL KNEE ARTHROPLASTY ;  Surgeon: Amy Landers MD;  Location:  OR     ARTHROSCOPY ANKLE  2/2/2012    Procedure:ARTHROSCOPY ANKLE; LEFT ANKLE ARTHROSCOPIC IRRIGATION AND DEBRIDEMENT, WITH HARDWARE REMOVAL (C-ARM); Surgeon:TONYA CAAL; Location: OR     ORTHOPEDIC SURGERY      Left tib/fib fracture     REMOVE HARDWARE ANKLE  2/2/2012    Procedure:REMOVE HARDWARE ANKLE; Surgeon:TONYA CAAL; Location: OR       Yessenia Crocker PA-C with Dr. Ernie Huynh  [facility]  Cell [phone]       Yes, agree.

## 2025-05-19 NOTE — PROCEDURES
[facility]    Triple Lumen PICC Placement    Date/Time: 5/13/2021 2:50 PM  Performed by: Janet York RN  Authorized by: Jd Augustine MD     UNIVERSAL PROTOCOL   Site Marked: Yes  Prior Images Obtained and Reviewed:  Yes  Required items: Required blood products, implants, devices and special equipment available    Patient identity confirmed:  Arm band  NA - No sedation, light sedation, or local anesthesia  Confirmation Checklist:  Patient's identity using two indicators, relevant allergies, procedure was appropriate and matched the consent or emergent situation and correct equipment/implants were available  Time out: Immediately prior to the procedure a time out was called    Universal Protocol: the Joint Commission Universal Protocol was followed    Preparation: Patient was prepped and draped in usual sterile fashion           ANESTHESIA    Anesthesia: See MAR for details  Local Anesthetic:  Lidocaine 1% without epinephrine      SEDATION    Patient Sedated: No        Preparation: skin prepped with ChloraPrep  Skin prep agent: skin prep agent completely dried prior to procedure  Sterile barriers: maximum sterile barriers were used: cap, mask, sterile gown, sterile gloves, and large sterile sheet  Hand hygiene: hand hygiene performed prior to central venous catheter insertion  Type of line used: Power PICC  Catheter type: triple lumen  Catheter size: 5 Fr  Brand: Bard  Lot number: [id number]  Placement method: MST  Number of attempts: 1  Successful placement: yes  Orientation: right  Location: basilic vein  Arm circumference: adults 10 cm  Extremity circumference: 37  Visible catheter length: 4  Internal length: 41 cm  Total catheter length: 45  Dressing and securement: blood cleaned with CHG, chlorhexidine patch applied, occlusive dressing applied and securement device  Post procedure assessment: blood return through all ports  PROCEDURE   Patient Tolerance:  Patient  tolerated the procedure well with no immediate complications             No

## (undated) DEVICE — SU PROLENE 2-0 RB-1DA 36" 8559H

## (undated) DEVICE — SOL NACL 0.9% IRRIG 1000ML BOTTLE 2F7124

## (undated) DEVICE — TUBING SUCTION MEDI-VAC SOFT 3/16"X20' N520A

## (undated) DEVICE — GLOVE PROTEXIS W/NEU-THERA 7.5  2D73TE75

## (undated) DEVICE — BLADE SAW SAGITTAL STRK 19.5X95X1.27MM 2108-109-000S15

## (undated) DEVICE — DRAPE MINOR PROCEDURE LAP 29496

## (undated) DEVICE — MANIFOLD NEPTUNE 4 PORT 700-20

## (undated) DEVICE — BONE CEMENT MIXEVAC III HI VAC KIT  0206-015-000

## (undated) DEVICE — ESU GROUND PAD UNIVERSAL W/O CORD

## (undated) DEVICE — GLOVE PROTEXIS W/NEU-THERA 6.5  2D73TE65

## (undated) DEVICE — SURGICEL FIBRILLAR HEMOSTAT 1"X2" 1961

## (undated) DEVICE — Device

## (undated) DEVICE — GLOVE PROTEXIS BLUE W/NEU-THERA 7.0  2D73EB70

## (undated) DEVICE — GLOVE PROTEXIS POWDER FREE 6.5 ORTHOPEDIC 2D73ET65

## (undated) DEVICE — ESU PENCIL W/HOLSTER E2350H

## (undated) DEVICE — SYR 10ML SLIP TIP W/O NDL

## (undated) DEVICE — JELLY LUBRICATING SURGILUBE 4OZ TUBE

## (undated) DEVICE — LINEN GOWN OVERSIZE 5408

## (undated) DEVICE — SU VICRYL 2-0 CT-1 27" UND J259H

## (undated) DEVICE — BONE CLEANING TIP INTERPULSE  0210-010-000

## (undated) DEVICE — BLADE SAW RECIP STRK 70X12.5X1.2MM 0277-096-281

## (undated) DEVICE — ESU ELEC BLADE 2.75" COATED/INSULATED E1455

## (undated) DEVICE — LINEN TOWEL PACK X5 5464

## (undated) DEVICE — CONNECTOR FUNNEL TRANSITION ENFIT F00106

## (undated) DEVICE — SU ETHILON 3-0 FS-1 18" 669H

## (undated) DEVICE — SU ETHIBOND 0 CTX CR  8X18" CX31D

## (undated) DEVICE — DRSG ADAPTIC 3X8" 6113

## (undated) DEVICE — PACK MINOR SBA15MIFSE

## (undated) DEVICE — IMM KNEE 20" 0814-2660

## (undated) DEVICE — GOWN IMPERVIOUS ZONED LG

## (undated) DEVICE — DRAIN ROUND W/RESERV KIT JACKSON PRATT 10FR 400ML SU130-402D

## (undated) DEVICE — DRAIN SYSTEM ENTERAL W/ENFIT CONNECTORS 250ML EDS-250

## (undated) DEVICE — SU ETHIBOND 1 CTX 30" X865H

## (undated) DEVICE — PREP CHLORAPREP 26ML TINTED ORANGE  260815

## (undated) DEVICE — TOURNIQUET CUFF 42" REPRO MAROON 60-7070-107

## (undated) DEVICE — SUCTION IRR SYSTEM W/O TIP INTERPULSE HANDPIECE 0210-100-000

## (undated) DEVICE — PREP CHLORAPREP W/ORANGE TINT 10.5ML 930715

## (undated) DEVICE — WRAP EZY KNEE

## (undated) DEVICE — TUBE GASTROSTOMY MIC PERC PULL PEG KIT ENFIT 20FR 8160-20

## (undated) DEVICE — PACK TOTAL KNEE SOP15TKFSD

## (undated) DEVICE — DRSG DRAIN 4X4" 7086

## (undated) RX ORDER — LIDOCAINE HYDROCHLORIDE 20 MG/ML
INJECTION, SOLUTION EPIDURAL; INFILTRATION; INTRACAUDAL; PERINEURAL
Status: DISPENSED
Start: 2018-04-03

## (undated) RX ORDER — PROPOFOL 10 MG/ML
INJECTION, EMULSION INTRAVENOUS
Status: DISPENSED
Start: 2018-04-03

## (undated) RX ORDER — FENTANYL CITRATE 50 UG/ML
INJECTION, SOLUTION INTRAMUSCULAR; INTRAVENOUS
Status: DISPENSED
Start: 2021-01-01

## (undated) RX ORDER — ONDANSETRON 2 MG/ML
INJECTION INTRAMUSCULAR; INTRAVENOUS
Status: DISPENSED
Start: 2018-04-03

## (undated) RX ORDER — CEFAZOLIN SODIUM 2 G/100ML
INJECTION, SOLUTION INTRAVENOUS
Status: DISPENSED
Start: 2018-04-03

## (undated) RX ORDER — NEOSTIGMINE METHYLSULFATE 1 MG/ML
VIAL (ML) INJECTION
Status: DISPENSED
Start: 2018-04-03

## (undated) RX ORDER — ACETAMINOPHEN 500 MG
TABLET ORAL
Status: DISPENSED
Start: 2018-04-03

## (undated) RX ORDER — HYDROMORPHONE HYDROCHLORIDE 1 MG/ML
INJECTION, SOLUTION INTRAMUSCULAR; INTRAVENOUS; SUBCUTANEOUS
Status: DISPENSED
Start: 2018-04-03

## (undated) RX ORDER — FENTANYL CITRATE 50 UG/ML
INJECTION, SOLUTION INTRAMUSCULAR; INTRAVENOUS
Status: DISPENSED
Start: 2018-04-03

## (undated) RX ORDER — PANTOPRAZOLE SODIUM 40 MG/1
TABLET, DELAYED RELEASE ORAL
Status: DISPENSED
Start: 2018-04-03

## (undated) RX ORDER — DEXAMETHASONE SODIUM PHOSPHATE 4 MG/ML
INJECTION, SOLUTION INTRA-ARTICULAR; INTRALESIONAL; INTRAMUSCULAR; INTRAVENOUS; SOFT TISSUE
Status: DISPENSED
Start: 2018-04-03

## (undated) RX ORDER — GLYCOPYRROLATE 0.2 MG/ML
INJECTION, SOLUTION INTRAMUSCULAR; INTRAVENOUS
Status: DISPENSED
Start: 2018-04-03

## (undated) RX ORDER — OXYCODONE HCL 10 MG/1
TABLET, FILM COATED, EXTENDED RELEASE ORAL
Status: DISPENSED
Start: 2018-04-03